# Patient Record
Sex: FEMALE | Race: WHITE | Employment: OTHER | RURAL
[De-identification: names, ages, dates, MRNs, and addresses within clinical notes are randomized per-mention and may not be internally consistent; named-entity substitution may affect disease eponyms.]

---

## 2017-03-31 ENCOUNTER — HOSPITAL ENCOUNTER (OUTPATIENT)
Dept: GENERAL RADIOLOGY | Age: 72
Discharge: HOME | End: 2017-03-31
Attending: NURSE PRACTITIONER | Admitting: NURSE PRACTITIONER

## 2017-03-31 DIAGNOSIS — Z12.31 ENCOUNTER FOR MAMMOGRAM TO ESTABLISH BASELINE MAMMOGRAM: ICD-10-CM

## 2018-04-02 ENCOUNTER — HOSPITAL ENCOUNTER (OUTPATIENT)
Dept: GENERAL RADIOLOGY | Age: 73
Discharge: HOME | End: 2018-04-02
Admitting: NURSE PRACTITIONER

## 2018-04-02 DIAGNOSIS — M81.0 AGE-RELATED OSTEOPOROSIS WITHOUT CURRENT PATHOLOGICAL FRACTURE: ICD-10-CM

## 2018-04-02 DIAGNOSIS — Z12.39 BREAST CANCER SCREENING: ICD-10-CM

## 2018-04-25 RX ORDER — SIMVASTATIN 20 MG
20 TABLET ORAL NIGHTLY
COMMUNITY

## 2018-04-25 RX ORDER — LEVOTHYROXINE SODIUM 0.07 MG/1
75 TABLET ORAL DAILY
COMMUNITY

## 2018-04-25 RX ORDER — ALENDRONATE SODIUM 70 MG/1
70 TABLET ORAL
COMMUNITY

## 2018-04-25 RX ORDER — BUDESONIDE AND FORMOTEROL FUMARATE DIHYDRATE 80; 4.5 UG/1; UG/1
2 AEROSOL RESPIRATORY (INHALATION)
Status: ON HOLD | COMMUNITY
End: 2018-11-01 | Stop reason: ALTCHOICE

## 2018-04-26 ENCOUNTER — OFFICE VISIT (OUTPATIENT)
Dept: SURGERY | Facility: CLINIC | Age: 73
End: 2018-04-26

## 2018-04-26 VITALS
SYSTOLIC BLOOD PRESSURE: 154 MMHG | HEART RATE: 91 BPM | WEIGHT: 210.54 LBS | DIASTOLIC BLOOD PRESSURE: 63 MMHG | TEMPERATURE: 98.4 F | OXYGEN SATURATION: 93 %

## 2018-04-26 DIAGNOSIS — K21.9 GASTROESOPHAGEAL REFLUX DISEASE, ESOPHAGITIS PRESENCE NOT SPECIFIED: ICD-10-CM

## 2018-04-26 DIAGNOSIS — K62.5 RECTAL BLEED: Primary | ICD-10-CM

## 2018-04-26 PROCEDURE — 99204 OFFICE O/P NEW MOD 45 MIN: CPT | Performed by: SURGERY

## 2018-04-26 RX ORDER — POLYETHYLENE GLYCOL 3350 17 G/17G
238 POWDER, FOR SOLUTION ORAL ONCE
Qty: 14 PACKET | Refills: 0 | Status: SHIPPED | OUTPATIENT
Start: 2018-04-26 | End: 2018-04-26

## 2018-04-26 RX ORDER — PHENOL 1.4 %
600 AEROSOL, SPRAY (ML) MUCOUS MEMBRANE DAILY
COMMUNITY

## 2018-04-26 RX ORDER — BISACODYL 5 MG/1
5 TABLET, DELAYED RELEASE ORAL DAILY PRN
Qty: 4 TABLET | Refills: 0 | Status: SHIPPED | OUTPATIENT
Start: 2018-04-26 | End: 2018-04-27

## 2018-04-26 NOTE — PROGRESS NOTES
Patient: Maura Jiménez    YOB: 1945    Date: 04/26/2018    Primary Care Provider: BRITTNY De La Cruz    Chief Complaint   Patient presents with   • Rectal Bleeding       Subjective .     History of present illness:  I saw the patient in the office today as a consultation for evaluation and treatment of rectal bleeding.  She has noticed blood on the toilet paper when she has hemorrhoids troubles.  She has also had a positive hemoccult test.  She denies family history of colon cancer and has never had a colonoscopy before.  She denies diarrhea, constipation, or abdominal pain. Patient also had epigastric pain and reflux symptoms. No previous EGD.    The following portions of the patient's history were reviewed and updated as appropriate: allergies, current medications, past family history, past medical history, past social history, past surgical history and problem list.      Review of Systems   Constitutional: Negative for chills, fever and unexpected weight change.   HENT: Negative for hearing loss, trouble swallowing and voice change.    Eyes: Negative for visual disturbance.   Respiratory: Negative for apnea, cough, chest tightness, shortness of breath and wheezing.    Cardiovascular: Negative for chest pain, palpitations and leg swelling.   Gastrointestinal: Positive for blood in stool. Negative for abdominal distention, abdominal pain, anal bleeding, constipation, diarrhea, nausea, rectal pain and vomiting.   Endocrine: Negative for cold intolerance and heat intolerance.   Genitourinary: Negative for difficulty urinating, dysuria and flank pain.   Musculoskeletal: Negative for back pain and gait problem.   Skin: Negative for color change, rash and wound.   Neurological: Negative for dizziness, syncope, speech difficulty, weakness, light-headedness, numbness and headaches.   Hematological: Negative for adenopathy. Does not bruise/bleed easily.   Psychiatric/Behavioral: Negative for  confusion. The patient is not nervous/anxious.        History:  Past Medical History:   Diagnosis Date   • Diabetes mellitus    • Disease of thyroid gland    • Hyperlipidemia    • Osteoarthritis        Past Surgical History:   Procedure Laterality Date   • APPENDECTOMY     • HYSTERECTOMY         Family History   Problem Relation Age of Onset   • Heart disease Father    • Diabetes Brother        Social History   Substance Use Topics   • Smoking status: Former Smoker   • Smokeless tobacco: Never Used   • Alcohol use No       Allergies:  Allergies   Allergen Reactions   • Codeine Unknown (See Comments)     unknown   • Shellfish-Derived Products Nausea And Vomiting       Medications:    Current Outpatient Prescriptions:   •  calcium carbonate (OS-VASU) 600 MG tablet, Take 600 mg by mouth Daily., Disp: , Rfl:   •  alendronate (FOSAMAX) 70 MG tablet, Take 70 mg by mouth Every 7 (Seven) Days., Disp: , Rfl:   •  bisacodyl (DULCOLAX) 5 MG EC tablet, Take 1 tablet by mouth Daily As Needed for Constipation for up to 1 day. Take as directed for colonoscopy prep, Disp: 4 tablet, Rfl: 0  •  budesonide-formoterol (SYMBICORT) 80-4.5 MCG/ACT inhaler, Inhale 2 puffs 2 (Two) Times a Day., Disp: , Rfl:   •  levothyroxine (SYNTHROID, LEVOTHROID) 75 MCG tablet, Take 75 mcg by mouth Daily., Disp: , Rfl:   •  mometasone-formoterol (DULERA 200) 200-5 MCG/ACT inhaler, Inhale 2 puffs 2 (Two) Times a Day., Disp: , Rfl:   •  polyethylene glycol (MIRALAX) packet, Take 238 g by mouth 1 (One) Time for 1 dose., Disp: 14 packet, Rfl: 0  •  simvastatin (ZOCOR) 20 MG tablet, Take 20 mg by mouth Every Night., Disp: , Rfl:   •  tiotropium (SPIRIVA) 18 MCG per inhalation capsule, Place 1 capsule into inhaler and inhale Daily., Disp: , Rfl:     Objective     Vital Signs:   Temp:  [98.4 °F (36.9 °C)] 98.4 °F (36.9 °C)  Heart Rate:  [91] 91  BP: (154)/(63) 154/63    Physical Exam:   General Appearance:    Alert, cooperative, in no acute distress   Head:     Normocephalic, without obvious abnormality, atraumatic   Eyes:            Lids and lashes normal, conjunctivae and sclerae normal, no   icterus, no pallor, corneas clear, PERRL   Ears:    Ears appear intact with no abnormalities noted   Throat:   No oral lesions, no thrush, oral mucosa moist   Neck:   No adenopathy, supple, trachea midline, no thyromegaly,  no JVD   Lungs:     Clear to auscultation,respirations regular, even and                  unlabored    Heart:    Regular rhythm and normal rate, normal S1 and S2, no            murmur   Abdomen:     no masses, no organomegaly, soft non-tender, non-distended, no guarding, there is no evidence of tenderness   Extremities:   Moves all extremities well, no edema, no cyanosis, no             redness   Pulses:   Pulses palpable and equal bilaterally   Skin:   No bleeding, bruising or rash   Lymph nodes:   No palpable adenopathy   Neurologic:   Cranial nerves 2 - 12 grossly intact, sensation intact      Results Review:   I reviewed the patient's new clinical results.    Review of Systems was reviewed and confirmed as accurate today.    Assessment/Plan :    1. Rectal bleed    2. Gastroesophageal reflux disease, esophagitis presence not specified        I recommend a colonoscopy And EGD for further evaluation. The procedure was explained as well as the risks which include but are not limited to bleeding, infection, perforation, abdominal pain etc. The patient understands these risks and the procedure and wishes to proceed.     Electronically signed by Arnulfo Sosa MD  04/26/18 9:39 AM  .crystal

## 2018-05-03 ENCOUNTER — OUTSIDE FACILITY SERVICE (OUTPATIENT)
Dept: SURGERY | Facility: CLINIC | Age: 73
End: 2018-05-03

## 2018-05-03 PROCEDURE — 43239 EGD BIOPSY SINGLE/MULTIPLE: CPT | Performed by: SURGERY

## 2018-05-03 PROCEDURE — 45378 DIAGNOSTIC COLONOSCOPY: CPT | Performed by: SURGERY

## 2018-05-10 ENCOUNTER — OFFICE VISIT (OUTPATIENT)
Dept: SURGERY | Facility: CLINIC | Age: 73
End: 2018-05-10

## 2018-05-10 VITALS
WEIGHT: 210.54 LBS | HEART RATE: 75 BPM | DIASTOLIC BLOOD PRESSURE: 61 MMHG | OXYGEN SATURATION: 97 % | TEMPERATURE: 97.5 F | SYSTOLIC BLOOD PRESSURE: 131 MMHG

## 2018-05-10 DIAGNOSIS — K21.00 GASTROESOPHAGEAL REFLUX DISEASE WITH ESOPHAGITIS: Primary | ICD-10-CM

## 2018-05-10 DIAGNOSIS — R10.13 EPIGASTRIC PAIN: ICD-10-CM

## 2018-05-10 PROCEDURE — 99212 OFFICE O/P EST SF 10 MIN: CPT | Performed by: SURGERY

## 2018-05-10 RX ORDER — OMEPRAZOLE 40 MG/1
40 CAPSULE, DELAYED RELEASE ORAL DAILY
Qty: 30 CAPSULE | Refills: 11 | Status: SHIPPED | OUTPATIENT
Start: 2018-05-10 | End: 2019-05-10

## 2018-05-10 NOTE — PROGRESS NOTES
Patient: Maura Jiménez    YOB: 1945    Date: 05/10/2018    Primary Care Provider: BRITTNY De La Cruz    Reason for Consultation: Follow-up colonoscopy    Chief Complaint   Patient presents with   • Follow-up     EGD and colonoscopy       History of present illness:  I saw the patient in the office today as a followup from their recent colonoscopy and EGD with polypectomy, the pathology report did show minimal chronic gastritis and acute esophagitis.  They state that they have done well and are having no complaints.Patient is not on a PPI medication. No findings on colonoscopy.    The following portions of the patient's history were reviewed and updated as appropriate: allergies, current medications, past family history, past medical history, past social history, past surgical history and problem list.      Review of Systems   Constitutional: Negative for chills, fatigue and fever.   Respiratory: Negative for cough.    Cardiovascular: Negative for chest pain.   Gastrointestinal: Negative for abdominal pain, diarrhea, nausea and vomiting.       Allergies:  Allergies   Allergen Reactions   • Codeine Unknown (See Comments)     unknown   • Shellfish-Derived Products Nausea And Vomiting       Medications:    Current Outpatient Prescriptions:   •  alendronate (FOSAMAX) 70 MG tablet, Take 70 mg by mouth Every 7 (Seven) Days., Disp: , Rfl:   •  budesonide-formoterol (SYMBICORT) 80-4.5 MCG/ACT inhaler, Inhale 2 puffs 2 (Two) Times a Day., Disp: , Rfl:   •  calcium carbonate (OS-VASU) 600 MG tablet, Take 600 mg by mouth Daily., Disp: , Rfl:   •  levothyroxine (SYNTHROID, LEVOTHROID) 75 MCG tablet, Take 75 mcg by mouth Daily., Disp: , Rfl:   •  mometasone-formoterol (DULERA 200) 200-5 MCG/ACT inhaler, Inhale 2 puffs 2 (Two) Times a Day., Disp: , Rfl:   •  simvastatin (ZOCOR) 20 MG tablet, Take 20 mg by mouth Every Night., Disp: , Rfl:   •  tiotropium (SPIRIVA) 18 MCG per inhalation capsule, Place 1  capsule into inhaler and inhale Daily., Disp: , Rfl:     Vital Signs:  Temp:  [97.5 °F (36.4 °C)] 97.5 °F (36.4 °C)  Heart Rate:  [75] 75  BP: (131)/(61) 131/61    Physical Exam:   General Appearance:    Alert, cooperative, in no acute distress   Abdomen:     no masses, no organomegaly, soft non-tender, non-distended, no guarding, wounds are well healed, no evidence of recurrent hernia   Chest:      Clear to ausculation            Cor:     Regular rate and rhythm    Results Review:   I reviewed the patient's new clinical results.    Assessment / Plan:    1. Gastroesophageal reflux disease with esophagitis    2. Epigastric pain        I did discuss the situation with the patient today in the office and they have done well from their recent colonoscopy with polypectomy.  I have released the patient back to normal activity.  I need to see the patient back in the office in 5 years and they will need to have repeat colonoscopy at that time.Patient will also be started on Prilosec daily and needs repeat EGD in 1 year.     Electronically signed by Arnulfo Sosa MD  05/10/18    Scribed for Arnulfo Sosa MD by Michelle Milligan. 5/10/2018  9:54 AM

## 2018-10-31 ENCOUNTER — APPOINTMENT (OUTPATIENT)
Dept: GENERAL RADIOLOGY | Facility: HOSPITAL | Age: 73
End: 2018-10-31

## 2018-10-31 ENCOUNTER — HOSPITAL ENCOUNTER (INPATIENT)
Facility: HOSPITAL | Age: 73
LOS: 4 days | Discharge: HOME | End: 2018-11-05
Attending: EMERGENCY MEDICINE | Admitting: INTERNAL MEDICINE

## 2018-10-31 DIAGNOSIS — J96.11 CHRONIC RESPIRATORY FAILURE WITH HYPOXIA (HCC): ICD-10-CM

## 2018-10-31 DIAGNOSIS — J96.91 RESPIRATORY FAILURE WITH HYPOXIA, UNSPECIFIED CHRONICITY (HCC): Primary | ICD-10-CM

## 2018-10-31 LAB
ALBUMIN SERPL-MCNC: 4.2 G/DL (ref 3.5–5)
ALBUMIN/GLOB SERPL: 1.3 G/DL (ref 1–2)
ALP SERPL-CCNC: 98 U/L (ref 38–126)
ALT SERPL W P-5'-P-CCNC: 28 U/L (ref 13–69)
ANION GAP SERPL CALCULATED.3IONS-SCNC: 13.6 MMOL/L (ref 10–20)
ARTERIAL PATENCY WRIST A: ABNORMAL
AST SERPL-CCNC: 40 U/L (ref 15–46)
ATMOSPHERIC PRESS: 732 MMHG
BASE EXCESS BLDA CALC-SCNC: 2.5 MMOL/L (ref 0–2)
BASOPHILS # BLD AUTO: 0.03 10*3/MM3 (ref 0–0.2)
BASOPHILS NFR BLD AUTO: 0.3 % (ref 0–2.5)
BDY SITE: ABNORMAL
BILIRUB SERPL-MCNC: 0.5 MG/DL (ref 0.2–1.3)
BUN BLD-MCNC: 9 MG/DL (ref 7–20)
BUN/CREAT SERPL: 15 (ref 7.1–23.5)
CALCIUM SPEC-SCNC: 9.2 MG/DL (ref 8.4–10.2)
CHLORIDE SERPL-SCNC: 99 MMOL/L (ref 98–107)
CO2 SERPL-SCNC: 27 MMOL/L (ref 26–30)
COHGB MFR BLD: 0.8 % (ref 0–2)
CREAT BLD-MCNC: 0.6 MG/DL (ref 0.6–1.3)
D-LACTATE SERPL-SCNC: 1.7 MMOL/L (ref 0.5–2)
DEPRECATED RDW RBC AUTO: 45.3 FL (ref 37–54)
EOSINOPHIL # BLD AUTO: 0.02 10*3/MM3 (ref 0–0.7)
EOSINOPHIL NFR BLD AUTO: 0.2 % (ref 0–7)
ERYTHROCYTE [DISTWIDTH] IN BLOOD BY AUTOMATED COUNT: 12.8 % (ref 11.5–14.5)
FLUAV AG NPH QL: NEGATIVE
FLUBV AG NPH QL IA: NEGATIVE
GFR SERPL CREATININE-BSD FRML MDRD: 98 ML/MIN/1.73
GLOBULIN UR ELPH-MCNC: 3.3 GM/DL
GLUCOSE BLD-MCNC: 251 MG/DL (ref 74–98)
GLUCOSE BLDC GLUCOMTR-MCNC: 272 MG/DL (ref 70–130)
HCO3 BLDA-SCNC: 26.4 MMOL/L (ref 22–28)
HCT VFR BLD AUTO: 39.4 % (ref 37–47)
HCT VFR BLD CALC: 36.2 %
HGB BLD-MCNC: 12.3 G/DL (ref 12–16)
HGB BLDA-MCNC: 11.8 G/DL (ref 12–18)
HOLD SPECIMEN: NORMAL
IMM GRANULOCYTES # BLD: 0.04 10*3/MM3 (ref 0–0.06)
IMM GRANULOCYTES NFR BLD: 0.3 % (ref 0–0.6)
LYMPHOCYTES # BLD AUTO: 0.67 10*3/MM3 (ref 0.6–3.4)
LYMPHOCYTES NFR BLD AUTO: 5.8 % (ref 10–50)
MCH RBC QN AUTO: 30 PG (ref 27–31)
MCHC RBC AUTO-ENTMCNC: 31.2 G/DL (ref 30–37)
MCV RBC AUTO: 96.1 FL (ref 81–99)
METHGB BLD QL: 1 % (ref 0–1.5)
MODALITY: ABNORMAL
MONOCYTES # BLD AUTO: 0.71 10*3/MM3 (ref 0–0.9)
MONOCYTES NFR BLD AUTO: 6.1 % (ref 0–12)
NEUTROPHILS # BLD AUTO: 10.15 10*3/MM3 (ref 2–6.9)
NEUTROPHILS NFR BLD AUTO: 87.3 % (ref 37–80)
NOTE: ABNORMAL
NRBC BLD MANUAL-RTO: 0 /100 WBC (ref 0–0)
NT-PROBNP SERPL-MCNC: 568 PG/ML (ref 0–125)
OXYHGB MFR BLDV: 95.3 % (ref 94–99)
PCO2 BLDA: 37.3 MM HG (ref 35–45)
PCO2 TEMP ADJ BLD: ABNORMAL MM HG (ref 35–45)
PH BLDA: 7.46 PH UNITS (ref 7.3–7.5)
PH, TEMP CORRECTED: ABNORMAL PH UNITS
PLATELET # BLD AUTO: 250 10*3/MM3 (ref 130–400)
PMV BLD AUTO: 10.8 FL (ref 6–12)
PO2 BLDA: 82 MM HG (ref 75–100)
PO2 TEMP ADJ BLD: ABNORMAL MM HG (ref 83–108)
POTASSIUM BLD-SCNC: 3.6 MMOL/L (ref 3.5–5.1)
PROT SERPL-MCNC: 7.5 G/DL (ref 6.3–8.2)
RBC # BLD AUTO: 4.1 10*6/MM3 (ref 4.2–5.4)
SAO2 % BLDCOA: 97 % (ref 94–100)
SODIUM BLD-SCNC: 136 MMOL/L (ref 137–145)
TROPONIN I SERPL-MCNC: <0.012 NG/ML (ref 0–0.03)
VENTILATOR MODE: ABNORMAL
WBC NRBC COR # BLD: 11.62 10*3/MM3 (ref 4.8–10.8)
WHOLE BLOOD HOLD SPECIMEN: NORMAL
WHOLE BLOOD HOLD SPECIMEN: NORMAL

## 2018-10-31 PROCEDURE — 25010000002 CEFTRIAXONE SODIUM-DEXTROSE 1-3.74 GM-%(50ML) RECONSTITUTED SOLUTION: Performed by: EMERGENCY MEDICINE

## 2018-10-31 PROCEDURE — 87804 INFLUENZA ASSAY W/OPTIC: CPT | Performed by: EMERGENCY MEDICINE

## 2018-10-31 PROCEDURE — 82962 GLUCOSE BLOOD TEST: CPT

## 2018-10-31 PROCEDURE — G0378 HOSPITAL OBSERVATION PER HR: HCPCS

## 2018-10-31 PROCEDURE — 93005 ELECTROCARDIOGRAM TRACING: CPT

## 2018-10-31 PROCEDURE — 80053 COMPREHEN METABOLIC PANEL: CPT

## 2018-10-31 PROCEDURE — 25010000002 ENOXAPARIN PER 10 MG: Performed by: INTERNAL MEDICINE

## 2018-10-31 PROCEDURE — 94799 UNLISTED PULMONARY SVC/PX: CPT

## 2018-10-31 PROCEDURE — 83605 ASSAY OF LACTIC ACID: CPT | Performed by: EMERGENCY MEDICINE

## 2018-10-31 PROCEDURE — 94640 AIRWAY INHALATION TREATMENT: CPT

## 2018-10-31 PROCEDURE — 25010000002 METHYLPREDNISOLONE PER 125 MG: Performed by: EMERGENCY MEDICINE

## 2018-10-31 PROCEDURE — 25010000002 AZITHROMYCIN: Performed by: EMERGENCY MEDICINE

## 2018-10-31 PROCEDURE — 63710000001 INSULIN ASPART PER 5 UNITS: Performed by: INTERNAL MEDICINE

## 2018-10-31 PROCEDURE — 83880 ASSAY OF NATRIURETIC PEPTIDE: CPT

## 2018-10-31 PROCEDURE — 83036 HEMOGLOBIN GLYCOSYLATED A1C: CPT | Performed by: INTERNAL MEDICINE

## 2018-10-31 PROCEDURE — 87040 BLOOD CULTURE FOR BACTERIA: CPT | Performed by: EMERGENCY MEDICINE

## 2018-10-31 PROCEDURE — 82375 ASSAY CARBOXYHB QUANT: CPT

## 2018-10-31 PROCEDURE — 71046 X-RAY EXAM CHEST 2 VIEWS: CPT

## 2018-10-31 PROCEDURE — 83050 HGB METHEMOGLOBIN QUAN: CPT

## 2018-10-31 PROCEDURE — 85025 COMPLETE CBC W/AUTO DIFF WBC: CPT

## 2018-10-31 PROCEDURE — 25010000002 METHYLPREDNISOLONE PER 40 MG: Performed by: INTERNAL MEDICINE

## 2018-10-31 PROCEDURE — 82805 BLOOD GASES W/O2 SATURATION: CPT

## 2018-10-31 PROCEDURE — 25810000003 SODIUM CHLORIDE 0.9 % WITH KCL 20 MEQ 20-0.9 MEQ/L-% SOLUTION: Performed by: INTERNAL MEDICINE

## 2018-10-31 PROCEDURE — 99220 PR INITIAL OBSERVATION CARE/DAY 70 MINUTES: CPT | Performed by: INTERNAL MEDICINE

## 2018-10-31 PROCEDURE — 36600 WITHDRAWAL OF ARTERIAL BLOOD: CPT

## 2018-10-31 PROCEDURE — 99284 EMERGENCY DEPT VISIT MOD MDM: CPT

## 2018-10-31 PROCEDURE — 84484 ASSAY OF TROPONIN QUANT: CPT

## 2018-10-31 RX ORDER — ATORVASTATIN CALCIUM 10 MG/1
10 TABLET, FILM COATED ORAL NIGHTLY
Status: DISCONTINUED | OUTPATIENT
Start: 2018-10-31 | End: 2018-11-05 | Stop reason: HOSPADM

## 2018-10-31 RX ORDER — IPRATROPIUM BROMIDE AND ALBUTEROL SULFATE 2.5; .5 MG/3ML; MG/3ML
3 SOLUTION RESPIRATORY (INHALATION)
Status: DISCONTINUED | OUTPATIENT
Start: 2018-10-31 | End: 2018-11-05 | Stop reason: HOSPADM

## 2018-10-31 RX ORDER — PROMETHAZINE HYDROCHLORIDE 25 MG/ML
12.5 INJECTION, SOLUTION INTRAMUSCULAR; INTRAVENOUS EVERY 6 HOURS PRN
Status: DISCONTINUED | OUTPATIENT
Start: 2018-10-31 | End: 2018-11-05 | Stop reason: HOSPADM

## 2018-10-31 RX ORDER — DEXTROSE MONOHYDRATE 25 G/50ML
25 INJECTION, SOLUTION INTRAVENOUS
Status: DISCONTINUED | OUTPATIENT
Start: 2018-10-31 | End: 2018-11-05 | Stop reason: HOSPADM

## 2018-10-31 RX ORDER — PANTOPRAZOLE SODIUM 40 MG/1
40 TABLET, DELAYED RELEASE ORAL EVERY MORNING
Status: DISCONTINUED | OUTPATIENT
Start: 2018-11-01 | End: 2018-11-05 | Stop reason: HOSPADM

## 2018-10-31 RX ORDER — IPRATROPIUM BROMIDE AND ALBUTEROL SULFATE 2.5; .5 MG/3ML; MG/3ML
3 SOLUTION RESPIRATORY (INHALATION) ONCE
Status: COMPLETED | OUTPATIENT
Start: 2018-10-31 | End: 2018-10-31

## 2018-10-31 RX ORDER — CEFTRIAXONE 1 G/50ML
1 INJECTION, SOLUTION INTRAVENOUS ONCE
Status: COMPLETED | OUTPATIENT
Start: 2018-10-31 | End: 2018-10-31

## 2018-10-31 RX ORDER — METHYLPREDNISOLONE SODIUM SUCCINATE 125 MG/2ML
125 INJECTION, POWDER, LYOPHILIZED, FOR SOLUTION INTRAMUSCULAR; INTRAVENOUS ONCE
Status: COMPLETED | OUTPATIENT
Start: 2018-10-31 | End: 2018-10-31

## 2018-10-31 RX ORDER — SODIUM CHLORIDE AND POTASSIUM CHLORIDE 150; 900 MG/100ML; MG/100ML
100 INJECTION, SOLUTION INTRAVENOUS CONTINUOUS
Status: DISPENSED | OUTPATIENT
Start: 2018-10-31 | End: 2018-11-01

## 2018-10-31 RX ORDER — ALBUTEROL SULFATE 90 UG/1
2 AEROSOL, METERED RESPIRATORY (INHALATION) EVERY 4 HOURS PRN
Status: ON HOLD | COMMUNITY
End: 2018-11-01 | Stop reason: ALTCHOICE

## 2018-10-31 RX ORDER — FOLIC ACID 1 MG/1
1 TABLET ORAL DAILY
Status: DISCONTINUED | OUTPATIENT
Start: 2018-11-01 | End: 2018-11-05 | Stop reason: HOSPADM

## 2018-10-31 RX ORDER — FOLIC ACID 1 MG/1
1 TABLET ORAL DAILY
COMMUNITY

## 2018-10-31 RX ORDER — CHOLECALCIFEROL (VITAMIN D3) 125 MCG
500 CAPSULE ORAL DAILY
Status: DISCONTINUED | OUTPATIENT
Start: 2018-11-01 | End: 2018-11-05 | Stop reason: HOSPADM

## 2018-10-31 RX ORDER — ACETAMINOPHEN 325 MG/1
650 TABLET ORAL EVERY 4 HOURS PRN
Status: DISCONTINUED | OUTPATIENT
Start: 2018-10-31 | End: 2018-11-05 | Stop reason: HOSPADM

## 2018-10-31 RX ORDER — SODIUM CHLORIDE 0.9 % (FLUSH) 0.9 %
3-10 SYRINGE (ML) INJECTION AS NEEDED
Status: DISCONTINUED | OUTPATIENT
Start: 2018-10-31 | End: 2018-11-05 | Stop reason: HOSPADM

## 2018-10-31 RX ORDER — PROMETHAZINE HYDROCHLORIDE 25 MG/1
12.5 SUPPOSITORY RECTAL EVERY 6 HOURS PRN
Status: DISCONTINUED | OUTPATIENT
Start: 2018-10-31 | End: 2018-11-05 | Stop reason: HOSPADM

## 2018-10-31 RX ORDER — BISACODYL 10 MG
10 SUPPOSITORY, RECTAL RECTAL DAILY PRN
Status: DISCONTINUED | OUTPATIENT
Start: 2018-10-31 | End: 2018-11-05 | Stop reason: HOSPADM

## 2018-10-31 RX ORDER — NICOTINE POLACRILEX 4 MG
1 LOZENGE BUCCAL
Status: DISCONTINUED | OUTPATIENT
Start: 2018-10-31 | End: 2018-11-05 | Stop reason: HOSPADM

## 2018-10-31 RX ORDER — SODIUM CHLORIDE 0.9 % (FLUSH) 0.9 %
3 SYRINGE (ML) INJECTION EVERY 12 HOURS SCHEDULED
Status: DISCONTINUED | OUTPATIENT
Start: 2018-10-31 | End: 2018-11-05 | Stop reason: HOSPADM

## 2018-10-31 RX ORDER — SODIUM CHLORIDE 0.9 % (FLUSH) 0.9 %
10 SYRINGE (ML) INJECTION AS NEEDED
Status: DISCONTINUED | OUTPATIENT
Start: 2018-10-31 | End: 2018-11-05 | Stop reason: HOSPADM

## 2018-10-31 RX ORDER — LEVOTHYROXINE SODIUM 0.07 MG/1
75 TABLET ORAL
Status: DISCONTINUED | OUTPATIENT
Start: 2018-11-01 | End: 2018-11-02

## 2018-10-31 RX ORDER — METHYLPREDNISOLONE SODIUM SUCCINATE 40 MG/ML
40 INJECTION, POWDER, LYOPHILIZED, FOR SOLUTION INTRAMUSCULAR; INTRAVENOUS EVERY 8 HOURS
Status: DISCONTINUED | OUTPATIENT
Start: 2018-10-31 | End: 2018-11-02

## 2018-10-31 RX ORDER — PROMETHAZINE HYDROCHLORIDE 12.5 MG/1
12.5 TABLET ORAL EVERY 6 HOURS PRN
Status: DISCONTINUED | OUTPATIENT
Start: 2018-10-31 | End: 2018-11-05 | Stop reason: HOSPADM

## 2018-10-31 RX ORDER — GUAIFENESIN 600 MG/1
600 TABLET, EXTENDED RELEASE ORAL 2 TIMES DAILY
Status: DISCONTINUED | OUTPATIENT
Start: 2018-10-31 | End: 2018-11-05 | Stop reason: HOSPADM

## 2018-10-31 RX ADMIN — METHYLPREDNISOLONE SODIUM SUCCINATE 125 MG: 125 INJECTION, POWDER, FOR SOLUTION INTRAMUSCULAR; INTRAVENOUS at 17:12

## 2018-10-31 RX ADMIN — SODIUM CHLORIDE 1000 ML: 9 INJECTION, SOLUTION INTRAVENOUS at 17:10

## 2018-10-31 RX ADMIN — POTASSIUM CHLORIDE AND SODIUM CHLORIDE 100 ML/HR: 900; 150 INJECTION, SOLUTION INTRAVENOUS at 20:33

## 2018-10-31 RX ADMIN — IPRATROPIUM BROMIDE AND ALBUTEROL SULFATE 3 ML: .5; 3 SOLUTION RESPIRATORY (INHALATION) at 16:40

## 2018-10-31 RX ADMIN — CEFTRIAXONE 1 G: 1 INJECTION, SOLUTION INTRAVENOUS at 18:06

## 2018-10-31 RX ADMIN — METHYLPREDNISOLONE SODIUM SUCCINATE 40 MG: 40 INJECTION, POWDER, FOR SOLUTION INTRAMUSCULAR; INTRAVENOUS at 20:32

## 2018-10-31 RX ADMIN — ATORVASTATIN CALCIUM 10 MG: 10 TABLET, FILM COATED ORAL at 20:33

## 2018-10-31 RX ADMIN — GUAIFENESIN 600 MG: 600 TABLET, EXTENDED RELEASE ORAL at 20:32

## 2018-10-31 RX ADMIN — INSULIN ASPART 4 UNITS: 100 INJECTION, SOLUTION INTRAVENOUS; SUBCUTANEOUS at 20:33

## 2018-10-31 RX ADMIN — ENOXAPARIN SODIUM 40 MG: 40 INJECTION SUBCUTANEOUS at 20:33

## 2018-10-31 RX ADMIN — AZITHROMYCIN 500 MG: 500 INJECTION, POWDER, LYOPHILIZED, FOR SOLUTION INTRAVENOUS at 18:54

## 2018-11-01 PROBLEM — E11.9 TYPE 2 DIABETES MELLITUS WITHOUT COMPLICATION, WITHOUT LONG-TERM CURRENT USE OF INSULIN: Status: ACTIVE | Noted: 2018-11-01

## 2018-11-01 PROBLEM — J44.1 CHRONIC OBSTRUCTIVE PULMONARY DISEASE WITH ACUTE EXACERBATION: Status: ACTIVE | Noted: 2018-11-01

## 2018-11-01 PROBLEM — E11.9 TYPE 2 DIABETES MELLITUS WITHOUT COMPLICATION, WITHOUT LONG-TERM CURRENT USE OF INSULIN (HCC): Status: ACTIVE | Noted: 2018-11-01

## 2018-11-01 LAB
ANION GAP SERPL CALCULATED.3IONS-SCNC: 13 MMOL/L (ref 10–20)
BACTERIA SPEC RESP CULT: NORMAL
BASOPHILS # BLD AUTO: 0.01 10*3/MM3 (ref 0–0.2)
BASOPHILS NFR BLD AUTO: 0.1 % (ref 0–2.5)
BUN BLD-MCNC: 9 MG/DL (ref 7–20)
BUN/CREAT SERPL: 18 (ref 7.1–23.5)
CALCIUM SPEC-SCNC: 7.9 MG/DL (ref 8.4–10.2)
CHLORIDE SERPL-SCNC: 107 MMOL/L (ref 98–107)
CO2 SERPL-SCNC: 19 MMOL/L (ref 26–30)
CREAT BLD-MCNC: 0.5 MG/DL (ref 0.6–1.3)
DEPRECATED RDW RBC AUTO: 45.4 FL (ref 37–54)
EOSINOPHIL # BLD AUTO: 0 10*3/MM3 (ref 0–0.7)
EOSINOPHIL NFR BLD AUTO: 0 % (ref 0–7)
ERYTHROCYTE [DISTWIDTH] IN BLOOD BY AUTOMATED COUNT: 12.9 % (ref 11.5–14.5)
GFR SERPL CREATININE-BSD FRML MDRD: 121 ML/MIN/1.73
GLUCOSE BLD-MCNC: 276 MG/DL (ref 74–98)
GLUCOSE BLDC GLUCOMTR-MCNC: 162 MG/DL (ref 70–130)
GLUCOSE BLDC GLUCOMTR-MCNC: 247 MG/DL (ref 70–130)
GLUCOSE BLDC GLUCOMTR-MCNC: 262 MG/DL (ref 70–130)
GLUCOSE BLDC GLUCOMTR-MCNC: 324 MG/DL (ref 70–130)
HBA1C MFR BLD: 6.8 % (ref 3–6)
HCT VFR BLD AUTO: 32.3 % (ref 37–47)
HGB BLD-MCNC: 10.3 G/DL (ref 12–16)
IMM GRANULOCYTES # BLD: 0.07 10*3/MM3 (ref 0–0.06)
IMM GRANULOCYTES NFR BLD: 0.7 % (ref 0–0.6)
LYMPHOCYTES # BLD AUTO: 0.48 10*3/MM3 (ref 0.6–3.4)
LYMPHOCYTES NFR BLD AUTO: 4.7 % (ref 10–50)
MAGNESIUM SERPL-MCNC: 1.9 MG/DL (ref 1.6–2.3)
MCH RBC QN AUTO: 30.6 PG (ref 27–31)
MCHC RBC AUTO-ENTMCNC: 31.9 G/DL (ref 30–37)
MCV RBC AUTO: 95.8 FL (ref 81–99)
MONOCYTES # BLD AUTO: 0.21 10*3/MM3 (ref 0–0.9)
MONOCYTES NFR BLD AUTO: 2.1 % (ref 0–12)
NEUTROPHILS # BLD AUTO: 9.37 10*3/MM3 (ref 2–6.9)
NEUTROPHILS NFR BLD AUTO: 92.4 % (ref 37–80)
NRBC BLD MANUAL-RTO: 0 /100 WBC (ref 0–0)
PLATELET # BLD AUTO: 218 10*3/MM3 (ref 130–400)
PMV BLD AUTO: 11 FL (ref 6–12)
POTASSIUM BLD-SCNC: 4 MMOL/L (ref 3.5–5.1)
RBC # BLD AUTO: 3.37 10*6/MM3 (ref 4.2–5.4)
SODIUM BLD-SCNC: 135 MMOL/L (ref 137–145)
WBC NRBC COR # BLD: 10.14 10*3/MM3 (ref 4.8–10.8)

## 2018-11-01 PROCEDURE — 83735 ASSAY OF MAGNESIUM: CPT | Performed by: INTERNAL MEDICINE

## 2018-11-01 PROCEDURE — 63710000001 INSULIN ASPART PER 5 UNITS: Performed by: INTERNAL MEDICINE

## 2018-11-01 PROCEDURE — 94640 AIRWAY INHALATION TREATMENT: CPT

## 2018-11-01 PROCEDURE — 85025 COMPLETE CBC W/AUTO DIFF WBC: CPT | Performed by: INTERNAL MEDICINE

## 2018-11-01 PROCEDURE — 25010000002 METHYLPREDNISOLONE PER 40 MG: Performed by: INTERNAL MEDICINE

## 2018-11-01 PROCEDURE — 25010000002 AZITHROMYCIN: Performed by: INTERNAL MEDICINE

## 2018-11-01 PROCEDURE — 94762 N-INVAS EAR/PLS OXIMTRY CONT: CPT

## 2018-11-01 PROCEDURE — 63710000001 INSULIN DETEMIR PER 5 UNITS: Performed by: NURSE PRACTITIONER

## 2018-11-01 PROCEDURE — 87070 CULTURE OTHR SPECIMN AEROBIC: CPT | Performed by: INTERNAL MEDICINE

## 2018-11-01 PROCEDURE — 25010000002 ENOXAPARIN PER 10 MG: Performed by: INTERNAL MEDICINE

## 2018-11-01 PROCEDURE — 94799 UNLISTED PULMONARY SVC/PX: CPT

## 2018-11-01 PROCEDURE — 82962 GLUCOSE BLOOD TEST: CPT

## 2018-11-01 PROCEDURE — 99232 SBSQ HOSP IP/OBS MODERATE 35: CPT | Performed by: INTERNAL MEDICINE

## 2018-11-01 PROCEDURE — 80048 BASIC METABOLIC PNL TOTAL CA: CPT | Performed by: INTERNAL MEDICINE

## 2018-11-01 PROCEDURE — 25010000002 CEFTRIAXONE SODIUM-DEXTROSE 1-3.74 GM-%(50ML) RECONSTITUTED SOLUTION: Performed by: INTERNAL MEDICINE

## 2018-11-01 RX ORDER — SODIUM CHLORIDE FOR INHALATION 3 %
4 VIAL, NEBULIZER (ML) INHALATION ONCE
Status: DISCONTINUED | OUTPATIENT
Start: 2018-11-01 | End: 2018-11-05 | Stop reason: HOSPADM

## 2018-11-01 RX ORDER — CEFTRIAXONE 1 G/50ML
1 INJECTION, SOLUTION INTRAVENOUS EVERY 24 HOURS
Status: DISCONTINUED | OUTPATIENT
Start: 2018-11-01 | End: 2018-11-05 | Stop reason: HOSPADM

## 2018-11-01 RX ORDER — BUDESONIDE 0.5 MG/2ML
0.5 INHALANT ORAL
Status: DISCONTINUED | OUTPATIENT
Start: 2018-11-01 | End: 2018-11-05 | Stop reason: HOSPADM

## 2018-11-01 RX ORDER — ALBUTEROL SULFATE 2.5 MG/3ML
2.5 SOLUTION RESPIRATORY (INHALATION) EVERY 4 HOURS PRN
COMMUNITY

## 2018-11-01 RX ADMIN — LEVOTHYROXINE SODIUM 75 MCG: 75 TABLET ORAL at 06:27

## 2018-11-01 RX ADMIN — METHYLPREDNISOLONE SODIUM SUCCINATE 40 MG: 40 INJECTION, POWDER, FOR SOLUTION INTRAMUSCULAR; INTRAVENOUS at 04:05

## 2018-11-01 RX ADMIN — IPRATROPIUM BROMIDE AND ALBUTEROL SULFATE 3 ML: .5; 3 SOLUTION RESPIRATORY (INHALATION) at 12:57

## 2018-11-01 RX ADMIN — PANTOPRAZOLE SODIUM 40 MG: 40 TABLET, DELAYED RELEASE ORAL at 06:27

## 2018-11-01 RX ADMIN — GUAIFENESIN 600 MG: 600 TABLET, EXTENDED RELEASE ORAL at 20:39

## 2018-11-01 RX ADMIN — CYANOCOBALAMIN TAB 500 MCG 500 MCG: 500 TAB at 09:07

## 2018-11-01 RX ADMIN — INSULIN ASPART 2 UNITS: 100 INJECTION, SOLUTION INTRAVENOUS; SUBCUTANEOUS at 20:39

## 2018-11-01 RX ADMIN — IPRATROPIUM BROMIDE AND ALBUTEROL SULFATE 3 ML: .5; 3 SOLUTION RESPIRATORY (INHALATION) at 00:50

## 2018-11-01 RX ADMIN — INSULIN ASPART 3 UNITS: 100 INJECTION, SOLUTION INTRAVENOUS; SUBCUTANEOUS at 17:44

## 2018-11-01 RX ADMIN — CALCIUM CARBONATE-VITAMIN D TAB 500 MG-200 UNIT 500 MG: 500-200 TAB at 09:07

## 2018-11-01 RX ADMIN — FOLIC ACID 1 MG: 1 TABLET ORAL at 09:07

## 2018-11-01 RX ADMIN — Medication 3 ML: at 09:06

## 2018-11-01 RX ADMIN — INSULIN ASPART 4 UNITS: 100 INJECTION, SOLUTION INTRAVENOUS; SUBCUTANEOUS at 06:59

## 2018-11-01 RX ADMIN — ENOXAPARIN SODIUM 40 MG: 40 INJECTION SUBCUTANEOUS at 20:39

## 2018-11-01 RX ADMIN — IPRATROPIUM BROMIDE AND ALBUTEROL SULFATE 3 ML: .5; 3 SOLUTION RESPIRATORY (INHALATION) at 06:53

## 2018-11-01 RX ADMIN — ATORVASTATIN CALCIUM 10 MG: 10 TABLET, FILM COATED ORAL at 20:39

## 2018-11-01 RX ADMIN — METHYLPREDNISOLONE SODIUM SUCCINATE 40 MG: 40 INJECTION, POWDER, FOR SOLUTION INTRAMUSCULAR; INTRAVENOUS at 20:39

## 2018-11-01 RX ADMIN — GUAIFENESIN 600 MG: 600 TABLET, EXTENDED RELEASE ORAL at 09:07

## 2018-11-01 RX ADMIN — IPRATROPIUM BROMIDE AND ALBUTEROL SULFATE 3 ML: .5; 3 SOLUTION RESPIRATORY (INHALATION) at 19:24

## 2018-11-01 RX ADMIN — Medication 3 ML: at 20:41

## 2018-11-01 RX ADMIN — AZITHROMYCIN 500 MG: 500 INJECTION, POWDER, LYOPHILIZED, FOR SOLUTION INTRAVENOUS at 18:57

## 2018-11-01 RX ADMIN — BUDESONIDE 0.5 MG: 0.5 INHALANT RESPIRATORY (INHALATION) at 19:24

## 2018-11-01 RX ADMIN — CEFTRIAXONE 1 G: 1 INJECTION, SOLUTION INTRAVENOUS at 17:43

## 2018-11-01 RX ADMIN — METHYLPREDNISOLONE SODIUM SUCCINATE 40 MG: 40 INJECTION, POWDER, FOR SOLUTION INTRAMUSCULAR; INTRAVENOUS at 12:18

## 2018-11-01 RX ADMIN — INSULIN DETEMIR 10 UNITS: 100 INJECTION, SOLUTION SUBCUTANEOUS at 15:44

## 2018-11-01 RX ADMIN — INSULIN ASPART 5 UNITS: 100 INJECTION, SOLUTION INTRAVENOUS; SUBCUTANEOUS at 12:17

## 2018-11-01 NOTE — PLAN OF CARE
Problem: Breathing Pattern Ineffective (Adult)  Goal: Identify Related Risk Factors and Signs and Symptoms  Outcome: Outcome(s) achieved Date Met: 10/31/18   10/31/18 0970   Breathing Pattern Ineffective (Adult)   Related Risk Factors (Breathing Pattern Ineffective) infection   Signs and Symptoms (Breathing Pattern Ineffective) breathing pattern altered;activity intolerance

## 2018-11-01 NOTE — PROGRESS NOTES
Adult Nutrition  Assessment/PES    Patient Name:  Maura Jiménez  YOB: 1945  MRN: [id number]  Admit Date:  10/31/2018    Assessment Date:  11/1/2018    Comments:  Pt was seen today and may qualify for mild malnutrition based on weight and NFPE (Nutrition Focused Physical Exam). Mild malnutrition related to inadequate protein and nutrient-dense foods evidenced by pt's poor PO intake over the past 3 days; no significant weight loss; mild muscle wasted noted on exam with slightly prominent clavicle, slightly squared shoulder, and mild hollowness to temples; mild fat loss indicated by loss of tricep and under eye fat pad, pt with prominent muscle definition in calf region. RD ordered nutritional supplements of NSA Mighty Shakes nightly and peanut butter cracker snack after lunch. Rec #1: Continue current diet order. Rec #2: Consider MVI with minerals. Rec #3: Replenish fluid PRN. RD to follow pt. Consult RD PRN.               Malnutrition Severity Assessment     Row Name 11/01/18 9682          Malnutrition Severity Assessment    Malnutrition Type Acute Illness/Injury Malnutrition        Physical Signs of Malnutrition (Acute)    Muscle Wasting Mild     Fat Loss Mild     Fluid Accumulation None     Secondary Physical Signs None        Weight Status (Acute)    BMI Mild (<19)     %IBW Mild (<90%)     %UBW --   Pt at UBW (96lbs)     Weight Loss --   No recent weight loss        Energy Intake Status (Acute)    Energy Intake Mild (<75% / 5d)        Criteria Met (Must meet criteria for severity in at least 2 of these categories: M Wasting, Fat Loss, Fluid, Secondary Signs, Wt. Status, Intake)    Patient meets criteria for  Mild malnutrition           Problem/Interventions:                      Electronically signed by:  Trinity Siegel RD  11/01/18 3:37 PM

## 2018-11-01 NOTE — PROGRESS NOTES
Malnutrition Severity Assessment    Patient Name:  Maura Jiménez  YOB: 1945  MRN: [id number]  Admit Date:  10/31/2018    Patient meets criteria for : Mild malnutrition    Comments:  Pt was seen today and may qualify for mild malnutrition based on weight and NFPE (Nutrition Focused Physical Exam). Mild malnutrition related to inadequate protein and nutrient-dense foods evidenced by pt's poor PO intake over the past 3 days; no significant weight loss; mild muscle wasted noted on exam with slightly prominent clavicle, slightly squared shoulder, and mild hollowness to temples; mild fat loss indicated by loss of tricep and under eye fat pad, pt with prominent muscle definition in calf region. RD ordered nutritional supplements of NSA Mighty Shakes nightly and peanut butter cracker snack after lunch. Rec #1: Continue current diet order. Rec #2: Consider MVI with minerals. Rec #3: Replenish fluid PRN. RD to follow pt. Consult RD PRN.       Malnutrition Type: Acute Illness/Injury Malnutrition     Malnutrition Type (last 8 hours)      Malnutrition Severity Assessment     Row Name 11/01/18 [facility]       Malnutrition Severity Assessment    Malnutrition Type Acute Illness/Injury Malnutrition    Row Name 11/01/18 1352       Physical Signs of Malnutrition (Acute)    Muscle Wasting Mild    Fat Loss Mild    Fluid Accumulation None    Secondary Physical Signs None    Row Name 11/01/18 1352       Weight Status (Acute)    BMI Mild (<19)    %IBW Mild (<90%)    %UBW --   Pt at UBW (96lbs)    Weight Loss --   No recent weight loss    Row Name 11/01/18 1352       Energy Intake Status (Acute)    Energy Intake Mild (<75% / 5d)    Row Name 11/01/18 [facility]       Criteria Met (Must meet criteria for severity in at least 2 of these categories: M Wasting, Fat Loss, Fluid, Secondary Signs, Wt. Status, Intake)    Patient meets criteria for  Mild malnutrition          Electronically signed by:  Trinity Siegel RD  11/01/18 3:36  PM

## 2018-11-01 NOTE — PROGRESS NOTES
Discharge Planning Assessment  [facility]     Patient Name: Maura Jiménez  MRN: [id number]  Today's Date: 11/1/2018    Admit Date: 10/31/2018          Discharge Needs Assessment     Row Name 11/01/18 1104       Living Environment    Lives With spouse    Name(s) of Who Lives With Patient --   , Yevgeniy       Resource/Environmental Concerns    Transportation Concerns car, none       Discharge Needs Assessment    Readmission Within the Last 30 Days no previous admission in last 30 days    Concerns to be Addressed no discharge needs identified    Anticipated Changes Related to Illness none    Equipment Needed After Discharge none            Discharge Plan     Row Name 11/01/18 1105       Plan    Plan Spoke to pt in room. Has no POA or Living Will.  Lives with  in 1 story house.  Has no steps.  .  Address and phone # is correct.  .   Independent with ADLS.  Has no medical equipment, but has had o2 before thinks my need it again. Ariana provided o2 last time and that is who she choses if needs it again.  Plans to go home at discharge denies discharge needs.   will transport.          Destination     No service coordination in this encounter.      Durable Medical Equipment     No service coordination in this encounter.      Dialysis/Infusion     No service coordination in this encounter.      Home Medical Care     No service coordination in this encounter.      Social Care     No service coordination in this encounter.                Demographic Summary     Row Name 11/01/18 1101       General Information    Admission Type inpatient    Referral Source admission list    Reason for Consult discharge planning            Functional Status     Row Name 11/01/18 1109       Functional Status    Usual Activity Tolerance good    Current Activity Tolerance good       Functional Status, IADL    Medications independent    Meal Preparation independent    Housekeeping independent    Laundry independent     Shopping independent       Mental Status Summary    Recent Changes in Mental Status/Cognitive Functioning no changes            Psychosocial    No documentation.           Abuse/Neglect    No documentation.           Legal    No documentation.           Substance Abuse    No documentation.           Patient Forms    No documentation.         Martha Haney

## 2018-11-01 NOTE — PROGRESS NOTES
PROGRESS NOTE        Date of Admission: 10/31/2018  Length of Stay: 0  Primary Care Physician: Sofia Martinez APRN    Subjective   Chief Complaint: Dyspnea  HPI: This is a 73-year-old female with history of COPD not on home oxygen who came into the emergency room with fever and shortness of breath and a been going on for 3-4 days.  She went to her primary care provider and was noted to have an oxygen saturation around 80%.  She was sent to the emergency room for further evaluation.  Chest x-ray was done which did not show any evidence of pneumonia.  Rambling to screen was negative.  She was admitted to the hospitalist service for acute hypoxic respiratory failure and COPD with exacerbation.  She is getting IV antibiotics in the form Rocephin and azithromycin as well as IV fluids.  She was also placed on steroids.  Her blood sugars have been running high secondary to the steroids for which we will start her on basal insulin.  Her hemoglobin A1c was 6.8.  She denies any chest pain shortness of breath is somewhat better today.  She still gets winded with any exertion.  She was certainly need a walking oximetry before discharge home.           Review Of Systems:   Review of Systems   General ROS: Patient denies any fevers, chills or loss of consciousness.    Psychological ROS: Denies any hallucinations and delusions.  Ophthalmic ROS: Denies any diplopia or transient loss of vision.  ENT ROS: Denies sore throat, nasal congestion or ear pain.   Hematological and Lymphatic ROS: Denies neck swelling or easy bleeding.  Endocrine ROS: Denies any recent unintentional weight gain or loss.  Respiratory ROS: Denies cough or shortness of breath.   Cardiovascular ROS: Denies chest pain or palpitations. No history of exertional chest pain.   Gastrointestinal ROS: Denies nausea and vomiting. Denies any abdominal pain. No diarrhea.  Genito-Urinary ROS: Denies dysuria or hematuria.  Musculoskeletal ROS: Denies back pain. No  muscle pain. No calf pain.   Neurological ROS: Denies any focal weakness. No loss of consciousness. Denies any numbness. Denies neck pain.   Dermatological ROS: Denies any redness or pruritis.    Objective      Temp:  [97.9 °F (36.6 °C)-101.8 °F (38.8 °C)] 98 °F (36.7 °C)  Heart Rate:  [] 94  Resp:  [16-28] 19  BP: (107-162)/(40-74) 129/50  Physical Exam    General Appearance:  Alert and cooperative, not in any acute distress.  Frail appearing, chronically ill appearing.   Head:  Atraumatic and normocephalic, without obvious abnormality.   Eyes:          PERRLA, conjunctivae and sclerae normal, no Icterus. No pallor. Extraocular movements are within normal limits.   Ears:  Ears appear intact with no abnormalities noted.   Throat: No oral lesions, no thrush, oral mucosa moist.   Neck: Supple, trachea midline, no thyromegaly, no carotid bruit.       Lungs:   Chest shape is normal. Breath sounds heard bilaterally equally.  No crackles Few wheezing with rhonchi. No Pleural rub or bronchial breathing.   Heart:  Normal S1 and S2, no murmur, no gallop, no rub. No JVD   Abdomen:   Normal bowel sounds, no masses, no organomegaly. Soft    non-tender, non-distended, no guarding, no rebound             tenderness   Extremities: Moves all extremities well, no edema, no cyanosis, no clubbing.   Pulses: Pulses palpable and equal bilaterally   Skin: No bleeding, bruising or rash       Neurologic:    Psychiatric/Behavior:     Cranial nerves 2 - 12 grossly intact, sensation intact, Motor power is normal and equal bilaterally.  Mood normal, behavior normal       Results Review:    I have reviewed the labs, radiology results and diagnostic studies.      Results from last 7 days  Lab Units 11/01/18  0456   WBC 10*3/mm3 10.14   HEMOGLOBIN g/dL 10.3*   PLATELETS 10*3/mm3 218       Results from last 7 days  Lab Units 11/01/18  0456   SODIUM mmol/L 135*   POTASSIUM mmol/L 4.0   CO2 mmol/L 19.0*   CREATININE mg/dL 0.50*   GLUCOSE mg/dL  276*       Culture Data:   Blood Culture   Date Value Ref Range Status   10/31/2018 No growth at less than 24 hours  Preliminary   10/31/2018 No growth at less than 24 hours  Preliminary     Respiratory Culture   Date Value Ref Range Status   11/01/2018 Rejected  Final     Radiology Data:   Cardiology Data:    I have reviewed the medications.    Assessment/Plan     Assessment and Plan:  1.  Acute hypoxic respiratory failure-patient is on oxygen.  She will need a walking oximetry prior to discharge.    2.  COPD with exacerbation patient is on breathing treatments, IV steroids, IV antibiotics in the form Rocephin and azithromycin.  Will continue to treat accordingly.    3.  Acute bronchitis-present upon admission-influenza was negative.  Same treatment is #2.    4.  Diabetes mellitus-blood sugars are elevated at this time likely secondary to the steroids.  I will go ahead and start her on Levemir and continue sliding scale insulin coverage.    5.  Acquired hypothyroidism-continue home levothyroxine.  Will check TSH in am      DVT prophylaxis:  Lovenox  Discharge Planning:   Kim Banuelos, BRITTNY 11/01/18 2:21 PM

## 2018-11-01 NOTE — PLAN OF CARE
Problem: Infection, Risk/Actual (Adult)  Goal: Identify Related Risk Factors and Signs and Symptoms   11/01/18 1602   Infection, Risk/Actual (Adult)   Related Risk Factors (Infection, Risk/Actual) age extremes;chronic illness/condition;sleep disturbance   Signs and Symptoms (Infection, Risk/Actual) chills;blood glucose changes

## 2018-11-02 ENCOUNTER — APPOINTMENT (OUTPATIENT)
Dept: CARDIOLOGY | Facility: HOSPITAL | Age: 73
End: 2018-11-02

## 2018-11-02 ENCOUNTER — APPOINTMENT (OUTPATIENT)
Dept: NUCLEAR MEDICINE | Facility: HOSPITAL | Age: 73
End: 2018-11-02

## 2018-11-02 LAB
ANION GAP SERPL CALCULATED.3IONS-SCNC: 9.9 MMOL/L (ref 10–20)
BASOPHILS # BLD AUTO: 0.01 10*3/MM3 (ref 0–0.2)
BASOPHILS NFR BLD AUTO: 0.1 % (ref 0–2.5)
BUN BLD-MCNC: 12 MG/DL (ref 7–20)
BUN/CREAT SERPL: 24 (ref 7.1–23.5)
CALCIUM SPEC-SCNC: 8.1 MG/DL (ref 8.4–10.2)
CHLORIDE SERPL-SCNC: 108 MMOL/L (ref 98–107)
CO2 SERPL-SCNC: 23 MMOL/L (ref 26–30)
CREAT BLD-MCNC: 0.5 MG/DL (ref 0.6–1.3)
D-DIMER, QUANTITATIVE (MAD,POW, STR): 739 NG/ML (FEU) (ref 0–500)
DEPRECATED RDW RBC AUTO: 46.4 FL (ref 37–54)
EOSINOPHIL # BLD AUTO: 0 10*3/MM3 (ref 0–0.7)
EOSINOPHIL NFR BLD AUTO: 0 % (ref 0–7)
ERYTHROCYTE [DISTWIDTH] IN BLOOD BY AUTOMATED COUNT: 13 % (ref 11.5–14.5)
GFR SERPL CREATININE-BSD FRML MDRD: 121 ML/MIN/1.73
GLUCOSE BLD-MCNC: 124 MG/DL (ref 74–98)
GLUCOSE BLDC GLUCOMTR-MCNC: 109 MG/DL (ref 70–130)
GLUCOSE BLDC GLUCOMTR-MCNC: 137 MG/DL (ref 70–130)
GLUCOSE BLDC GLUCOMTR-MCNC: 143 MG/DL (ref 70–130)
GLUCOSE BLDC GLUCOMTR-MCNC: 208 MG/DL (ref 70–130)
HCT VFR BLD AUTO: 29.8 % (ref 37–47)
HGB BLD-MCNC: 9.2 G/DL (ref 12–16)
IMM GRANULOCYTES # BLD: 0.12 10*3/MM3 (ref 0–0.06)
IMM GRANULOCYTES NFR BLD: 0.9 % (ref 0–0.6)
LYMPHOCYTES # BLD AUTO: 0.61 10*3/MM3 (ref 0.6–3.4)
LYMPHOCYTES NFR BLD AUTO: 4.4 % (ref 10–50)
MCH RBC QN AUTO: 29.9 PG (ref 27–31)
MCHC RBC AUTO-ENTMCNC: 30.9 G/DL (ref 30–37)
MCV RBC AUTO: 96.8 FL (ref 81–99)
MONOCYTES # BLD AUTO: 0.49 10*3/MM3 (ref 0–0.9)
MONOCYTES NFR BLD AUTO: 3.6 % (ref 0–12)
NEUTROPHILS # BLD AUTO: 12.51 10*3/MM3 (ref 2–6.9)
NEUTROPHILS NFR BLD AUTO: 91 % (ref 37–80)
NRBC BLD MANUAL-RTO: 0 /100 WBC (ref 0–0)
PLATELET # BLD AUTO: 238 10*3/MM3 (ref 130–400)
PMV BLD AUTO: 11.4 FL (ref 6–12)
POTASSIUM BLD-SCNC: 3.9 MMOL/L (ref 3.5–5.1)
RBC # BLD AUTO: 3.08 10*6/MM3 (ref 4.2–5.4)
RETICS #: 0.06 10*6/MM3 (ref 0.02–0.13)
RETICS/RBC NFR AUTO: 1.91 % (ref 0.5–1.5)
SODIUM BLD-SCNC: 137 MMOL/L (ref 137–145)
TSH SERPL DL<=0.05 MIU/L-ACNC: 0.05 MIU/ML (ref 0.47–4.68)
WBC NRBC COR # BLD: 13.74 10*3/MM3 (ref 4.8–10.8)

## 2018-11-02 PROCEDURE — 85025 COMPLETE CBC W/AUTO DIFF WBC: CPT | Performed by: NURSE PRACTITIONER

## 2018-11-02 PROCEDURE — 94799 UNLISTED PULMONARY SVC/PX: CPT

## 2018-11-02 PROCEDURE — 93306 TTE W/DOPPLER COMPLETE: CPT | Performed by: INTERNAL MEDICINE

## 2018-11-02 PROCEDURE — 25010000002 CEFTRIAXONE SODIUM-DEXTROSE 1-3.74 GM-%(50ML) RECONSTITUTED SOLUTION: Performed by: INTERNAL MEDICINE

## 2018-11-02 PROCEDURE — 25010000002 ENOXAPARIN PER 10 MG: Performed by: INTERNAL MEDICINE

## 2018-11-02 PROCEDURE — 78582 LUNG VENTILAT&PERFUS IMAGING: CPT

## 2018-11-02 PROCEDURE — 93306 TTE W/DOPPLER COMPLETE: CPT

## 2018-11-02 PROCEDURE — 63710000001 INSULIN DETEMIR PER 5 UNITS: Performed by: NURSE PRACTITIONER

## 2018-11-02 PROCEDURE — 0 TECHNETIUM ALBUMIN AGGREGATED: Performed by: INTERNAL MEDICINE

## 2018-11-02 PROCEDURE — 85379 FIBRIN DEGRADATION QUANT: CPT | Performed by: NURSE PRACTITIONER

## 2018-11-02 PROCEDURE — 82962 GLUCOSE BLOOD TEST: CPT

## 2018-11-02 PROCEDURE — 84443 ASSAY THYROID STIM HORMONE: CPT | Performed by: NURSE PRACTITIONER

## 2018-11-02 PROCEDURE — 99232 SBSQ HOSP IP/OBS MODERATE 35: CPT | Performed by: INTERNAL MEDICINE

## 2018-11-02 PROCEDURE — 25010000002 AZITHROMYCIN: Performed by: INTERNAL MEDICINE

## 2018-11-02 PROCEDURE — 85045 AUTOMATED RETICULOCYTE COUNT: CPT | Performed by: NURSE PRACTITIONER

## 2018-11-02 PROCEDURE — 0 TECHNETIUM TC 99M PENTETATE KIT: Performed by: INTERNAL MEDICINE

## 2018-11-02 PROCEDURE — 25010000002 METHYLPREDNISOLONE PER 40 MG: Performed by: NURSE PRACTITIONER

## 2018-11-02 PROCEDURE — 63710000001 INSULIN ASPART PER 5 UNITS: Performed by: INTERNAL MEDICINE

## 2018-11-02 PROCEDURE — A9539 TC99M PENTETATE: HCPCS | Performed by: INTERNAL MEDICINE

## 2018-11-02 PROCEDURE — 25010000002 METHYLPREDNISOLONE PER 40 MG: Performed by: INTERNAL MEDICINE

## 2018-11-02 PROCEDURE — 80048 BASIC METABOLIC PNL TOTAL CA: CPT | Performed by: NURSE PRACTITIONER

## 2018-11-02 PROCEDURE — A9540 TC99M MAA: HCPCS | Performed by: INTERNAL MEDICINE

## 2018-11-02 RX ORDER — PREDNISONE 20 MG/1
40 TABLET ORAL
Status: DISCONTINUED | OUTPATIENT
Start: 2018-11-03 | End: 2018-11-02

## 2018-11-02 RX ORDER — LEVOTHYROXINE SODIUM 0.05 MG/1
50 TABLET ORAL
Status: DISCONTINUED | OUTPATIENT
Start: 2018-11-03 | End: 2018-11-05 | Stop reason: HOSPADM

## 2018-11-02 RX ORDER — METHYLPREDNISOLONE SODIUM SUCCINATE 40 MG/ML
40 INJECTION, POWDER, LYOPHILIZED, FOR SOLUTION INTRAMUSCULAR; INTRAVENOUS EVERY 12 HOURS
Status: DISCONTINUED | OUTPATIENT
Start: 2018-11-02 | End: 2018-11-04

## 2018-11-02 RX ADMIN — IPRATROPIUM BROMIDE AND ALBUTEROL SULFATE 3 ML: .5; 3 SOLUTION RESPIRATORY (INHALATION) at 00:42

## 2018-11-02 RX ADMIN — CALCIUM CARBONATE-VITAMIN D TAB 500 MG-200 UNIT 500 MG: 500-200 TAB at 08:44

## 2018-11-02 RX ADMIN — ENOXAPARIN SODIUM 40 MG: 40 INJECTION SUBCUTANEOUS at 20:39

## 2018-11-02 RX ADMIN — Medication 1 DOSE: at 18:25

## 2018-11-02 RX ADMIN — IPRATROPIUM BROMIDE AND ALBUTEROL SULFATE 3 ML: .5; 3 SOLUTION RESPIRATORY (INHALATION) at 19:22

## 2018-11-02 RX ADMIN — BUDESONIDE 0.5 MG: 0.5 INHALANT RESPIRATORY (INHALATION) at 19:22

## 2018-11-02 RX ADMIN — ATORVASTATIN CALCIUM 10 MG: 10 TABLET, FILM COATED ORAL at 20:39

## 2018-11-02 RX ADMIN — INSULIN DETEMIR 10 UNITS: 100 INJECTION, SOLUTION SUBCUTANEOUS at 09:00

## 2018-11-02 RX ADMIN — METHYLPREDNISOLONE SODIUM SUCCINATE 40 MG: 40 INJECTION, POWDER, FOR SOLUTION INTRAMUSCULAR; INTRAVENOUS at 14:55

## 2018-11-02 RX ADMIN — CEFTRIAXONE 1 G: 1 INJECTION, SOLUTION INTRAVENOUS at 16:30

## 2018-11-02 RX ADMIN — BUDESONIDE 0.5 MG: 0.5 INHALANT RESPIRATORY (INHALATION) at 06:51

## 2018-11-02 RX ADMIN — IPRATROPIUM BROMIDE AND ALBUTEROL SULFATE 3 ML: .5; 3 SOLUTION RESPIRATORY (INHALATION) at 12:16

## 2018-11-02 RX ADMIN — GUAIFENESIN 600 MG: 600 TABLET, EXTENDED RELEASE ORAL at 08:44

## 2018-11-02 RX ADMIN — Medication 3 ML: at 23:26

## 2018-11-02 RX ADMIN — KIT FOR THE PREPARATION OF TECHNETIUM TC 99M PENTETATE 1 DOSE: 20 INJECTION, POWDER, LYOPHILIZED, FOR SOLUTION INTRAVENOUS; RESPIRATORY (INHALATION) at 18:10

## 2018-11-02 RX ADMIN — CYANOCOBALAMIN TAB 500 MCG 500 MCG: 500 TAB at 08:44

## 2018-11-02 RX ADMIN — AZITHROMYCIN 500 MG: 500 INJECTION, POWDER, LYOPHILIZED, FOR SOLUTION INTRAVENOUS at 17:22

## 2018-11-02 RX ADMIN — PANTOPRAZOLE SODIUM 40 MG: 40 TABLET, DELAYED RELEASE ORAL at 06:01

## 2018-11-02 RX ADMIN — FOLIC ACID 1 MG: 1 TABLET ORAL at 08:44

## 2018-11-02 RX ADMIN — LEVOTHYROXINE SODIUM 75 MCG: 75 TABLET ORAL at 06:01

## 2018-11-02 RX ADMIN — IPRATROPIUM BROMIDE AND ALBUTEROL SULFATE 3 ML: .5; 3 SOLUTION RESPIRATORY (INHALATION) at 06:51

## 2018-11-02 RX ADMIN — INSULIN ASPART 3 UNITS: 100 INJECTION, SOLUTION INTRAVENOUS; SUBCUTANEOUS at 12:15

## 2018-11-02 RX ADMIN — GUAIFENESIN 600 MG: 600 TABLET, EXTENDED RELEASE ORAL at 20:39

## 2018-11-02 RX ADMIN — Medication 3 ML: at 10:00

## 2018-11-02 RX ADMIN — METHYLPREDNISOLONE SODIUM SUCCINATE 40 MG: 40 INJECTION, POWDER, FOR SOLUTION INTRAMUSCULAR; INTRAVENOUS at 06:00

## 2018-11-02 NOTE — PROGRESS NOTES
Exercise Oximetry    Patient Name:Maura Jiménez   MRN: [id number]   Date: 11/02/18             ROOM AIR BASELINE   SpO2% 79   Heart Rate    Blood Pressure      EXERCISE ON ROOM AIR SpO2% EXERCISE ON O2 @  LPM SpO2%   1 MINUTE  1 MINUTE    2 MINUTES  2 MINUTES    3 MINUTES  3 MINUTES    4 MINUTES  4 MINUTES    5 MINUTES  5 MINUTES    6 MINUTES  6 MINUTES               Distance Walked   Distance Walked   Dyspnea (Jp Scale)  7 Dyspnea (Jp Scale)   Fatigue (Jp Scale)  7 Fatigue (Jp Scale)   SpO2% Post Exercise 94 SpO2% Post Exercise   HR Post Exercise   HR Post Exercise   Time to Recovery  3mins Time to Recovery     Comments:pt got up on her own and walked to restroom  from bed on nc @ 2lpm. Pt sat dropped and she became very short of breath.  After pt returned back to bed respiratory placed her on nc @ 3lpm and o2 sats  > 90%.   Pt states that her dyspnea jp scale was at least 7.

## 2018-11-02 NOTE — PLAN OF CARE
Problem: Breathing Pattern Ineffective (Adult)  Goal: Effective Oxygenation/Ventilation  Outcome: Ongoing (interventions implemented as appropriate)   11/02/18 0433   Breathing Pattern Ineffective (Adult)   Effective Oxygenation/Ventilation making progress toward outcome

## 2018-11-02 NOTE — PROGRESS NOTES
PROGRESS NOTE        Date of Admission: 10/31/2018  Length of Stay: 1  Primary Care Physician: Sofia Martinez APRN    Subjective   Chief Complaint: Dyspnea  HPI: This is a 73-year-old female with history of COPD not on home oxygen who came into the emergency room with fever and shortness of breath and a been going on for 3-4 days.  She went to her primary care provider and was noted to have an oxygen saturation around 80%.  She was sent to the emergency room for further evaluation.  Chest x-ray was done which did not show any evidence of pneumonia.  Rambling to screen was negative.  She was admitted to the hospitalist service for acute hypoxic respiratory failure and COPD with exacerbation.  She is getting IV antibiotics in the form Rocephin and azithromycin as well as IV fluids.  She was also placed on steroids.  Her blood sugars have been running high secondary to the steroids for which we will start her on basal insulin.  Her hemoglobin A1c was 6.8.  She denies any chest pain shortness of breath is somewhat better today.  She still gets winded with any exertion.  She is feeling much better today.  I had planned to discharge the patient home today however when she got up with respiratory she became very SOA and oxygen dropped to less than 80 %. I will check a d-dimer and if elevated get a VQ scan (shellfish allergy) just to ensure no evidence of PE and a 2D echo to look at her valvular function.  She denies chest pain, nausea or vomiting.           Review Of Systems:   Review of Systems   General ROS: Patient denies any fevers, chills or loss of consciousness.    Psychological ROS: Denies any hallucinations and delusions.  Ophthalmic ROS: Denies any diplopia or transient loss of vision.  ENT ROS: Denies sore throat, nasal congestion or ear pain.   Hematological and Lymphatic ROS: Denies neck swelling or easy bleeding.  Endocrine ROS: Denies any recent unintentional weight gain or loss.  Respiratory ROS:  Denies cough  She does have shortness of breath.   Cardiovascular ROS: Denies chest pain or palpitations. No history of exertional chest pain.   Gastrointestinal ROS: Denies nausea and vomiting. Denies any abdominal pain. No diarrhea.  Genito-Urinary ROS: Denies dysuria or hematuria.  Musculoskeletal ROS: Denies back pain. No muscle pain. No calf pain.   Neurological ROS: Denies any focal weakness. No loss of consciousness. Denies any numbness. Denies neck pain.   Dermatological ROS: Denies any redness or pruritis.    Objective      Temp:  [97.6 °F (36.4 °C)-98.8 °F (37.1 °C)] 98.2 °F (36.8 °C)  Heart Rate:  [] 90  Resp:  [16-19] 16  BP: (112-132)/(42-60) 117/46  Physical Exam    General Appearance:  Alert and cooperative, not in any acute distress.  Frail appearing, chronically ill appearing.   Head:  Atraumatic and normocephalic, without obvious abnormality.   Eyes:          PERRLA, conjunctivae and sclerae normal, no Icterus. No pallor. Extraocular movements are within normal limits.   Ears:  Ears appear intact with no abnormalities noted.   Throat: No oral lesions, no thrush, oral mucosa moist.   Neck: Supple, trachea midline, no thyromegaly, no carotid bruit.       Lungs:   Chest shape is normal. Breath sounds heard bilaterally equally.  No crackles Few wheezing with rhonchi but moving air well. No Pleural rub or bronchial breathing.   Heart:  Normal S1 and S2, no murmur, no gallop, no rub. No JVD   Abdomen:   Normal bowel sounds, no masses, no organomegaly. Soft    non-tender, non-distended, no guarding, no rebound             tenderness   Extremities: Moves all extremities well, no edema, no cyanosis, no clubbing.   Pulses: Pulses palpable and equal bilaterally   Skin: No bleeding, bruising or rash       Neurologic:    Psychiatric/Behavior:     Cranial nerves 2 - 12 grossly intact, sensation intact, Motor power is normal and equal bilaterally.  Mood normal, behavior normal       Results Review:    I  have reviewed the labs, radiology results and diagnostic studies.      Results from last 7 days  Lab Units 11/02/18  0454   WBC 10*3/mm3 13.74*   HEMOGLOBIN g/dL 9.2*   PLATELETS 10*3/mm3 238       Results from last 7 days  Lab Units 11/02/18  0454   SODIUM mmol/L 137   POTASSIUM mmol/L 3.9   CO2 mmol/L 23.0*   CREATININE mg/dL 0.50*   GLUCOSE mg/dL 124*       Culture Data:   Blood Culture   Date Value Ref Range Status   10/31/2018 No growth at less than 24 hours  Preliminary   10/31/2018 No growth at less than 24 hours  Preliminary     Respiratory Culture   Date Value Ref Range Status   11/01/2018 Rejected  Final     Radiology Data:   Cardiology Data:    I have reviewed the medications.    Assessment/Plan     Assessment and Plan:  1.  Acute hypoxic respiratory failure-patient is on oxygen.  She will need a walking oximetry prior to discharge.  She did become quite dyspneic with a walking oximetry today.  VQ scan neg for PE.      2.  COPD with exacerbation patient is on breathing treatments, IV steroids, IV antibiotics in the form Rocephin and azithromycin.  Will continue to treat accordingly.      3.  Acute bronchitis-present upon admission-influenza was negative.  Same treatment is #2.    4.  Diabetes mellitus-blood sugars are elevated at this time likely secondary to the steroids.  I will go ahead and start her on Levemir and continue sliding scale insulin coverage.    5.  Acquired hypothyroidism-  Overtreated.  Decrease levothyroxine to to 50mcg    6.  Anemia-  Iron panel, B12 ferritin, folate, retic count    Hopefully plan dc home in next 24 to 48 hours.    DVT prophylaxis:  Lovenox  Discharge Planning:   Kim Banuelos, BRITTNY 11/02/18 1:23 PM

## 2018-11-02 NOTE — PROGRESS NOTES
Continued Stay Note   [facility]     Patient Name: Maura Jiménez  MRN: [id number]  Today's Date: 11/2/2018    Admit Date: 10/31/2018          Discharge Plan     Row Name 11/02/18 1342       Plan    Plan Spoke again to pt in room, still thinks may need o2 at discharge.  Still wants [facility] but concerned they are in [place].  Please ck with pt to make certain of choice if needs.  O2.                Discharge Codes    No documentation.           Martha Haney

## 2018-11-02 NOTE — PLAN OF CARE
Problem: Patient Care Overview  Goal: Plan of Care Review  Outcome: Ongoing (interventions implemented as appropriate)   11/02/18 0433   Coping/Psychosocial   Plan of Care Reviewed With patient   Plan of Care Review   Progress improving   OTHER   Outcome Summary Patient states that she is feeling much better. VSS. Continue with IV steroids and IV antibiotics.     Goal: Individualization and Mutuality  Outcome: Ongoing (interventions implemented as appropriate)    Goal: Discharge Needs Assessment  Outcome: Ongoing (interventions implemented as appropriate)    Goal: Interprofessional Rounds/Family Conf  Outcome: Ongoing (interventions implemented as appropriate)      Problem: Infection, Risk/Actual (Adult)  Goal: Infection Prevention/Resolution  Outcome: Ongoing (interventions implemented as appropriate)      Problem: Breathing Pattern Ineffective (Adult)  Goal: Effective Oxygenation/Ventilation  Outcome: Ongoing (interventions implemented as appropriate)    Goal: Anxiety/Fear Reduction  Outcome: Ongoing (interventions implemented as appropriate)

## 2018-11-03 LAB
ANION GAP SERPL CALCULATED.3IONS-SCNC: 9.8 MMOL/L (ref 10–20)
BH CV ECHO MEAS - EF(MOD-BP): 60 %
BH CV ECHO MEAS - RAP SYSTOLE: 15 MMHG
BH CV ECHO MEAS - RVSP: 60 MMHG
BH CV ECHO MEAS - TAPSE (>1.6): 2.1 CM2
BUN BLD-MCNC: 14 MG/DL (ref 7–20)
BUN/CREAT SERPL: 23.3 (ref 7.1–23.5)
CALCIUM SPEC-SCNC: 8.8 MG/DL (ref 8.4–10.2)
CHLORIDE SERPL-SCNC: 107 MMOL/L (ref 98–107)
CO2 SERPL-SCNC: 28 MMOL/L (ref 26–30)
CREAT BLD-MCNC: 0.6 MG/DL (ref 0.6–1.3)
DEPRECATED RDW RBC AUTO: 46.4 FL (ref 37–54)
ERYTHROCYTE [DISTWIDTH] IN BLOOD BY AUTOMATED COUNT: 13.2 % (ref 11.5–14.5)
FERRITIN SERPL-MCNC: 145 NG/ML (ref 11.1–264)
GFR SERPL CREATININE-BSD FRML MDRD: 98 ML/MIN/1.73
GLUCOSE BLD-MCNC: 150 MG/DL (ref 74–98)
GLUCOSE BLDC GLUCOMTR-MCNC: 124 MG/DL (ref 70–130)
GLUCOSE BLDC GLUCOMTR-MCNC: 155 MG/DL (ref 70–130)
GLUCOSE BLDC GLUCOMTR-MCNC: 187 MG/DL (ref 70–130)
GLUCOSE BLDC GLUCOMTR-MCNC: 60 MG/DL (ref 70–130)
GLUCOSE BLDC GLUCOMTR-MCNC: 78 MG/DL (ref 70–130)
HCT VFR BLD AUTO: 32 % (ref 37–47)
HEMOCCULT STL QL: NEGATIVE
HGB BLD-MCNC: 10.1 G/DL (ref 12–16)
IRON 24H UR-MRATE: 38 MCG/DL (ref 37–181)
IRON SATN MFR SERPL: 17 % (ref 11–46)
LV EF 2D ECHO EST: 75 %
MAXIMAL PREDICTED HEART RATE: 146 BPM
MCH RBC QN AUTO: 30.1 PG (ref 27–31)
MCHC RBC AUTO-ENTMCNC: 31.6 G/DL (ref 30–37)
MCV RBC AUTO: 95.5 FL (ref 81–99)
PLATELET # BLD AUTO: 298 10*3/MM3 (ref 130–400)
PMV BLD AUTO: 11 FL (ref 6–12)
POTASSIUM BLD-SCNC: 3.8 MMOL/L (ref 3.5–5.1)
RBC # BLD AUTO: 3.35 10*6/MM3 (ref 4.2–5.4)
SODIUM BLD-SCNC: 141 MMOL/L (ref 137–145)
STRESS TARGET HR: 124 BPM
TIBC SERPL-MCNC: 222 MCG/DL (ref 261–497)
WBC NRBC COR # BLD: 11.83 10*3/MM3 (ref 4.8–10.8)

## 2018-11-03 PROCEDURE — 94799 UNLISTED PULMONARY SVC/PX: CPT

## 2018-11-03 PROCEDURE — 25010000002 AZITHROMYCIN: Performed by: INTERNAL MEDICINE

## 2018-11-03 PROCEDURE — 25010000002 CEFTRIAXONE SODIUM-DEXTROSE 1-3.74 GM-%(50ML) RECONSTITUTED SOLUTION: Performed by: INTERNAL MEDICINE

## 2018-11-03 PROCEDURE — 25010000002 ENOXAPARIN PER 10 MG: Performed by: INTERNAL MEDICINE

## 2018-11-03 PROCEDURE — 82607 VITAMIN B-12: CPT | Performed by: NURSE PRACTITIONER

## 2018-11-03 PROCEDURE — 25010000002 METHYLPREDNISOLONE PER 40 MG: Performed by: NURSE PRACTITIONER

## 2018-11-03 PROCEDURE — 83540 ASSAY OF IRON: CPT | Performed by: NURSE PRACTITIONER

## 2018-11-03 PROCEDURE — 80048 BASIC METABOLIC PNL TOTAL CA: CPT | Performed by: NURSE PRACTITIONER

## 2018-11-03 PROCEDURE — 82746 ASSAY OF FOLIC ACID SERUM: CPT | Performed by: NURSE PRACTITIONER

## 2018-11-03 PROCEDURE — 83550 IRON BINDING TEST: CPT | Performed by: NURSE PRACTITIONER

## 2018-11-03 PROCEDURE — 82962 GLUCOSE BLOOD TEST: CPT

## 2018-11-03 PROCEDURE — 85027 COMPLETE CBC AUTOMATED: CPT | Performed by: NURSE PRACTITIONER

## 2018-11-03 PROCEDURE — 63710000001 INSULIN ASPART PER 5 UNITS: Performed by: INTERNAL MEDICINE

## 2018-11-03 PROCEDURE — 82728 ASSAY OF FERRITIN: CPT | Performed by: NURSE PRACTITIONER

## 2018-11-03 PROCEDURE — 99232 SBSQ HOSP IP/OBS MODERATE 35: CPT | Performed by: INTERNAL MEDICINE

## 2018-11-03 PROCEDURE — 63710000001 INSULIN DETEMIR PER 5 UNITS: Performed by: NURSE PRACTITIONER

## 2018-11-03 PROCEDURE — 82272 OCCULT BLD FECES 1-3 TESTS: CPT | Performed by: NURSE PRACTITIONER

## 2018-11-03 RX ADMIN — GUAIFENESIN 600 MG: 600 TABLET, EXTENDED RELEASE ORAL at 21:08

## 2018-11-03 RX ADMIN — BUDESONIDE 0.5 MG: 0.5 INHALANT RESPIRATORY (INHALATION) at 06:39

## 2018-11-03 RX ADMIN — CALCIUM CARBONATE-VITAMIN D TAB 500 MG-200 UNIT 500 MG: 500-200 TAB at 08:25

## 2018-11-03 RX ADMIN — AZITHROMYCIN 500 MG: 500 INJECTION, POWDER, LYOPHILIZED, FOR SOLUTION INTRAVENOUS at 18:16

## 2018-11-03 RX ADMIN — METHYLPREDNISOLONE SODIUM SUCCINATE 40 MG: 40 INJECTION, POWDER, FOR SOLUTION INTRAMUSCULAR; INTRAVENOUS at 02:04

## 2018-11-03 RX ADMIN — METHYLPREDNISOLONE SODIUM SUCCINATE 40 MG: 40 INJECTION, POWDER, FOR SOLUTION INTRAMUSCULAR; INTRAVENOUS at 17:08

## 2018-11-03 RX ADMIN — ENOXAPARIN SODIUM 40 MG: 40 INJECTION SUBCUTANEOUS at 21:08

## 2018-11-03 RX ADMIN — BUDESONIDE 0.5 MG: 0.5 INHALANT RESPIRATORY (INHALATION) at 18:31

## 2018-11-03 RX ADMIN — INSULIN ASPART 2 UNITS: 100 INJECTION, SOLUTION INTRAVENOUS; SUBCUTANEOUS at 06:51

## 2018-11-03 RX ADMIN — GUAIFENESIN 600 MG: 600 TABLET, EXTENDED RELEASE ORAL at 08:25

## 2018-11-03 RX ADMIN — PANTOPRAZOLE SODIUM 40 MG: 40 TABLET, DELAYED RELEASE ORAL at 06:51

## 2018-11-03 RX ADMIN — CYANOCOBALAMIN TAB 500 MCG 500 MCG: 500 TAB at 08:25

## 2018-11-03 RX ADMIN — IPRATROPIUM BROMIDE AND ALBUTEROL SULFATE 3 ML: .5; 3 SOLUTION RESPIRATORY (INHALATION) at 00:24

## 2018-11-03 RX ADMIN — Medication 3 ML: at 21:08

## 2018-11-03 RX ADMIN — INSULIN DETEMIR 10 UNITS: 100 INJECTION, SOLUTION SUBCUTANEOUS at 08:26

## 2018-11-03 RX ADMIN — IPRATROPIUM BROMIDE AND ALBUTEROL SULFATE 3 ML: .5; 3 SOLUTION RESPIRATORY (INHALATION) at 18:31

## 2018-11-03 RX ADMIN — INSULIN ASPART 2 UNITS: 100 INJECTION, SOLUTION INTRAVENOUS; SUBCUTANEOUS at 12:34

## 2018-11-03 RX ADMIN — ATORVASTATIN CALCIUM 10 MG: 10 TABLET, FILM COATED ORAL at 21:08

## 2018-11-03 RX ADMIN — LEVOTHYROXINE SODIUM 50 MCG: 50 TABLET ORAL at 06:51

## 2018-11-03 RX ADMIN — FOLIC ACID 1 MG: 1 TABLET ORAL at 08:25

## 2018-11-03 RX ADMIN — Medication 3 ML: at 08:26

## 2018-11-03 RX ADMIN — IPRATROPIUM BROMIDE AND ALBUTEROL SULFATE 3 ML: .5; 3 SOLUTION RESPIRATORY (INHALATION) at 13:00

## 2018-11-03 RX ADMIN — CEFTRIAXONE 1 G: 1 INJECTION, SOLUTION INTRAVENOUS at 17:09

## 2018-11-03 RX ADMIN — IPRATROPIUM BROMIDE AND ALBUTEROL SULFATE 3 ML: .5; 3 SOLUTION RESPIRATORY (INHALATION) at 06:39

## 2018-11-03 NOTE — PROGRESS NOTES
[facility]    Name:  Maura Jiménez   Age:  73 y.o.  Sex:  female  :  1945  MRN:  [id number]   Visit Number:  [id number]  Admission Date:  10/31/2018  Date Of Service:  18  Primary Care Physician:  Sofia Martinez APRN     LOS: 2 days :  Patient Care Team:  Sofia Martinez APRN as PCP - Arnulfo Zeng MD as Consulting Physician (General Surgery):      Subjective / Interval History:     Patient's chart reviewed and intubated min and events noted since admission.  The she has been admitted because of COPD exacerbation with hypoxic respiratory.  Patient the says she feels very short of breath if she tries to more little bit and the given go going up to the bathroom.  The she does a not have oxygen at home but things would needed upon discharge.  She is coughing it is dry Cough and nonproductive.  There is no fever besides that other review of systems unremarkable      Vital Signs:    Temp:  [97.8 °F (36.6 °C)-98.3 °F (36.8 °C)] 98.3 °F (36.8 °C)  Heart Rate:  [84-92] 92  Resp:  [16-18] 18  BP: (117-142)/(46-61) 142/55    Intake and output:    I/O last 3 completed shifts:  In: 360 [P.O.:360]  Out: 1800 [Urine:1800]  No intake/output data recorded.    Physical Examination:    General Appearance:    Alert and cooperative, not in any acute distress.   Head:    Atraumatic and normocephalic, without obvious abnormality.   Eyes:            PERRLA,  No pallor. Extraocular movements are within normal limits.   Neck:   Supple,  No lymphglands, no bruit   Lungs:     Chest shape is normal. Breath sounds heard bilaterally equally.  Mild bilateral end expiratory wheezing.     Heart:    Normal S1 and S2, no murmur,  No JVD   Abdomen:     Normal bowel sounds, no masses, no organomegaly. Soft        non-tender, no guarding, no rebound                tenderness   Extremities:   Moves all extremities well, no edema, no cyanosis,    Skin:   No   bruising or rash.   Neurologic:   Grossly non focal and moves all extrimities equally.    Laboratory results:    Results from last 7 days  Lab Units 11/03/18  0506 11/02/18  0454 11/01/18  0456 10/31/18  1557   SODIUM mmol/L 141 137 135* 136*   POTASSIUM mmol/L 3.8 3.9 4.0 3.6   CHLORIDE mmol/L 107 108* 107 99   CO2 mmol/L 28.0 23.0* 19.0* 27.0   BUN mg/dL 14 12 9 9   CREATININE mg/dL 0.60 0.50* 0.50* 0.60   CALCIUM mg/dL 8.8 8.1* 7.9* 9.2   BILIRUBIN mg/dL  --   --   --  0.5   ALK PHOS U/L  --   --   --  98   ALT (SGPT) U/L  --   --   --  28   AST (SGOT) U/L  --   --   --  40   GLUCOSE mg/dL 150* 124* 276* 251*       Results from last 7 days  Lab Units 11/03/18  0506 11/02/18 0454 11/01/18  0456   WBC 10*3/mm3 11.83* 13.74* 10.14   HEMOGLOBIN g/dL 10.1* 9.2* 10.3*   HEMATOCRIT % 32.0* 29.8* 32.3*   PLATELETS 10*3/mm3 298 238 218           Results from last 7 days  Lab Units 10/31/18  1557   TROPONIN I ng/mL <0.012       Results from last 7 days  Lab Units 10/31/18  1635 10/31/18  1630   BLOODCX  No growth at 2 days No growth at 2 days       Radiology results:    Imaging Results (last 24 hours)     Procedure Component Value Units Date/Time    NM Lung Ventilation Perfusion [[id number]] Collected:  11/02/18 1852     Updated:  11/02/18 1853    Narrative:       FINAL REPORT    TECHNIQUE:  The patient was injected with 5 mCi of technetium 99m MAA.  30.0  mCi of aerosolized DTPA was utilized for ventilation.  Images of  the lungs were obtained in multiple projections.    CLINICAL HISTORY:  Other abnormalities of breathing    FINDINGS:  Overall, perfusion is superior to ventilation.  There are no  mismatched defects to suggest PE.  Impression: Low probability  for PE.      Impression:       Authenticated by Doris Carlin MD on 11/02/2018 06:52:35 PM          I have reviewed the patient's radiology reports.    Medication Review:     I have reviewed the patients active and prn medications.     Assessment:       Respiratory failure with hypoxia (CMS/HCC)    Chronic obstructive pulmonary disease with acute exacerbation (CMS/HCC)    Type 2 diabetes mellitus without complication, without long-term current use of insulin (CMS/HCC)      Plan:    Patient with hypoxic respiratory failure due to COPD exacerbation.  At present would continue with the bronchodilators oxygen nebulizers and the mucolytic agent.  Will try to keep a close watch on blood sugars due to steroids and as she is diabetic though hemoglobin A1c is controlled.  Will try to ambulate and see if her hypoxemia is better and close follow-up as needed.    Cody Allan MD  11/03/18  9:06 AM      Please note that portions of this note may have been completed with a voice recognition program. Efforts were made to edit the dictations, but occasionally words are mistranscribed.

## 2018-11-03 NOTE — PLAN OF CARE
Problem: Patient Care Overview  Goal: Plan of Care Review  Outcome: Ongoing (interventions implemented as appropriate)   11/03/18 0315 11/03/18 1200 11/03/18 1612   Coping/Psychosocial   Plan of Care Reviewed With --  patient --    Plan of Care Review   Progress improving --  --    OTHER   Outcome Summary --  --  Improvement with pt breathing, willl continue to monitor       Problem: Infection, Risk/Actual (Adult)  Goal: Identify Related Risk Factors and Signs and Symptoms  Outcome: Outcome(s) achieved Date Met: 11/03/18    Goal: Infection Prevention/Resolution  Outcome: Ongoing (interventions implemented as appropriate)      Problem: Breathing Pattern Ineffective (Adult)  Goal: Effective Oxygenation/Ventilation  Outcome: Ongoing (interventions implemented as appropriate)    Goal: Anxiety/Fear Reduction  Outcome: Ongoing (interventions implemented as appropriate)

## 2018-11-03 NOTE — PLAN OF CARE
Problem: Patient Care Overview  Goal: Plan of Care Review  Outcome: Ongoing (interventions implemented as appropriate)   11/03/18 0315   Coping/Psychosocial   Plan of Care Reviewed With patient   Plan of Care Review   Progress improving   OTHER   Outcome Summary VSS. Patient rested comfortably. VQ scan negative.     Goal: Individualization and Mutuality  Outcome: Ongoing (interventions implemented as appropriate)    Goal: Discharge Needs Assessment  Outcome: Ongoing (interventions implemented as appropriate)    Goal: Interprofessional Rounds/Family Conf  Outcome: Ongoing (interventions implemented as appropriate)      Problem: Infection, Risk/Actual (Adult)  Goal: Infection Prevention/Resolution  Outcome: Ongoing (interventions implemented as appropriate)      Problem: Breathing Pattern Ineffective (Adult)  Goal: Anxiety/Fear Reduction  Outcome: Ongoing (interventions implemented as appropriate)

## 2018-11-04 LAB
GLUCOSE BLDC GLUCOMTR-MCNC: 130 MG/DL (ref 70–130)
GLUCOSE BLDC GLUCOMTR-MCNC: 226 MG/DL (ref 70–130)
GLUCOSE BLDC GLUCOMTR-MCNC: 271 MG/DL (ref 70–130)
GLUCOSE BLDC GLUCOMTR-MCNC: 54 MG/DL (ref 70–130)

## 2018-11-04 PROCEDURE — 99231 SBSQ HOSP IP/OBS SF/LOW 25: CPT | Performed by: INTERNAL MEDICINE

## 2018-11-04 PROCEDURE — 63710000001 INSULIN ASPART PER 5 UNITS: Performed by: INTERNAL MEDICINE

## 2018-11-04 PROCEDURE — 82962 GLUCOSE BLOOD TEST: CPT

## 2018-11-04 PROCEDURE — 94799 UNLISTED PULMONARY SVC/PX: CPT

## 2018-11-04 PROCEDURE — 25010000002 ENOXAPARIN PER 10 MG: Performed by: INTERNAL MEDICINE

## 2018-11-04 PROCEDURE — 25010000002 METHYLPREDNISOLONE PER 40 MG: Performed by: NURSE PRACTITIONER

## 2018-11-04 PROCEDURE — 25010000002 METHYLPREDNISOLONE PER 40 MG: Performed by: INTERNAL MEDICINE

## 2018-11-04 PROCEDURE — 25010000002 CEFTRIAXONE SODIUM-DEXTROSE 1-3.74 GM-%(50ML) RECONSTITUTED SOLUTION: Performed by: INTERNAL MEDICINE

## 2018-11-04 PROCEDURE — 25010000002 AZITHROMYCIN: Performed by: INTERNAL MEDICINE

## 2018-11-04 PROCEDURE — 63710000001 INSULIN DETEMIR PER 5 UNITS: Performed by: NURSE PRACTITIONER

## 2018-11-04 RX ORDER — L.ACID,PARA/B.BIFIDUM/S.THERM 8B CELL
1 CAPSULE ORAL DAILY
Status: DISCONTINUED | OUTPATIENT
Start: 2018-11-04 | End: 2018-11-05 | Stop reason: HOSPADM

## 2018-11-04 RX ORDER — METHYLPREDNISOLONE SODIUM SUCCINATE 40 MG/ML
20 INJECTION, POWDER, LYOPHILIZED, FOR SOLUTION INTRAMUSCULAR; INTRAVENOUS EVERY 12 HOURS
Status: DISCONTINUED | OUTPATIENT
Start: 2018-11-04 | End: 2018-11-05 | Stop reason: HOSPADM

## 2018-11-04 RX ADMIN — LEVOTHYROXINE SODIUM 50 MCG: 50 TABLET ORAL at 06:54

## 2018-11-04 RX ADMIN — GUAIFENESIN 600 MG: 600 TABLET, EXTENDED RELEASE ORAL at 09:03

## 2018-11-04 RX ADMIN — IPRATROPIUM BROMIDE AND ALBUTEROL SULFATE 3 ML: .5; 3 SOLUTION RESPIRATORY (INHALATION) at 06:59

## 2018-11-04 RX ADMIN — INSULIN DETEMIR 10 UNITS: 100 INJECTION, SOLUTION SUBCUTANEOUS at 09:04

## 2018-11-04 RX ADMIN — INSULIN ASPART 3 UNITS: 100 INJECTION, SOLUTION INTRAVENOUS; SUBCUTANEOUS at 06:54

## 2018-11-04 RX ADMIN — IPRATROPIUM BROMIDE AND ALBUTEROL SULFATE 3 ML: .5; 3 SOLUTION RESPIRATORY (INHALATION) at 00:58

## 2018-11-04 RX ADMIN — ATORVASTATIN CALCIUM 10 MG: 10 TABLET, FILM COATED ORAL at 20:52

## 2018-11-04 RX ADMIN — GUAIFENESIN 600 MG: 600 TABLET, EXTENDED RELEASE ORAL at 20:52

## 2018-11-04 RX ADMIN — ENOXAPARIN SODIUM 40 MG: 40 INJECTION SUBCUTANEOUS at 20:53

## 2018-11-04 RX ADMIN — INSULIN ASPART 4 UNITS: 100 INJECTION, SOLUTION INTRAVENOUS; SUBCUTANEOUS at 12:02

## 2018-11-04 RX ADMIN — METHYLPREDNISOLONE SODIUM SUCCINATE 20 MG: 40 INJECTION, POWDER, FOR SOLUTION INTRAMUSCULAR; INTRAVENOUS at 15:43

## 2018-11-04 RX ADMIN — PANTOPRAZOLE SODIUM 40 MG: 40 TABLET, DELAYED RELEASE ORAL at 06:54

## 2018-11-04 RX ADMIN — CYANOCOBALAMIN TAB 500 MCG 500 MCG: 500 TAB at 09:03

## 2018-11-04 RX ADMIN — Medication 3 ML: at 20:53

## 2018-11-04 RX ADMIN — Medication 3 ML: at 09:00

## 2018-11-04 RX ADMIN — BUDESONIDE 0.5 MG: 0.5 INHALANT RESPIRATORY (INHALATION) at 06:59

## 2018-11-04 RX ADMIN — CALCIUM CARBONATE-VITAMIN D TAB 500 MG-200 UNIT 500 MG: 500-200 TAB at 09:03

## 2018-11-04 RX ADMIN — FOLIC ACID 1 MG: 1 TABLET ORAL at 09:03

## 2018-11-04 RX ADMIN — Medication 1 CAPSULE: at 15:55

## 2018-11-04 RX ADMIN — METHYLPREDNISOLONE SODIUM SUCCINATE 40 MG: 40 INJECTION, POWDER, FOR SOLUTION INTRAMUSCULAR; INTRAVENOUS at 01:32

## 2018-11-04 RX ADMIN — IPRATROPIUM BROMIDE AND ALBUTEROL SULFATE 3 ML: .5; 3 SOLUTION RESPIRATORY (INHALATION) at 19:24

## 2018-11-04 RX ADMIN — IPRATROPIUM BROMIDE AND ALBUTEROL SULFATE 3 ML: .5; 3 SOLUTION RESPIRATORY (INHALATION) at 12:54

## 2018-11-04 RX ADMIN — BUDESONIDE 0.5 MG: 0.5 INHALANT RESPIRATORY (INHALATION) at 19:24

## 2018-11-04 RX ADMIN — AZITHROMYCIN 500 MG: 500 INJECTION, POWDER, LYOPHILIZED, FOR SOLUTION INTRAVENOUS at 18:01

## 2018-11-04 RX ADMIN — CEFTRIAXONE 1 G: 1 INJECTION, SOLUTION INTRAVENOUS at 17:14

## 2018-11-04 NOTE — PLAN OF CARE
Problem: Patient Care Overview  Goal: Plan of Care Review  Outcome: Outcome(s) achieved Date Met: 11/04/18 11/04/18 0434 11/04/18 1200 11/04/18 1457   Coping/Psychosocial   Plan of Care Reviewed With --  patient --    Plan of Care Review   Progress improving --  --    OTHER   Outcome Summary --  --  pt improvement with breathing, plan for potential D/C tomorrow with walking pulse ox. titrate oxygen down and steroids. Will continue to monitor.       Problem: Infection, Risk/Actual (Adult)  Goal: Infection Prevention/Resolution  Outcome: Ongoing (interventions implemented as appropriate)      Problem: Breathing Pattern Ineffective (Adult)  Goal: Effective Oxygenation/Ventilation  Outcome: Ongoing (interventions implemented as appropriate)    Goal: Anxiety/Fear Reduction  Outcome: Ongoing (interventions implemented as appropriate)      Problem: Fall Risk (Adult)  Goal: Absence of Fall  Outcome: Ongoing (interventions implemented as appropriate)

## 2018-11-04 NOTE — PROGRESS NOTES
[facility]    Name:  Maura Jiménez   Age:  73 y.o.  Sex:  female  :  1945  MRN:  [id number]   Visit Number:  [id number]  Admission Date:  10/31/2018  Date Of Service:  18  Primary Care Physician:  Sofia Martinez APRN     LOS: 3 days :  Patient Care Team:  Sofia Martinez APRN as PCP - Arnulfo Zeng MD as Consulting Physician (General Surgery):      Subjective / Interval History:     Patient's chart reviewed  and events noted since admission.  she has been admitted because of COPD exacerbation with hypoxic respiratory.  Patient the says she was very short of breath until yesterday and it has improved.  On oxygen she needed about 3 L to keep her saturation and they are improved and in the mid 90s and will start tapering it down.  Patient has been coughing but is dry but it is also improving.  Also her appetite is not good but the she is able to eat and drink and tolerate oral food well      Vital Signs:    Temp:  [97.9 °F (36.6 °C)-98.5 °F (36.9 °C)] 98.2 °F (36.8 °C)  Heart Rate:  [77-95] 82  Resp:  [17-20] 19  BP: (114-139)/(53-64) 134/64    Intake and output:    I/O last 3 completed shifts:  In: -   Out: 1150 [Urine:1150]  I/O this shift:  In: -   Out: 350 [Urine:350]    Physical Examination:    General Appearance:    Alert and cooperative, not in any acute distress.   Head:    Atraumatic and normocephalic, without obvious abnormality.   Eyes:            PERRLA,  No pallor. Extraocular movements are within normal limits.   Neck:   Supple,  No lymphglands, no bruit   Lungs:     Chest shape is normal. Breath sounds heard bilaterally equally.  Mild bilateral end expiratory wheezing and she had yesterday has resolved     Heart:    Normal S1 and S2, no murmur,  No JVD   Abdomen:     Normal bowel sounds, no masses, no organomegaly. Soft        non-tender, no guarding, no rebound                tenderness   Extremities:   Moves  all extremities well, no edema, no cyanosis,    Skin:   No  bruising or rash.   Neurologic:   Grossly non focal and moves all extrimities equally.    Laboratory results:    Results from last 7 days  Lab Units 11/03/18  0506 11/02/18  0454 11/01/18  0456 10/31/18  1557   SODIUM mmol/L 141 137 135* 136*   POTASSIUM mmol/L 3.8 3.9 4.0 3.6   CHLORIDE mmol/L 107 108* 107 99   CO2 mmol/L 28.0 23.0* 19.0* 27.0   BUN mg/dL 14 12 9 9   CREATININE mg/dL 0.60 0.50* 0.50* 0.60   CALCIUM mg/dL 8.8 8.1* 7.9* 9.2   BILIRUBIN mg/dL  --   --   --  0.5   ALK PHOS U/L  --   --   --  98   ALT (SGPT) U/L  --   --   --  28   AST (SGOT) U/L  --   --   --  40   GLUCOSE mg/dL 150* 124* 276* 251*       Results from last 7 days  Lab Units 11/03/18  0506 11/02/18  0454 11/01/18  0456   WBC 10*3/mm3 11.83* 13.74* 10.14   HEMOGLOBIN g/dL 10.1* 9.2* 10.3*   HEMATOCRIT % 32.0* 29.8* 32.3*   PLATELETS 10*3/mm3 298 238 218           Results from last 7 days  Lab Units 10/31/18  1557   TROPONIN I ng/mL <0.012       Results from last 7 days  Lab Units 10/31/18  1635 10/31/18  1630   BLOODCX  No growth at 3 days No growth at 3 days       Radiology results:    Imaging Results (last 24 hours)     ** No results found for the last 24 hours. **          I have reviewed the patient's radiology reports.    Medication Review:     I have reviewed the patients active and prn medications.     Assessment:      Respiratory failure with hypoxia (CMS/HCC)    Chronic obstructive pulmonary disease with acute exacerbation (CMS/HCC)    Type 2 diabetes mellitus without complication, without long-term current use of insulin (CMS/HCC)      Plan:    Patient with hypoxic respiratory failure due to COPD exacerbation.  At present would continue with the bronchodilators oxygen nebulizers and the mucolytic agent.  Will try to keep a close watch on blood sugars due to steroids and as she is diabetic though hemoglobin A1c is controlled.      We will start to taper down the IV  steroids and possibly change to oral.  Also decrease oxygen to 2 L and try to ambulate and that tomorrow 6 minute walk on room air to be done to see if she needs home oxygen if stable she may be able to be discharged in the next 24 hours.    Cody Allan MD  11/04/18  12:54 PM      Please note that portions of this note may have been completed with a voice recognition program. Efforts were made to edit the dictations, but occasionally words are mistranscribed.

## 2018-11-04 NOTE — PLAN OF CARE
Problem: Patient Care Overview  Goal: Plan of Care Review  Outcome: Ongoing (interventions implemented as appropriate)   11/04/18 0434   Coping/Psychosocial   Plan of Care Reviewed With patient   Plan of Care Review   Progress improving     Goal: Individualization and Mutuality  Outcome: Ongoing (interventions implemented as appropriate)   11/04/18 0434   Individualization   Patient Specific Preferences Trash can by bed.     Goal: Discharge Needs Assessment  Outcome: Ongoing (interventions implemented as appropriate)      Problem: Infection, Risk/Actual (Adult)  Goal: Infection Prevention/Resolution  Outcome: Ongoing (interventions implemented as appropriate)   11/04/18 0434   Infection, Risk/Actual (Adult)   Infection Prevention/Resolution making progress toward outcome       Problem: Breathing Pattern Ineffective (Adult)  Goal: Effective Oxygenation/Ventilation  Outcome: Ongoing (interventions implemented as appropriate)   11/04/18 0434   Breathing Pattern Ineffective (Adult)   Effective Oxygenation/Ventilation making progress toward outcome     Goal: Anxiety/Fear Reduction  Outcome: Ongoing (interventions implemented as appropriate)   11/04/18 0434   Breathing Pattern Ineffective (Adult)   Anxiety/Fear Reduction making progress toward outcome

## 2018-11-04 NOTE — PLAN OF CARE
Problem: Fall Risk (Adult)  Goal: Identify Related Risk Factors and Signs and Symptoms  Outcome: Outcome(s) achieved Date Met: 11/04/18 11/04/18 0434   Fall Risk (Adult)   Related Risk Factors (Fall Risk) environment unfamiliar   Signs and Symptoms (Fall Risk) presence of risk factors     Goal: Absence of Fall  Outcome: Ongoing (interventions implemented as appropriate)   11/04/18 0434   Fall Risk (Adult)   Absence of Fall making progress toward outcome

## 2018-11-05 VITALS
SYSTOLIC BLOOD PRESSURE: 130 MMHG | HEART RATE: 89 BPM | WEIGHT: 99.65 LBS | OXYGEN SATURATION: 90 % | BODY MASS INDEX: 18.81 KG/M2 | TEMPERATURE: 98.2 F | RESPIRATION RATE: 18 BRPM | HEIGHT: 61 IN | DIASTOLIC BLOOD PRESSURE: 48 MMHG

## 2018-11-05 LAB
ALBUMIN SERPL-MCNC: 3 G/DL (ref 3.5–5)
ALBUMIN/GLOB SERPL: 1.3 G/DL (ref 1–2)
ALP SERPL-CCNC: 65 U/L (ref 38–126)
ALT SERPL W P-5'-P-CCNC: 57 U/L (ref 13–69)
ANION GAP SERPL CALCULATED.3IONS-SCNC: 7.9 MMOL/L (ref 10–20)
AST SERPL-CCNC: 26 U/L (ref 15–46)
BACTERIA SPEC AEROBE CULT: NORMAL
BACTERIA SPEC AEROBE CULT: NORMAL
BILIRUB SERPL-MCNC: 0.3 MG/DL (ref 0.2–1.3)
BUN BLD-MCNC: 11 MG/DL (ref 7–20)
BUN/CREAT SERPL: 22 (ref 7.1–23.5)
CALCIUM SPEC-SCNC: 8.6 MG/DL (ref 8.4–10.2)
CHLORIDE SERPL-SCNC: 103 MMOL/L (ref 98–107)
CO2 SERPL-SCNC: 32 MMOL/L (ref 26–30)
CREAT BLD-MCNC: 0.5 MG/DL (ref 0.6–1.3)
DEPRECATED RDW RBC AUTO: 46.8 FL (ref 37–54)
ERYTHROCYTE [DISTWIDTH] IN BLOOD BY AUTOMATED COUNT: 13 % (ref 11.5–14.5)
FOLATE SERPL-MCNC: 11.9 NG/ML
GFR SERPL CREATININE-BSD FRML MDRD: 121 ML/MIN/1.73
GLOBULIN UR ELPH-MCNC: 2.3 GM/DL
GLUCOSE BLD-MCNC: 117 MG/DL (ref 74–98)
GLUCOSE BLDC GLUCOMTR-MCNC: 131 MG/DL (ref 70–130)
GLUCOSE BLDC GLUCOMTR-MCNC: 175 MG/DL (ref 70–130)
HCT VFR BLD AUTO: 33.4 % (ref 37–47)
HGB BLD-MCNC: 10.3 G/DL (ref 12–16)
MCH RBC QN AUTO: 29.9 PG (ref 27–31)
MCHC RBC AUTO-ENTMCNC: 30.8 G/DL (ref 30–37)
MCV RBC AUTO: 97.1 FL (ref 81–99)
PLATELET # BLD AUTO: 318 10*3/MM3 (ref 130–400)
PMV BLD AUTO: 10.4 FL (ref 6–12)
POTASSIUM BLD-SCNC: 3.9 MMOL/L (ref 3.5–5.1)
PROT SERPL-MCNC: 5.3 G/DL (ref 6.3–8.2)
RBC # BLD AUTO: 3.44 10*6/MM3 (ref 4.2–5.4)
SODIUM BLD-SCNC: 139 MMOL/L (ref 137–145)
VIT B12 BLD-MCNC: 697 PG/ML (ref 239–931)
WBC NRBC COR # BLD: 8.05 10*3/MM3 (ref 4.8–10.8)

## 2018-11-05 PROCEDURE — 94799 UNLISTED PULMONARY SVC/PX: CPT

## 2018-11-05 PROCEDURE — 80053 COMPREHEN METABOLIC PANEL: CPT | Performed by: INTERNAL MEDICINE

## 2018-11-05 PROCEDURE — 99239 HOSP IP/OBS DSCHRG MGMT >30: CPT | Performed by: FAMILY MEDICINE

## 2018-11-05 PROCEDURE — 82962 GLUCOSE BLOOD TEST: CPT

## 2018-11-05 PROCEDURE — 63710000001 INSULIN ASPART PER 5 UNITS: Performed by: INTERNAL MEDICINE

## 2018-11-05 PROCEDURE — 94618 PULMONARY STRESS TESTING: CPT

## 2018-11-05 PROCEDURE — 25010000002 METHYLPREDNISOLONE PER 40 MG: Performed by: INTERNAL MEDICINE

## 2018-11-05 PROCEDURE — 85027 COMPLETE CBC AUTOMATED: CPT | Performed by: INTERNAL MEDICINE

## 2018-11-05 PROCEDURE — 63710000001 INSULIN DETEMIR PER 5 UNITS: Performed by: NURSE PRACTITIONER

## 2018-11-05 RX ORDER — CEFUROXIME AXETIL 500 MG/1
500 TABLET ORAL 2 TIMES DAILY
Qty: 4 TABLET | Refills: 0 | Status: SHIPPED | OUTPATIENT
Start: 2018-11-05 | End: 2018-11-07

## 2018-11-05 RX ORDER — PREDNISONE 10 MG/1
TABLET ORAL
Qty: 14 TABLET | Refills: 0 | OUTPATIENT
Start: 2018-11-05 | End: 2022-09-12

## 2018-11-05 RX ADMIN — INSULIN DETEMIR 10 UNITS: 100 INJECTION, SOLUTION SUBCUTANEOUS at 09:13

## 2018-11-05 RX ADMIN — IPRATROPIUM BROMIDE AND ALBUTEROL SULFATE 3 ML: .5; 3 SOLUTION RESPIRATORY (INHALATION) at 00:28

## 2018-11-05 RX ADMIN — CALCIUM CARBONATE-VITAMIN D TAB 500 MG-200 UNIT 500 MG: 500-200 TAB at 09:11

## 2018-11-05 RX ADMIN — Medication 1 CAPSULE: at 09:11

## 2018-11-05 RX ADMIN — CYANOCOBALAMIN TAB 500 MCG 500 MCG: 500 TAB at 09:12

## 2018-11-05 RX ADMIN — INSULIN ASPART 2 UNITS: 100 INJECTION, SOLUTION INTRAVENOUS; SUBCUTANEOUS at 12:43

## 2018-11-05 RX ADMIN — IPRATROPIUM BROMIDE AND ALBUTEROL SULFATE 3 ML: .5; 3 SOLUTION RESPIRATORY (INHALATION) at 06:52

## 2018-11-05 RX ADMIN — FOLIC ACID 1 MG: 1 TABLET ORAL at 09:12

## 2018-11-05 RX ADMIN — GUAIFENESIN 600 MG: 600 TABLET, EXTENDED RELEASE ORAL at 09:11

## 2018-11-05 RX ADMIN — LEVOTHYROXINE SODIUM 50 MCG: 50 TABLET ORAL at 06:50

## 2018-11-05 RX ADMIN — METHYLPREDNISOLONE SODIUM SUCCINATE 20 MG: 40 INJECTION, POWDER, FOR SOLUTION INTRAMUSCULAR; INTRAVENOUS at 02:00

## 2018-11-05 RX ADMIN — BUDESONIDE 0.5 MG: 0.5 INHALANT RESPIRATORY (INHALATION) at 06:52

## 2018-11-05 RX ADMIN — PANTOPRAZOLE SODIUM 40 MG: 40 TABLET, DELAYED RELEASE ORAL at 06:50

## 2018-11-05 NOTE — PROGRESS NOTES
Continued Stay Note   [facility]     Patient Name: Maura Jiménez  MRN: [id number]  Today's Date: 11/5/2018    Admit Date: 10/31/2018          Discharge Plan     Row Name 11/05/18 1146       Plan    Plan Spoke to pt in room.  Will need home o2 at discharge.  Still want [facility].  Called to Stacey at [facility] and referral faxed to [phone].                Discharge Codes    No documentation.       Expected Discharge Date and Time     Expected Discharge Date Expected Discharge Time    Nov 3, 2018             Martha Haney

## 2018-11-05 NOTE — PLAN OF CARE
Problem: Patient Care Overview  Goal: Plan of Care Review  Outcome: Ongoing (interventions implemented as appropriate)      Problem: Breathing Pattern Ineffective (Adult)  Goal: Effective Oxygenation/Ventilation  Outcome: Ongoing (interventions implemented as appropriate)    Goal: Anxiety/Fear Reduction  Outcome: Outcome(s) achieved Date Met: 11/05/18      Problem: Fall Risk (Adult)  Goal: Absence of Fall  Outcome: Ongoing (interventions implemented as appropriate)

## 2018-11-05 NOTE — PROGRESS NOTES
Exercise Oximetry    Patient Name:Maura Jiménez   MRN: [id number]   Date: 11/05/18             ROOM AIR BASELINE   SpO2% 86% at RA    Heart Rate 107   Blood Pressure      EXERCISE ON ROOM AIR SpO2% EXERCISE ON O2 @ 2 LPM SpO2%   1 MINUTE  1 MINUTE           2 lpm NC 92%   2 MINUTES  2 MINUTES         2 lpm NC 90%   3 MINUTES  3 MINUTES         2 lpm NC 90%   4 MINUTES  4 MINUTES         2 lpm NC 90%   5 MINUTES  5 MINUTES         2 lpm NC 90%   6 MINUTES  6 MINUTES         2 lpm NC 90%              Distance Walked  125 FT Distance Walked   Dyspnea (Jp Scale)  0 Dyspnea (Jp Scale)   Fatigue (Jp Scale)   Fatigue (Jp Scale)   SpO2% Post Exercise  91% on 2 lpm NC SpO2% Post Exercise   HR Post Exercise  101 HR Post Exercise   Time to Recovery  5 Minutes Time to Recovery     Comments: Pt walked hallway at own pace with no assistance needed.

## 2018-11-05 NOTE — DISCHARGE SUMMARY
[facility]      Name:  Maura Jiménez   Age:  73 y.o.  Sex:  female  :  1945  MRN:  [id number]   Visit Number:  [id number]  Primary Care Physician:  Sofia Martinez APRN  Date of Discharge:  2018  Admission Date:  10/31/2018    Discharge Diagnosis:   Acute hypoxic respiratory failure, requiring 2L NC for discharge  COPD exacerbation improved  DM2          History of Present Illness/Hospital Course:  The patient is a 73 yr old with COPD not on home oxygen, DM2, admitted for COPD exacerbation. She had increased cough, yellow sputum, shortness of breath and fever for 3-4 days. Her oxygen was low at PCP and she was admitted for further treatment of hypoxia. Chest xray negative for acute infiltrates.     She was continued on oxygen, duonebs, solumedrol, ceftriaxone and azithromycin. She is improved now, with no further chest pain, shortness of breath, abdominal pain. She is eating and drinking more on her own. Amulation to restroom has been normal. She is feeling much better now and requesting to be discharged home.      Six minute walk confirmed she will need 2L Oxygen for home. Counseling provided about no smoking on oxygen.    Consults:     Consults     No orders found from 10/2/2018 to 2018.          Procedures Performed:none             Vital Signs:    Temp:  [97.8 °F (36.6 °C)-98.2 °F (36.8 °C)] 98.2 °F (36.8 °C)  Heart Rate:  [66-92] 89  Resp:  [16-18] 18  BP: (100-132)/(48-69) 130/48    Physical Exam:      General Appearance:    Alert, cooperative, in no acute distress, Eldelry lady sitting up in bed smiling.   Head:    Normocephalic, without obvious abnormality, atraumatic   Eyes:            Lids and lashes normal, conjunctivae and sclerae normal, no   icterus, no pallor, corneas clear, PERRLA   Ears:    Ears appear intact with no abnormalities noted   Throat:   No oral lesions, no thrush, oral mucosa moist   Neck:   No adenopathy,  supple, trachea midline, no thyromegaly, no     carotid bruit, no JVD   Back:     No kyphosis present, no scoliosis present, no skin lesions,       erythema or scars, no tenderness to percussion or                   palpation,   range of motion normal   Lungs:     Clear to auscultation,respirations regular, even and                   unlabored    Heart:    Regular rhythm and normal rate, normal S1 and S2, no            murmur, no gallop, no rub, no click   Breast Exam:    Deferred   Abdomen:     Normal bowel sounds, no masses, no organomegaly, soft        non-tender, non-distended, no guarding, no rebound                 tenderness   Genitalia:    Deferred   Extremities:   Moves all extremities well, no edema, no cyanosis, no              redness   Pulses:   Pulses palpable and equal bilaterally   Skin:   No bleeding, bruising or rash   Lymph nodes:   No palpable adenopathy   Neurologic:   Cranial nerves 2 - 12 grossly intact, sensation intact, DTR        present and equal bilaterally         Pertinent Lab Results:     Lab Results (all)     Procedure Component Value Units Date/Time    Vitamin B12 [[id number]]  (Normal) Collected:  11/03/18 0506    Specimen:  Blood Updated:  11/05/18 0720     Vitamin B-12 697 pg/mL     Folate [[id number]]  (Normal) Collected:  11/03/18 0506    Specimen:  Blood Updated:  11/05/18 0720     Folate 11.90 ng/mL     POC Glucose Once [[id number]]  (Abnormal) Collected:  11/05/18 0630    Specimen:  Blood Updated:  11/05/18 0640     Glucose 131 (H) mg/dL      Comment: Serial Number: [id number]:  [id number]       Comprehensive Metabolic Panel [[id number]]  (Abnormal) Collected:  11/05/18 0453    Specimen:  Blood Updated:  11/05/18 0603     Glucose 117 (H) mg/dL      BUN 11 mg/dL      Creatinine 0.50 (L) mg/dL      Sodium 139 mmol/L      Potassium 3.9 mmol/L      Chloride 103 mmol/L      CO2 32.0 (H) mmol/L      Calcium 8.6 mg/dL      Total Protein 5.3 (L) g/dL      Albumin 3.00 (L) g/dL       ALT (SGPT) 57 U/L      AST (SGOT) 26 U/L      Alkaline Phosphatase 65 U/L      Total Bilirubin 0.3 mg/dL      eGFR Non African Amer 121 mL/min/1.73      Globulin 2.3 gm/dL      A/G Ratio 1.3 g/dL      BUN/Creatinine Ratio 22.0     Anion Gap 7.9 (L) mmol/L     Narrative:       The MDRD GFR formula is only valid for adults with stable renal function between ages 18 and 70.    CBC (No Diff) [[id number]]  (Abnormal) Collected:  11/05/18 0453    Specimen:  Blood Updated:  11/05/18 0547     WBC 8.05 10*3/mm3      RBC 3.44 (L) 10*6/mm3      Hemoglobin 10.3 (L) g/dL      Hematocrit 33.4 (L) %      MCV 97.1 fL      MCH 29.9 pg      MCHC 30.8 g/dL      RDW 13.0 %      RDW-SD 46.8 fl      MPV 10.4 fL      Platelets 318 10*3/mm3     POC Glucose Once [[id number]]  (Normal) Collected:  11/04/18 2003    Specimen:  Blood Updated:  11/04/18 2015     Glucose 130 mg/dL      Comment: Serial Number: [id number]:  [id number]       POC Glucose Once [[id number]]  (Abnormal) Collected:  11/04/18 1555    Specimen:  Blood Updated:  11/04/18 1605     Glucose 54 (L) mg/dL      Comment: Serial Number: [id number]:  [id number]       Blood Culture - Blood, [[id number]]  (Normal) Collected:  10/31/18 1630    Specimen:  Blood from Arm, Left Updated:  11/04/18 1545     Blood Culture No growth at 4 days    Blood Culture - Blood, [[id number]]  (Normal) Collected:  10/31/18 1635    Specimen:  Blood from Arm, Right Updated:  11/04/18 1545     Blood Culture No growth at 4 days    POC Glucose Once [[id number]]  (Abnormal) Collected:  11/04/18 1055    Specimen:  Blood Updated:  11/04/18 1131     Glucose 271 (H) mg/dL      Comment: Serial Number: [id number]:  [id number]       POC Glucose Once [[id number]]  (Abnormal) Collected:  11/04/18 0633    Specimen:  Blood Updated:  11/04/18 0645     Glucose 226 (H) mg/dL      Comment: Serial Number: [id number]:  [id number]       POC Glucose Once [[id number]]  (Normal) Collected:  11/03/18 2029    Specimen:   Blood Updated:  11/03/18 2045     Glucose 78 mg/dL      Comment: Serial Number: [id number]:  [id number]       POC Glucose Once [[id number]]  (Normal) Collected:  11/03/18 1824    Specimen:  Blood Updated:  11/03/18 1830     Glucose 124 mg/dL      Comment: Serial Number: [id number]:  [id number]       POC Glucose Once [[id number]]  (Abnormal) Collected:  11/03/18 1636    Specimen:  Blood Updated:  11/03/18 1711     Glucose 60 (L) mg/dL      Comment: Serial Number: [id number]:  [id number]       POC Glucose Once [[id number]]  (Abnormal) Collected:  11/03/18 1107    Specimen:  Blood Updated:  11/03/18 1710     Glucose 187 (H) mg/dL      Comment: Serial Number: [id number]:  [id number]       Occult Blood X 1, Stool - Stool, Per Rectum [[id number]]  (Normal) Collected:  11/03/18 1050    Specimen:  Stool from Per Rectum Updated:  11/03/18 1105     Fecal Occult Blood Negative    Iron Profile [[id number]]  (Abnormal) Collected:  11/03/18 0506    Specimen:  Blood Updated:  11/03/18 0706     Iron 38 mcg/dL      TIBC 222 (L) mcg/dL      Iron Saturation 17 %     Ferritin [[id number]]  (Normal) Collected:  11/03/18 0506    Specimen:  Blood Updated:  11/03/18 0706     Ferritin 145.00 ng/mL     POC Glucose Once [[id number]]  (Abnormal) Collected:  11/03/18 0626    Specimen:  Blood Updated:  11/03/18 0645     Glucose 155 (H) mg/dL      Comment: Serial Number: [id number]:  [id number]       Basic Metabolic Panel [[id number]]  (Abnormal) Collected:  11/03/18 0506    Specimen:  Blood Updated:  11/03/18 0629     Glucose 150 (H) mg/dL      BUN 14 mg/dL      Creatinine 0.60 mg/dL      Sodium 141 mmol/L      Potassium 3.8 mmol/L      Chloride 107 mmol/L      CO2 28.0 mmol/L      Calcium 8.8 mg/dL      eGFR Non African Amer 98 mL/min/1.73      BUN/Creatinine Ratio 23.3     Anion Gap 9.8 (L) mmol/L     Narrative:       The MDRD GFR formula is only valid for adults with stable renal function between ages 18 and 70.    CBC (No Diff)  [[id number]]  (Abnormal) Collected:  11/03/18 0506    Specimen:  Blood Updated:  11/03/18 0618     WBC 11.83 (H) 10*3/mm3      RBC 3.35 (L) 10*6/mm3      Hemoglobin 10.1 (L) g/dL      Hematocrit 32.0 (L) %      MCV 95.5 fL      MCH 30.1 pg      MCHC 31.6 g/dL      RDW 13.2 %      RDW-SD 46.4 fl      MPV 11.0 fL      Platelets 298 10*3/mm3     POC Glucose Once [[id number]]  (Abnormal) Collected:  11/02/18 2033    Specimen:  Blood Updated:  11/02/18 2100     Glucose 143 (H) mg/dL      Comment: Serial Number: [id number]:  [id number]       POC Glucose Once [[id number]]  (Normal) Collected:  11/02/18 1622    Specimen:  Blood Updated:  11/02/18 1721     Glucose 109 mg/dL      Comment: Serial Number: [id number]:  [id number]       POC Glucose Once [[id number]]  (Abnormal) Collected:  11/02/18 1117    Specimen:  Blood Updated:  11/02/18 1720     Glucose 208 (H) mg/dL      Comment: Serial Number: [id number]:  [id number]       D-dimer, Quantitative [[id number]]  (Abnormal) Collected:  11/02/18 1507    Specimen:  Blood Updated:  11/02/18 1601     D-Dimer, Quantitative 739 (C) ng/mL (FEU)     Reticulocytes [[id number]]  (Abnormal) Collected:  11/02/18 1507    Specimen:  Blood Updated:  11/02/18 1520     Reticulocyte % 1.91 (H) %      Reticulocyte Absolute 0.0605 10*6/mm3     TSH [[id number]]  (Abnormal) Collected:  11/02/18 0454    Specimen:  Blood Updated:  11/02/18 0700     TSH 0.048 (L) mIU/mL     POC Glucose Once [[id number]]  (Abnormal) Collected:  11/02/18 0635    Specimen:  Blood Updated:  11/02/18 0650     Glucose 137 (H) mg/dL      Comment: Serial Number: [id number]:  [id number]       CBC & Differential [[id number]] Collected:  11/02/18 0454    Specimen:  Blood Updated:  11/02/18 0622    Narrative:       The following orders were created for panel order CBC & Differential.  Procedure                               Abnormality         Status                     ---------                               -----------          ------                     CBC Auto Differential[[id number]]        Abnormal            Final result                 Please view results for these tests on the individual orders.    CBC Auto Differential [[id number]]  (Abnormal) Collected:  11/02/18 0454    Specimen:  Blood Updated:  11/02/18 0622     WBC 13.74 (H) 10*3/mm3      RBC 3.08 (L) 10*6/mm3      Hemoglobin 9.2 (L) g/dL      Hematocrit 29.8 (L) %      MCV 96.8 fL      MCH 29.9 pg      MCHC 30.9 g/dL      RDW 13.0 %      RDW-SD 46.4 fl      MPV 11.4 fL      Platelets 238 10*3/mm3      Neutrophil % 91.0 (H) %      Lymphocyte % 4.4 (L) %      Monocyte % 3.6 %      Eosinophil % 0.0 %      Basophil % 0.1 %      Immature Grans % 0.9 (H) %      Neutrophils, Absolute 12.51 (H) 10*3/mm3      Lymphocytes, Absolute 0.61 10*3/mm3      Monocytes, Absolute 0.49 10*3/mm3      Eosinophils, Absolute 0.00 10*3/mm3      Basophils, Absolute 0.01 10*3/mm3      Immature Grans, Absolute 0.12 (H) 10*3/mm3      nRBC 0.0 /100 WBC     Basic Metabolic Panel [[id number]]  (Abnormal) Collected:  11/02/18 0454    Specimen:  Blood Updated:  11/02/18 0609     Glucose 124 (H) mg/dL      BUN 12 mg/dL      Creatinine 0.50 (L) mg/dL      Sodium 137 mmol/L      Potassium 3.9 mmol/L      Chloride 108 (H) mmol/L      CO2 23.0 (L) mmol/L      Calcium 8.1 (L) mg/dL      eGFR Non African Amer 121 mL/min/1.73      BUN/Creatinine Ratio 24.0 (H)     Anion Gap 9.9 (L) mmol/L     Narrative:       The MDRD GFR formula is only valid for adults with stable renal function between ages 18 and 70.    POC Glucose Once [[id number]]  (Abnormal) Collected:  11/01/18 2016    Specimen:  Blood Updated:  11/01/18 2100     Glucose 162 (H) mg/dL      Comment: Serial Number: [id number]:  [id number]       POC Glucose Once [[id number]]  (Abnormal) Collected:  11/01/18 1639    Specimen:  Blood Updated:  11/01/18 1750     Glucose 247 (H) mg/dL      Comment: Serial Number: [id number]:  [id number]       POC Glucose  Once [[id number]]  (Abnormal) Collected:  11/01/18 1114    Specimen:  Blood Updated:  11/01/18 1121     Glucose 324 (H) mg/dL      Comment: Serial Number: [id number]:  [id number]       POC Glucose Once [[id number]]  (Abnormal) Collected:  11/01/18 0645    Specimen:  Blood Updated:  11/01/18 0705     Glucose 262 (H) mg/dL      Comment: Serial Number: [id number]:  [id number]       Respiratory Culture - Sputum, Cough [[id number]] Collected:  11/01/18 0442    Specimen:  Sputum from Cough Updated:  11/01/18 0654     Respiratory Culture Rejected    Narrative:         Specimen rejected due to microscopic exam of gram stain.    Basic Metabolic Panel [[id number]]  (Abnormal) Collected:  11/01/18 0456    Specimen:  Blood Updated:  11/01/18 0628     Glucose 276 (H) mg/dL      BUN 9 mg/dL      Creatinine 0.50 (L) mg/dL      Sodium 135 (L) mmol/L      Potassium 4.0 mmol/L      Chloride 107 mmol/L      CO2 19.0 (L) mmol/L      Calcium 7.9 (L) mg/dL      eGFR Non African Amer 121 mL/min/1.73      BUN/Creatinine Ratio 18.0     Anion Gap 13.0 mmol/L     Narrative:       The MDRD GFR formula is only valid for adults with stable renal function between ages 18 and 70.    Magnesium [[id number]]  (Normal) Collected:  11/01/18 0456    Specimen:  Blood Updated:  11/01/18 0628     Magnesium 1.9 mg/dL     CBC Auto Differential [[id number]]  (Abnormal) Collected:  11/01/18 0456    Specimen:  Blood Updated:  11/01/18 0618     WBC 10.14 10*3/mm3      RBC 3.37 (L) 10*6/mm3      Hemoglobin 10.3 (L) g/dL      Hematocrit 32.3 (L) %      MCV 95.8 fL      MCH 30.6 pg      MCHC 31.9 g/dL      RDW 12.9 %      RDW-SD 45.4 fl      MPV 11.0 fL      Platelets 218 10*3/mm3      Neutrophil % 92.4 (H) %      Lymphocyte % 4.7 (L) %      Monocyte % 2.1 %      Eosinophil % 0.0 %      Basophil % 0.1 %      Immature Grans % 0.7 (H) %      Neutrophils, Absolute 9.37 (H) 10*3/mm3      Lymphocytes, Absolute 0.48 (L) 10*3/mm3      Monocytes, Absolute 0.21 10*3/mm3       Eosinophils, Absolute 0.00 10*3/mm3      Basophils, Absolute 0.01 10*3/mm3      Immature Grans, Absolute 0.07 (H) 10*3/mm3      nRBC 0.0 /100 WBC     Hemoglobin A1c [[id number]]  (Abnormal) Collected:  10/31/18 1557    Specimen:  Blood Updated:  11/01/18 0041     Hemoglobin A1C 6.8 (H) %     Narrative:       Expected HgbA1C results:     3% to 6% HgbA1C      HgbA1C                 Estimated Average Glucose     5%                              97 mg/dl   6%                             126 mg/dl   7%                             154 mg/dl   8%                             183 mg/dl   9%                             212 mg/dl  >10%                           >240 mg/dl      POC Glucose Once [[id number]]  (Abnormal) Collected:  10/31/18 2021    Specimen:  Blood Updated:  10/31/18 2030     Glucose 272 (H) mg/dL      Comment: Serial Number: [id number]:  [id number]       Hyrum Draw [[id number]] Collected:  10/31/18 1557    Specimen:  Blood Updated:  10/31/18 1808    Narrative:       The following orders were created for panel order Hyrum Draw.  Procedure                               Abnormality         Status                     ---------                               -----------         ------                     Light Blue Top[[id number]]                                    Final result               Lavender Top[[id number]]                                     Final result               Gold Top - SST[[id number]]                                   Final result               Green Top (No Gel)[[id number]]                                                            Please view results for these tests on the individual orders.    Influenza Antigen, Rapid - Swab, Nasopharynx [[id number]]  (Normal) Collected:  10/31/18 1645    Specimen:  Swab from Nasopharynx Updated:  10/31/18 1704     Influenza A Ag, EIA Negative     Influenza B Ag, EIA Negative    Lactic Acid, Plasma [[id number]]  (Normal) Collected:  10/31/18 1630    Specimen:   Blood from Arm, Left Updated:  10/31/18 1704     Lactate 1.7 mmol/L     Light Blue Top [[id number]] Collected:  10/31/18 1557    Specimen:  Blood Updated:  10/31/18 1700     Extra Tube hold for add-on     Comment: Auto resulted       Lavender Top [[id number]] Collected:  10/31/18 1557    Specimen:  Blood Updated:  10/31/18 1700     Extra Tube hold for add-on     Comment: Auto resulted       Gold Top - SST [[id number]] Collected:  10/31/18 1557    Specimen:  Blood Updated:  10/31/18 1700     Extra Tube Hold for add-ons.     Comment: Auto resulted.       Blood Gas, Arterial With Co-Ox [[id number]]  (Abnormal) Collected:  10/31/18 1649    Specimen:  Arterial Blood Updated:  10/31/18 1649     Site Arterial Line     Luciano's Test N/A     pH, Arterial 7.458 pH units      pCO2, Arterial 37.3 mm Hg      pO2, Arterial 82.0 mm Hg      HCO3, Arterial 26.4 mmol/L      Base Excess, Arterial 2.5 (H) mmol/L      O2 Saturation, Arterial 97.0 %      Hemoglobin, Blood Gas 11.8 (L) g/dL      Hematocrit, Blood Gas 36.2 %      Oxyhemoglobin 95.3 %      Methemoglobin 1.00 %      Carboxyhemoglobin 0.8 %      Barometric Pressure for Blood Gas 732 mmHg      Modality N/A     Ventilator Mode NA     Note --     pH, Temp Corrected -- pH Units      pCO2, Temperature Corrected -- mm Hg      pO2, Temperature Corrected -- mm Hg     BNP [[id number]]  (Abnormal) Collected:  10/31/18 1557    Specimen:  Blood Updated:  10/31/18 1645     proBNP 568.0 (C) pg/mL     Comprehensive Metabolic Panel [[id number]]  (Abnormal) Collected:  10/31/18 1557    Specimen:  Blood Updated:  10/31/18 1640     Glucose 251 (H) mg/dL      Comment: Glucose >180, Hemoglobin A1C recommended.        BUN 9 mg/dL      Creatinine 0.60 mg/dL      Sodium 136 (L) mmol/L      Potassium 3.6 mmol/L      Chloride 99 mmol/L      CO2 27.0 mmol/L      Calcium 9.2 mg/dL      Total Protein 7.5 g/dL      Albumin 4.20 g/dL      ALT (SGPT) 28 U/L      AST (SGOT) 40 U/L      Alkaline Phosphatase 98 U/L       Total Bilirubin 0.5 mg/dL      eGFR Non African Amer 98 mL/min/1.73      Globulin 3.3 gm/dL      A/G Ratio 1.3 g/dL      BUN/Creatinine Ratio 15.0     Anion Gap 13.6 mmol/L     Narrative:       The MDRD GFR formula is only valid for adults with stable renal function between ages 18 and 70.    Troponin [[id number]]  (Normal) Collected:  10/31/18 1557    Specimen:  Blood Updated:  10/31/18 1640     Troponin I <0.012 ng/mL     Narrative:       Normal Patient Upper Reference Limit (URL) (99th Percentile)=0.03 ng/mL   Non-AMI Illness Reference Limit=0.03-0.11 ng/mL   AMI Confirmation=0.12 ng/mL and above    CBC & Differential [[id number]] Collected:  10/31/18 1557    Specimen:  Blood Updated:  10/31/18 1614    Narrative:       The following orders were created for panel order CBC & Differential.  Procedure                               Abnormality         Status                     ---------                               -----------         ------                     CBC Auto Differential[[id number]]        Abnormal            Final result                 Please view results for these tests on the individual orders.    CBC Auto Differential [[id number]]  (Abnormal) Collected:  10/31/18 1557    Specimen:  Blood Updated:  10/31/18 1614     WBC 11.62 (H) 10*3/mm3      RBC 4.10 (L) 10*6/mm3      Hemoglobin 12.3 g/dL      Hematocrit 39.4 %      MCV 96.1 fL      MCH 30.0 pg      MCHC 31.2 g/dL      RDW 12.8 %      RDW-SD 45.3 fl      MPV 10.8 fL      Platelets 250 10*3/mm3      Neutrophil % 87.3 (H) %      Lymphocyte % 5.8 (L) %      Monocyte % 6.1 %      Eosinophil % 0.2 %      Basophil % 0.3 %      Immature Grans % 0.3 %      Neutrophils, Absolute 10.15 (H) 10*3/mm3      Lymphocytes, Absolute 0.67 10*3/mm3      Monocytes, Absolute 0.71 10*3/mm3      Eosinophils, Absolute 0.02 10*3/mm3      Basophils, Absolute 0.03 10*3/mm3      Immature Grans, Absolute 0.04 10*3/mm3      nRBC 0.0 /100 WBC           Pertinent Radiology  Results:    Imaging Results (all)     Procedure Component Value Units Date/Time    NM Lung Ventilation Perfusion [[id number]] Collected:  11/02/18 1852     Updated:  11/02/18 1853    Narrative:       FINAL REPORT    TECHNIQUE:  The patient was injected with 5 mCi of technetium 99m MAA.  30.0  mCi of aerosolized DTPA was utilized for ventilation.  Images of  the lungs were obtained in multiple projections.    CLINICAL HISTORY:  Other abnormalities of breathing    FINDINGS:  Overall, perfusion is superior to ventilation.  There are no  mismatched defects to suggest PE.  Impression: Low probability  for PE.      Impression:       Authenticated by Doris Carlin MD on 11/02/2018 06:52:35 PM    XR Chest 2 View [[id number]] Collected:  11/01/18 0817     Updated:  11/01/18 0821    Narrative:       PROCEDURE: XR CHEST 2 VW-        HISTORY: [facility]  triage protocol     COMPARISON: June 4, 2016.     FINDINGS: The heart is normal in size. The mediastinum is unremarkable.  There are emphysematous changes to the lungs. No focal opacities were  effusions are evident. There is no pneumothorax. There are no acute  osseous abnormalities.           Impression:       Emphysematous changes with no acute cardiopulmonary process.           This report was finalized on 11/1/2018 8:19 AM by Jerald Bueno M.D..          Condition on Discharge:  Stable        Code status during the hospital stay:        Discharge Disposition:    Home or Self Care    Discharge Medication:       Discharge Medications      New Medications      Instructions Start Date   cefuroxime 500 MG tablet  Commonly known as:  CEFTIN   500 mg, Oral, 2 Times Daily      predniSONE 10 MG tablet  Commonly known as:  DELTASONE   Take 4 tab daily x 2 day then 2 tab daily x 2 day then one tab daily x 2 day and stop         Continue These Medications      Instructions Start Date   albuterol (2.5 MG/3ML) 0.083% nebulizer solution  Commonly known as:  PROVENTIL   2.5 mg,  Nebulization, Every 4 Hours PRN      calcium carbonate 600 MG tablet  Commonly known as:  OS-VASU   600 mg, Oral, Daily      folic acid 1 MG tablet  Commonly known as:  FOLVITE   1 mg, Oral, Daily      FOSAMAX 70 MG tablet  Generic drug:  alendronate   70 mg, Oral, Every 7 Days      levothyroxine 75 MCG tablet  Commonly known as:  SYNTHROID, LEVOTHROID   75 mcg, Oral, Daily      omeprazole 40 MG capsule  Commonly known as:  PRILOSEC   40 mg, Oral, Daily      VITAMIN B 12 PO   1,000 Units, Oral, Daily      ZOCOR 20 MG tablet  Generic drug:  simvastatin   20 mg, Oral, Nightly             Discharge Diet:     Diet Instructions     Diet: Regular       Discharge Diet:  Regular          Activity at Discharge:     Activity Instructions     Activity as Tolerated             Follow-up Appointments:    No future appointments.  Additional Instructions for the Follow-ups that You Need to Schedule     Discharge Follow-up with PCP    As directed      Currently Documented PCP:  Sofia Martinez APRN  PCP Phone Number:  [phone]    Follow Up Details:  Less than one  week               Test Results Pending at Discharge:     Order Current Status    Blood Culture - Blood, Preliminary result    Blood Culture - Blood, Preliminary result             Shellie Amaral DO  11/05/18  11:53 AM      Medication risks and benefits were discussed in great detail. The patient reported being satisfied with the current treatment plan and the care delivered while hospitalized.     Time:  I spent 35 minutes preparing discharge counseling and teaching.

## 2018-11-06 ENCOUNTER — READMISSION MANAGEMENT (OUTPATIENT)
Dept: CALL CENTER | Facility: HOSPITAL | Age: 73
End: 2018-11-06

## 2018-11-06 NOTE — OUTREACH NOTE
Prep Survey      Responses   Facility patient discharged from?  [facility]   Is patient eligible?  Yes   Discharge diagnosis  Acute hypoxic resp. failure, COPD Exac. , Acute bronchitis, borderline diabetes II, hypothyroidism   Does the patient have one of the following disease processes/diagnoses(primary or secondary)?  COPD/Pneumonia   Does the patient have Home health ordered?  No   Is there a DME ordered?  Yes   What DME was ordered?  [facility] for home O2   Comments regarding appointments  See AVS   Prep survey completed?  Yes          Ml Miguel RN

## 2018-11-07 NOTE — PROGRESS NOTES
Case Management Discharge Note         Destination     No service has been selected for the patient.      Durable Medical Equipment - Selection Complete     Service Request Status Selected Specialties Address Phone Number Fax Number    [facility] Selected DME Services [address] [address] [phone]      Dialysis/Infusion     No service has been selected for the patient.      Home Medical Care     No service has been selected for the patient.      Social Care     No service has been selected for the patient.        Other:  (pvt car)    Final Discharge Disposition Code: 01 - home or self-care   Home O2 with [facility]

## 2018-11-08 ENCOUNTER — READMISSION MANAGEMENT (OUTPATIENT)
Dept: CALL CENTER | Facility: HOSPITAL | Age: 73
End: 2018-11-08

## 2018-11-08 NOTE — OUTREACH NOTE
COPD/PN Week 1 Survey      Responses   Facility patient discharged from?  [facility]   Does the patient have one of the following disease processes/diagnoses(primary or secondary)?  COPD/Pneumonia   Is there a successful TCM telephone encounter documented?  No   Was the primary reason for admission:  COPD exacerbation   Week 1 attempt successful?  Yes   Call start time  1516   Call end time  1523   Discharge diagnosis  Acute hypoxic resp. failure, COPD Exac. , Acute bronchitis, borderline diabetes II, hypothyroidism   Is patient permission given to speak with other caregiver?  Yes   List who call center can speak with  Yevgeniy    Meds reviewed with patient/caregiver?  Yes   Is the patient having any side effects they believe may be caused by any medication additions or changes?  No   Does the patient have all medications ordered at discharge?  Yes   Is the patient taking all medications as directed (includes completed medication regime)?  Yes   Comments regarding appointments  See AVS   Does the patient have a primary care provider?   Yes   Does the patient have an appointment with their PCP or pulmonologist within 7 days of discharge?  Yes   Has the patient kept scheduled appointments due by today?  N/A   Comments  Appt 11/12/18 with PCP   What DME was ordered?  [facility] for home O2   Has all DME been delivered?  Yes   Did the patient receive a copy of their discharge instructions?  Yes   Nursing interventions  Reviewed instructions with patient   What is the patient's perception of their health status since discharge?  Improving   Are the patient's immunizations up to date?   No   Nursing interventions  Advised patient to discuss with provider at next visit, Educated on importance of maintaining up to date immunizations as advised by provider   Is the patient/caregiver able to teach back the hierarchy of who to call/visit for symptoms/problems? PCP, Specialist, Home health nurse, Urgent Care, ED, 911  Yes    Additional teach back comments  Pneumonia vaccine in 2013, will speak with PCP about updating.  Quit smoking 2 months ago.    Is the patient able to teach back COPD zones?  Yes   Nursing interventions  Education provided on various zones   Patient reports what zone on this call?  Green Zone   Green Zone  Reports doing well, Breathing without shortness of breath, Usual activity and exercise level, Sleeping well, Appetite is good, Usual amount of phlegm/mucus without difficulty coughing up   Green Zone interventions:  Take daily medications, Continue regular exercise/diet plan, Do not smoke, Use oxygen as prescribed, Avoid indoor/outdoor triggers   Week 1 call completed?  Yes   Wrap up additional comments  Discussed some minor ankle edema. She will try elevating her feet, cutting out salt and ambulating more often. If not going down overnight, will mention to her doctor at appt.           Peggy Garcia RN

## 2018-11-15 ENCOUNTER — READMISSION MANAGEMENT (OUTPATIENT)
Dept: CALL CENTER | Facility: HOSPITAL | Age: 73
End: 2018-11-15

## 2018-11-15 NOTE — OUTREACH NOTE
COPD/PN Week 2 Survey      Responses   Facility patient discharged from?  [facility]   Does the patient have one of the following disease processes/diagnoses(primary or secondary)?  COPD/Pneumonia   Week 2 attempt successful?  Yes   Call start time  1652   Call end time  1654   Meds reviewed with patient/caregiver?  Yes   Is the patient taking all medications as directed (includes completed medication regime)?  Yes   Medication comments  antibiotics and steroids have been completed   Comments regarding appointments  Sofia Martinez NP   Has the patient kept scheduled appointments due by today?  Yes   Comments  2 liters oxygen    Did the patient receive a copy of their discharge instructions?  Yes   What is the patient's perception of their health status since discharge?  Improving   Is the patient able to teach back COPD zones?  Yes   Patient reports what zone on this call?  Green Zone   Green Zone  Reports doing well, Breathing without shortness of breath, Usual activity and exercise level, Usual amount of phlegm/mucus without difficulty coughing up, Sleeping well, Appetite is good   Green Zone interventions:  Take daily medications, Use oxygen as prescribed, Continue regular exercise/diet plan, Avoid indoor/outdoor triggers, Do not smoke   Week 2 call completed?  Yes   Wrap up additional comments  Swelling has gone away, feeling better.           Ana Yoon RN

## 2018-11-23 ENCOUNTER — READMISSION MANAGEMENT (OUTPATIENT)
Dept: CALL CENTER | Facility: HOSPITAL | Age: 73
End: 2018-11-23

## 2018-11-23 NOTE — OUTREACH NOTE
COPD/PN Week 3 Survey      Responses   Facility patient discharged from?  [facility]   Does the patient have one of the following disease processes/diagnoses(primary or secondary)?  COPD/Pneumonia   Was the primary reason for admission:  COPD exacerbation   Week 3 attempt successful?  Yes   Call start time  1620   Call end time  1623   Discharge diagnosis  Acute hypoxic resp. failure, COPD Exac. , Acute bronchitis, borderline diabetes II, hypothyroidism   Meds reviewed with patient/caregiver?  Yes   Is the patient having any side effects they believe may be caused by any medication additions or changes?  No   Does the patient have all medications ordered at discharge?  Yes   Is the patient taking all medications as directed (includes completed medication regime)?  Yes   Does the patient have a primary care provider?   Yes   Does the patient have an appointment with their PCP or pulmonologist within 7 days of discharge?  Yes   Has the patient kept scheduled appointments due by today?  Yes   Has home health visited the patient within 72 hours of discharge?  N/A   What DME was ordered?  [facility] for home O2   Has all DME been delivered?  Yes   Psychosocial issues?  No   Did the patient receive a copy of their discharge instructions?  Yes   Nursing interventions  Reviewed instructions with patient   What is the patient's perception of their health status since discharge?  Improving   Nursing Interventions  Nurse provided patient education   Are the patient's immunizations up to date?   Yes   Nursing interventions  Educated on importance of maintaining up to date immunizations as advised by provider   Is the patient/caregiver able to teach back the hierarchy of who to call/visit for symptoms/problems? PCP, Specialist, Home health nurse, Urgent Care, ED, 911  Yes   Is the patient able to teach back COPD zones?  Yes   Patient reports what zone on this call?  Green Zone   Green Zone  Reports doing well, Breathing without  shortness of breath, Usual amount of phlegm/mucus without difficulty coughing up, Usual activity and exercise level   Green Zone interventions:  Take daily medications   Week 3 call completed?  Yes          Johan Steiner RN

## 2018-11-30 ENCOUNTER — READMISSION MANAGEMENT (OUTPATIENT)
Dept: CALL CENTER | Facility: HOSPITAL | Age: 73
End: 2018-11-30

## 2018-11-30 NOTE — OUTREACH NOTE
COPD/PN Week 4 Survey      Responses   Facility patient discharged from?  [facility]   Does the patient have one of the following disease processes/diagnoses(primary or secondary)?  COPD/Pneumonia   Was the primary reason for admission:  COPD exacerbation   Week 4 attempt successful?  No          Evangelina Puente RN

## 2019-08-05 ENCOUNTER — TELEPHONE (OUTPATIENT)
Dept: SURGERY | Facility: CLINIC | Age: 74
End: 2019-08-05

## 2019-08-05 NOTE — TELEPHONE ENCOUNTER
Attempted to contact patient regarding follow up for EGD. Unable to contact at this time. Letter sent.

## 2019-09-18 ENCOUNTER — HOSPITAL ENCOUNTER (OUTPATIENT)
Dept: ULTRASOUND IMAGING | Facility: HOSPITAL | Age: 74
Discharge: HOME | End: 2019-09-18
Payer: MEDICARE

## 2019-09-18 ENCOUNTER — HOSPITAL ENCOUNTER (OUTPATIENT)
Facility: HOSPITAL | Age: 74
Discharge: HOME | End: 2019-09-18
Payer: MEDICARE

## 2019-09-18 ENCOUNTER — HOSPITAL ENCOUNTER (OUTPATIENT)
Dept: GENERAL RADIOLOGY | Facility: HOSPITAL | Age: 74
Discharge: HOME | End: 2019-09-18
Payer: MEDICARE

## 2019-09-18 DIAGNOSIS — R63.4 LOSS OF WEIGHT: ICD-10-CM

## 2019-09-18 DIAGNOSIS — Z72.0 TOBACCO ABUSE: ICD-10-CM

## 2019-09-18 PROCEDURE — 76705 ECHO EXAM OF ABDOMEN: CPT

## 2019-09-18 PROCEDURE — 71046 X-RAY EXAM CHEST 2 VIEWS: CPT

## 2022-04-12 ENCOUNTER — TELEPHONE (OUTPATIENT)
Dept: SURGERY | Facility: CLINIC | Age: 77
End: 2022-04-12

## 2022-04-12 NOTE — TELEPHONE ENCOUNTER
PT CALLED BACK AND SAID SHE DIDN'T WANT TO BE SEEN FOR A EVAL FOR EGD. SO IF YOU HAVE ANY OTHER QUESTION FOR PT PLEASE CALL BACK [phone]

## 2022-04-12 NOTE — TELEPHONE ENCOUNTER
LVM FOR PT TO CALL BACK FOR A EVAL EGD LAST EGD WAS DONE 05/03/2018. I HAVE S/C PT FOR 05/19/2022 AT [facility] FOR EVAL FOR EGD.

## 2022-05-12 ENCOUNTER — HOSPITAL ENCOUNTER (INPATIENT)
Facility: HOSPITAL | Age: 77
LOS: 2 days | Discharge: HOME | DRG: 190 | End: 2022-05-14
Attending: HOSPITALIST | Admitting: INTERNAL MEDICINE
Payer: MEDICARE

## 2022-05-12 ENCOUNTER — APPOINTMENT (OUTPATIENT)
Dept: GENERAL RADIOLOGY | Facility: HOSPITAL | Age: 77
DRG: 190 | End: 2022-05-12
Payer: MEDICARE

## 2022-05-12 DIAGNOSIS — J44.9 COPD (CHRONIC OBSTRUCTIVE PULMONARY DISEASE) (HCC): ICD-10-CM

## 2022-05-12 DIAGNOSIS — J96.01 ACUTE RESPIRATORY FAILURE WITH HYPOXIA (HCC): Primary | ICD-10-CM

## 2022-05-12 DIAGNOSIS — J44.1 ACUTE EXACERBATION OF CHRONIC OBSTRUCTIVE PULMONARY DISEASE (COPD) (HCC): ICD-10-CM

## 2022-05-12 LAB
A/G RATIO: 1.1 (ref 0.8–2)
ALBUMIN SERPL-MCNC: 3.4 G/DL (ref 3.4–4.8)
ALP BLD-CCNC: 71 U/L (ref 25–100)
ALT SERPL-CCNC: 13 U/L (ref 4–36)
ANION GAP SERPL CALCULATED.3IONS-SCNC: 10 MMOL/L (ref 3–16)
AST SERPL-CCNC: 19 U/L (ref 8–33)
BASE EXCESS ARTERIAL: 3.4 MMOL/L (ref -3–3)
BASOPHILS ABSOLUTE: 0 K/UL (ref 0–0.1)
BASOPHILS RELATIVE PERCENT: 0.3 %
BILIRUB SERPL-MCNC: 0.3 MG/DL (ref 0.3–1.2)
BUN BLDV-MCNC: 12 MG/DL (ref 6–20)
CALCIUM SERPL-MCNC: 9 MG/DL (ref 8.5–10.5)
CHLORIDE BLD-SCNC: 97 MMOL/L (ref 98–107)
CO2: 25 MMOL/L (ref 20–30)
CREAT SERPL-MCNC: 0.7 MG/DL (ref 0.4–1.2)
EOSINOPHILS ABSOLUTE: 0 K/UL (ref 0–0.4)
EOSINOPHILS RELATIVE PERCENT: 0.1 %
FIO2: 0.28 %
GFR AFRICAN AMERICAN: >59
GFR NON-AFRICAN AMERICAN: >60
GLOBULIN: 3 G/DL
GLUCOSE BLD-MCNC: 254 MG/DL (ref 74–106)
GLUCOSE BLD-MCNC: 394 MG/DL (ref 74–106)
HCO3 ARTERIAL: 27.9 MMOL/L (ref 22–26)
HCT VFR BLD CALC: 37.5 % (ref 37–47)
HEMOGLOBIN: 11.7 G/DL (ref 11.5–16.5)
IMMATURE GRANULOCYTES #: 0.1 K/UL
IMMATURE GRANULOCYTES %: 0.5 % (ref 0–5)
LYMPHOCYTES ABSOLUTE: 0.6 K/UL (ref 1.5–4)
LYMPHOCYTES RELATIVE PERCENT: 5.3 %
MCH RBC QN AUTO: 29.5 PG (ref 27–32)
MCHC RBC AUTO-ENTMCNC: 31.2 G/DL (ref 31–35)
MCV RBC AUTO: 94.5 FL (ref 80–100)
MONOCYTES ABSOLUTE: 0.8 K/UL (ref 0.2–0.8)
MONOCYTES RELATIVE PERCENT: 7.4 %
NEUTROPHILS ABSOLUTE: 9.2 K/UL (ref 2–7.5)
NEUTROPHILS RELATIVE PERCENT: 86.4 %
O2 SAT, ARTERIAL: 91.6 %
O2 THERAPY: ABNORMAL
PCO2 ARTERIAL: 42.2 MMHG (ref 35–45)
PDW BLD-RTO: 12.4 % (ref 11–16)
PERFORMED ON: ABNORMAL
PH ARTERIAL: 7.44 (ref 7.35–7.45)
PLATELET # BLD: 254 K/UL (ref 150–400)
PMV BLD AUTO: 10.4 FL (ref 6–10)
PO2 ARTERIAL: 62.4 MMHG (ref 80–100)
POTASSIUM REFLEX MAGNESIUM: 4.6 MMOL/L (ref 3.4–5.1)
PRO-BNP: 927 PG/ML (ref 0–1800)
RAPID INFLUENZA  B AGN: NEGATIVE
RAPID INFLUENZA A AGN: NEGATIVE
RBC # BLD: 3.97 M/UL (ref 3.8–5.8)
SODIUM BLD-SCNC: 132 MMOL/L (ref 136–145)
TCO2 ARTERIAL: 29.2 MMOL/L (ref 24–30)
TOTAL PROTEIN: 6.4 G/DL (ref 6.4–8.3)
TROPONIN: <0.3 NG/ML
WBC # BLD: 10.7 K/UL (ref 4–11)

## 2022-05-12 PROCEDURE — 80053 COMPREHEN METABOLIC PANEL: CPT

## 2022-05-12 PROCEDURE — 84484 ASSAY OF TROPONIN QUANT: CPT

## 2022-05-12 PROCEDURE — 96360 HYDRATION IV INFUSION INIT: CPT

## 2022-05-12 PROCEDURE — 2580000003 HC RX 258: Performed by: INTERNAL MEDICINE

## 2022-05-12 PROCEDURE — 93005 ELECTROCARDIOGRAM TRACING: CPT

## 2022-05-12 PROCEDURE — 6360000002 HC RX W HCPCS: Performed by: HOSPITALIST

## 2022-05-12 PROCEDURE — 94640 AIRWAY INHALATION TREATMENT: CPT

## 2022-05-12 PROCEDURE — 36415 COLL VENOUS BLD VENIPUNCTURE: CPT

## 2022-05-12 PROCEDURE — 85025 COMPLETE CBC W/AUTO DIFF WBC: CPT

## 2022-05-12 PROCEDURE — 1200000000 HC SEMI PRIVATE

## 2022-05-12 PROCEDURE — 71045 X-RAY EXAM CHEST 1 VIEW: CPT

## 2022-05-12 PROCEDURE — 83880 ASSAY OF NATRIURETIC PEPTIDE: CPT

## 2022-05-12 PROCEDURE — 87804 INFLUENZA ASSAY W/OPTIC: CPT

## 2022-05-12 PROCEDURE — 2580000003 HC RX 258: Performed by: HOSPITALIST

## 2022-05-12 PROCEDURE — 99285 EMERGENCY DEPT VISIT HI MDM: CPT

## 2022-05-12 PROCEDURE — 6370000000 HC RX 637 (ALT 250 FOR IP): Performed by: INTERNAL MEDICINE

## 2022-05-12 PROCEDURE — 6360000002 HC RX W HCPCS: Performed by: INTERNAL MEDICINE

## 2022-05-12 PROCEDURE — 82803 BLOOD GASES ANY COMBINATION: CPT

## 2022-05-12 PROCEDURE — 36600 WITHDRAWAL OF ARTERIAL BLOOD: CPT

## 2022-05-12 PROCEDURE — 96361 HYDRATE IV INFUSION ADD-ON: CPT

## 2022-05-12 RX ORDER — ONDANSETRON 2 MG/ML
4 INJECTION INTRAMUSCULAR; INTRAVENOUS EVERY 6 HOURS PRN
Status: DISCONTINUED | OUTPATIENT
Start: 2022-05-12 | End: 2022-05-14 | Stop reason: HOSPADM

## 2022-05-12 RX ORDER — ALBUTEROL SULFATE 90 UG/1
2 AEROSOL, METERED RESPIRATORY (INHALATION) EVERY 6 HOURS PRN
COMMUNITY

## 2022-05-12 RX ORDER — ERGOCALCIFEROL 1.25 MG/1
50000 CAPSULE ORAL WEEKLY
COMMUNITY

## 2022-05-12 RX ORDER — LEVOTHYROXINE SODIUM 0.07 MG/1
75 TABLET ORAL DAILY
Status: DISCONTINUED | OUTPATIENT
Start: 2022-05-13 | End: 2022-05-14 | Stop reason: HOSPADM

## 2022-05-12 RX ORDER — IPRATROPIUM BROMIDE AND ALBUTEROL SULFATE 2.5; .5 MG/3ML; MG/3ML
1 SOLUTION RESPIRATORY (INHALATION)
Status: DISCONTINUED | OUTPATIENT
Start: 2022-05-13 | End: 2022-05-14 | Stop reason: HOSPADM

## 2022-05-12 RX ORDER — LOSARTAN POTASSIUM 100 MG/1
100 TABLET ORAL DAILY
Status: ON HOLD | COMMUNITY
End: 2022-05-13

## 2022-05-12 RX ORDER — ATORVASTATIN CALCIUM 20 MG/1
20 TABLET, FILM COATED ORAL DAILY
Status: DISCONTINUED | OUTPATIENT
Start: 2022-05-12 | End: 2022-05-14 | Stop reason: HOSPADM

## 2022-05-12 RX ORDER — CHOLECALCIFEROL (VITAMIN D3) 125 MCG
1000 CAPSULE ORAL DAILY
Status: DISCONTINUED | OUTPATIENT
Start: 2022-05-13 | End: 2022-05-14 | Stop reason: HOSPADM

## 2022-05-12 RX ORDER — LOSARTAN POTASSIUM 50 MG/1
100 TABLET ORAL DAILY
Status: DISCONTINUED | OUTPATIENT
Start: 2022-05-13 | End: 2022-05-14 | Stop reason: HOSPADM

## 2022-05-12 RX ORDER — INSULIN LISPRO 100 [IU]/ML
0-6 INJECTION, SOLUTION INTRAVENOUS; SUBCUTANEOUS
Status: DISCONTINUED | OUTPATIENT
Start: 2022-05-13 | End: 2022-05-14 | Stop reason: HOSPADM

## 2022-05-12 RX ORDER — POLYETHYLENE GLYCOL 3350 17 G/17G
17 POWDER, FOR SOLUTION ORAL DAILY PRN
Status: DISCONTINUED | OUTPATIENT
Start: 2022-05-12 | End: 2022-05-14 | Stop reason: HOSPADM

## 2022-05-12 RX ORDER — ONDANSETRON 4 MG/1
4 TABLET, ORALLY DISINTEGRATING ORAL EVERY 8 HOURS PRN
Status: DISCONTINUED | OUTPATIENT
Start: 2022-05-12 | End: 2022-05-14 | Stop reason: HOSPADM

## 2022-05-12 RX ORDER — ACETAMINOPHEN 325 MG/1
650 TABLET ORAL EVERY 6 HOURS PRN
Status: DISCONTINUED | OUTPATIENT
Start: 2022-05-12 | End: 2022-05-14 | Stop reason: HOSPADM

## 2022-05-12 RX ORDER — LEVALBUTEROL INHALATION SOLUTION 1.25 MG/3ML
1.25 SOLUTION RESPIRATORY (INHALATION) EVERY 8 HOURS PRN
Status: DISCONTINUED | OUTPATIENT
Start: 2022-05-12 | End: 2022-05-12

## 2022-05-12 RX ORDER — INSULIN LISPRO 100 [IU]/ML
0-3 INJECTION, SOLUTION INTRAVENOUS; SUBCUTANEOUS NIGHTLY
Status: DISCONTINUED | OUTPATIENT
Start: 2022-05-12 | End: 2022-05-14 | Stop reason: HOSPADM

## 2022-05-12 RX ORDER — ENOXAPARIN SODIUM 100 MG/ML
30 INJECTION SUBCUTANEOUS DAILY
Status: DISCONTINUED | OUTPATIENT
Start: 2022-05-13 | End: 2022-05-13

## 2022-05-12 RX ORDER — FOLIC ACID 1 MG/1
1 TABLET ORAL DAILY
Status: DISCONTINUED | OUTPATIENT
Start: 2022-05-13 | End: 2022-05-14 | Stop reason: HOSPADM

## 2022-05-12 RX ORDER — ACETAMINOPHEN 650 MG/1
650 SUPPOSITORY RECTAL EVERY 6 HOURS PRN
Status: DISCONTINUED | OUTPATIENT
Start: 2022-05-12 | End: 2022-05-14 | Stop reason: HOSPADM

## 2022-05-12 RX ORDER — ALBUTEROL SULFATE 2.5 MG/3ML
2.5 SOLUTION RESPIRATORY (INHALATION) EVERY 6 HOURS PRN
COMMUNITY

## 2022-05-12 RX ORDER — 0.9 % SODIUM CHLORIDE 0.9 %
500 INTRAVENOUS SOLUTION INTRAVENOUS ONCE
Status: COMPLETED | OUTPATIENT
Start: 2022-05-12 | End: 2022-05-12

## 2022-05-12 RX ADMIN — INSULIN LISPRO 2 UNITS: 100 INJECTION, SOLUTION INTRAVENOUS; SUBCUTANEOUS at 23:07

## 2022-05-12 RX ADMIN — LEVALBUTEROL 1.25 MG: 1.25 SOLUTION RESPIRATORY (INHALATION) at 18:28

## 2022-05-12 RX ADMIN — IPRATROPIUM BROMIDE 0.5 MG: 0.5 SOLUTION RESPIRATORY (INHALATION) at 18:28

## 2022-05-12 RX ADMIN — CEFTRIAXONE SODIUM 1000 MG: 1 INJECTION, POWDER, FOR SOLUTION INTRAMUSCULAR; INTRAVENOUS at 23:05

## 2022-05-12 RX ADMIN — SODIUM CHLORIDE 500 ML: 9 INJECTION, SOLUTION INTRAVENOUS at 18:51

## 2022-05-12 ASSESSMENT — PAIN - FUNCTIONAL ASSESSMENT: PAIN_FUNCTIONAL_ASSESSMENT: NONE - DENIES PAIN

## 2022-05-12 NOTE — ED PROVIDER NOTES
7:24 PM EDT  Dusty Bunch was checked out to me by Dr. Satnam Bullard. Please see his/her initial documentation for details of the patient's ED presentation, physical exam and completed studies. In brief, Dusty Bunch is a 67 y.o. female that presents to ER with productive cough with white sputum. Pt has been sick for the past couple days with shortness of breath and cough. She went to the clinic today and had COVID testing that was negative. Flu testing negative. Pt with CXR negative. Noted by EMS that her oxygen was in the mid 80's. They placed her on 2 liters bumping her up to 6 liters. She was 100% and they dropped her to 2 L while in ED. ABG showing PO2 of 68 and Dr. Satnam Bullard moved her up to 3 L. Currently at 99%. Pt without any acute respiratory distress currently. Temp 99.7 F. Pt long term smoker 1 ppd for 30 years. No sick contacts.      I have reviewed and interpreted all of the currently available lab results from this visit (if applicable):  Results for orders placed or performed during the hospital encounter of 05/12/22   Rapid Influenza A/B Antigens    Specimen: Nasopharyngeal   Result Value Ref Range    Rapid Influenza A Ag Negative Negative    Rapid Influenza B Ag Negative Negative   CBC with Auto Differential   Result Value Ref Range    WBC 10.7 4.0 - 11.0 K/uL    RBC 3.97 3.80 - 5.80 M/uL    Hemoglobin 11.7 11.5 - 16.5 g/dL    Hematocrit 37.5 37.0 - 47.0 %    MCV 94.5 80.0 - 100.0 fL    MCH 29.5 27.0 - 32.0 pg    MCHC 31.2 31.0 - 35.0 g/dL    RDW 12.4 11.0 - 16.0 %    Platelets 604 211 - 896 K/uL    MPV 10.4 (H) 6.0 - 10.0 fL    Neutrophils % 86.4 %    Immature Granulocytes % 0.5 0.0 - 5.0 %    Lymphocytes % 5.3 %    Monocytes % 7.4 %    Eosinophils % 0.1 %    Basophils % 0.3 %    Neutrophils Absolute 9.2 (H) 2.0 - 7.5 K/uL    Immature Granulocytes # 0.1 K/uL    Lymphocytes Absolute 0.6 (L) 1.5 - 4.0 K/uL    Monocytes Absolute 0.8 0.2 - 0.8 K/uL    Eosinophils Absolute 0.0 0.0 - 0.4 K/uL Basophils Absolute 0.0 0.0 - 0.1 K/uL   Comprehensive Metabolic Panel w/ Reflex to MG   Result Value Ref Range    Sodium 132 (L) 136 - 145 mmol/L    Potassium reflex Magnesium 4.6 3.4 - 5.1 mmol/L    Chloride 97 (L) 98 - 107 mmol/L    CO2 25 20 - 30 mmol/L    Anion Gap 10 3 - 16    Glucose 394 (H) 74 - 106 mg/dL    BUN 12 6 - 20 mg/dL    CREATININE 0.7 0.4 - 1.2 mg/dL    GFR Non-African American >60 >59    GFR African American >59 >59    Calcium 9.0 8.5 - 10.5 mg/dL    Total Protein 6.4 6.4 - 8.3 g/dL    Albumin 3.4 3.4 - 4.8 g/dL    Albumin/Globulin Ratio 1.1 0.8 - 2.0    Total Bilirubin 0.3 0.3 - 1.2 mg/dL    Alkaline Phosphatase 71 25 - 100 U/L    ALT 13 4 - 36 U/L    AST 19 8 - 33 U/L    Globulin 3.0 Not Established g/dL   Troponin   Result Value Ref Range    Troponin <0.30 <0.30 ng/mL   Brain Natriuretic Peptide   Result Value Ref Range    Pro- 0 - 1,800 pg/mL   Blood Gas, Arterial   Result Value Ref Range    pH, Arterial 7.438 7.350 - 7.450    pCO2, Arterial 42.2 35.0 - 45.0 mmHg    pO2, Arterial 62.4 (L) 80.0 - 100.0 mmHg    HCO3, Arterial 27.9 (H) 22.0 - 26.0 mmol/L    Base Excess, Arterial 3.4 (H) -3.0 - 3.0 mmol/L    O2 Sat, Arterial 91.6 >92 %    TCO2, Arterial 29.2 24.0 - 30.0 mmol/L    O2 Therapy Unknown     FIO2 0.28 Not Established %       MDM:  Pt was pending CXR at shift change. No acute findings noted once the xray was done. Pt stable. Final read also negative. Called Dr. Neisha Kenyon and he has accepted the patient. Will give dose of Rocephin while here. Pt will be admitted to [facility] med/surg    Final Impression:  1. Acute respiratory failure with hypoxia ([facility] [address]    2.  Acute exacerbation of chronic obstructive pulmonary disease (COPD) ([facility] [address]      Disposition: Pt admitted to [facility] Med/Surg Floor      (Please note that portions of this note may have been completed with a voice recognition program. Efforts were made to edit the dictations but occasionally words are mis-transcribed.)    Jazmyn Palmer, DO Misha Blind, DO  05/12/22 2028

## 2022-05-12 NOTE — ED PROVIDER NOTES
[facility] ENCOUNTER      Pt Name: Akilah Hdz  MRN: [id number]  YOB: 1945  Date of evaluation: 5/12/2022  Provider: Cindy Ryan, [facility]       Chief Complaint   Patient presents with    Cough    Shortness of Breath         HISTORY OF PRESENT ILLNESS  (Location/Symptom, Timing/Onset, Context/Setting, Quality, Duration, Modifying Factors, Severity.)   Akilah Hdz is a 67 y.o. female who presents to the emergency department for shortness of breath and cough. Patient states her symptoms actually started yesterday. Advised that she was short of breath but she took some nebulizers at home and states that she started coughing up some yellowish sputum. Patient does have a history of chronic obstructive pulmonary disease. States that she was on oxygen at 1 time but she is no longer on it that she has not been on oxygen for about 3 years. Patient states that she quit smoking 1 week ago. Advised that she was half a pack a day smoker for 30 years if not longer. Patient denies any fevers or chills that she is aware of but states that her temperature at the clinic today was low-grade at 99. She advised that she had a negative COVID swab performed at the clinic. Patient pulse ox apparently was low and they had to place her on oxygen. Patient was on 3 L at the clinic and a pulse ox was around 87-89 with EMS. They turned her up to 6L nasal cannula and she came up to the upper 90 to 100% with this. Patient denies any sick contacts that she is aware of. She denies any headaches out of ordinary. Denies any body aches. Denies any chest pain with her shortness of breath. Denies any loss of taste or smell. She received Solu-Medrol 80mg IM and a nebulizer at the clinic prior to arrival here. Nursing notes were reviewed.     REVIEW OFSYSTEMS    (2-9 systems for level 4, 10 or more for level 5)   ROS:  General:  No fevers, no chills, no weakness  Cardiovascular:  No chest pain, no palpitations  Respiratory:  +shortness of breath, + cough, no wheezing  Gastrointestinal:  No pain, no nausea, no vomiting, no diarrhea  Musculoskeletal:  No muscle pain, no joint pain  Skin:  No rash, no easy bruising  Neurologic:  No speech problems, no headache, no extremity weakness  Psychiatric:  No anxiety  Genitourinary:  No dysuria, no hematuria    Except as noted above the remainder of the review of systems was reviewed and negative. PAST MEDICAL HISTORY     Past Medical History:   Diagnosis Date    COPD [facility]chronic obstructive [facility] disease) ([facility] [address]     Hyperlipidemia     Hypothyroidism     Osteoarthritis     Type II or unspecified type diabetes mellitus without mention of complication, not stated as uncontrolled          SURGICAL HISTORY       Past Surgical History:   Procedure Laterality Date    APPENDECTOMY      HYSTERECTOMY      PARTIAL         CURRENT MEDICATIONS       Previous Medications    ALBUTEROL (PROVENTIL) (2.5 MG/3ML) 0.083% NEBULIZER SOLUTION    Take 2.5 mg by nebulization every 6 hours as needed for Wheezing    ALBUTEROL SULFATE HFA (VENTOLIN HFA) 108 (90 BASE) MCG/ACT INHALER    Inhale 2 puffs into the lungs every 6 hours as needed for Wheezing    CALCIUM CARB-CHOLECALCIFEROL (CALCIUM CARBONATE-VITAMIN D3 PO)    Take by mouth 600 mg (1500 mg) -800 unit tablet    FOLIC ACID 20 MG CAPS    Take 1 mg by mouth daily     LEVOTHYROXINE (SYNTHROID) 75 MCG TABLET    TAKE ONE TABLET BY MOUTH EVERY DAY    LOSARTAN (COZAAR) 100 MG TABLET    Take 100 mg by mouth daily    SIMVASTATIN (ZOCOR) 20 MG TABLET    TAKE ONE TABLET BY MOUTH NIGHTLY    VITAMIN B-12 (CYANOCOBALAMIN) 100 MCG TABLET    Take 1,000 mcg by mouth daily.       VITAMIN D (ERGOCALCIFEROL) 1.25 MG (83283 UT) CAPS CAPSULE    Take 50,000 Units by mouth once a week       ALLERGIES     Codeine and Shellfish-derived products    FAMILY HISTORY       Family History Problem Relation Age of Onset    Heart Disease Father     Diabetes Brother           SOCIAL HISTORY       Social History     Socioeconomic History    Marital status:      Spouse name: None    Number of children: None    Years of education: None    Highest education level: None   Occupational History    None   Tobacco Use    Smoking status: Former Smoker     Packs/day: 0.50     Years: 36.00     Pack years: 18.00    Smokeless tobacco: Former User     Quit date: 12/10/2013    Tobacco comment: patient quit x 1 week ago   Substance and Sexual Activity    Alcohol use: No    Drug use: No    Sexual activity: Not Currently   Other Topics Concern    None   Social History Narrative    None     Social Determinants of Health     Financial Resource Strain:     Difficulty of Paying Living Expenses: Not on file   Food Insecurity:     Worried About Running Out of Food in the Last Year: Not on file    Coco of Food in the Last Year: Not on file   Transportation Needs:     Lack of Transportation (Medical): Not on file    Lack of Transportation (Non-Medical):  Not on file   Physical Activity:     Days of Exercise per Week: Not on file    Minutes of Exercise per Session: Not on file   Stress:     Feeling of Stress : Not on file   Social Connections:     Frequency of Communication with Friends and Family: Not on file    Frequency of Social Gatherings with Friends and Family: Not on file    Attends Temple Services: Not on file    Active Member of Clubs or Organizations: Not on file    Attends Club or Organization Meetings: Not on file    Marital Status: Not on file   Intimate Partner Violence:     Fear of Current or Ex-Partner: Not on file    Emotionally Abused: Not on file    Physically Abused: Not on file    Sexually Abused: Not on file   Housing Stability:     Unable to Pay for Housing in the Last Year: Not on file    Number of Places Lived in the Last Year: Not on file    Unstable Housing in the Last Year: Not on file         PHYSICAL EXAM    (up to 7 for level 4, 8 or more for level 5)     ED Triage Vitals   BP Temp Temp src Pulse Resp SpO2 Height Weight   -- -- -- -- -- -- -- --       Physical Exam  General :Patient is awake, alert, oriented, in no acute distress, nontoxic appearing  HEENT: Pupils are equally round and reactive to light, EOMI, conjunctivae clear. Oral mucosa is moist, no exudate. Uvula is midline  Cardiac: Heart regular rate, rhythm, no murmurs, rubs, or gallops  Lungs: Creased breath sounds bibasilarly with mild bilateral expiratory wheeze mid lung fields. No rhonchi noted. No obvious crackle. There is no use of accessory muscles. Abdomen: Abdomen is soft, nontender, nondistended. There is no firm or pulsatile masses, no rebound rigidity or guarding. Musculoskeletal: 5 out of 5 strength in all 4 extremities. No focal muscle deficits are appreciated  Neuro: Motor intact, sensory intact, level of consciousness is normal  Dermatology: Skin is warm and dry  Psych: Mentation is grossly normal, cognition is grossly normal. Affect is appropriate. DIAGNOSTIC RESULTS     EKG: All EKG's are interpreted by the Emergency Department Physician who either signs or Co-signs this chart in the [address] a cardiologist.    The EKG interpreted by me shows sinus tachycardia. Heart rates 105 bpm, AZ interval is 117 ms, QRS durations 76, QT is 312 QTC is 413 ms. No acute T wave inversions concerning for acute myocardial ischemia. No ST elevations concerning for acute myocardial infarction.     RADIOLOGY:   Non-plain film images such as CT, Ultrasound and MRI are read by the radiologist. Plain radiographic images are visualized and preliminarily interpreted by the emergency physician with the below findings:      ? Radiologist's Report Reviewed:  XR CHEST PORTABLE    (Results Pending)         ED BEDSIDE ULTRASOUND:   Performed by ED Physician - none    LABS:    I have reviewed and interpreted all of the currently available lab results from this visit (ifapplicable):  Results for orders placed or performed during the hospital encounter of 05/12/22   CBC with Auto Differential   Result Value Ref Range    WBC 10.7 4.0 - 11.0 K/uL    RBC 3.97 3.80 - 5.80 M/uL    Hemoglobin 11.7 11.5 - 16.5 g/dL    Hematocrit 37.5 37.0 - 47.0 %    MCV 94.5 80.0 - 100.0 fL    MCH 29.5 27.0 - 32.0 pg    MCHC 31.2 31.0 - 35.0 g/dL    RDW 12.4 11.0 - 16.0 %    Platelets 386 682 - 355 K/uL    MPV 10.4 (H) 6.0 - 10.0 fL    Neutrophils % 86.4 %    Immature Granulocytes % 0.5 0.0 - 5.0 %    Lymphocytes % 5.3 %    Monocytes % 7.4 %    Eosinophils % 0.1 %    Basophils % 0.3 %    Neutrophils Absolute 9.2 (H) 2.0 - 7.5 K/uL    Immature Granulocytes # 0.1 K/uL    Lymphocytes Absolute 0.6 (L) 1.5 - 4.0 K/uL    Monocytes Absolute 0.8 0.2 - 0.8 K/uL    Eosinophils Absolute 0.0 0.0 - 0.4 K/uL    Basophils Absolute 0.0 0.0 - 0.1 K/uL   Comprehensive Metabolic Panel w/ Reflex to MG   Result Value Ref Range    Sodium 132 (L) 136 - 145 mmol/L    Potassium reflex Magnesium 4.6 3.4 - 5.1 mmol/L    Chloride 97 (L) 98 - 107 mmol/L    CO2 25 20 - 30 mmol/L    Anion Gap 10 3 - 16    Glucose 394 (H) 74 - 106 mg/dL    BUN 12 6 - 20 mg/dL    CREATININE 0.7 0.4 - 1.2 mg/dL    GFR Non-African American >60 >59    GFR African American >59 >59    Calcium 9.0 8.5 - 10.5 mg/dL    Total Protein 6.4 6.4 - 8.3 g/dL    Albumin 3.4 3.4 - 4.8 g/dL    Albumin/Globulin Ratio 1.1 0.8 - 2.0    Total Bilirubin 0.3 0.3 - 1.2 mg/dL    Alkaline Phosphatase 71 25 - 100 U/L    ALT 13 4 - 36 U/L    AST 19 8 - 33 U/L    Globulin 3.0 Not Established g/dL   Troponin   Result Value Ref Range    Troponin <0.30 <0.30 ng/mL   Brain Natriuretic Peptide   Result Value Ref Range    Pro- 0 - 1,800 pg/mL   Blood Gas, Arterial   Result Value Ref Range    pH, Arterial 7.438 7.350 - 7.450    pCO2, Arterial 42.2 35.0 - 45.0 mmHg    pO2, Arterial 62.4 (L) 80.0 - 100.0 mmHg HCO3, Arterial 27.9 (H) 22.0 - 26.0 mmol/L    Base Excess, Arterial 3.4 (H) -3.0 - 3.0 mmol/L    O2 Sat, Arterial 91.6 >92 %    TCO2, Arterial 29.2 24.0 - 30.0 mmol/L    O2 Therapy Unknown     FIO2 0.28 Not Established %        All other labs were within normal range or not returned as of this dictation. EMERGENCY DEPARTMENT COURSE and DIFFERENTIAL DIAGNOSIS/MDM:   Vitals:    Vitals:    05/12/22 1814 05/12/22 1827 05/12/22 1839   BP: (!) 155/52     Pulse: 100     Resp: 22     Temp: 99.7 °F (37.6 °C)     TempSrc: Oral     SpO2: 100% 98% 94%   Weight: 83 lb (37.6 kg)     Height: 5' 1\" (1.549 m)         MEDICATIONS ADMINISTERED IN ED:  Medications   0.9 % sodium chloride bolus (500 mLs IntraVENous New Bag 5/12/22 1851)       After initial evaluation examination I did have a conversation with the patient about the upcoming plan, treatment possible disposition which they are agreeable to the times dictation. Advised we going give her Atrovent and a Xopenex treatment here since she already had albuterol just prior to arrival.  Patient had a couple of nebulizers at her home also prior to arrival to the clinic. Patient will also have a small fluid boluses given here. She also has a audible chest radiograph performed rule out any pneumonic type process however she has had cough which is productive but denies any fevers or chills symptoms seem more persistent with a acute COPD exacerbation. Patient will have CBC, CMP, troponin, proBNP, arterial blood gas and twelve-lead EKG. Patient's final disposition will determine once her radiological diagnostic studies been performed reviewed however she has had a acute hypoxic respiratory failure and was needing to be started on oxygen. Her pulse ox was as low as in the 80 range and even on 3 L at the clinic she was still under 89%. So she does meet criteria for acute hypoxic respiratory failure which would be worthy of admission by itself.   We have been able to wean the patient down from the 6 L nasal cannula that she was on down to 2 she was ranging anywhere between 92 and 94% on the 2 L nasal cannula. Patient had rapid COVID screen at clinic which was negative we will try to verify that with the clinic if possible so she does not have to have repeat test here. Rapid influenza screen was    Blood work showed white count 10,700, hemoglobin is 11.7, hematocrit 37.5, platelet counts 948. Comprehensive metabolic panel was benign save for sodium of 132 slightly low, chloride of 97 slightly low, glucose of 394 mildly elevated. Troponin was nondetectable less than 0.30. proBNP was negative at 927. ABG pH is 7.438, PCO2 of 42.2 PO2 was 62.4 on 2 L nasal cannula. Bicarb was 27.9. Base excess was 3.4. Portable chest radiograph pending at shift change. Final disposition will be determined by oncoming physician. 1900  I have signed out Claudia Betito Emergency Department care to Dr. Adryan Manriquez. We discussed the pertinent history, physical exam, completed/pending test results (if applicable) and current treatment plan. Please refer to his/her chart for the patients remaining Emergency Department course and final disposition. CONSULTS:  None    PROCEDURES:  Procedures    CRITICAL CARE TIME    Total Critical Care time was 0 minutes, excluding separately reportable procedures. There was a high probability of clinically significant/life threatening deterioration in the patient's condition which required my urgent intervention. FINAL IMPRESSION      1. Acute respiratory failure with hypoxia ([facility] [address]    2. Acute exacerbation of chronic obstructive pulmonary disease (COPD) ([facility])          DISPOSITION/PLAN   DISPOSITION        PATIENT REFERRED TO:  No follow-up provider specified.     DISCHARGE MEDICATIONS:  New Prescriptions    No medications on file       Comment: Please note this report has been produced using speech recognition software and may contain errorsrelated to that system including errors in grammar, punctuation, and spelling, as well as words and phrases that may be inappropriate. If there are any questions or concerns please feel free to contact the dictating providerfor clarification.     Ricardo Media, DO  Attending Emergency Physician              Ricardo Media, DO  05/12/22 5565

## 2022-05-12 NOTE — ED NOTES
Patient with c/o cough and shortness of breath x 2 days. Patient was sent in by [facility] for low oxygen sats and shortness of breath, and cough.  Patient was given an Albuterol neb and solumedrol 80mg IM by [facility] prior to ems arrival.     Wai Franks RN  05/12/22 2041 [facility] Yissel, RN  05/12/22 2041 [facility], RN  05/12/22 9740

## 2022-05-13 PROBLEM — Z87.891 PERSONAL HISTORY OF TOBACCO USE: Status: ACTIVE | Noted: 2022-05-13

## 2022-05-13 PROBLEM — J96.01 ACUTE RESPIRATORY FAILURE WITH HYPOXIA (HCC): Status: ACTIVE | Noted: 2022-05-13

## 2022-05-13 LAB
ANION GAP SERPL CALCULATED.3IONS-SCNC: 12 MMOL/L (ref 3–16)
BASOPHILS ABSOLUTE: 0 K/UL (ref 0–0.1)
BASOPHILS RELATIVE PERCENT: 0.1 %
BUN BLDV-MCNC: 13 MG/DL (ref 6–20)
CALCIUM SERPL-MCNC: 9.2 MG/DL (ref 8.5–10.5)
CHLORIDE BLD-SCNC: 100 MMOL/L (ref 98–107)
CO2: 25 MMOL/L (ref 20–30)
CREAT SERPL-MCNC: 0.6 MG/DL (ref 0.4–1.2)
EOSINOPHILS ABSOLUTE: 0 K/UL (ref 0–0.4)
EOSINOPHILS RELATIVE PERCENT: 0 %
GFR AFRICAN AMERICAN: >59
GFR NON-AFRICAN AMERICAN: >60
GLUCOSE BLD-MCNC: 247 MG/DL (ref 74–106)
GLUCOSE BLD-MCNC: 249 MG/DL (ref 74–106)
GLUCOSE BLD-MCNC: 284 MG/DL (ref 74–106)
GLUCOSE BLD-MCNC: 298 MG/DL (ref 74–106)
GLUCOSE BLD-MCNC: 363 MG/DL (ref 74–106)
HBA1C MFR BLD: 7 %
HCT VFR BLD CALC: 39.5 % (ref 37–47)
HEMOGLOBIN: 12.3 G/DL (ref 11.5–16.5)
IMMATURE GRANULOCYTES #: 0.1 K/UL
IMMATURE GRANULOCYTES %: 0.7 % (ref 0–5)
LYMPHOCYTES ABSOLUTE: 0.6 K/UL (ref 1.5–4)
LYMPHOCYTES RELATIVE PERCENT: 6.2 %
MCH RBC QN AUTO: 29.8 PG (ref 27–32)
MCHC RBC AUTO-ENTMCNC: 31.1 G/DL (ref 31–35)
MCV RBC AUTO: 95.6 FL (ref 80–100)
MONOCYTES ABSOLUTE: 0.3 K/UL (ref 0.2–0.8)
MONOCYTES RELATIVE PERCENT: 2.9 %
NEUTROPHILS ABSOLUTE: 8.3 K/UL (ref 2–7.5)
NEUTROPHILS RELATIVE PERCENT: 90.1 %
PDW BLD-RTO: 12.4 % (ref 11–16)
PERFORMED ON: ABNORMAL
PLATELET # BLD: 259 K/UL (ref 150–400)
PMV BLD AUTO: 10.5 FL (ref 6–10)
POTASSIUM REFLEX MAGNESIUM: 4.7 MMOL/L (ref 3.4–5.1)
RBC # BLD: 4.13 M/UL (ref 3.8–5.8)
SARS-COV-2, NAAT: NOT DETECTED
SODIUM BLD-SCNC: 137 MMOL/L (ref 136–145)
WBC # BLD: 9.2 K/UL (ref 4–11)

## 2022-05-13 PROCEDURE — 83036 HEMOGLOBIN GLYCOSYLATED A1C: CPT

## 2022-05-13 PROCEDURE — 6370000000 HC RX 637 (ALT 250 FOR IP): Performed by: INTERNAL MEDICINE

## 2022-05-13 PROCEDURE — 85025 COMPLETE CBC W/AUTO DIFF WBC: CPT

## 2022-05-13 PROCEDURE — 99222 1ST HOSP IP/OBS MODERATE 55: CPT | Performed by: INTERNAL MEDICINE

## 2022-05-13 PROCEDURE — 6360000002 HC RX W HCPCS: Performed by: PHYSICIAN ASSISTANT

## 2022-05-13 PROCEDURE — 97161 PT EVAL LOW COMPLEX 20 MIN: CPT

## 2022-05-13 PROCEDURE — 99406 BEHAV CHNG SMOKING 3-10 MIN: CPT | Performed by: INTERNAL MEDICINE

## 2022-05-13 PROCEDURE — 2700000000 HC OXYGEN THERAPY PER DAY

## 2022-05-13 PROCEDURE — 94640 AIRWAY INHALATION TREATMENT: CPT

## 2022-05-13 PROCEDURE — 94669 MECHANICAL CHEST WALL OSCILL: CPT

## 2022-05-13 PROCEDURE — 97165 OT EVAL LOW COMPLEX 30 MIN: CPT

## 2022-05-13 PROCEDURE — 6360000002 HC RX W HCPCS: Performed by: INTERNAL MEDICINE

## 2022-05-13 PROCEDURE — 36415 COLL VENOUS BLD VENIPUNCTURE: CPT

## 2022-05-13 PROCEDURE — 87635 SARS-COV-2 COVID-19 AMP PRB: CPT

## 2022-05-13 PROCEDURE — 97802 MEDICAL NUTRITION INDIV IN: CPT

## 2022-05-13 PROCEDURE — 2580000003 HC RX 258: Performed by: INTERNAL MEDICINE

## 2022-05-13 PROCEDURE — 80048 BASIC METABOLIC PNL TOTAL CA: CPT

## 2022-05-13 PROCEDURE — 97116 GAIT TRAINING THERAPY: CPT

## 2022-05-13 PROCEDURE — 6370000000 HC RX 637 (ALT 250 FOR IP): Performed by: PHYSICIAN ASSISTANT

## 2022-05-13 PROCEDURE — 1200000000 HC SEMI PRIVATE

## 2022-05-13 RX ORDER — DEXTROSE MONOHYDRATE 50 MG/ML
100 INJECTION, SOLUTION INTRAVENOUS PRN
Status: DISCONTINUED | OUTPATIENT
Start: 2022-05-13 | End: 2022-05-14 | Stop reason: HOSPADM

## 2022-05-13 RX ORDER — BUDESONIDE 0.5 MG/2ML
0.5 INHALANT ORAL 2 TIMES DAILY
Status: DISCONTINUED | OUTPATIENT
Start: 2022-05-13 | End: 2022-05-14 | Stop reason: HOSPADM

## 2022-05-13 RX ORDER — SIMVASTATIN 40 MG
TABLET ORAL
COMMUNITY
Start: 2022-04-26

## 2022-05-13 RX ORDER — AZITHROMYCIN 250 MG/1
250 TABLET, FILM COATED ORAL DAILY
Status: DISCONTINUED | OUTPATIENT
Start: 2022-05-14 | End: 2022-05-14 | Stop reason: HOSPADM

## 2022-05-13 RX ORDER — ENOXAPARIN SODIUM 100 MG/ML
40 INJECTION SUBCUTANEOUS DAILY
Status: DISCONTINUED | OUTPATIENT
Start: 2022-05-14 | End: 2022-05-14 | Stop reason: HOSPADM

## 2022-05-13 RX ORDER — LOSARTAN POTASSIUM 50 MG/1
TABLET ORAL
COMMUNITY
Start: 2022-04-25

## 2022-05-13 RX ORDER — GUAIFENESIN/DEXTROMETHORPHAN 100-10MG/5
5 SYRUP ORAL EVERY 4 HOURS PRN
Status: DISCONTINUED | OUTPATIENT
Start: 2022-05-13 | End: 2022-05-14 | Stop reason: HOSPADM

## 2022-05-13 RX ORDER — GUAIFENESIN 600 MG/1
600 TABLET, EXTENDED RELEASE ORAL 2 TIMES DAILY
Status: DISCONTINUED | OUTPATIENT
Start: 2022-05-13 | End: 2022-05-14 | Stop reason: HOSPADM

## 2022-05-13 RX ORDER — AZITHROMYCIN 250 MG/1
500 TABLET, FILM COATED ORAL DAILY
Status: COMPLETED | OUTPATIENT
Start: 2022-05-13 | End: 2022-05-13

## 2022-05-13 RX ORDER — METHYLPREDNISOLONE SODIUM SUCCINATE 125 MG/2ML
60 INJECTION, POWDER, LYOPHILIZED, FOR SOLUTION INTRAMUSCULAR; INTRAVENOUS EVERY 8 HOURS
Status: DISCONTINUED | OUTPATIENT
Start: 2022-05-13 | End: 2022-05-14 | Stop reason: HOSPADM

## 2022-05-13 RX ADMIN — ATORVASTATIN CALCIUM 20 MG: 20 TABLET, FILM COATED ORAL at 20:00

## 2022-05-13 RX ADMIN — INSULIN LISPRO 2 UNITS: 100 INJECTION, SOLUTION INTRAVENOUS; SUBCUTANEOUS at 08:44

## 2022-05-13 RX ADMIN — LEVOTHYROXINE SODIUM 75 MCG: 75 TABLET ORAL at 08:43

## 2022-05-13 RX ADMIN — INSULIN LISPRO 5 UNITS: 100 INJECTION, SOLUTION INTRAVENOUS; SUBCUTANEOUS at 17:30

## 2022-05-13 RX ADMIN — IPRATROPIUM BROMIDE AND ALBUTEROL SULFATE 1 AMPULE: 2.5; .5 SOLUTION RESPIRATORY (INHALATION) at 10:42

## 2022-05-13 RX ADMIN — CEFTRIAXONE SODIUM 1000 MG: 1 INJECTION, POWDER, FOR SOLUTION INTRAMUSCULAR; INTRAVENOUS at 21:17

## 2022-05-13 RX ADMIN — INSULIN LISPRO 3 UNITS: 100 INJECTION, SOLUTION INTRAVENOUS; SUBCUTANEOUS at 11:22

## 2022-05-13 RX ADMIN — GUAIFENESIN 600 MG: 600 TABLET, EXTENDED RELEASE ORAL at 10:04

## 2022-05-13 RX ADMIN — GUAIFENESIN 600 MG: 600 TABLET, EXTENDED RELEASE ORAL at 20:00

## 2022-05-13 RX ADMIN — FOLIC ACID 1 MG: 1 TABLET ORAL at 08:43

## 2022-05-13 RX ADMIN — LOSARTAN POTASSIUM 100 MG: 50 TABLET, FILM COATED ORAL at 08:43

## 2022-05-13 RX ADMIN — INSULIN LISPRO 2 UNITS: 100 INJECTION, SOLUTION INTRAVENOUS; SUBCUTANEOUS at 20:01

## 2022-05-13 RX ADMIN — BUDESONIDE 500 MCG: 0.5 INHALANT ORAL at 17:28

## 2022-05-13 RX ADMIN — AZITHROMYCIN MONOHYDRATE 500 MG: 250 TABLET ORAL at 10:04

## 2022-05-13 RX ADMIN — ATORVASTATIN CALCIUM 20 MG: 20 TABLET, FILM COATED ORAL at 00:18

## 2022-05-13 RX ADMIN — METHYLPREDNISOLONE SODIUM SUCCINATE 60 MG: 125 INJECTION, POWDER, FOR SOLUTION INTRAMUSCULAR; INTRAVENOUS at 17:59

## 2022-05-13 RX ADMIN — METFORMIN HYDROCHLORIDE 500 MG: 500 TABLET, FILM COATED ORAL at 08:51

## 2022-05-13 RX ADMIN — IPRATROPIUM BROMIDE AND ALBUTEROL SULFATE 1 AMPULE: 2.5; .5 SOLUTION RESPIRATORY (INHALATION) at 05:12

## 2022-05-13 RX ADMIN — IPRATROPIUM BROMIDE AND ALBUTEROL SULFATE 1 AMPULE: 2.5; .5 SOLUTION RESPIRATORY (INHALATION) at 17:28

## 2022-05-13 RX ADMIN — METFORMIN HYDROCHLORIDE 500 MG: 500 TABLET, FILM COATED ORAL at 17:26

## 2022-05-13 RX ADMIN — ENOXAPARIN SODIUM 30 MG: 100 INJECTION SUBCUTANEOUS at 08:43

## 2022-05-13 RX ADMIN — METHYLPREDNISOLONE SODIUM SUCCINATE 60 MG: 125 INJECTION, POWDER, FOR SOLUTION INTRAMUSCULAR; INTRAVENOUS at 10:04

## 2022-05-13 NOTE — PROGRESS NOTES
Med rec performed utilizing fill history from [address] [place] [address] and [facility].  See notes below:    -Removed the following per fill history and patient:  Vitamin D3    -Changed Simvastatin 20 mg to Simvastatin 40 mg per fill history and patient.  -Changed Losartan 100 mg to Losartan 50 mg    Frankie Murphy CMA

## 2022-05-13 NOTE — ACP (ADVANCE CARE PLANNING)
Advance Care Planning     General Advance Care Planning (ACP) Conversation    Date of Conversation:5/13/22   Conducted with: Patient with Decision Making Capacity    Healthcare Decision Maker:  No healthcare decision makers have been documented. Click here to complete [address] including selection of the Healthcare Decision Maker Relationship (ie \"Primary\")   Today we documented Decision Maker(s) consistent with Legal Next of Kin hierarchy. Content/Action Overview:  Has NO ACP documents/care preferences - information provided, considering goals and options  Reviewed DNR/DNI and patient elects DNR order - referred to ACP Clinical Specialist & placed order  Nurses notified of request to arzola eto DNR status.      Length of Voluntary ACP Conversation in minutes:  <16 minutes (Non-Billable)    Natalya Mcqueene

## 2022-05-13 NOTE — PROGRESS NOTES
Occupational Therapy  Facility/Department: [facility] MED SURG  Occupational Therapy Initial Assessment    Name: Sandrine Barrow  :   MRN: [id number]  Date of Service: 2022    Discharge Recommendations:  Home with assist PRN  OT Equipment Recommendations  Equipment Needed: No       Patient Diagnosis(es): The primary encounter diagnosis was Acute respiratory failure with hypoxia ([facility] [address] A diagnosis of Acute exacerbation of chronic obstructive pulmonary disease (COPD) ([facility] [address] was also pertinent to this visit. Past Medical History:  has a past medical history of COPD (chronic obstructive pulmonary disease) ([facility] [address] Hyperlipidemia, Hypothyroidism, Osteoarthritis, and Type II or unspecified type diabetes mellitus without mention of complication, not stated as uncontrolled. Past Surgical History:  has a past surgical history that includes Appendectomy and Hysterectomy. Assessment   Performance deficits / Impairments: Decreased endurance;Decreased balance  Assessment: Pt agreeable to OT services. Pt completed bed mobility with MOD I. Pt transferred to standing with CGA<>SBA. Pt with mild sway upon coming to stand. Pt donned shoes with independence. Pt ambulated 380 feet with 2 standing rest breaks desat 86% and regained with standing rest break and PLB technique. Pt will benefit from skilled OT services while IP. Prognosis: Good  Decision Making: Low Complexity  Exam: 02 sats decreased to 86% during ambulation with standing rest break and PLB education improved >90%. REQUIRES OT FOLLOW-UP: Yes  Activity Tolerance  Activity Tolerance Comments: Limited by decreased 02 sats.         Plan   Plan  Times per Week: 3-5  Times per Day: Daily  Plan Weeks: 1  Current Treatment Recommendations: Strengthening,Balance training,Endurance training,Self-Care / ADL     Restrictions  Restrictions/Precautions  Restrictions/Precautions: General Precautions  Required Braces or Orthoses?: No    Subjective   General  Chart Reviewed: Yes  Patient assessed for rehabilitation services?: Yes  Family / Caregiver Present: No  Referring Practitioner: [facility] [place] [place]  Diagnosis: COPD exacerbation  Subjective  Subjective: Pt states she came in to ED with SOA. Pt agreeable to OT services.      Social/Functional History  Social/Functional History  Lives With: Spouse  Type of Home: House  Home Layout: One level  Home Access: Stairs to enter without rails  Entrance Stairs - Number of Steps: 1  Bathroom Shower/Tub: Tub/Shower unit  Bathroom Toilet: Handicap height  Bathroom Equipment: 3-in-1 commode  Bathroom Accessibility: Accessible  Home Equipment: [place] [employer], standard,Cane  Has the patient had two or more falls in the past year or any fall with injury in the past year?: No  ADL Assistance: Independent  Homemaking Assistance: Independent  Homemaking Responsibilities: Yes  Ambulation Assistance: Independent  Transfer Assistance: Independent  Active : Yes       Objective   Pulse: 73  Heart Rate Source: Monitor  BP: (!) 113/50  MAP (Calculated): 71  Resp: 23  SpO2: 94 %  O2 Device: Nasal cannula  Vision Exceptions: Wears glasses at all times  Hearing: Within functional limits       Observation/Palpation  Observation: 3LPM, lying in bed, NAD, pleasant and cooperative     Bed Mobility Training  Bed Mobility Training: No  Balance  Sitting: Intact  Standing: Without support  Gait  Overall Level of Assistance: Contact-guard assistance;Stand-by assistance  Distance (ft): 380 Feet        ADL  LE Dressing: Independent        Bed mobility  Rolling to Left: Modified independent  Supine to Sit: Modified independent  Sit to Supine: Modified independent  Scooting: Modified independent  Transfers  Stand Pivot Transfers: Stand by assistance  Sit to stand: Stand by assistance     Cognition  Overall Cognitive Status: WFL     LUE AROM (degrees)  LUE AROM : WFL  RUE AROM (degrees)  RUE AROM : WFL       Goals  Short Term Goals  Time Frame for Short term goals: 1 week  Short Term Goal 1: Pt to tolerate x15 minutes of activity to increase functional activity tolerance maintaining 02 sats >90% PRE <4/10  Short Term Goal 2: Pt to complete functional reaching with no LOB with independence. Short Term Goal 3: Pt to tolerate standing x8 minutes with no LOB to complete ADL tasks. Therapy Time   Individual Concurrent Group Co-treatment   Time In           Time Out           Minutes                This note serves as a DC summary in the event of pt discharge.      Ramya Barba, OTR/L

## 2022-05-13 NOTE — PLAN OF CARE
Problem: ABCDS Injury Assessment  Goal: Absence of physical injury  5/13/2022 1413 by Mariella Shepherd RN  Outcome: Progressing  5/13/2022 0052 by Porsha Pratt RN  Outcome: Progressing

## 2022-05-13 NOTE — FLOWSHEET NOTE
05/13/22 0900   Assessment   Charting Type Shift assessment   Psychosocial   Psychosocial (WDL) WDL   Neurological   Neuro (WDL) WDL   Level of Consciousness Alert (0)   Elizabeth Coma Scale   Eye Opening 4   Best Verbal Response 5   Best Motor Response 6   Gracie Coma Scale Score 15   HEENT (Head, Ears, Eyes, Nose, & Throat)   HEENT (WDL) X   Teeth Dentures upper;Dentures lower;Missing teeth   Respiratory   Respiratory (WDL) WDL   Cardiac   Cardiac (WDL) WDL   Gastrointestinal   Abdominal (WDL) WDL   Genitourinary   Genitourinary (WDL) WDL   Peripheral Vascular   Peripheral Vascular (WDL) WDL   Edema None   Skin Integumentary    Skin Integumentary (WDL) WDL   Musculoskeletal   Musculoskeletal (WDL) WDL

## 2022-05-13 NOTE — H&P
History and Physical    Patient:  Dusty Bunch    CHIEF COMPLAINT:    Cough  SOA    HISTORY OF PRESENT ILLNESS:   The patient is a 67 y.o. female with past medical history of tobacco abuse, COPD, HLD, hypothyroidism, OA and diabetes mellitus type 2 who presented to the emergency department complaining of cough and shortness of breath. Patient states symptoms started approximately 1 week prior and have continued to worsen. She has now developed a productive cough with yellowish sputum. She also has difficulty walking across the floor without feeling short of breath. At baseline, she does not require oxygen. Patient states she stopped smoking with onset of symptoms. She denied any fever, chills, chest pain, nausea, vomiting or diarrhea. She has been using her home inhaler and nebulizer without improvement of symptoms. Yesterday, patient became acutely short of breath and called her PCP. Upon presentation to clinic, O2 sat found to be 84%. EMS was called for transport to the ED. Patient was placed on 3 L nasal cannula with improvement to 87-89%. She was turned up to 6 L nasal cannula with improvement to %. She denies any sick contacts. She has been vaccinated for COVID and flu. Vitals upon arrival: Blood pressure 155/52, pulse 100, respirations 22, temperature 99.7, O2 sat 100% on 4 L nasal cannula. Labs: WBC 10.7, hemoglobin 11.7, hematocrit 37.5, platelets 484, sodium 132, potassium 4.6, chloride 97, CO2 25, BUN 12, creatinine 0.7, anion gap 10, glucose 394, proBNP 927, troponin less than 0.3, alkaline phos 71, ALT 13, AST 19, bilirubin 0.3. Flu negative. ABG: pH 7.438, PCO2 42.2, PO2 62.4, O2 sat 91.6. Chest x-ray with no acute pulmonary disease. Patient was given IV fluids, azithromycin, ceftriaxone, bronchodilators and Solu-Medrol. She was then admitted for further care.     Past Medical History:      Diagnosis Date    COPD (chronic obstructive pulmonary disease) ([facility]     Hyperlipidemia     Hypothyroidism     Osteoarthritis     Type II or unspecified type diabetes mellitus without mention of complication, not stated as uncontrolled        Past Surgical History:      Procedure Laterality Date    APPENDECTOMY      HYSTERECTOMY      PARTIAL       Medications Prior to Admission:    Prior to Admission medications    Medication Sig Start Date End Date Taking? Authorizing Provider   albuterol sulfate HFA (VENTOLIN HFA) 108 (90 Base) MCG/ACT inhaler Inhale 2 puffs into the lungs every 6 hours as needed for Wheezing   Yes Historical Provider, MD   albuterol (PROVENTIL) (2.5 MG/3ML) 0.083% nebulizer solution Take 2.5 mg by nebulization every 6 hours as needed for Wheezing   Yes Historical Provider, MD   vitamin D (ERGOCALCIFEROL) 1.25 MG (75564 UT) CAPS capsule Take 50,000 Units by mouth once a week   Yes Historical Provider, MD   simvastatin (ZOCOR) 40 MG tablet TAKE 1 TABLET BY MOUTH ONCE DAILY IN THE EVENING 4/26/22   Historical Provider, MD   losartan (COZAAR) 50 MG tablet  4/25/22   Historical Provider, MD   levothyroxine (SYNTHROID) 75 MCG tablet TAKE ONE TABLET BY MOUTH EVERY DAY 5/9/14   AMANDA Crawford   Folic Acid 20 MG CAPS Take 1 mg by mouth daily     Historical Provider, MD   vitamin B-12 (CYANOCOBALAMIN) 100 MCG tablet Take 1,000 mcg by mouth daily. Historical Provider, MD       Allergies:  Codeine and Shellfish-derived products    Social History:   TOBACCO:   reports that she has quit smoking. She has a 18.00 pack-year smoking history. She quit smokeless tobacco use about 8 years ago. ETOH:   reports no history of alcohol use. OCCUPATION:  None     Family History:       Problem Relation Age of Onset    Heart Disease Father     Diabetes Brother        Review of system  Constitutional:  Denies fever or chills. Positive for generalized weakness and fatigue.   Eyes:  Denies eye pain or redness  HENT:  Denies nasal congestion or sore throat   Respiratory: Positive for productive cough, shortness of air and dyspnea on exertion. Cardiovascular:  Denies chest pain or edema   GI:  Denies abdominal pain, nausea, vomiting, bloody stools or diarrhea   :  Denies dysuria or frequency  Musculoskeletal:  Denies acute neck pain or body aches  Integument:  Denies rash or itching  Neurologic:  Denies headache, dizziness, numbness, tingling or unilateral weakness  Psychiatric:  Denies acute depression or acute anxiety      Vital Signs  Temp: 97.3 °F (36.3 °C)  Pulse: 73  Resp: 23  BP: (!) 113/50  SpO2: 90 %  O2 Device: Nasal cannula  O2 Flow Rate (L/min): 3 L/min    vital signs reviewed in electronic chart. Physical exam  Constitutional:  Well developed, well nourished, thin, elderly female sitting upright in bed in no acute distress  Eyes:  no scleral icterus, conjunctiva normal   HENT:  Atraumatic, external ears normal, nose normal, oropharynx moist, no pharyngeal exudates. Neck- supple, no JVD, no lymphadenopathy  Respiratory:  No respiratory distress, no rales. Scant wheezing and scattered rhonchi detected. Decreased air movement throughout. Cardiovascular:  Normal rate, normal rhythm, no murmurs, no gallops, no rubs, no edema   GI:  Soft, nondistended, normal bowel sounds, nontender, no voluntary guarding  Musculoskeletal:  No cyanosis or obvious acute deformity. Moving all extremities   Integument:  Warm and dry.   Lymphatic:  No cervical or axillary lymphadenopathy noted   Neurologic:  Alert & oriented x 3, no apparent focal deficits noted   Psychiatric:  Speech and behavior appropriate         Lab Results   Component Value Date     05/13/2022    K 4.7 05/13/2022     05/13/2022    CO2 25 05/13/2022    BUN 13 05/13/2022    CREATININE 0.6 05/13/2022    GLUCOSE 247 (H) 05/13/2022    CALCIUM 9.2 05/13/2022    PROT 6.4 05/12/2022    LABALBU 3.4 05/12/2022    BILITOT 0.3 05/12/2022    ALKPHOS 71 05/12/2022    AST 19 05/12/2022    ALT 13 05/12/2022    LABGLOM >60 05/13/2022    GFRAA >59 05/13/2022    AGRATIO 1.1 05/12/2022    GLOB 3.0 05/12/2022           Lab Results   Component Value Date    WBC 9.2 05/13/2022    HGB 12.3 05/13/2022    HCT 39.5 05/13/2022    MCV 95.6 05/13/2022     05/13/2022       PA/lat CXR:   XR CHEST PORTABLE   Final Result      No acute pulmonary disease. EKG: Sinus tachycardia, heart rate 105 bpm, MI interval 117 ms, QRS duration 76 ms, QT/QTc 312/413 ms. Assessment and Plan     Active Hospital Problems    Diagnosis Date Noted    Acute respiratory failure with hypoxia ([facility] [J96.01]  -suspect due to COPD exacerbation  -continue treatment as outlined  -at baseline, pt does not use home O2; O2 sats currently maintained on 2L NC   05/13/2022    Personal history of tobacco use [Z87.891]  -NRT  -counseled on cessation   05/13/2022    COPD exacerbation ([facility] [J44.1]  -CXR 5/12: no acute pulmonary disease  -treating with IV rocephin, azithromycin, duonebs, budesonide, mucinex, solumedrol  -encourage IS and ambulation  -continue supplemental O2 to maintain sats >90%; At baseline, pt does not use O2; currently requiring 2L NC   05/12/2022    DM type 2 (diabetes mellitus, type 2) ([facility] [E11.9]  -A1c 7.0  -Continue home metformin  -Add Lantus 10 units nightly and sliding scale insulin while patient is receiving steroids   07/21/2011    Hyperlipidemia [E78.5]  -continue lipitor   07/21/2011    Hypothyroidism [E03.9]  -continue synthroid   07/21/2011     Patient was seen and examined by Dr. Enedina Gee and plan of care reviewed. Treatment plan was formulated collaboratively.       JOCELIN Almodovar certifies per CMS regulation for 42 .15(a), that the patient may reasonably be expected to be discharged or transferred to a hospital within 96 hours after admission to [address] [place] [address]    Electronically signed by JOCELIN Almodovar on 5/13/2022 at 1:49 PM

## 2022-05-13 NOTE — PROGRESS NOTES
Physical Therapy  Facility/Department: [facility]  Physical Therapy Initial Assessment    Name: Anuel Sanchez  :   MRN: [id number]  Date of Service: 2022    Discharge Recommendations:  Continue to assess pending progress          Patient Diagnosis(es): The primary encounter diagnosis was Acute respiratory failure with hypoxia ([facility] A diagnosis of Acute exacerbation of chronic obstructive pulmonary disease (COPD) ([facility] was also pertinent to this visit. Past Medical History:  has a past medical history of COPD (chronic obstructive pulmonary disease) ([facility] Hyperlipidemia, Hypothyroidism, Osteoarthritis, and Type II or unspecified type diabetes mellitus without mention of complication, not stated as uncontrolled. Past Surgical History:  has a past surgical history that includes Appendectomy and Hysterectomy. Assessment   Body Structures, Functions, Activity Limitations Requiring Skilled Therapeutic Intervention: Decreased endurance  Assessment: PT eval completed. Patient requires mod I or bed mobility. Sit to stand and transfers with SBA/CGA but was noted to have LOB posteriorly with initial stand. Ambulates 380' with SBA/CGA but requires 2 standing rest breaks d/t reported SOA and O2 sats dropping to 86% on 3 LPM O2. Sats recovered quickly with v/c for PLB/pacing. Patient presents with functional mobility deficits but is expected to benefit from continued skilled PT intervention to improve her mobility status prior to D/C.   Therapy Prognosis: Good  Decision Making: Low Complexity  Requires PT Follow-Up: Yes  Activity Tolerance  Activity Tolerance: Patient limited by endurance     Plan   Plan  Plan: 3-5 times per week  Current Treatment Recommendations: Balance training,Functional mobility training,Transfer training,Gait training,Endurance training,Home exercise program,Safety education & training,Patient/Caregiver education & training  Safety Devices  Type of Devices: Call light within reach,Left in bed     Restrictions  Restrictions/Precautions  Restrictions/Precautions: General Precautions  Required Braces or Orthoses?: No     Subjective   General  Chart Reviewed: Yes  Patient assessed for rehabilitation services?: Yes  Family / Caregiver Present: No  Referring Practitioner: Amelia Umanzor MD  Follows Commands: Within Functional Limits         Social/Functional History  Social/Functional History  Lives With: Spouse  Type of Home: House  Home Layout: One level  Home Access: Stairs to enter without rails  Entrance Stairs - Number of Steps: 1  Bathroom Shower/Tub: Tub/Shower unit  Bathroom Toilet: Handicap height  Bathroom Equipment: 3-in-1 commode  Bathroom Accessibility: Accessible  Home Equipment: Wheelchair-manual,Walker, standard,Cane  Has the patient had two or more falls in the past year or any fall with injury in the past year?: No  ADL Assistance: [address] [place] [address] Avenue: Independent  Homemaking Responsibilities: Yes  Ambulation Assistance: Independent  Transfer Assistance: Independent  Active : Yes  Vision/Hearing  Vision Exceptions: Wears glasses at all times  Hearing: Within functional limits    Cognition   Orientation  Overall Orientation Status: Within Functional Limits  Cognition  Overall Cognitive Status: WFL     Objective   Pulse: 73  BP: (!) 113/50  MAP (Calculated): 71  SpO2: 90 %  O2 Device: Nasal cannula     Observation/Palpation  Observation: 3LPM, lying in bed, NAD, pleasant and cooperative  Gross Assessment  AROM: Within functional limits  PROM: Within functional limits  Strength:  Within functional limits  Coordination: Within functional limits                 Bed Mobility Training  Bed Mobility Training: No  Balance  Sitting: Intact  Standing: Without support  Gait  Overall Level of Assistance: Contact-guard assistance;Stand-by assistance  Distance (ft): 380 Feet  Bed mobility  Rolling to Left: Modified independent  Supine to Sit: Modified independent  Sit to Supine: Modified independent  Scooting: Modified independent  Transfers  Sit to Stand: Contact guard assistance (Posterior list/LOB backward with sit to stand.)  Stand to sit: Stand by assistance  Bed to Chair: Contact guard assistance  Ambulation  Surface: level tile  Device: No Device  Other Apparatus: O2  Assistance: Contact guard assistance;Stand by assistance  Distance: 380'- 2 standing rest breaks d/t SOA/O2 desat to 86%. Sats increased quickly with v/c for PLB/pacing. Balance  Posture: Good  Sitting - Static: Good  Sitting - Dynamic: Good  Standing - Static: Good;-  Standing - Dynamic: Good;-             Goals  Long Term Goals  Time Frame for Long term goals : 10 days  Long term goal 1: Patient will perform all bed mobility with independence. Long term goal 2: Patient will perform sit to stand and transfers with independence. Long term goal 3: Patient will ambulate 400'x2 with no SOA, appropriate PLB/pacing, and SBA. Long term goal 4: Patient will demonstrate independence with HEP. Patient Goals   Patient goals : Return home.        Education         Therapy Time   Individual Concurrent Group Co-treatment   Time In 1655         Time Out 1004         Minutes [address], [place]

## 2022-05-13 NOTE — CONSULTS
Comprehensive Nutrition Assessment    Type and Reason for Visit:  Initial,Consult,Positive Nutrition Screen    Nutrition Recommendations/Plan:   1. Modified diet to include 4 carb choice diet. 2. Will start ONS TID. 3. Encourage intakes of meals and ONS as appropriate. Malnutrition Assessment:  Malnutrition Status: At risk for malnutrition (Comment) (related to stated poor intakes and significant weight loss. No muscle wasting.) (05/13/22 1041)    Context:  Acute Illness     Findings of the 6 clinical characteristics of malnutrition:  Energy Intake:  Unable to assess  Weight Loss:  Greater than 5% over 1 month     Body Fat Loss:  Unable to assess     Muscle Mass Loss:  No significant muscle mass loss    Fluid Accumulation:  No significant fluid accumulation     Strength:       Nutrition Assessment:    Pt admitted with COPD excerbation and is at moderate-high risk for ongoing nutritional compromise r/t significant weight loss, and poor appetite. Nutrition Related Findings:    Pt presents with being  severely underweight and 13% decrease in weight since April 26. Pt states has always been small. Has no appetite. No lilia reported and no skin issues. No muscle wasting. PMH: COPD, GERD, hyperlipidemia, T2D. Wound Type: None       Current Nutrition Intake & Therapies:    Average Meal Intake: 51-75%  Average Supplements Intake: None Ordered  ADULT ORAL NUTRITION SUPPLEMENT; Breakfast, Lunch, Dinner; Standard High Calorie/High Protein Oral Supplement  ADULT DIET; Regular; 4 carb choices (60 gm/meal)    Anthropometric Measures:  Height: 5' 1\" (154.9 cm)  Ideal Body Weight (IBW): 105 lbs (48 kg)       Current Body Weight: 83 lb (37.6 kg), 79 % IBW.  Weight Source: Stated  Current BMI (kg/m2): 15.7  Usual Body Weight: 95 lb 8 oz (43.3 kg)  % Weight Change (Calculated): -13.1                    BMI Categories: Underweight (BMI less than 22) age over 72    Estimated Daily Nutrient Needs:  Energy Requirements

## 2022-05-14 VITALS
WEIGHT: 86.1 LBS | TEMPERATURE: 98.3 F | RESPIRATION RATE: 18 BRPM | HEART RATE: 93 BPM | OXYGEN SATURATION: 96 % | DIASTOLIC BLOOD PRESSURE: 44 MMHG | HEIGHT: 61 IN | SYSTOLIC BLOOD PRESSURE: 110 MMHG | BODY MASS INDEX: 16.25 KG/M2

## 2022-05-14 LAB
A/G RATIO: 1.1 (ref 0.8–2)
ALBUMIN SERPL-MCNC: 3.1 G/DL (ref 3.4–4.8)
ALP BLD-CCNC: 69 U/L (ref 25–100)
ALT SERPL-CCNC: 16 U/L (ref 4–36)
ANION GAP SERPL CALCULATED.3IONS-SCNC: 9 MMOL/L (ref 3–16)
AST SERPL-CCNC: 19 U/L (ref 8–33)
BILIRUB SERPL-MCNC: <0.2 MG/DL (ref 0.3–1.2)
BUN BLDV-MCNC: 16 MG/DL (ref 6–20)
CALCIUM SERPL-MCNC: 9 MG/DL (ref 8.5–10.5)
CHLORIDE BLD-SCNC: 103 MMOL/L (ref 98–107)
CO2: 27 MMOL/L (ref 20–30)
CREAT SERPL-MCNC: 0.6 MG/DL (ref 0.4–1.2)
GFR AFRICAN AMERICAN: >59
GFR NON-AFRICAN AMERICAN: >60
GLOBULIN: 2.7 G/DL
GLUCOSE BLD-MCNC: 290 MG/DL (ref 74–106)
GLUCOSE BLD-MCNC: 302 MG/DL (ref 74–106)
GLUCOSE BLD-MCNC: 327 MG/DL (ref 74–106)
HCT VFR BLD CALC: 36 % (ref 37–47)
HEMOGLOBIN: 10.9 G/DL (ref 11.5–16.5)
MCH RBC QN AUTO: 29.1 PG (ref 27–32)
MCHC RBC AUTO-ENTMCNC: 30.3 G/DL (ref 31–35)
MCV RBC AUTO: 96 FL (ref 80–100)
PDW BLD-RTO: 12.6 % (ref 11–16)
PERFORMED ON: ABNORMAL
PERFORMED ON: ABNORMAL
PLATELET # BLD: 268 K/UL (ref 150–400)
PMV BLD AUTO: 11.3 FL (ref 6–10)
POTASSIUM REFLEX MAGNESIUM: 5.6 MMOL/L (ref 3.4–5.1)
RBC # BLD: 3.75 M/UL (ref 3.8–5.8)
SODIUM BLD-SCNC: 139 MMOL/L (ref 136–145)
TOTAL PROTEIN: 5.8 G/DL (ref 6.4–8.3)
WBC # BLD: 15.1 K/UL (ref 4–11)

## 2022-05-14 PROCEDURE — 99239 HOSP IP/OBS DSCHRG MGMT >30: CPT | Performed by: INTERNAL MEDICINE

## 2022-05-14 PROCEDURE — 94669 MECHANICAL CHEST WALL OSCILL: CPT

## 2022-05-14 PROCEDURE — 94761 N-INVAS EAR/PLS OXIMETRY MLT: CPT

## 2022-05-14 PROCEDURE — 94618 PULMONARY STRESS TESTING: CPT

## 2022-05-14 PROCEDURE — 6360000002 HC RX W HCPCS: Performed by: PHYSICIAN ASSISTANT

## 2022-05-14 PROCEDURE — 6370000000 HC RX 637 (ALT 250 FOR IP): Performed by: INTERNAL MEDICINE

## 2022-05-14 PROCEDURE — 80053 COMPREHEN METABOLIC PANEL: CPT

## 2022-05-14 PROCEDURE — 36415 COLL VENOUS BLD VENIPUNCTURE: CPT

## 2022-05-14 PROCEDURE — 94640 AIRWAY INHALATION TREATMENT: CPT

## 2022-05-14 PROCEDURE — 6370000000 HC RX 637 (ALT 250 FOR IP): Performed by: PHYSICIAN ASSISTANT

## 2022-05-14 PROCEDURE — 2700000000 HC OXYGEN THERAPY PER DAY

## 2022-05-14 PROCEDURE — 85027 COMPLETE CBC AUTOMATED: CPT

## 2022-05-14 RX ORDER — PREDNISONE 20 MG/1
40 TABLET ORAL DAILY
Qty: 10 TABLET | Refills: 0 | Status: SHIPPED | OUTPATIENT
Start: 2022-05-14 | End: 2022-05-19

## 2022-05-14 RX ORDER — SODIUM POLYSTYRENE SULFONATE 15 G/60ML
15 SUSPENSION ORAL; RECTAL ONCE
Status: COMPLETED | OUTPATIENT
Start: 2022-05-14 | End: 2022-05-14

## 2022-05-14 RX ORDER — AMLODIPINE BESYLATE 5 MG/1
5 TABLET ORAL DAILY
Qty: 30 TABLET | Refills: 3 | Status: SHIPPED | OUTPATIENT
Start: 2022-05-14

## 2022-05-14 RX ORDER — DOXYCYCLINE HYCLATE 100 MG
100 TABLET ORAL 2 TIMES DAILY
Qty: 10 TABLET | Refills: 0 | Status: SHIPPED | OUTPATIENT
Start: 2022-05-14 | End: 2022-05-19

## 2022-05-14 RX ORDER — GUAIFENESIN 600 MG/1
600 TABLET, EXTENDED RELEASE ORAL 2 TIMES DAILY
Qty: 10 TABLET | Refills: 0 | Status: SHIPPED | OUTPATIENT
Start: 2022-05-14

## 2022-05-14 RX ORDER — GUAIFENESIN/DEXTROMETHORPHAN 100-10MG/5
5 SYRUP ORAL EVERY 4 HOURS PRN
Qty: 120 ML | Refills: 0 | Status: SHIPPED | OUTPATIENT
Start: 2022-05-14 | End: 2022-05-24

## 2022-05-14 RX ORDER — IPRATROPIUM BROMIDE AND ALBUTEROL SULFATE 2.5; .5 MG/3ML; MG/3ML
3 SOLUTION RESPIRATORY (INHALATION)
Qty: 360 ML | Refills: 0 | Status: SHIPPED | OUTPATIENT
Start: 2022-05-14

## 2022-05-14 RX ORDER — BUDESONIDE AND FORMOTEROL FUMARATE DIHYDRATE 80; 4.5 UG/1; UG/1
2 AEROSOL RESPIRATORY (INHALATION) 2 TIMES DAILY
Qty: 1 EACH | Refills: 0 | Status: SHIPPED | OUTPATIENT
Start: 2022-05-14

## 2022-05-14 RX ADMIN — LOSARTAN POTASSIUM 100 MG: 50 TABLET, FILM COATED ORAL at 08:20

## 2022-05-14 RX ADMIN — METHYLPREDNISOLONE SODIUM SUCCINATE 60 MG: 125 INJECTION, POWDER, FOR SOLUTION INTRAMUSCULAR; INTRAVENOUS at 10:17

## 2022-05-14 RX ADMIN — IPRATROPIUM BROMIDE AND ALBUTEROL SULFATE 1 AMPULE: 2.5; .5 SOLUTION RESPIRATORY (INHALATION) at 08:52

## 2022-05-14 RX ADMIN — BUDESONIDE 500 MCG: 0.5 INHALANT ORAL at 05:13

## 2022-05-14 RX ADMIN — CYANOCOBALAMIN TAB 500 MCG 1000 MCG: 500 TAB at 08:21

## 2022-05-14 RX ADMIN — AZITHROMYCIN MONOHYDRATE 250 MG: 250 TABLET ORAL at 08:21

## 2022-05-14 RX ADMIN — METFORMIN HYDROCHLORIDE 500 MG: 500 TABLET, FILM COATED ORAL at 08:20

## 2022-05-14 RX ADMIN — ENOXAPARIN SODIUM 40 MG: 100 INJECTION SUBCUTANEOUS at 08:20

## 2022-05-14 RX ADMIN — INSULIN LISPRO 4 UNITS: 100 INJECTION, SOLUTION INTRAVENOUS; SUBCUTANEOUS at 08:22

## 2022-05-14 RX ADMIN — SODIUM POLYSTYRENE SULFONATE 15 G: 15 SUSPENSION ORAL; RECTAL at 10:13

## 2022-05-14 RX ADMIN — LEVOTHYROXINE SODIUM 75 MCG: 75 TABLET ORAL at 08:22

## 2022-05-14 RX ADMIN — FOLIC ACID 1 MG: 1 TABLET ORAL at 08:22

## 2022-05-14 RX ADMIN — IPRATROPIUM BROMIDE AND ALBUTEROL SULFATE 1 AMPULE: 2.5; .5 SOLUTION RESPIRATORY (INHALATION) at 05:12

## 2022-05-14 RX ADMIN — GUAIFENESIN 600 MG: 600 TABLET, EXTENDED RELEASE ORAL at 08:22

## 2022-05-14 RX ADMIN — METHYLPREDNISOLONE SODIUM SUCCINATE 60 MG: 125 INJECTION, POWDER, FOR SOLUTION INTRAMUSCULAR; INTRAVENOUS at 00:15

## 2022-05-14 RX ADMIN — INSULIN LISPRO 4 UNITS: 100 INJECTION, SOLUTION INTRAVENOUS; SUBCUTANEOUS at 11:29

## 2022-05-14 NOTE — PROGRESS NOTES
Received home oxygen supply from [facility]. PIV removed. Discharged home. Pt verbalized understanding of discharge instructions and is aware to  new prescriptions at pharmacy. Accompanied to exit.

## 2022-05-14 NOTE — FLOWSHEET NOTE
05/14/22 0839   Assessment   Charting Type Shift assessment   Psychosocial   Psychosocial (WDL) WDL   Neurological   Neuro (WDL) WDL   Level of Consciousness Alert (0)   [place] Coma Scale   Eye Opening 4   Best Verbal Response 5   Best Motor Response 6   Gracie Coma Scale Score 15   HEENT (Head, Ears, Eyes, Nose, & Throat)   HEENT (WDL) X   Teeth Dentures upper;Dentures lower;Missing teeth   Respiratory   Respiratory (WDL) WDL   Cardiac   Cardiac (WDL) WDL   Gastrointestinal   Abdominal (WDL) WDL   Genitourinary   Genitourinary (WDL) WDL   Peripheral Vascular   Peripheral Vascular (WDL) WDL   Edema None   Skin Integumentary    Skin Integumentary (WDL) WDL   Musculoskeletal   Musculoskeletal (WDL) WDL Script to be filled here in [facility] pharmacy.  Taken down to fill.  Mother aware.  Closing encounter.

## 2022-05-14 NOTE — PLAN OF CARE
Problem: Discharge Planning  Goal: Discharge to home or other facility with appropriate resources  Outcome: Completed     Problem: ABCDS Injury Assessment  Goal: Absence of physical injury  Outcome: Completed     Problem: Chronic Conditions and Co-morbidities  Goal: Patient's chronic conditions and co-morbidity symptoms are monitored and maintained or improved  Outcome: Completed

## 2022-05-14 NOTE — PROGRESS NOTES
6 MINUTE WALK TEST    Exercise type: In patient's room without assistance. O2 sat RA/O2LPM RR BERNARDA HR BP   Rest 87 RA 20 0 92    1 min 84 1  1 98    2 min 88 2 24 2 102    3 min 91 3  2 102    4 min 93 3 24 3 104    5 min 94 3  3 107    6 min 94 3 24 3 108    Recovery 95 2 20 0 96      Distance walked: 240 Feet    Breath sounds/patterns: Diminished    Comments: Patient will require continuous oxygen @ 2 lpm and will need to increase oxygen liter flow to 3 lpm with activity.

## 2022-05-14 NOTE — DISCHARGE SUMMARY
Discharge Summary      Patient ID: Hollis Joshua      Patient's PCP: Alexander Barker Date: 5/12/2022     Discharge Date:  5/14/2022    Admitting Provider: Zuhair Carranza MD    Discharging Provider: JOCELIN Celeste     Reason for this admission:   Acute hypoxic respiratory failure secondary to COPD exacerbation    Discharge Diagnoses: Active Hospital Problems    Diagnosis Date Noted    Acute respiratory failure with hypoxia ([facility] [J96.01] 05/13/2022     Priority: Medium    Personal history of tobacco use [Z87.891] 05/13/2022     Priority: Medium    COPD exacerbation ([facility] [J44.1] 05/12/2022     Priority: Medium    DM type 2 (diabetes mellitus, type 2) ([facility] [E11.9] 07/21/2011    Hyperlipidemia [E78.5] 07/21/2011    Hypothyroidism [E03.9] 07/21/2011       Procedures:  XR CHEST PORTABLE   Final Result      No acute pulmonary disease. Consults:   IP CONSULT TO DIETITIAN  PT OT  RT  Case management      Briefly:   67 y.o. female with past medical history of tobacco abuse, COPD, HLD, hypothyroidism, OA and diabetes mellitus type 2 admitted for acute hypoxic respiratory failure secondary to COPD exacerbation. Patient's O2 sats 84% upon arrival to PCPs clinic. She was subsequently placed on someone oxygen with improvement. Patient treated with IV Rocephin, azithromycin, DuoNebs, budesonide nebs, Mucinex and Solu-Medrol with significant improvement. She is requesting to be discharged home today. She underwent desaturation screening this morning with desaturation to 84%. She was placed on 3 L nasal cannula with activity with recovery. She will also need 2 L nasal cannula at rest.  Oxygen will be arranged and patient will be discharged home. She will be discharged with prednisone, Symbicort, spiriva, Mucinex, doxycycline and given as needed duoneb prescription. She already has a nebulizer. Hospital Course:       Active Hospital Problems     Diagnosis Date Noted    Acute respiratory failure with hypoxia ([facility] [J96.01]  -suspect due to COPD exacerbation  -continue treatment as outlined  -at baseline, pt does not use home O2; O2 sats currently maintained on 2L NC  -Desaturation screening 5/14. Patient will need supplemental oxygen for discharge home    05/13/2022    Personal history of tobacco use [Z87.891]  -NRT  -counseled on cessation    05/13/2022    COPD exacerbation ([facility] [place] [facility] [J44.1]  -CXR 5/12: no acute pulmonary disease  -treating with IV rocephin, azithromycin, duonebs, budesonide, mucinex, solumedrol  -encourage IS and ambulation  -continue supplemental O2 to maintain sats >90%; At baseline, pt does not use O2; currently requiring 2L NC  -Plan to discharge with course of prednisone, doxycycline, Mucinex, Symbicort inhaler, spirivia and as needed DuoNeb prescription. Patient already has nebulizer and PRN albuterol MDI and nebs at home.    05/12/2022    DM type 2 (diabetes mellitus, type 2) ([facility] [place] [facility] [E11.9]  -A1c 7.0  -Continue home metformin  -covered with Lantus 10 units nightly and sliding scale insulin while IP  -add metformin on dc    07/21/2011    Hyperlipidemia [E78.5]  -continue lipitor    07/21/2011    Hypothyroidism [E03.9]  -continue synthroid    Hypertension   -Changed home Cozaar to Norvasc 5/14 secondary to hyperkalemia    Hyperkalemia  -Potassium 5.6 on 5/14  -Changed home Cozaar to Norvasc as above  -Gave Kayexalate 30 g x 1 dose 5/14          Disposition: home    Discharged Condition: Stable    Vital Signs  Temp: 98.3 °F (36.8 °C)  Pulse: 93  Resp: 18  BP: (!) 110/44  SpO2: 96 %  O2 Device: Nasal cannula  O2 Flow Rate (L/min): 2 L/min    Vital signs reviewed in electronic chart. Physical exam  Constitutional:  Well developed, well nourished, thin, elderly female sitting upright in bed in no acute distress  Eyes:  no scleral icterus, conjunctiva normal   HENT:  Atraumatic, external ears normal, nose normal, oropharynx moist, no pharyngeal exudates.  Neck- supple, no JVD, no lymphadenopathy  Respiratory:  No respiratory distress, no rales. Scant wheezing and scattered rhonchi detected. Decreased air movement throughout. Cardiovascular:  Normal rate, normal rhythm, no murmurs, no gallops, no rubs, no edema   GI:  Soft, nondistended, normal bowel sounds, nontender, no voluntary guarding  Musculoskeletal:  No cyanosis or obvious acute deformity. Moving all extremities   Integument:  Warm and dry. Lymphatic:  No cervical or axillary lymphadenopathy noted   Neurologic:  Alert & oriented x 3, no apparent focal deficits noted   Psychiatric:  Speech and behavior appropriate       Activity: activity as tolerated  Diet: diabetic diet  Follow Up: Primary Care Physician in 2 weeks    Labs:  For convenience and continuity at follow-up the following most recent labs are provided:    CBC:   Lab Results   Component Value Date    WBC 15.1 05/14/2022    HGB 10.9 05/14/2022    HCT 36.0 05/14/2022     05/14/2022       RENAL:   Lab Results   Component Value Date     05/14/2022    K 5.6 05/14/2022     05/14/2022    CO2 27 05/14/2022    BUN 16 05/14/2022    CREATININE 0.6 05/14/2022         Discharge Medications:     Current Discharge Medication List           Details   ipratropium-albuterol (DUONEB) 0.5-2.5 (3) MG/3ML SOLN nebulizer solution Inhale 3 mLs into the lungs every 4 hours (while awake)  Qty: 360 mL, Refills: 0      metFORMIN (GLUCOPHAGE) 500 MG tablet Take 1 tablet by mouth 2 times daily (with meals)  Qty: 60 tablet, Refills: 3      doxycycline hyclate (VIBRA-TABS) 100 MG tablet Take 1 tablet by mouth 2 times daily for 5 days  Qty: 10 tablet, Refills: 0      predniSONE (DELTASONE) 20 MG tablet Take 2 tablets by mouth daily for 5 days  Qty: 10 tablet, Refills: 0      guaiFENesin (MUCINEX) 600 MG extended release tablet Take 1 tablet by mouth 2 times daily  Qty: 10 tablet, Refills: 0      guaiFENesin-dextromethorphan (ROBITUSSIN DM) 100-10 MG/5ML syrup Take 5 mLs by mouth every 4 hours as needed for Cough  Qty: 120 mL, Refills: 0      amLODIPine (NORVASC) 5 MG tablet Take 1 tablet by mouth daily  Qty: 30 tablet, Refills: 3              Details   budesonide-formoterol (SYMBICORT) 80-4.5 MCG/ACT AERO Inhale 2 puffs into the lungs 2 times daily  Qty: 1 each, Refills: 0    Associated Diagnoses: COPD (chronic obstructive pulmonary disease) ([facility])      tiotropium (SPIRIVA) 18 MCG inhalation capsule Inhale 1 capsule into the lungs daily  Qty: 30 capsule, Refills: 3    Associated Diagnoses: COPD (chronic obstructive pulmonary disease) ([facility])              Details   albuterol sulfate HFA (VENTOLIN HFA) 108 (90 Base) MCG/ACT inhaler Inhale 2 puffs into the lungs every 6 hours as needed for Wheezing      albuterol (PROVENTIL) (2.5 MG/3ML) 0.083% nebulizer solution Take 2.5 mg by nebulization every 6 hours as needed for Wheezing      vitamin D (ERGOCALCIFEROL) 1.25 MG (12092 UT) CAPS capsule Take 50,000 Units by mouth once a week      simvastatin (ZOCOR) 40 MG tablet TAKE 1 TABLET BY MOUTH ONCE DAILY IN THE EVENING      losartan (COZAAR) 50 MG tablet       levothyroxine (SYNTHROID) 75 MCG tablet TAKE ONE TABLET BY MOUTH EVERY DAY  Qty: 30 tablet, Refills: 5    Associated Diagnoses: Hypothyroid      Folic Acid 20 MG CAPS Take 1 mg by mouth daily       vitamin B-12 (CYANOCOBALAMIN) 100 MCG tablet Take 1,000 mcg by mouth daily. Patient was seen and examined by Dr. Radha Gr and plan of care reviewed. Treatment plan was formulated collaboratively. Time spent in review of pt's case, medical record, coordination with case management and preparation of discharge summary was in excess of 30 minutes. Signed:  Electronically signed by JOCELIN Greene on 5/14/2022 at 12:25 PM       Thank you DARLINE Miller for the opportunity to be involved in this patient's care. If you have any questions or concerns please feel free to contact me at [phone].

## 2022-09-12 ENCOUNTER — HOSPITAL ENCOUNTER (EMERGENCY)
Facility: HOSPITAL | Age: 77
Discharge: HOME | End: 2022-09-12
Attending: EMERGENCY MEDICINE | Admitting: EMERGENCY MEDICINE

## 2022-09-12 ENCOUNTER — APPOINTMENT (OUTPATIENT)
Dept: GENERAL RADIOLOGY | Facility: HOSPITAL | Age: 77
End: 2022-09-12

## 2022-09-12 VITALS
OXYGEN SATURATION: 98 % | SYSTOLIC BLOOD PRESSURE: 101 MMHG | HEIGHT: 61 IN | HEART RATE: 83 BPM | RESPIRATION RATE: 19 BRPM | TEMPERATURE: 98.6 F | WEIGHT: 78.2 LBS | BODY MASS INDEX: 14.76 KG/M2 | DIASTOLIC BLOOD PRESSURE: 46 MMHG

## 2022-09-12 DIAGNOSIS — J44.1 COPD EXACERBATION: Primary | ICD-10-CM

## 2022-09-12 DIAGNOSIS — U07.1 COVID-19: ICD-10-CM

## 2022-09-12 LAB
ALBUMIN SERPL-MCNC: 3.7 G/DL (ref 3.5–5.2)
ALBUMIN/GLOB SERPL: 1.4 G/DL
ALP SERPL-CCNC: 61 U/L (ref 39–117)
ALT SERPL W P-5'-P-CCNC: 19 U/L (ref 1–33)
ANION GAP SERPL CALCULATED.3IONS-SCNC: 10.8 MMOL/L (ref 5–15)
AST SERPL-CCNC: 31 U/L (ref 1–32)
B PARAPERT DNA SPEC QL NAA+PROBE: NOT DETECTED
B PERT DNA SPEC QL NAA+PROBE: NOT DETECTED
BASOPHILS # BLD AUTO: 0.01 10*3/MM3 (ref 0–0.2)
BASOPHILS NFR BLD AUTO: 0.1 % (ref 0–1.5)
BILIRUB SERPL-MCNC: 0.3 MG/DL (ref 0–1.2)
BUN SERPL-MCNC: 11 MG/DL (ref 8–23)
BUN/CREAT SERPL: 18 (ref 7–25)
C PNEUM DNA NPH QL NAA+NON-PROBE: NOT DETECTED
CALCIUM SPEC-SCNC: 9.2 MG/DL (ref 8.6–10.5)
CHLORIDE SERPL-SCNC: 98 MMOL/L (ref 98–107)
CO2 SERPL-SCNC: 26.2 MMOL/L (ref 22–29)
CREAT SERPL-MCNC: 0.61 MG/DL (ref 0.57–1)
DEPRECATED RDW RBC AUTO: 43.6 FL (ref 37–54)
EGFRCR SERPLBLD CKD-EPI 2021: 91.6 ML/MIN/1.73
EOSINOPHIL # BLD AUTO: 0 10*3/MM3 (ref 0–0.4)
EOSINOPHIL NFR BLD AUTO: 0 % (ref 0.3–6.2)
ERYTHROCYTE [DISTWIDTH] IN BLOOD BY AUTOMATED COUNT: 13.1 % (ref 12.3–15.4)
FLUAV SUBTYP SPEC NAA+PROBE: NOT DETECTED
FLUBV RNA ISLT QL NAA+PROBE: NOT DETECTED
GLOBULIN UR ELPH-MCNC: 2.7 GM/DL
GLUCOSE SERPL-MCNC: 221 MG/DL (ref 65–99)
HADV DNA SPEC NAA+PROBE: NOT DETECTED
HCOV 229E RNA SPEC QL NAA+PROBE: NOT DETECTED
HCOV HKU1 RNA SPEC QL NAA+PROBE: NOT DETECTED
HCOV NL63 RNA SPEC QL NAA+PROBE: NOT DETECTED
HCOV OC43 RNA SPEC QL NAA+PROBE: NOT DETECTED
HCT VFR BLD AUTO: 36.5 % (ref 34–46.6)
HGB BLD-MCNC: 12 G/DL (ref 12–15.9)
HMPV RNA NPH QL NAA+NON-PROBE: NOT DETECTED
HOLD SPECIMEN: NORMAL
HOLD SPECIMEN: NORMAL
HPIV1 RNA ISLT QL NAA+PROBE: NOT DETECTED
HPIV2 RNA SPEC QL NAA+PROBE: NOT DETECTED
HPIV3 RNA NPH QL NAA+PROBE: NOT DETECTED
HPIV4 P GENE NPH QL NAA+PROBE: NOT DETECTED
IMM GRANULOCYTES # BLD AUTO: 0.04 10*3/MM3 (ref 0–0.05)
IMM GRANULOCYTES NFR BLD AUTO: 0.5 % (ref 0–0.5)
LYMPHOCYTES # BLD AUTO: 0.6 10*3/MM3 (ref 0.7–3.1)
LYMPHOCYTES NFR BLD AUTO: 8.2 % (ref 19.6–45.3)
M PNEUMO IGG SER IA-ACNC: NOT DETECTED
MCH RBC QN AUTO: 30.2 PG (ref 26.6–33)
MCHC RBC AUTO-ENTMCNC: 32.9 G/DL (ref 31.5–35.7)
MCV RBC AUTO: 91.7 FL (ref 79–97)
MONOCYTES # BLD AUTO: 0.43 10*3/MM3 (ref 0.1–0.9)
MONOCYTES NFR BLD AUTO: 5.9 % (ref 5–12)
NEUTROPHILS NFR BLD AUTO: 6.27 10*3/MM3 (ref 1.7–7)
NEUTROPHILS NFR BLD AUTO: 85.3 % (ref 42.7–76)
NRBC BLD AUTO-RTO: 0 /100 WBC (ref 0–0.2)
NT-PROBNP SERPL-MCNC: 984.7 PG/ML (ref 0–1800)
PLATELET # BLD AUTO: 145 10*3/MM3 (ref 140–450)
PMV BLD AUTO: 10.9 FL (ref 6–12)
POTASSIUM SERPL-SCNC: 4.1 MMOL/L (ref 3.5–5.2)
PROT SERPL-MCNC: 6.4 G/DL (ref 6–8.5)
RBC # BLD AUTO: 3.98 10*6/MM3 (ref 3.77–5.28)
RHINOVIRUS RNA SPEC NAA+PROBE: NOT DETECTED
RSV RNA NPH QL NAA+NON-PROBE: NOT DETECTED
SARS-COV-2 RNA NPH QL NAA+NON-PROBE: DETECTED
SODIUM SERPL-SCNC: 135 MMOL/L (ref 136–145)
TROPONIN T SERPL-MCNC: <0.01 NG/ML (ref 0–0.03)
WBC NRBC COR # BLD: 7.35 10*3/MM3 (ref 3.4–10.8)
WHOLE BLOOD HOLD COAG: NORMAL
WHOLE BLOOD HOLD SPECIMEN: NORMAL

## 2022-09-12 PROCEDURE — 85025 COMPLETE CBC W/AUTO DIFF WBC: CPT

## 2022-09-12 PROCEDURE — 96365 THER/PROPH/DIAG IV INF INIT: CPT

## 2022-09-12 PROCEDURE — 99283 EMERGENCY DEPT VISIT LOW MDM: CPT

## 2022-09-12 PROCEDURE — 36415 COLL VENOUS BLD VENIPUNCTURE: CPT

## 2022-09-12 PROCEDURE — 80053 COMPREHEN METABOLIC PANEL: CPT | Performed by: EMERGENCY MEDICINE

## 2022-09-12 PROCEDURE — 0202U NFCT DS 22 TRGT SARS-COV-2: CPT | Performed by: EMERGENCY MEDICINE

## 2022-09-12 PROCEDURE — 84484 ASSAY OF TROPONIN QUANT: CPT | Performed by: EMERGENCY MEDICINE

## 2022-09-12 PROCEDURE — 71045 X-RAY EXAM CHEST 1 VIEW: CPT

## 2022-09-12 PROCEDURE — 25010000002 MAGNESIUM SULFATE 2 GM/50ML SOLUTION: Performed by: EMERGENCY MEDICINE

## 2022-09-12 PROCEDURE — 83880 ASSAY OF NATRIURETIC PEPTIDE: CPT | Performed by: EMERGENCY MEDICINE

## 2022-09-12 PROCEDURE — 93005 ELECTROCARDIOGRAM TRACING: CPT | Performed by: EMERGENCY MEDICINE

## 2022-09-12 PROCEDURE — 96366 THER/PROPH/DIAG IV INF ADDON: CPT

## 2022-09-12 RX ORDER — MAGNESIUM SULFATE HEPTAHYDRATE 40 MG/ML
2 INJECTION, SOLUTION INTRAVENOUS ONCE
Status: COMPLETED | OUTPATIENT
Start: 2022-09-12 | End: 2022-09-12

## 2022-09-12 RX ORDER — PREDNISONE 20 MG/1
TABLET ORAL
Qty: 10 TABLET | Refills: 0 | Status: SHIPPED | OUTPATIENT
Start: 2022-09-12 | End: 2023-02-21 | Stop reason: HOSPADM

## 2022-09-12 RX ORDER — SODIUM CHLORIDE 0.9 % (FLUSH) 0.9 %
10 SYRINGE (ML) INJECTION AS NEEDED
Status: DISCONTINUED | OUTPATIENT
Start: 2022-09-12 | End: 2022-09-12 | Stop reason: HOSPADM

## 2022-09-12 RX ADMIN — MAGNESIUM SULFATE HEPTAHYDRATE 2 G: 40 INJECTION, SOLUTION INTRAVENOUS at 11:09

## 2022-09-12 NOTE — ED PROVIDER NOTES
"Subjective   PIT    History of Present Illness    Chief Complaint: Shortness of breath  History of Present Illness: 77-year-old female history of COPD, symptoms x3 days.  Wears baseline 2 L nasal cannula.  No sick contacts no travel no fever.  Patient arrived by ambulance, received nebs Solu-Medrol prior to arrival.  Onset: 3 days  Duration: Persistent  Exacerbating / Alleviating factors: Home medications persistent symptoms  Associated symptoms: None      Nurses Notes reviewed and agree, including vitals, allergies, social history and prior medical history.     REVIEW OF SYSTEMS: All systems reviewed and not pertinent unless noted.    Positive for: Shortness of breath, wheezing    Negative for: Fever hemoptysis syncope chest pain palpitations  Review of Systems    Past Medical History:   Diagnosis Date   • COPD (chronic obstructive pulmonary disease) ([facility])    • Disease of thyroid gland    • Hyperlipidemia    • Osteoarthritis    • Type 2 diabetes mellitus without complication, without long-term current use of insulin ([facility]) 11/1/2018       Allergies   Allergen Reactions   • Shellfish-Derived Products Nausea And Vomiting   • Codeine Confusion     unknown       Past Surgical History:   Procedure Laterality Date   • APPENDECTOMY     • CATARACT EXTRACTION, BILATERAL     • HYSTERECTOMY         Family History   Problem Relation Age of Onset   • Heart disease Father    • Diabetes Brother        Social History     Socioeconomic History   • Marital status:    Tobacco Use   • Smoking status: Former Smoker     Quit date: 9/30/2018     Years since quitting: 3.9   • Smokeless tobacco: Never Used   Substance and Sexual Activity   • Alcohol use: No   • Drug use: No   • Sexual activity: Never           Objective   Physical Exam  BP 98/47   Pulse 75   Temp 98.6 °F (37 °C) (Oral)   Resp 20   Ht 154.9 cm (61\")   Wt 35.5 kg (78 lb 3.2 oz)   SpO2 95%   BMI 14.78 kg/m²     CONSTITUTIONAL: Well developed, nontoxic thin frame " 77-year-old  female,  in no acute distress.  VITAL SIGNS: per nursing, reviewed and noted  SKIN: exposed skin with no rashes, ulcerations or petechiae  EYES: Grossly EOMI, no icterus  ENT: Normal voice.  No posterior pharyngeal edema or exudate RESPIRATORY:  No increased work of breathing. No retractions.  Mild expiratory wheezes  CARDIOVASCULAR:  regular rate and rhythm, no murmurs.  Good Peripheral pulses. Good cap refill to extremities.   GI: Abdomen soft, nontender, normal bowel sounds. No hernia. No ascites.  MUSCULOSKELETAL:  No tenderness. Full ROM. Strength and tone grossly normal.  no spasms. no neck or back tenderness or spasm.   NEUROLOGIC: Alert, oriented x 3. No gross deficits. GCS 15.   PSYCH: appropriate affect.  : no bladder tenderness or distention, no CVA tenderness      Procedures       No attending physician procedures were performed on this patient.    ED Course  ED Course as of 09/12/22 1349   Mon Sep 12, 2022   1027 EKG interpreted by me reveals sinus rhythm rate 90.  No ectopy no ischemic changes normal EKG. [PF]   1247 COVID19(!!): Detected [PF]      ED Course User Index  [PF] Grayson Hoffmann,       Lab Results (last 24 hours)     Procedure Component Value Units Date/Time    CBC & Differential [[id number]]  (Abnormal) Collected: 09/12/22 1018    Specimen: Blood Updated: 09/12/22 1024    Narrative:      The following orders were created for panel order CBC & Differential.  Procedure                               Abnormality         Status                     ---------                               -----------         ------                     CBC Auto Differential[[id number]]        Abnormal            Final result                 Please view results for these tests on the individual orders.    Comprehensive Metabolic Panel [[id number]]  (Abnormal) Collected: 09/12/22 1018    Specimen: Blood Updated: 09/12/22 1043     Glucose 221 mg/dL      Comment: Glucose >180, Hemoglobin A1C  recommended.        BUN 11 mg/dL      Creatinine 0.61 mg/dL      Sodium 135 mmol/L      Potassium 4.1 mmol/L      Comment: Slight hemolysis detected by analyzer. Results may be affected.        Chloride 98 mmol/L      CO2 26.2 mmol/L      Calcium 9.2 mg/dL      Total Protein 6.4 g/dL      Albumin 3.70 g/dL      ALT (SGPT) 19 U/L      AST (SGOT) 31 U/L      Alkaline Phosphatase 61 U/L      Total Bilirubin 0.3 mg/dL      Globulin 2.7 gm/dL      A/G Ratio 1.4 g/dL      BUN/Creatinine Ratio 18.0     Anion Gap 10.8 mmol/L      eGFR 91.6 mL/min/1.73      Comment: National Kidney Foundation and American Society of Nephrology (ASN) Task Force recommended calculation based on the Chronic Kidney Disease Epidemiology Collaboration (CKD-EPI) equation refit without adjustment for race.       Narrative:      GFR Normal >60  Chronic Kidney Disease <60  Kidney Failure <15      BNP [[id number]]  (Normal) Collected: 09/12/22 1018    Specimen: Blood Updated: 09/12/22 1046     proBNP 984.7 pg/mL     Narrative:      Among patients with dyspnea, NT-proBNP is highly sensitive for the detection of acute congestive heart failure. In addition NT-proBNP of <300 pg/ml effectively rules out acute congestive heart failure with 99% negative predictive value.    Results may be falsely decreased if patient taking Biotin.      Troponin [[id number]]  (Normal) Collected: 09/12/22 1018    Specimen: Blood Updated: 09/12/22 1046     Troponin T <0.010 ng/mL     Narrative:      Troponin T Reference Range:  <= 0.03 ng/mL-   Negative for AMI  >0.03 ng/mL-     Abnormal for myocardial necrosis.  Clinicians would have to utilize clinical acumen, EKG, Troponin and serial changes to determine if it is an Acute Myocardial Infarction or myocardial injury due to an underlying chronic condition.       Results may be falsely decreased if patient taking Biotin.      CBC Auto Differential [[id number]]  (Abnormal) Collected: 09/12/22 1018    Specimen: Blood Updated:  09/12/22 1024     WBC 7.35 10*3/mm3      RBC 3.98 10*6/mm3      Hemoglobin 12.0 g/dL      Hematocrit 36.5 %      MCV 91.7 fL      MCH 30.2 pg      MCHC 32.9 g/dL      RDW 13.1 %      RDW-SD 43.6 fl      MPV 10.9 fL      Platelets 145 10*3/mm3      Neutrophil % 85.3 %      Lymphocyte % 8.2 %      Monocyte % 5.9 %      Eosinophil % 0.0 %      Basophil % 0.1 %      Immature Grans % 0.5 %      Neutrophils, Absolute 6.27 10*3/mm3      Lymphocytes, Absolute 0.60 10*3/mm3      Monocytes, Absolute 0.43 10*3/mm3      Eosinophils, Absolute 0.00 10*3/mm3      Basophils, Absolute 0.01 10*3/mm3      Immature Grans, Absolute 0.04 10*3/mm3      nRBC 0.0 /100 WBC     Respiratory Panel PCR w/COVID-19(SARS-CoV-2) MICHAEL/SHASHANK/NAVJOT/PAD/COR/MAD/HAYLEY In-House, NP Swab in [facility], 3-4 HR TAT - Swab, Nasopharynx [[id number]]  (Abnormal) Collected: 09/12/22 1111    Specimen: Swab from Nasopharynx Updated: 09/12/22 1243     ADENOVIRUS, PCR Not Detected     Coronavirus 229E Not Detected     Coronavirus HKU1 Not Detected     Coronavirus NL63 Not Detected     Coronavirus OC43 Not Detected     COVID19 Detected     Human Metapneumovirus Not Detected     Human Rhinovirus/Enterovirus Not Detected     Influenza A PCR Not Detected     Influenza B PCR Not Detected     Parainfluenza Virus 1 Not Detected     Parainfluenza Virus 2 Not Detected     Parainfluenza Virus 3 Not Detected     Parainfluenza Virus 4 Not Detected     RSV, PCR Not Detected     Bordetella pertussis pcr Not Detected     Bordetella parapertussis PCR Not Detected     Chlamydophila pneumoniae PCR Not Detected     Mycoplasma pneumo by PCR Not Detected    Narrative:      In the setting of a positive respiratory panel with a viral infection PLUS a negative procalcitonin without other underlying concern for bacterial infection, consider observing off antibiotics or discontinuation of antibiotics and continue supportive care. If the respiratory panel is positive for atypical bacterial infection  (Bordetella pertussis, Chlamydophila pneumoniae, or Mycoplasma pneumoniae), consider antibiotic de-escalation to target atypical bacterial infection.        XR Chest 1 View    Result Date: 9/12/2022  CLINICAL INDICATION:  [facility] Triage Protocol  EXAMINATION TECHNIQUE: XR CHEST 1 VW-  COMPARISON: Radiograph 10/31/2018.  FINDINGS: Severe emphysema. Inflated lungs with flattening of the hemidiaphragms. No suspicious pulmonary masses or airspace consolidation. No pneumothorax or pleural effusion. Heart is normal in size. Aortic atherosclerosis. No acute osseous or soft tissue abnormality. No free air under hemidiaphragms. Demineralization of the bones.      Impression: Severe emphysema. No focal airspace consolidation.  Images personally reviewed, interpreted and dictated by AARON Alarcon.       This report was signed and finalized on 9/12/2022 10:49 AM by AARON Alarcon.                                         MDM  Wheezing resolved after  interventions monitored 3+ hours.  Covid positive.  Tolerating baseline oxygen 2 L nasal cannula at 98%.  Patient does not meet criteria for Paxlovid due to weight less than 88 pounds.  It appears she meets criteria for molnopiravir. Fact sheet provided, will send to pharmacy, with rx. Prednisone.   Final diagnoses:   COPD exacerbation (HCC)   COVID-19       ED Disposition  ED Disposition     ED Disposition   Discharge    Condition   Stable    Comment   --             No follow-up provider specified.       Medication List      New Prescriptions    Molnupiravir 200 MG capsule  Commonly known as: LAGEVRIO  Take 4 capsules by mouth Every 12 (Twelve) Hours for 5 days.        Changed    predniSONE 20 MG tablet  Commonly known as: DELTASONE  Take 2 tablets daily.  What changed:   · medication strength  · additional instructions           Where to Get Your Medications      Information about where to get these medications is not yet available    Ask your nurse or doctor about  these medications  · Molnupiravir 200 MG capsule  · predniSONE 20 MG tablet          Grayson Hoffmann,   09/12/22 5039

## 2022-09-15 ENCOUNTER — APPOINTMENT (OUTPATIENT)
Dept: GENERAL RADIOLOGY | Facility: HOSPITAL | Age: 77
End: 2022-09-15

## 2022-09-15 ENCOUNTER — APPOINTMENT (OUTPATIENT)
Dept: CT IMAGING | Facility: HOSPITAL | Age: 77
End: 2022-09-15

## 2022-09-15 ENCOUNTER — HOSPITAL ENCOUNTER (EMERGENCY)
Facility: HOSPITAL | Age: 77
Discharge: HOME | End: 2022-09-15
Attending: EMERGENCY MEDICINE | Admitting: EMERGENCY MEDICINE

## 2022-09-15 VITALS
WEIGHT: 78 LBS | OXYGEN SATURATION: 100 % | HEART RATE: 68 BPM | RESPIRATION RATE: 18 BRPM | HEIGHT: 61 IN | DIASTOLIC BLOOD PRESSURE: 56 MMHG | TEMPERATURE: 98.3 F | SYSTOLIC BLOOD PRESSURE: 118 MMHG | BODY MASS INDEX: 14.73 KG/M2

## 2022-09-15 DIAGNOSIS — J42 CHRONIC BRONCHITIS, UNSPECIFIED CHRONIC BRONCHITIS TYPE: ICD-10-CM

## 2022-09-15 DIAGNOSIS — U07.1 COVID-19: Primary | ICD-10-CM

## 2022-09-15 LAB
A-A DO2: 26.3 MMHG
ALBUMIN SERPL-MCNC: 4.4 G/DL (ref 3.5–5.2)
ALBUMIN/GLOB SERPL: 2.2 G/DL
ALP SERPL-CCNC: 61 U/L (ref 39–117)
ALT SERPL W P-5'-P-CCNC: 19 U/L (ref 1–33)
ANION GAP SERPL CALCULATED.3IONS-SCNC: 10.3 MMOL/L (ref 5–15)
ARTERIAL PATENCY WRIST A: POSITIVE
AST SERPL-CCNC: 28 U/L (ref 1–32)
ATMOSPHERIC PRESS: 740 MMHG
BASE EXCESS BLDA CALC-SCNC: 4.5 MMOL/L (ref 0–2)
BASOPHILS # BLD AUTO: 0.01 10*3/MM3 (ref 0–0.2)
BASOPHILS NFR BLD AUTO: 0.1 % (ref 0–1.5)
BDY SITE: ABNORMAL
BILIRUB SERPL-MCNC: 0.3 MG/DL (ref 0–1.2)
BUN SERPL-MCNC: 20 MG/DL (ref 8–23)
BUN/CREAT SERPL: 32.8 (ref 7–25)
CALCIUM SPEC-SCNC: 9.6 MG/DL (ref 8.6–10.5)
CHLORIDE SERPL-SCNC: 103 MMOL/L (ref 98–107)
CO2 SERPL-SCNC: 29.7 MMOL/L (ref 22–29)
COHGB MFR BLD: 0.5 % (ref 0–2)
CREAT SERPL-MCNC: 0.61 MG/DL (ref 0.57–1)
D-LACTATE SERPL-SCNC: 2 MMOL/L (ref 0.5–2)
DEPRECATED RDW RBC AUTO: 45.1 FL (ref 37–54)
EGFRCR SERPLBLD CKD-EPI 2021: 91.6 ML/MIN/1.73
EOSINOPHIL # BLD AUTO: 0.01 10*3/MM3 (ref 0–0.4)
EOSINOPHIL NFR BLD AUTO: 0.1 % (ref 0.3–6.2)
ERYTHROCYTE [DISTWIDTH] IN BLOOD BY AUTOMATED COUNT: 13.2 % (ref 12.3–15.4)
GAS FLOW AIRWAY: 4.5 LPM
GLOBULIN UR ELPH-MCNC: 2 GM/DL
GLUCOSE SERPL-MCNC: 138 MG/DL (ref 65–99)
HCO3 BLDA-SCNC: 30.1 MMOL/L (ref 22–28)
HCT VFR BLD AUTO: 38.8 % (ref 34–46.6)
HCT VFR BLD CALC: 34.7 %
HGB BLD-MCNC: 12.3 G/DL (ref 12–15.9)
HOLD SPECIMEN: NORMAL
HOLD SPECIMEN: NORMAL
IMM GRANULOCYTES # BLD AUTO: 0.05 10*3/MM3 (ref 0–0.05)
IMM GRANULOCYTES NFR BLD AUTO: 0.5 % (ref 0–0.5)
INHALED O2 CONCENTRATION: 38 %
LYMPHOCYTES # BLD AUTO: 1.35 10*3/MM3 (ref 0.7–3.1)
LYMPHOCYTES NFR BLD AUTO: 12.5 % (ref 19.6–45.3)
MCH RBC QN AUTO: 29.6 PG (ref 26.6–33)
MCHC RBC AUTO-ENTMCNC: 31.7 G/DL (ref 31.5–35.7)
MCV RBC AUTO: 93.3 FL (ref 79–97)
METHGB BLD QL: 0.4 % (ref 0–1.5)
MODALITY: ABNORMAL
MONOCYTES # BLD AUTO: 0.72 10*3/MM3 (ref 0.1–0.9)
MONOCYTES NFR BLD AUTO: 6.7 % (ref 5–12)
NEUTROPHILS NFR BLD AUTO: 8.64 10*3/MM3 (ref 1.7–7)
NEUTROPHILS NFR BLD AUTO: 80.1 % (ref 42.7–76)
NOTE: ABNORMAL
NRBC BLD AUTO-RTO: 0 /100 WBC (ref 0–0.2)
NT-PROBNP SERPL-MCNC: 2125 PG/ML (ref 0–1800)
OXYHGB MFR BLDV: 98.9 % (ref 94–99)
PCO2 BLDA: 48.5 MM HG (ref 35–45)
PCO2 TEMP ADJ BLD: ABNORMAL MM[HG]
PH BLDA: 7.4 PH UNITS (ref 7.35–7.45)
PH, TEMP CORRECTED: ABNORMAL
PLATELET # BLD AUTO: 306 10*3/MM3 (ref 140–450)
PMV BLD AUTO: 10.8 FL (ref 6–12)
PO2 BLDA: 184 MM HG (ref 75–100)
PO2 TEMP ADJ BLD: ABNORMAL MM[HG]
POTASSIUM SERPL-SCNC: 4.1 MMOL/L (ref 3.5–5.2)
PROCALCITONIN SERPL-MCNC: 0.2 NG/ML (ref 0–0.25)
PROT SERPL-MCNC: 6.4 G/DL (ref 6–8.5)
RBC # BLD AUTO: 4.16 10*6/MM3 (ref 3.77–5.28)
SAO2 % BLDCOA: 99.8 % (ref 94–100)
SODIUM SERPL-SCNC: 143 MMOL/L (ref 136–145)
TROPONIN T SERPL-MCNC: <0.01 NG/ML (ref 0–0.03)
VENTILATOR MODE: ABNORMAL
WBC NRBC COR # BLD: 10.78 10*3/MM3 (ref 3.4–10.8)
WHOLE BLOOD HOLD COAG: NORMAL
WHOLE BLOOD HOLD SPECIMEN: NORMAL

## 2022-09-15 PROCEDURE — 93005 ELECTROCARDIOGRAM TRACING: CPT

## 2022-09-15 PROCEDURE — 82375 ASSAY CARBOXYHB QUANT: CPT

## 2022-09-15 PROCEDURE — 36415 COLL VENOUS BLD VENIPUNCTURE: CPT

## 2022-09-15 PROCEDURE — 83605 ASSAY OF LACTIC ACID: CPT

## 2022-09-15 PROCEDURE — 83050 HGB METHEMOGLOBIN QUAN: CPT

## 2022-09-15 PROCEDURE — 85025 COMPLETE CBC W/AUTO DIFF WBC: CPT

## 2022-09-15 PROCEDURE — 71045 X-RAY EXAM CHEST 1 VIEW: CPT

## 2022-09-15 PROCEDURE — 87040 BLOOD CULTURE FOR BACTERIA: CPT | Performed by: EMERGENCY MEDICINE

## 2022-09-15 PROCEDURE — 84145 PROCALCITONIN (PCT): CPT | Performed by: EMERGENCY MEDICINE

## 2022-09-15 PROCEDURE — 96360 HYDRATION IV INFUSION INIT: CPT

## 2022-09-15 PROCEDURE — 83880 ASSAY OF NATRIURETIC PEPTIDE: CPT

## 2022-09-15 PROCEDURE — 80053 COMPREHEN METABOLIC PANEL: CPT

## 2022-09-15 PROCEDURE — 82805 BLOOD GASES W/O2 SATURATION: CPT

## 2022-09-15 PROCEDURE — 36600 WITHDRAWAL OF ARTERIAL BLOOD: CPT

## 2022-09-15 PROCEDURE — 99284 EMERGENCY DEPT VISIT MOD MDM: CPT

## 2022-09-15 PROCEDURE — 25010000002 IOPAMIDOL 61 % SOLUTION: Performed by: EMERGENCY MEDICINE

## 2022-09-15 PROCEDURE — 71275 CT ANGIOGRAPHY CHEST: CPT

## 2022-09-15 PROCEDURE — 84484 ASSAY OF TROPONIN QUANT: CPT

## 2022-09-15 RX ORDER — SODIUM CHLORIDE 0.9 % (FLUSH) 0.9 %
10 SYRINGE (ML) INJECTION AS NEEDED
Status: DISCONTINUED | OUTPATIENT
Start: 2022-09-15 | End: 2022-09-15 | Stop reason: HOSPADM

## 2022-09-15 RX ADMIN — IOPAMIDOL 100 ML: 612 INJECTION, SOLUTION INTRAVENOUS at 12:57

## 2022-09-15 RX ADMIN — SODIUM CHLORIDE 500 ML: 9 INJECTION, SOLUTION INTRAVENOUS at 10:12

## 2022-09-15 NOTE — ED PROVIDER NOTES
Subjective   History of Present Illness  27-year-old female with a past medical history significant for COPD who has home O2 requirements of 2.5 L nasal cannula at all times presents to the ED with a chief complaint of shortness of breath.  Patient was seen and evaluated here 3 days ago diagnosed with COPD exacerbation secondary to a positive COVID-19 infection.  Patient states that her cough has gotten slightly worse.  Cough is productive small amount of sputum.  She has had increased work of breathing.  Tachypneic on arrival to the ED.  No fever.  No nausea vomiting diarrhea or abdominal pain.  She has been taking Paxil elevated.  No chest pain.  No other complaints at this time.        Review of Systems   Constitutional: Positive for fatigue.   Respiratory: Positive for cough, shortness of breath and wheezing.    Neurological: Positive for weakness.   All other systems reviewed and are negative.      Past Medical History:   Diagnosis Date   • COPD (chronic obstructive pulmonary disease) ([facility])    • Disease of thyroid gland    • Hyperlipidemia    • Osteoarthritis    • Type 2 diabetes mellitus without complication, without long-term current use of insulin ([facility]) 11/1/2018       Allergies   Allergen Reactions   • Shellfish-Derived Products Nausea And Vomiting   • Codeine Confusion     unknown       Past Surgical History:   Procedure Laterality Date   • APPENDECTOMY     • CATARACT EXTRACTION, BILATERAL     • HYSTERECTOMY         Family History   Problem Relation Age of Onset   • Heart disease Father    • Diabetes Brother        Social History     Socioeconomic History   • Marital status:    Tobacco Use   • Smoking status: Former Smoker     Quit date: 9/30/2018     Years since quitting: 3.9   • Smokeless tobacco: Never Used   Substance and Sexual Activity   • Alcohol use: No   • Drug use: No   • Sexual activity: Never           Objective   Physical Exam  Vitals and nursing note reviewed.   Constitutional:        General: She is not in acute distress.     Appearance: She is ill-appearing. She is not diaphoretic.      Comments: Chronically ill-appearing.   HENT:      Head: Normocephalic and atraumatic.      Nose: Nose normal.   Eyes:      Conjunctiva/sclera: Conjunctivae normal.   Cardiovascular:      Rate and Rhythm: Normal rate and regular rhythm.   Pulmonary:      Effort: Pulmonary effort is normal. No respiratory distress.      Breath sounds: Normal breath sounds.   Abdominal:      General: There is no distension.      Palpations: Abdomen is soft.      Tenderness: There is no abdominal tenderness. There is no guarding.   Musculoskeletal:         General: No deformity.   Neurological:      Mental Status: She is alert and oriented to person, place, and time.      Cranial Nerves: No cranial nerve deficit.         Procedures           ED Course  ED Course as of 09/15/22 1734   Thu Sep 15, 2022   1115 EKG interpreted by me.  Sinus rhythm.  Rate of 84.  Short MN interval.  Shortened QT interval.  No obvious ST or T wave changes.  Abnormal EKG. [CG]      ED Course User Index  [CG] Kane Martinez, DO                                           MDM  77-year-old female presents to the ED with a chief complaint of shortness of breath.  The patient has a history of COPD wears 2.5 L nasal cannula at home at all times.  She recently had a positive COVID-19 infection and is taking medication and steroids.  That were prescribed to her 3 years ago.  She had some increasing shortness of breath today.  Her work-up is reassuring.  Imaging shows some chronic bronchitis.  Her pulse ox was appropriate and she was weaned back down to normal baseline requirements.  Patient is appropriate for discharge and follow-up outpatient needed.  She is agreeable to this plan.      Final diagnoses:   COVID-19   Chronic bronchitis, unspecified chronic bronchitis type (HCC)       ED Disposition  ED Disposition     ED Disposition   Discharge    Condition    Stable    Comment   --             Sofia Martinez, APRN  [address] [facility] [address] [phone]               Medication List      No changes were made to your prescriptions during this visit.          Kane Martinez, DO  09/15/22 1738

## 2022-09-20 LAB
BACTERIA SPEC AEROBE CULT: NORMAL
BACTERIA SPEC AEROBE CULT: NORMAL

## 2023-02-20 ENCOUNTER — HOSPITAL ENCOUNTER (OUTPATIENT)
Facility: HOSPITAL | Age: 78
Setting detail: OBSERVATION
Discharge: HOME | End: 2023-02-21
Attending: STUDENT IN AN ORGANIZED HEALTH CARE EDUCATION/TRAINING PROGRAM | Admitting: STUDENT IN AN ORGANIZED HEALTH CARE EDUCATION/TRAINING PROGRAM
Payer: MEDICARE

## 2023-02-20 ENCOUNTER — APPOINTMENT (OUTPATIENT)
Dept: GENERAL RADIOLOGY | Facility: HOSPITAL | Age: 78
End: 2023-02-20
Payer: MEDICARE

## 2023-02-20 DIAGNOSIS — J96.11 CHRONIC RESPIRATORY FAILURE WITH HYPOXIA: ICD-10-CM

## 2023-02-20 DIAGNOSIS — J44.9 CHRONIC OBSTRUCTIVE PULMONARY DISEASE, UNSPECIFIED COPD TYPE: Primary | ICD-10-CM

## 2023-02-20 PROBLEM — J44.1 COPD EXACERBATION: Status: ACTIVE | Noted: 2023-02-20

## 2023-02-20 PROBLEM — J44.1 COPD EXACERBATION (HCC): Status: ACTIVE | Noted: 2023-02-20

## 2023-02-20 PROBLEM — J96.21 ACUTE ON CHRONIC RESPIRATORY FAILURE WITH HYPOXIA: Status: ACTIVE | Noted: 2023-02-20

## 2023-02-20 LAB
ALBUMIN SERPL-MCNC: 4.2 G/DL (ref 3.5–5.2)
ALBUMIN/GLOB SERPL: 1.6 G/DL
ALP SERPL-CCNC: 79 U/L (ref 39–117)
ALT SERPL W P-5'-P-CCNC: 11 U/L (ref 1–33)
ANION GAP SERPL CALCULATED.3IONS-SCNC: 10.8 MMOL/L (ref 5–15)
AST SERPL-CCNC: 19 U/L (ref 1–32)
B PARAPERT DNA SPEC QL NAA+PROBE: NOT DETECTED
B PERT DNA SPEC QL NAA+PROBE: NOT DETECTED
BASOPHILS # BLD AUTO: 0.03 10*3/MM3 (ref 0–0.2)
BASOPHILS NFR BLD AUTO: 0.4 % (ref 0–1.5)
BILIRUB SERPL-MCNC: 0.4 MG/DL (ref 0–1.2)
BUN SERPL-MCNC: 11 MG/DL (ref 8–23)
BUN/CREAT SERPL: 16.9 (ref 7–25)
C PNEUM DNA NPH QL NAA+NON-PROBE: NOT DETECTED
CALCIUM SPEC-SCNC: 9.5 MG/DL (ref 8.6–10.5)
CHLORIDE SERPL-SCNC: 96 MMOL/L (ref 98–107)
CO2 SERPL-SCNC: 32.2 MMOL/L (ref 22–29)
CREAT SERPL-MCNC: 0.65 MG/DL (ref 0.57–1)
CRP SERPL-MCNC: 2.5 MG/DL (ref 0–0.5)
D DIMER PPP FEU-MCNC: <0.27 MCGFEU/ML (ref 0–0.78)
DEPRECATED RDW RBC AUTO: 42.5 FL (ref 37–54)
EGFRCR SERPLBLD CKD-EPI 2021: 90.2 ML/MIN/1.73
EOSINOPHIL # BLD AUTO: 0.07 10*3/MM3 (ref 0–0.4)
EOSINOPHIL NFR BLD AUTO: 0.9 % (ref 0.3–6.2)
ERYTHROCYTE [DISTWIDTH] IN BLOOD BY AUTOMATED COUNT: 12.7 % (ref 12.3–15.4)
FLUAV SUBTYP SPEC NAA+PROBE: NOT DETECTED
FLUBV RNA ISLT QL NAA+PROBE: NOT DETECTED
GEN 5 2HR TROPONIN T REFLEX: 11 NG/L
GLOBULIN UR ELPH-MCNC: 2.6 GM/DL
GLUCOSE BLDC GLUCOMTR-MCNC: 176 MG/DL (ref 70–130)
GLUCOSE SERPL-MCNC: 175 MG/DL (ref 65–99)
HADV DNA SPEC NAA+PROBE: NOT DETECTED
HBA1C MFR BLD: 6.5 % (ref 4.8–5.6)
HCOV 229E RNA SPEC QL NAA+PROBE: NOT DETECTED
HCOV HKU1 RNA SPEC QL NAA+PROBE: NOT DETECTED
HCOV NL63 RNA SPEC QL NAA+PROBE: NOT DETECTED
HCOV OC43 RNA SPEC QL NAA+PROBE: NOT DETECTED
HCT VFR BLD AUTO: 40.4 % (ref 34–46.6)
HGB BLD-MCNC: 12.4 G/DL (ref 12–15.9)
HMPV RNA NPH QL NAA+NON-PROBE: NOT DETECTED
HOLD SPECIMEN: NORMAL
HOLD SPECIMEN: NORMAL
HPIV1 RNA ISLT QL NAA+PROBE: NOT DETECTED
HPIV2 RNA SPEC QL NAA+PROBE: NOT DETECTED
HPIV3 RNA NPH QL NAA+PROBE: NOT DETECTED
HPIV4 P GENE NPH QL NAA+PROBE: NOT DETECTED
IMM GRANULOCYTES # BLD AUTO: 0.03 10*3/MM3 (ref 0–0.05)
IMM GRANULOCYTES NFR BLD AUTO: 0.4 % (ref 0–0.5)
LYMPHOCYTES # BLD AUTO: 0.88 10*3/MM3 (ref 0.7–3.1)
LYMPHOCYTES NFR BLD AUTO: 10.9 % (ref 19.6–45.3)
M PNEUMO IGG SER IA-ACNC: NOT DETECTED
MAGNESIUM SERPL-MCNC: 1.8 MG/DL (ref 1.6–2.4)
MCH RBC QN AUTO: 28 PG (ref 26.6–33)
MCHC RBC AUTO-ENTMCNC: 30.7 G/DL (ref 31.5–35.7)
MCV RBC AUTO: 91.2 FL (ref 79–97)
MONOCYTES # BLD AUTO: 0.53 10*3/MM3 (ref 0.1–0.9)
MONOCYTES NFR BLD AUTO: 6.6 % (ref 5–12)
NEUTROPHILS NFR BLD AUTO: 6.51 10*3/MM3 (ref 1.7–7)
NEUTROPHILS NFR BLD AUTO: 80.8 % (ref 42.7–76)
NRBC BLD AUTO-RTO: 0 /100 WBC (ref 0–0.2)
NT-PROBNP SERPL-MCNC: 287.4 PG/ML (ref 0–1800)
PHOSPHATE SERPL-MCNC: 3.7 MG/DL (ref 2.5–4.5)
PLATELET # BLD AUTO: 260 10*3/MM3 (ref 140–450)
PMV BLD AUTO: 10.4 FL (ref 6–12)
POTASSIUM SERPL-SCNC: 3.8 MMOL/L (ref 3.5–5.2)
PROCALCITONIN SERPL-MCNC: 0.04 NG/ML (ref 0–0.25)
PROT SERPL-MCNC: 6.8 G/DL (ref 6–8.5)
RBC # BLD AUTO: 4.43 10*6/MM3 (ref 3.77–5.28)
RHINOVIRUS RNA SPEC NAA+PROBE: DETECTED
RSV RNA NPH QL NAA+NON-PROBE: NOT DETECTED
SARS-COV-2 RNA NPH QL NAA+NON-PROBE: NOT DETECTED
SODIUM SERPL-SCNC: 139 MMOL/L (ref 136–145)
TROPONIN T DELTA: 0 NG/L
TROPONIN T SERPL HS-MCNC: 11 NG/L
WBC NRBC COR # BLD: 8.05 10*3/MM3 (ref 3.4–10.8)
WHOLE BLOOD HOLD COAG: NORMAL
WHOLE BLOOD HOLD SPECIMEN: NORMAL

## 2023-02-20 PROCEDURE — 96375 TX/PRO/DX INJ NEW DRUG ADDON: CPT

## 2023-02-20 PROCEDURE — 84100 ASSAY OF PHOSPHORUS: CPT | Performed by: STUDENT IN AN ORGANIZED HEALTH CARE EDUCATION/TRAINING PROGRAM

## 2023-02-20 PROCEDURE — 80053 COMPREHEN METABOLIC PANEL: CPT | Performed by: STUDENT IN AN ORGANIZED HEALTH CARE EDUCATION/TRAINING PROGRAM

## 2023-02-20 PROCEDURE — 99285 EMERGENCY DEPT VISIT HI MDM: CPT

## 2023-02-20 PROCEDURE — 96372 THER/PROPH/DIAG INJ SC/IM: CPT

## 2023-02-20 PROCEDURE — 82962 GLUCOSE BLOOD TEST: CPT

## 2023-02-20 PROCEDURE — 96361 HYDRATE IV INFUSION ADD-ON: CPT

## 2023-02-20 PROCEDURE — 36415 COLL VENOUS BLD VENIPUNCTURE: CPT

## 2023-02-20 PROCEDURE — 83735 ASSAY OF MAGNESIUM: CPT | Performed by: STUDENT IN AN ORGANIZED HEALTH CARE EDUCATION/TRAINING PROGRAM

## 2023-02-20 PROCEDURE — G0378 HOSPITAL OBSERVATION PER HR: HCPCS

## 2023-02-20 PROCEDURE — 94799 UNLISTED PULMONARY SVC/PX: CPT

## 2023-02-20 PROCEDURE — 83036 HEMOGLOBIN GLYCOSYLATED A1C: CPT | Performed by: FAMILY MEDICINE

## 2023-02-20 PROCEDURE — 94761 N-INVAS EAR/PLS OXIMETRY MLT: CPT

## 2023-02-20 PROCEDURE — 96365 THER/PROPH/DIAG IV INF INIT: CPT

## 2023-02-20 PROCEDURE — 25010000002 METHYLPREDNISOLONE PER 125 MG: Performed by: STUDENT IN AN ORGANIZED HEALTH CARE EDUCATION/TRAINING PROGRAM

## 2023-02-20 PROCEDURE — 85379 FIBRIN DEGRADATION QUANT: CPT | Performed by: STUDENT IN AN ORGANIZED HEALTH CARE EDUCATION/TRAINING PROGRAM

## 2023-02-20 PROCEDURE — 86140 C-REACTIVE PROTEIN: CPT | Performed by: STUDENT IN AN ORGANIZED HEALTH CARE EDUCATION/TRAINING PROGRAM

## 2023-02-20 PROCEDURE — 87070 CULTURE OTHR SPECIMN AEROBIC: CPT | Performed by: FAMILY MEDICINE

## 2023-02-20 PROCEDURE — 84484 ASSAY OF TROPONIN QUANT: CPT | Performed by: STUDENT IN AN ORGANIZED HEALTH CARE EDUCATION/TRAINING PROGRAM

## 2023-02-20 PROCEDURE — 96376 TX/PRO/DX INJ SAME DRUG ADON: CPT

## 2023-02-20 PROCEDURE — 25010000002 METHYLPREDNISOLONE PER 40 MG: Performed by: FAMILY MEDICINE

## 2023-02-20 PROCEDURE — 94640 AIRWAY INHALATION TREATMENT: CPT

## 2023-02-20 PROCEDURE — 84145 PROCALCITONIN (PCT): CPT | Performed by: STUDENT IN AN ORGANIZED HEALTH CARE EDUCATION/TRAINING PROGRAM

## 2023-02-20 PROCEDURE — 85025 COMPLETE CBC W/AUTO DIFF WBC: CPT | Performed by: STUDENT IN AN ORGANIZED HEALTH CARE EDUCATION/TRAINING PROGRAM

## 2023-02-20 PROCEDURE — 25010000002 ENOXAPARIN PER 10 MG: Performed by: FAMILY MEDICINE

## 2023-02-20 PROCEDURE — 0202U NFCT DS 22 TRGT SARS-COV-2: CPT | Performed by: STUDENT IN AN ORGANIZED HEALTH CARE EDUCATION/TRAINING PROGRAM

## 2023-02-20 PROCEDURE — 93005 ELECTROCARDIOGRAM TRACING: CPT | Performed by: STUDENT IN AN ORGANIZED HEALTH CARE EDUCATION/TRAINING PROGRAM

## 2023-02-20 PROCEDURE — 25010000002 MAGNESIUM SULFATE 2 GM/50ML SOLUTION: Performed by: STUDENT IN AN ORGANIZED HEALTH CARE EDUCATION/TRAINING PROGRAM

## 2023-02-20 PROCEDURE — 71045 X-RAY EXAM CHEST 1 VIEW: CPT

## 2023-02-20 PROCEDURE — 83880 ASSAY OF NATRIURETIC PEPTIDE: CPT | Performed by: STUDENT IN AN ORGANIZED HEALTH CARE EDUCATION/TRAINING PROGRAM

## 2023-02-20 PROCEDURE — 99223 1ST HOSP IP/OBS HIGH 75: CPT | Performed by: FAMILY MEDICINE

## 2023-02-20 RX ORDER — ATORVASTATIN CALCIUM 10 MG/1
10 TABLET, FILM COATED ORAL NIGHTLY
Status: DISCONTINUED | OUTPATIENT
Start: 2023-02-21 | End: 2023-02-21 | Stop reason: HOSPADM

## 2023-02-20 RX ORDER — IPRATROPIUM BROMIDE AND ALBUTEROL SULFATE 2.5; .5 MG/3ML; MG/3ML
3 SOLUTION RESPIRATORY (INHALATION)
Status: DISCONTINUED | OUTPATIENT
Start: 2023-02-20 | End: 2023-02-20

## 2023-02-20 RX ORDER — SODIUM CHLORIDE 0.9 % (FLUSH) 0.9 %
10 SYRINGE (ML) INJECTION EVERY 12 HOURS SCHEDULED
Status: DISCONTINUED | OUTPATIENT
Start: 2023-02-20 | End: 2023-02-21 | Stop reason: HOSPADM

## 2023-02-20 RX ORDER — SODIUM CHLORIDE 9 MG/ML
40 INJECTION, SOLUTION INTRAVENOUS AS NEEDED
Status: DISCONTINUED | OUTPATIENT
Start: 2023-02-20 | End: 2023-02-21 | Stop reason: HOSPADM

## 2023-02-20 RX ORDER — DEXTROSE MONOHYDRATE 25 G/50ML
25 INJECTION, SOLUTION INTRAVENOUS
Status: DISCONTINUED | OUTPATIENT
Start: 2023-02-20 | End: 2023-02-21 | Stop reason: HOSPADM

## 2023-02-20 RX ORDER — CHOLECALCIFEROL (VITAMIN D3) 125 MCG
5 CAPSULE ORAL NIGHTLY PRN
Status: DISCONTINUED | OUTPATIENT
Start: 2023-02-20 | End: 2023-02-21 | Stop reason: HOSPADM

## 2023-02-20 RX ORDER — ONDANSETRON 4 MG/1
4 TABLET, FILM COATED ORAL EVERY 6 HOURS PRN
Status: DISCONTINUED | OUTPATIENT
Start: 2023-02-20 | End: 2023-02-21 | Stop reason: HOSPADM

## 2023-02-20 RX ORDER — ONDANSETRON 2 MG/ML
4 INJECTION INTRAMUSCULAR; INTRAVENOUS EVERY 6 HOURS PRN
Status: DISCONTINUED | OUTPATIENT
Start: 2023-02-20 | End: 2023-02-21 | Stop reason: HOSPADM

## 2023-02-20 RX ORDER — MAGNESIUM SULFATE HEPTAHYDRATE 40 MG/ML
2 INJECTION, SOLUTION INTRAVENOUS ONCE
Status: COMPLETED | OUTPATIENT
Start: 2023-02-20 | End: 2023-02-20

## 2023-02-20 RX ORDER — METHYLPREDNISOLONE SODIUM SUCCINATE 40 MG/ML
20 INJECTION, POWDER, LYOPHILIZED, FOR SOLUTION INTRAMUSCULAR; INTRAVENOUS EVERY 8 HOURS
Status: DISCONTINUED | OUTPATIENT
Start: 2023-02-20 | End: 2023-02-21 | Stop reason: HOSPADM

## 2023-02-20 RX ORDER — ACETAMINOPHEN 325 MG/1
650 TABLET ORAL EVERY 4 HOURS PRN
Status: DISCONTINUED | OUTPATIENT
Start: 2023-02-20 | End: 2023-02-21 | Stop reason: HOSPADM

## 2023-02-20 RX ORDER — GUAIFENESIN/DEXTROMETHORPHAN 100-10MG/5
10 SYRUP ORAL EVERY 6 HOURS PRN
Status: DISCONTINUED | OUTPATIENT
Start: 2023-02-20 | End: 2023-02-21 | Stop reason: HOSPADM

## 2023-02-20 RX ORDER — FLUTICASONE PROPIONATE 50 MCG
2 SPRAY, SUSPENSION (ML) NASAL DAILY
Status: DISCONTINUED | OUTPATIENT
Start: 2023-02-20 | End: 2023-02-21 | Stop reason: HOSPADM

## 2023-02-20 RX ORDER — METHYLPREDNISOLONE SODIUM SUCCINATE 125 MG/2ML
125 INJECTION, POWDER, LYOPHILIZED, FOR SOLUTION INTRAMUSCULAR; INTRAVENOUS ONCE
Status: COMPLETED | OUTPATIENT
Start: 2023-02-20 | End: 2023-02-20

## 2023-02-20 RX ORDER — IPRATROPIUM BROMIDE AND ALBUTEROL SULFATE 2.5; .5 MG/3ML; MG/3ML
3 SOLUTION RESPIRATORY (INHALATION)
Status: DISCONTINUED | OUTPATIENT
Start: 2023-02-20 | End: 2023-02-21 | Stop reason: HOSPADM

## 2023-02-20 RX ORDER — INSULIN ASPART 100 [IU]/ML
0-9 INJECTION, SOLUTION INTRAVENOUS; SUBCUTANEOUS
Status: DISCONTINUED | OUTPATIENT
Start: 2023-02-20 | End: 2023-02-21 | Stop reason: HOSPADM

## 2023-02-20 RX ORDER — SODIUM CHLORIDE 0.9 % (FLUSH) 0.9 %
10 SYRINGE (ML) INJECTION AS NEEDED
Status: DISCONTINUED | OUTPATIENT
Start: 2023-02-20 | End: 2023-02-21 | Stop reason: HOSPADM

## 2023-02-20 RX ORDER — BUDESONIDE 0.5 MG/2ML
0.5 INHALANT ORAL
Status: DISCONTINUED | OUTPATIENT
Start: 2023-02-20 | End: 2023-02-21 | Stop reason: HOSPADM

## 2023-02-20 RX ORDER — SODIUM CHLORIDE 9 MG/ML
75 INJECTION, SOLUTION INTRAVENOUS CONTINUOUS
Status: ACTIVE | OUTPATIENT
Start: 2023-02-20 | End: 2023-02-21

## 2023-02-20 RX ORDER — LEVOTHYROXINE SODIUM 0.07 MG/1
75 TABLET ORAL DAILY
Status: DISCONTINUED | OUTPATIENT
Start: 2023-02-21 | End: 2023-02-21 | Stop reason: HOSPADM

## 2023-02-20 RX ORDER — IPRATROPIUM BROMIDE AND ALBUTEROL SULFATE 2.5; .5 MG/3ML; MG/3ML
3 SOLUTION RESPIRATORY (INHALATION) ONCE
Status: COMPLETED | OUTPATIENT
Start: 2023-02-20 | End: 2023-02-20

## 2023-02-20 RX ORDER — ACETAMINOPHEN 650 MG/1
650 SUPPOSITORY RECTAL EVERY 4 HOURS PRN
Status: DISCONTINUED | OUTPATIENT
Start: 2023-02-20 | End: 2023-02-21 | Stop reason: HOSPADM

## 2023-02-20 RX ORDER — ACETAMINOPHEN 160 MG/5ML
650 SOLUTION ORAL EVERY 4 HOURS PRN
Status: DISCONTINUED | OUTPATIENT
Start: 2023-02-20 | End: 2023-02-21 | Stop reason: HOSPADM

## 2023-02-20 RX ORDER — ENOXAPARIN SODIUM 100 MG/ML
30 INJECTION SUBCUTANEOUS EVERY 24 HOURS
Status: DISCONTINUED | OUTPATIENT
Start: 2023-02-20 | End: 2023-02-21 | Stop reason: HOSPADM

## 2023-02-20 RX ORDER — IPRATROPIUM BROMIDE AND ALBUTEROL SULFATE 2.5; .5 MG/3ML; MG/3ML
3 SOLUTION RESPIRATORY (INHALATION) EVERY 4 HOURS PRN
Status: DISCONTINUED | OUTPATIENT
Start: 2023-02-20 | End: 2023-02-21 | Stop reason: HOSPADM

## 2023-02-20 RX ORDER — NICOTINE POLACRILEX 4 MG
15 LOZENGE BUCCAL
Status: DISCONTINUED | OUTPATIENT
Start: 2023-02-20 | End: 2023-02-21 | Stop reason: HOSPADM

## 2023-02-20 RX ADMIN — IPRATROPIUM BROMIDE AND ALBUTEROL SULFATE 3 ML: .5; 3 SOLUTION RESPIRATORY (INHALATION) at 19:40

## 2023-02-20 RX ADMIN — Medication 10 ML: at 21:31

## 2023-02-20 RX ADMIN — BUDESONIDE 0.5 MG: 0.5 SUSPENSION RESPIRATORY (INHALATION) at 19:40

## 2023-02-20 RX ADMIN — SODIUM CHLORIDE 75 ML/HR: 9 INJECTION, SOLUTION INTRAVENOUS at 18:55

## 2023-02-20 RX ADMIN — MAGNESIUM SULFATE HEPTAHYDRATE 2 G: 40 INJECTION, SOLUTION INTRAVENOUS at 11:08

## 2023-02-20 RX ADMIN — IPRATROPIUM BROMIDE AND ALBUTEROL SULFATE 3 ML: .5; 3 SOLUTION RESPIRATORY (INHALATION) at 10:59

## 2023-02-20 RX ADMIN — ENOXAPARIN SODIUM 30 MG: 30 INJECTION SUBCUTANEOUS at 18:55

## 2023-02-20 RX ADMIN — METHYLPREDNISOLONE SODIUM SUCCINATE 20 MG: 40 INJECTION, POWDER, FOR SOLUTION INTRAMUSCULAR; INTRAVENOUS at 21:31

## 2023-02-20 RX ADMIN — METHYLPREDNISOLONE SODIUM SUCCINATE 125 MG: 125 INJECTION, POWDER, FOR SOLUTION INTRAMUSCULAR; INTRAVENOUS at 11:08

## 2023-02-20 RX ADMIN — IPRATROPIUM BROMIDE AND ALBUTEROL SULFATE 3 ML: .5; 3 SOLUTION RESPIRATORY (INHALATION) at 14:53

## 2023-02-21 VITALS
RESPIRATION RATE: 17 BRPM | WEIGHT: 72.09 LBS | OXYGEN SATURATION: 92 % | TEMPERATURE: 99.2 F | BODY MASS INDEX: 13.61 KG/M2 | HEIGHT: 61 IN | DIASTOLIC BLOOD PRESSURE: 47 MMHG | HEART RATE: 96 BPM | SYSTOLIC BLOOD PRESSURE: 128 MMHG

## 2023-02-21 PROBLEM — J96.11 CHRONIC RESPIRATORY FAILURE WITH HYPOXIA: Status: ACTIVE | Noted: 2023-02-21

## 2023-02-21 PROBLEM — B34.8 RHINOVIRUS INFECTION: Status: ACTIVE | Noted: 2023-02-21

## 2023-02-21 PROBLEM — J96.11 CHRONIC RESPIRATORY FAILURE WITH HYPOXIA (HCC): Status: ACTIVE | Noted: 2023-02-21

## 2023-02-21 LAB
GLUCOSE BLDC GLUCOMTR-MCNC: 169 MG/DL (ref 70–130)
GLUCOSE BLDC GLUCOMTR-MCNC: 285 MG/DL (ref 70–130)

## 2023-02-21 PROCEDURE — 94799 UNLISTED PULMONARY SVC/PX: CPT

## 2023-02-21 PROCEDURE — G0378 HOSPITAL OBSERVATION PER HR: HCPCS

## 2023-02-21 PROCEDURE — 82962 GLUCOSE BLOOD TEST: CPT

## 2023-02-21 PROCEDURE — 94761 N-INVAS EAR/PLS OXIMETRY MLT: CPT

## 2023-02-21 PROCEDURE — 99239 HOSP IP/OBS DSCHRG MGMT >30: CPT | Performed by: STUDENT IN AN ORGANIZED HEALTH CARE EDUCATION/TRAINING PROGRAM

## 2023-02-21 PROCEDURE — 96361 HYDRATE IV INFUSION ADD-ON: CPT

## 2023-02-21 PROCEDURE — 96376 TX/PRO/DX INJ SAME DRUG ADON: CPT

## 2023-02-21 PROCEDURE — 25010000002 METHYLPREDNISOLONE PER 40 MG: Performed by: FAMILY MEDICINE

## 2023-02-21 PROCEDURE — 94664 DEMO&/EVAL PT USE INHALER: CPT

## 2023-02-21 PROCEDURE — 94618 PULMONARY STRESS TESTING: CPT

## 2023-02-21 PROCEDURE — 63710000001 INSULIN ASPART PER 5 UNITS: Performed by: FAMILY MEDICINE

## 2023-02-21 RX ORDER — PREDNISONE 20 MG/1
20 TABLET ORAL DAILY
Qty: 5 TABLET | Refills: 0 | Status: SHIPPED | OUTPATIENT
Start: 2023-02-21

## 2023-02-21 RX ADMIN — INSULIN ASPART 6 UNITS: 100 INJECTION, SOLUTION INTRAVENOUS; SUBCUTANEOUS at 12:03

## 2023-02-21 RX ADMIN — IPRATROPIUM BROMIDE AND ALBUTEROL SULFATE 3 ML: .5; 3 SOLUTION RESPIRATORY (INHALATION) at 06:58

## 2023-02-21 RX ADMIN — METHYLPREDNISOLONE SODIUM SUCCINATE 20 MG: 40 INJECTION, POWDER, FOR SOLUTION INTRAMUSCULAR; INTRAVENOUS at 12:04

## 2023-02-21 RX ADMIN — Medication 10 ML: at 10:44

## 2023-02-21 RX ADMIN — LEVOTHYROXINE SODIUM 75 MCG: 0.07 TABLET ORAL at 10:41

## 2023-02-21 RX ADMIN — INSULIN ASPART 2 UNITS: 100 INJECTION, SOLUTION INTRAVENOUS; SUBCUTANEOUS at 06:30

## 2023-02-21 RX ADMIN — METHYLPREDNISOLONE SODIUM SUCCINATE 20 MG: 40 INJECTION, POWDER, FOR SOLUTION INTRAMUSCULAR; INTRAVENOUS at 04:45

## 2023-02-21 RX ADMIN — BUDESONIDE 0.5 MG: 0.5 SUSPENSION RESPIRATORY (INHALATION) at 06:58

## 2023-02-22 LAB — BACTERIA SPEC RESP CULT: NORMAL

## 2024-02-04 ENCOUNTER — APPOINTMENT (OUTPATIENT)
Dept: CT IMAGING | Facility: HOSPITAL | Age: 79
End: 2024-02-04
Payer: MEDICARE

## 2024-02-04 ENCOUNTER — HOSPITAL ENCOUNTER (EMERGENCY)
Facility: HOSPITAL | Age: 79
Discharge: HOME | End: 2024-02-04
Attending: STUDENT IN AN ORGANIZED HEALTH CARE EDUCATION/TRAINING PROGRAM | Admitting: STUDENT IN AN ORGANIZED HEALTH CARE EDUCATION/TRAINING PROGRAM
Payer: MEDICARE

## 2024-02-04 VITALS
OXYGEN SATURATION: 92 % | RESPIRATION RATE: 20 BRPM | SYSTOLIC BLOOD PRESSURE: 140 MMHG | TEMPERATURE: 97.8 F | HEIGHT: 60 IN | WEIGHT: 70 LBS | BODY MASS INDEX: 13.74 KG/M2 | DIASTOLIC BLOOD PRESSURE: 40 MMHG | HEART RATE: 100 BPM

## 2024-02-04 DIAGNOSIS — S20.211A RIB CONTUSION, RIGHT, INITIAL ENCOUNTER: Primary | ICD-10-CM

## 2024-02-04 PROCEDURE — 72125 CT NECK SPINE W/O DYE: CPT

## 2024-02-04 PROCEDURE — 99284 EMERGENCY DEPT VISIT MOD MDM: CPT

## 2024-02-04 PROCEDURE — 71250 CT THORAX DX C-: CPT

## 2024-02-04 PROCEDURE — 70450 CT HEAD/BRAIN W/O DYE: CPT

## 2024-02-04 NOTE — ED PROVIDER NOTES
Subjective:  History of Present Illness:    Patient is a 78-year-old female with history of COPD, on 3 L nasal cannula at all times, presents today with right rib pain after fall.  States that 48 hours prior to arrival she suffered mechanical fall and fell onto her right side.  Denies loss of consciousness.  Initially was able to pick her self up and walk, had no pain to any extremity.  However, patient states that since that time she has had persistent right rib pain.  Concern for rib fracture presents to our emergency department for evaluation.  States that her right side hurts with inspiration.  Denies any fevers.  No increased cough.  Denies any nausea vomiting or diarrhea.  No pain to the abdomen, no pain to any extremity.  Patient with slight abrasion to the right side of her head but otherwise denies any headaches, C-spine tenderness no preceding medical complaints.      Nurses Notes reviewed and agree, including vitals, allergies, social history and prior medical history.     REVIEW OF SYSTEMS: All systems reviewed and not pertinent unless noted.  Review of Systems   Constitutional:  Negative for activity change, appetite change, chills, fatigue and fever.   HENT:  Negative for rhinorrhea, sinus pressure and sinus pain.    Eyes:  Negative for discharge and itching.   Respiratory:  Negative for cough and shortness of breath.    Cardiovascular:  Positive for chest pain. Negative for leg swelling.   Gastrointestinal:  Negative for abdominal distention, abdominal pain, nausea and vomiting.   Endocrine: Negative for cold intolerance and heat intolerance.   Genitourinary:  Negative for decreased urine volume, difficulty urinating, flank pain, frequency, urgency, vaginal bleeding, vaginal discharge and vaginal pain.   Musculoskeletal:  Negative for gait problem, neck pain and neck stiffness.   Skin:  Negative for color change.   Allergic/Immunologic: Negative for environmental allergies.   Neurological:  Negative  "for seizures, syncope, facial asymmetry and speech difficulty.   Psychiatric/Behavioral:  Negative for self-injury and suicidal ideas.        History reviewed. No pertinent past medical history.    Allergies:    Codeine and Shellfish-derived products      History reviewed. No pertinent surgical history.      Social History     Socioeconomic History    Marital status: Unknown         History reviewed. No pertinent family history.    Objective  Physical Exam:  /40 (BP Location: Left arm, Patient Position: Sitting)   Pulse 100   Temp 97.8 °F (36.6 °C) (Oral)   Resp 20   Ht 152.4 cm (60\")   Wt 31.8 kg (70 lb)   SpO2 92%   BMI 13.67 kg/m²      Physical Exam  Constitutional:       General: She is not in acute distress.     Appearance: Normal appearance. She is not ill-appearing.   HENT:      Head: Normocephalic and atraumatic.      Nose: Nose normal. No congestion or rhinorrhea.      Mouth/Throat:      Mouth: Mucous membranes are dry.      Pharynx: Oropharynx is clear. No oropharyngeal exudate or posterior oropharyngeal erythema.   Eyes:      Extraocular Movements: Extraocular movements intact.      Conjunctiva/sclera: Conjunctivae normal.      Pupils: Pupils are equal, round, and reactive to light.   Cardiovascular:      Rate and Rhythm: Normal rate and regular rhythm.      Pulses: Normal pulses.      Heart sounds: Normal heart sounds.   Pulmonary:      Effort: Pulmonary effort is normal. No respiratory distress.      Breath sounds: Normal breath sounds.      Comments: Right-sided tenderness palpation on exam without any crepitus  Chest:      Chest wall: Tenderness present.   Abdominal:      General: Abdomen is flat. Bowel sounds are normal. There is no distension.      Palpations: Abdomen is soft.      Tenderness: There is no abdominal tenderness.   Musculoskeletal:         General: No swelling or tenderness. Normal range of motion.      Cervical back: Normal range of motion and neck supple.   Skin:     " General: Skin is warm and dry.      Capillary Refill: Capillary refill takes less than 2 seconds.   Neurological:      General: No focal deficit present.      Mental Status: She is alert and oriented to person, place, and time. Mental status is at baseline.      Cranial Nerves: No cranial nerve deficit.      Sensory: No sensory deficit.      Motor: No weakness.      Coordination: Coordination normal.   Psychiatric:         Mood and Affect: Mood normal.         Behavior: Behavior normal.         Thought Content: Thought content normal.         Judgment: Judgment normal.         Procedures    ED Course:    ED Course as of 02/04/24 1333   Sun Feb 04, 2024   1302 No acute process of my review of CT imaging prior to radiology overread [JE]      ED Course User Index  [JE] Kiran Serrano MD       Lab Results (last 24 hours)       ** No results found for the last 24 hours. **             CT Chest Without Contrast Diagnostic    Result Date: 2/4/2024  PROCEDURE: CT CHEST WO CONTRAST DIAGNOSTIC-  HISTORY: Chest trauma, blunt  COMPARISON: None.  PROCEDURE: Axial images were obtained from the lung apex to the mid abdomen by computed tomography. This study was performed with techniques to keep radiation doses as low as reasonably achievable, (ALARA). Individualized dose reduction techniques using automated exposure control or adjustment of mA and/or kV according to the patient size were employed.  FINDINGS:  CHEST: There is no suspicious axillary adenopathy. There is no suspicious hilar or mediastinal adenopathy. The heart is proper size. There is no pericardial or pleural effusion.No suspicious infiltrate or nodule identified. Emphysematous change noted without pneumothorax. There is pleural-parenchymal scarring in both apices, partially calcified. No sternal fracture identified. Minimal pleural parenchymal scarring noted posteriorly in the right lower lobe. Posterior left lower lobe there is a 3 mm noncalcified subpleural  nodule. No displaced rib fractures seen. Limited images of the upper abdomen are unremarkable. Vascular calcifications noted. There is evidence of old calcified granulomatous disease.      Impression: No acute cardiopulmonary process.  Emphysematous change.    CTDI: 1.08 mGy DLP:43.72 mGy.cm  This report was signed and finalized on 2/4/2024 1:19 PM by Valerie Sherman MD.      CT Cervical Spine Without Contrast    Result Date: 2/4/2024  PROCEDURE: CT CERVICAL SPINE WO CONTRAST-  HISTORY: Neck trauma (Age >= 65y)  COMPARISON: None.  PROCEDURE: Axial images were obtained from the skull base to the thoracic inlet by computed tomography. 3 D reconstruction images were performed. This study was performed with techniques to keep radiation doses as low as reasonably achievable, (ALARA). Individualized dose reduction techniques using automated exposure control or adjustment of mA and/or kV according to the patient size were employed.  FINDINGS: No acute fracture identified. There is minimal retrolisthesis of C3 on C4, likely on a degenerative basis. There is moderate disc base narrowing at this level with osteophyte formation. Minimal anterolisthesis noted C2 on C3. There is normal alignment of the facets. Mild to moderate disc base narrowing noted at additional levels. Osteophytes present at all levels. There are levels with spinal stenosis and/or neuroforaminal narrowing secondary to degenerative change but not secondary to acute bony abnormality. Vascular calcifications noted, particularly on the left, consider nonemergent carotid Doppler. The facets are normally aligned. The soft tissues are unremarkable. Limited images of the lung apices demonstrate moderate degenerative change as well as partially calcified pleural parenchymal scarring. Osteopenia noted of the bony structures..      Impression: No acute fracture.  Multilevel degenerative change.  Significant carotid calcifications on the left, recommend nonemergent  carotid Doppler.   CTDI: 26.68 mGy DLP:448.45 mGy.cm  This report was signed and finalized on 2/4/2024 12:59 PM by Valerie Sherman MD.      CT Head Without Contrast    Result Date: 2/4/2024  PROCEDURE: CT HEAD WO CONTRAST-  HISTORY: Head trauma, minor (Age >= 65y)  COMPARISON: None.  TECHNIQUE: Multiple axial CT images were performed from the foramen magnum to the vertex. Individualized dose reduction techniques using automated exposure control or adjustment of mA and/or kV according to the patient size were employed.  FINDINGS: There is mild to moderate, age-appropriate generalized cerebral atrophy. The ventricles are enlarged. Minimal small vessel ischemic disease noted. There is no evidence of edema or hemorrhage.  No masses are identified. No extra-axial fluid is seen. The paranasal sinuses are unremarkable.      Impression: Atrophy  without acute process.     CTDI: 26.68 mGy DLP:448.45 mGy.cm  This report was signed and finalized on 2/4/2024 12:56 PM by Valerie Sherman MD.          MDM      Initial impression of presenting illness: Right rib pain    DDX: includes but is not limited to: Rib fracture, bacterial pneumonia, COPD exacerbation    Patient arrives stable with vitals interpreted by myself.     Pertinent features from physical exam: Right-sided rib tenderness on exam without any crepitus.    Initial diagnostic plan: CT head, C-spine, CT chest    Results from initial plan were reviewed and interpreted by me revealing no concern for rib fracture on CT, CT head and C-spine negative    Diagnostic information from other sources: Reviewed past medical records and discussed with family at bedside    Interventions / Re-evaluation: Observed in emergency department for over an hour and a half with no change in vital signs    Results/clinical rationale were discussed with patient at bedside    Consultations/Discussion of results with other physicians: Shari negative workup in our emergency department, given symptoms  suspect rib contusion as etiology of her symptoms.  Provided incentive spirometer and encouraged use every hours at home and encouraged follow-up with primary care doctor to ensure resolution of symptoms and given strict turn precaution for severe shortness of breath, fevers    Disposition plan: Discharge  -----        Final diagnoses:   Rib contusion, right, initial encounter          Kiran Serrano MD  02/04/24 3923

## 2024-02-04 NOTE — Clinical Note
[facility] EMERGENCY DEPARTMENT  [address] [address]  Phone: [phone]    Maura Bryson was seen and treated in our emergency department on 2/4/2024.  She may return to work on 02/07/2024.         Thank you for choosing [facility].    Kiran Serrano MD

## 2024-02-04 NOTE — DISCHARGE INSTRUCTIONS
You were evaluated after a fall.  We got CT scans of your head neck and chest that showed no concerns for fracture or acute process.  We provided you an incentive spirometer at bedside to make sure that you are moving air appropriately.  Will continue to take Tylenol ibuprofen at home for chest discomfort and follow-up primary care doctor to ensure that you heal appropriately.  You are now stable for discharge.

## 2024-02-07 ENCOUNTER — TRANSCRIBE ORDERS (OUTPATIENT)
Dept: ADMINISTRATIVE | Facility: HOSPITAL | Age: 79
End: 2024-02-07
Payer: MEDICARE

## 2024-02-07 DIAGNOSIS — I65.22 ATHEROSCLEROSIS OF LEFT CAROTID ARTERY: Primary | ICD-10-CM

## 2024-02-13 ENCOUNTER — HOSPITAL ENCOUNTER (OUTPATIENT)
Dept: ULTRASOUND IMAGING | Facility: HOSPITAL | Age: 79
Discharge: HOME | End: 2024-02-13
Admitting: NURSE PRACTITIONER
Payer: MEDICARE

## 2024-02-13 DIAGNOSIS — I65.22 ATHEROSCLEROSIS OF LEFT CAROTID ARTERY: ICD-10-CM

## 2024-02-13 PROCEDURE — 93880 EXTRACRANIAL BILAT STUDY: CPT

## 2024-06-26 ENCOUNTER — OFFICE VISIT (OUTPATIENT)
Dept: PULMONOLOGY | Facility: CLINIC | Age: 79
End: 2024-06-26
Payer: MEDICARE

## 2024-06-26 ENCOUNTER — HOSPITAL ENCOUNTER (OUTPATIENT)
Dept: PULMONOLOGY | Facility: HOSPITAL | Age: 79
Discharge: HOME | End: 2024-06-26
Payer: MEDICARE

## 2024-06-26 ENCOUNTER — LAB (OUTPATIENT)
Dept: LAB | Facility: HOSPITAL | Age: 79
End: 2024-06-26
Payer: MEDICARE

## 2024-06-26 VITALS
RESPIRATION RATE: 18 BRPM | BODY MASS INDEX: 13.55 KG/M2 | OXYGEN SATURATION: 84 % | HEART RATE: 97 BPM | HEIGHT: 60 IN | SYSTOLIC BLOOD PRESSURE: 124 MMHG | DIASTOLIC BLOOD PRESSURE: 52 MMHG | WEIGHT: 69 LBS

## 2024-06-26 DIAGNOSIS — J44.9 CHRONIC OBSTRUCTIVE PULMONARY DISEASE, UNSPECIFIED COPD TYPE: ICD-10-CM

## 2024-06-26 DIAGNOSIS — J41.0 CHRONIC BRONCHITIS, SIMPLE: ICD-10-CM

## 2024-06-26 DIAGNOSIS — Z87.891 PERSONAL HISTORY OF NICOTINE DEPENDENCE: ICD-10-CM

## 2024-06-26 DIAGNOSIS — R06.02 SHORTNESS OF BREATH: ICD-10-CM

## 2024-06-26 DIAGNOSIS — R06.02 SHORTNESS OF BREATH: Primary | ICD-10-CM

## 2024-06-26 DIAGNOSIS — J43.9 PULMONARY EMPHYSEMA, UNSPECIFIED EMPHYSEMA TYPE: ICD-10-CM

## 2024-06-26 DIAGNOSIS — R09.02 HYPOXIA: ICD-10-CM

## 2024-06-26 LAB
A-A DO2: 94.6 MMHG
ARTERIAL PATENCY WRIST A: ABNORMAL
ATMOSPHERIC PRESS: 729 MMHG
BASE EXCESS BLDA CALC-SCNC: 6.9 MMOL/L (ref 0–2)
BDY SITE: ABNORMAL
COHGB MFR BLD: 1.6 % (ref 0–2)
GAS FLOW AIRWAY: 3 LPM
HCO3 BLDA-SCNC: 32.9 MMOL/L (ref 22–28)
HCT VFR BLD CALC: 33.9 %
INHALED O2 CONCENTRATION: 32 %
Lab: ABNORMAL
METHGB BLD QL: 0.4 % (ref 0–1.5)
MODALITY: ABNORMAL
OXYHGB MFR BLDV: 92.7 % (ref 94–99)
PCO2 BLDA: 52.8 MM HG (ref 35–45)
PCO2 TEMP ADJ BLD: ABNORMAL MM[HG]
PH BLDA: 7.4 PH UNITS (ref 7.3–7.5)
PH, TEMP CORRECTED: ABNORMAL
PO2 BLDA: 65 MM HG (ref 75–100)
PO2 TEMP ADJ BLD: ABNORMAL MM[HG]
SAO2 % BLDCOA: 94.5 % (ref 94–100)
VENTILATOR MODE: ABNORMAL

## 2024-06-26 PROCEDURE — 36600 WITHDRAWAL OF ARTERIAL BLOOD: CPT | Performed by: INTERNAL MEDICINE

## 2024-06-26 PROCEDURE — 82104 ALPHA-1-ANTITRYPSIN PHENO: CPT

## 2024-06-26 PROCEDURE — 94729 DIFFUSING CAPACITY: CPT | Performed by: INTERNAL MEDICINE

## 2024-06-26 PROCEDURE — 99204 OFFICE O/P NEW MOD 45 MIN: CPT | Performed by: INTERNAL MEDICINE

## 2024-06-26 PROCEDURE — 83050 HGB METHEMOGLOBIN QUAN: CPT | Performed by: INTERNAL MEDICINE

## 2024-06-26 PROCEDURE — 82805 BLOOD GASES W/O2 SATURATION: CPT | Performed by: INTERNAL MEDICINE

## 2024-06-26 PROCEDURE — 94060 EVALUATION OF WHEEZING: CPT | Performed by: INTERNAL MEDICINE

## 2024-06-26 PROCEDURE — 94726 PLETHYSMOGRAPHY LUNG VOLUMES: CPT | Performed by: INTERNAL MEDICINE

## 2024-06-26 PROCEDURE — 82375 ASSAY CARBOXYHB QUANT: CPT | Performed by: INTERNAL MEDICINE

## 2024-06-26 PROCEDURE — 82103 ALPHA-1-ANTITRYPSIN TOTAL: CPT

## 2024-06-26 RX ORDER — HYDROGEN PEROXIDE 2.65 ML/100ML
1 LIQUID ORAL; TOPICAL DAILY
COMMUNITY
Start: 2024-03-28

## 2024-06-26 RX ORDER — METFORMIN HYDROCHLORIDE 500 MG/1
1 TABLET, EXTENDED RELEASE ORAL EVERY 12 HOURS SCHEDULED
COMMUNITY
Start: 2024-05-30

## 2024-06-26 RX ORDER — AMLODIPINE BESYLATE 5 MG/1
1 TABLET ORAL DAILY
COMMUNITY
Start: 2024-03-28

## 2024-06-26 RX ORDER — NAPROXEN 500 MG/1
TABLET ORAL
COMMUNITY
Start: 2024-05-15

## 2024-06-26 RX ORDER — PRAVASTATIN SODIUM 40 MG
40 TABLET ORAL
COMMUNITY
Start: 2024-04-12

## 2024-06-26 RX ORDER — LOSARTAN POTASSIUM 100 MG/1
1 TABLET ORAL DAILY
COMMUNITY
Start: 2024-04-12

## 2024-06-26 RX ORDER — ERGOCALCIFEROL 1.25 MG/1
50000 CAPSULE ORAL WEEKLY
COMMUNITY

## 2024-06-26 RX ORDER — IPRATROPIUM BROMIDE AND ALBUTEROL SULFATE 2.5; .5 MG/3ML; MG/3ML
3 SOLUTION RESPIRATORY (INHALATION) 4 TIMES DAILY PRN
Qty: 360 ML | Refills: 5 | Status: SHIPPED | OUTPATIENT
Start: 2024-06-26

## 2024-06-26 NOTE — PROGRESS NOTES
"  CONSULT NOTE    Requested by:   Sofia Martinez, *   Sofia Martinez, BRITTNY      Chief Complaint   Patient presents with    Breathing Problem    Consult       Subjective:  Maura Jiménez is a 78 y.o. female.   Patient comes in today for evaluation of shortness of breath. Patient says that for the past \"many many\" years, she has been noticing that her exercise tolerance has been declining. The patient has had days when walking any significant distance is difficult to do without stopping in the middle, to catch her breath.     Patient also complains of some cough and phlegm production that occurs on most mornings out of the week, for most weeks out of the month, for the past few years.     she has been smoking 1 packs per day for the past 30 years.  she quit smoking 7 months ago.    she mentions a few recent prescriptions for antibiotics / steroids for upper respiratory tract infection.    she is currently on oxygen.     Family history of lung diseases POSITIVE. COPD in  sister      The following portions of the patient's history were reviewed and updated as appropriate: allergies, current medications, past family history, past medical history, past social history, and past surgical history.    Review of Systems   Constitutional:  Negative for chills, fatigue and fever.   HENT:  Negative for sinus pressure, sneezing and sore throat.    Respiratory:  Positive for cough, shortness of breath and wheezing. Negative for chest tightness.    Cardiovascular:  Positive for leg swelling. Negative for palpitations.   All other systems reviewed and are negative.      Past Medical History:   Diagnosis Date    COPD (chronic obstructive pulmonary disease)     Disease of thyroid gland     Hyperlipidemia     Osteoarthritis     Type 2 diabetes mellitus without complication, without long-term current use of insulin 2018       Social History     Tobacco Use    Smoking status: Former     Current packs/day: " "0.00     Types: Cigarettes     Quit date: 2018     Years since quittin.7    Smokeless tobacco: Never   Substance Use Topics    Alcohol use: No         Objective:  Visit Vitals  /52   Pulse 97   Resp 18   Ht 152.4 cm (60\") Comment: pt reported   Wt 31.3 kg (69 lb)   SpO2 (!) 84% Comment: on room air (REST)   BMI 13.48 kg/m²   O2: 96% on 3LPD at rest.     BMI Readings from Last 8 Encounters:   24 13.48 kg/m²   24 13.67 kg/m²   23 13.62 kg/m²   09/15/22 14.74 kg/m²   22 14.78 kg/m²   18 18.83 kg/m²       Physical Exam  Vitals reviewed.   Constitutional:       Appearance: She is well-developed.   HENT:      Nose:      Comments: Mild nasal erythema noted.     Mouth/Throat:      Mouth: Mucous membranes are moist.   Neck:      Vascular: No JVD.   Cardiovascular:      Rate and Rhythm: Normal rate and regular rhythm.   Pulmonary:      Effort: Pulmonary effort is normal.      Breath sounds: No wheezing or rales.      Comments: Somewhat hyperresonant to percussion.  Somewhat decreased air entry.  Mild scattered wheezing noted.   Musculoskeletal:      Cervical back: Neck supple.      Right lower leg: No edema.      Left lower leg: No edema.      Comments: Gait was slow.   Skin:     General: Skin is warm and dry.   Neurological:      Mental Status: She is alert and oriented to person, place, and time.         Assessment/Plan:  Diagnoses and all orders for this visit:    1. Shortness of breath (Primary)  -     Alpha - 1 - Antitrypsin Phenotype; Future  -     Blood Gas, Arterial -With Co-Ox Panel: Yes; Future  -     Complete PFT - Pre & Post Bronchodilator; Future    2. Chronic obstructive pulmonary disease, unspecified COPD type  -     Alpha - 1 - Antitrypsin Phenotype; Future  -     Blood Gas, Arterial -With Co-Ox Panel: Yes; Future  -     Complete PFT - Pre & Post Bronchodilator; Future    3. Chronic bronchitis, simple  -     Alpha - 1 - Antitrypsin Phenotype; Future  -     Blood " Gas, Arterial -With Co-Ox Panel: Yes; Future  -     Complete PFT - Pre & Post Bronchodilator; Future    4. Pulmonary emphysema, unspecified emphysema type  -     Alpha - 1 - Antitrypsin Phenotype; Future  -     Blood Gas, Arterial -With Co-Ox Panel: Yes; Future  -     Complete PFT - Pre & Post Bronchodilator; Future    5. Personal history of nicotine dependence    6. Hypoxia    Other orders  -     ipratropium-albuterol (DUO-NEB) 0.5-2.5 mg/3 ml nebulizer; Take 3 mL by nebulization 4 (Four) Times a Day As Needed for Wheezing or Shortness of Air.  Dispense: 360 mL; Refill: 5        Return in about 4 months (around 10/26/2024) for Recheck, Labs, Imaging, For [place] ([place]), ...Also 11 mths w/Abby In [place].    DISCUSSION(if any):  Last CT scan results was reviewed in great detail with the patient.  Results for orders placed during the hospital encounter of 02/04/24    CT Chest Without Contrast Diagnostic    Narrative  PROCEDURE: CT CHEST WO CONTRAST DIAGNOSTIC-    HISTORY: Chest trauma, blunt    COMPARISON: None.    PROCEDURE: Axial images were obtained from the lung apex to the mid  abdomen by computed tomography. This study was performed with techniques  to keep radiation doses as low as reasonably achievable, (ALARA).  Individualized dose reduction techniques using automated exposure  control or adjustment of mA and/or kV according to the patient size were  employed.    FINDINGS:    CHEST: There is no suspicious axillary adenopathy. There is no  suspicious hilar or mediastinal adenopathy. The heart is proper size.  There is no pericardial or pleural effusion.No suspicious infiltrate or  nodule identified. Emphysematous change noted without pneumothorax.  There is pleural-parenchymal scarring in both apices, partially  calcified. No sternal fracture identified. Minimal pleural parenchymal  scarring noted posteriorly in the right lower lobe. Posterior left lower  lobe there is a 3 mm noncalcified subpleural  nodule. No displaced rib  fractures seen. Limited images of the upper abdomen are unremarkable.  Vascular calcifications noted. There is evidence of old calcified  granulomatous disease.    Impression  No acute cardiopulmonary process.    Emphysematous change.        CTDI: 1.08 mGy  DLP:43.72 mGy.cm    This report was signed and finalized on 2/4/2024 1:19 PM by Valerie Sherman MD.      I also reviewed her last echocardiogram and shared the results with her.   Results for orders placed during the hospital encounter of 10/31/18    Adult Transthoracic Echo Complete W/ Cont if Necessary Per Protocol    Interpretation Summary  · Left ventricular systolic function is hyperdynamic (EF > 70).  · Mild to moderate tricuspid valve regurgitation is present.  · Calculated right ventricular systolic pressure from tricuspid regurgitation is 60 mmHg.      ===========================  ===========================    Laboratory workup also showed   Lab Results   Component Value Date    HGB 12.4 02/20/2023    HGB 12.3 09/15/2022    HGB 12.0 09/12/2022   ,   Lab Results   Component Value Date    HCT 40.4 02/20/2023    HCT 38.8 09/15/2022    HCT 36.5 09/12/2022       Lab Results   Component Value Date    EOSABS 0.07 02/20/2023    EOSABS 0.01 09/15/2022    EOSABS 0.00 09/12/2022    & Laboratory workup also showed   Lab Results   Component Value Date    CO2 32.2 (H) 02/20/2023   ,   Lab Results   Component Value Date    HGBA1C 6.50 (H) 02/20/2023    HGBA1C 7.0 (H) 05/13/2022    HGBA1C 6.8 (H) 10/31/2018   ,   Lab Results   Component Value Date    TSH 0.048 (L) 11/02/2018    TSH 0.27 (L) 06/05/2016     Laboratory workup also showed Last Arterial Blood Gas  Lab Results   Component Value Date    PHART 7.401 09/15/2022    YBC0SDU 48.5 (H) 09/15/2022    PO2ART 184.0 (H) 09/15/2022    ZYJ7EAS 30.1 (H) 09/15/2022    BASEEXCESS 4.5 (H) 09/15/2022    [id number] 99.8 09/15/2022     PFTs were reviewed.  Severe obstruction  noted    ===========================  ===========================    Orders as above.    I have told the patient, that in my view, shortness of breath is likely from underlying severe chronic obstructive lung disease.     Patient was given education and demonstration on how to use the medicine.     Side effects, of prescribed medicines, discussed.    Patient was also instructed on compliance and adherence with instructions.     I have discussed the need to stay quit from smoking.    Patient was given reading material, as appropriate.     The patient belongs to the risk group for which lung cancer screening has been recommended.  This will be indicated in 1 year from the last CT. I have made the patient aware of my strong recommendation to discuss this further with primary care provider.    The patient was advised to continue using oxygen 24/7.    Will order ABG and if her CO2 is above 50 or so, she may need to be considered for either BiPAP or NIV.     Patient was asked to call with any concerns.     Patient will be followed clinically to assess for response to treatment and further recommendations will be made, based on response.        Dictated utilizing Dragon dictation.    This document was electronically signed by Khushi Brink MD on 06/26/24 at 15:03 EDT

## 2024-07-03 LAB
A1AT PHENOTYP SERPL IFE: ABNORMAL
A1AT SERPL-MCNC: 198 MG/DL (ref 101–187)

## 2024-09-10 ENCOUNTER — APPOINTMENT (OUTPATIENT)
Dept: GENERAL RADIOLOGY | Facility: HOSPITAL | Age: 79
End: 2024-09-10
Payer: MEDICARE

## 2024-09-10 ENCOUNTER — HOSPITAL ENCOUNTER (EMERGENCY)
Facility: HOSPITAL | Age: 79
Discharge: HOME | End: 2024-09-10
Attending: EMERGENCY MEDICINE | Admitting: EMERGENCY MEDICINE
Payer: MEDICARE

## 2024-09-10 VITALS
HEART RATE: 93 BPM | WEIGHT: 68 LBS | SYSTOLIC BLOOD PRESSURE: 125 MMHG | DIASTOLIC BLOOD PRESSURE: 46 MMHG | BODY MASS INDEX: 13.35 KG/M2 | RESPIRATION RATE: 16 BRPM | OXYGEN SATURATION: 99 % | TEMPERATURE: 98.8 F | HEIGHT: 60 IN

## 2024-09-10 DIAGNOSIS — J44.1 COPD EXACERBATION: Primary | ICD-10-CM

## 2024-09-10 LAB
ALBUMIN SERPL-MCNC: 4 G/DL (ref 3.5–5.2)
ALBUMIN/GLOB SERPL: 1.7 G/DL
ALP SERPL-CCNC: 56 U/L (ref 39–117)
ALT SERPL W P-5'-P-CCNC: 11 U/L (ref 1–33)
ANION GAP SERPL CALCULATED.3IONS-SCNC: 7.4 MMOL/L (ref 5–15)
AST SERPL-CCNC: 20 U/L (ref 1–32)
BASOPHILS # BLD AUTO: 0.03 10*3/MM3 (ref 0–0.2)
BASOPHILS NFR BLD AUTO: 0.4 % (ref 0–1.5)
BILIRUB SERPL-MCNC: 0.2 MG/DL (ref 0–1.2)
BUN SERPL-MCNC: 9 MG/DL (ref 8–23)
BUN/CREAT SERPL: 16.7 (ref 7–25)
CALCIUM SPEC-SCNC: 9.2 MG/DL (ref 8.6–10.5)
CHLORIDE SERPL-SCNC: 97 MMOL/L (ref 98–107)
CO2 SERPL-SCNC: 32.6 MMOL/L (ref 22–29)
CREAT SERPL-MCNC: 0.54 MG/DL (ref 0.57–1)
DEPRECATED RDW RBC AUTO: 43.8 FL (ref 37–54)
EGFRCR SERPLBLD CKD-EPI 2021: 93.2 ML/MIN/1.73
EOSINOPHIL # BLD AUTO: 0.12 10*3/MM3 (ref 0–0.4)
EOSINOPHIL NFR BLD AUTO: 1.6 % (ref 0.3–6.2)
ERYTHROCYTE [DISTWIDTH] IN BLOOD BY AUTOMATED COUNT: 12.5 % (ref 12.3–15.4)
GLOBULIN UR ELPH-MCNC: 2.3 GM/DL
GLUCOSE SERPL-MCNC: 308 MG/DL (ref 65–99)
HCT VFR BLD AUTO: 36.6 % (ref 34–46.6)
HGB BLD-MCNC: 11.4 G/DL (ref 12–15.9)
HOLD SPECIMEN: NORMAL
HOLD SPECIMEN: NORMAL
IMM GRANULOCYTES # BLD AUTO: 0.03 10*3/MM3 (ref 0–0.05)
IMM GRANULOCYTES NFR BLD AUTO: 0.4 % (ref 0–0.5)
LYMPHOCYTES # BLD AUTO: 0.31 10*3/MM3 (ref 0.7–3.1)
LYMPHOCYTES NFR BLD AUTO: 4.1 % (ref 19.6–45.3)
MCH RBC QN AUTO: 29.8 PG (ref 26.6–33)
MCHC RBC AUTO-ENTMCNC: 31.1 G/DL (ref 31.5–35.7)
MCV RBC AUTO: 95.8 FL (ref 79–97)
MONOCYTES # BLD AUTO: 0.51 10*3/MM3 (ref 0.1–0.9)
MONOCYTES NFR BLD AUTO: 6.7 % (ref 5–12)
NEUTROPHILS NFR BLD AUTO: 6.64 10*3/MM3 (ref 1.7–7)
NEUTROPHILS NFR BLD AUTO: 86.8 % (ref 42.7–76)
NRBC BLD AUTO-RTO: 0 /100 WBC (ref 0–0.2)
NT-PROBNP SERPL-MCNC: 319 PG/ML (ref 0–1800)
PLATELET # BLD AUTO: 217 10*3/MM3 (ref 140–450)
PMV BLD AUTO: 11 FL (ref 6–12)
POTASSIUM SERPL-SCNC: 4.4 MMOL/L (ref 3.5–5.2)
PROT SERPL-MCNC: 6.3 G/DL (ref 6–8.5)
RBC # BLD AUTO: 3.82 10*6/MM3 (ref 3.77–5.28)
SODIUM SERPL-SCNC: 137 MMOL/L (ref 136–145)
TROPONIN T SERPL HS-MCNC: 21 NG/L
TROPONIN T SERPL HS-MCNC: 22 NG/L
WBC NRBC COR # BLD AUTO: 7.64 10*3/MM3 (ref 3.4–10.8)
WHOLE BLOOD HOLD COAG: NORMAL
WHOLE BLOOD HOLD SPECIMEN: NORMAL

## 2024-09-10 PROCEDURE — 85025 COMPLETE CBC W/AUTO DIFF WBC: CPT | Performed by: EMERGENCY MEDICINE

## 2024-09-10 PROCEDURE — 84484 ASSAY OF TROPONIN QUANT: CPT | Performed by: NURSE PRACTITIONER

## 2024-09-10 PROCEDURE — 94640 AIRWAY INHALATION TREATMENT: CPT

## 2024-09-10 PROCEDURE — 80053 COMPREHEN METABOLIC PANEL: CPT | Performed by: EMERGENCY MEDICINE

## 2024-09-10 PROCEDURE — 36415 COLL VENOUS BLD VENIPUNCTURE: CPT

## 2024-09-10 PROCEDURE — 99284 EMERGENCY DEPT VISIT MOD MDM: CPT

## 2024-09-10 PROCEDURE — 25010000002 METHYLPREDNISOLONE PER 125 MG: Performed by: NURSE PRACTITIONER

## 2024-09-10 PROCEDURE — 83880 ASSAY OF NATRIURETIC PEPTIDE: CPT | Performed by: EMERGENCY MEDICINE

## 2024-09-10 PROCEDURE — 71045 X-RAY EXAM CHEST 1 VIEW: CPT

## 2024-09-10 PROCEDURE — 94799 UNLISTED PULMONARY SVC/PX: CPT

## 2024-09-10 PROCEDURE — 93005 ELECTROCARDIOGRAM TRACING: CPT | Performed by: EMERGENCY MEDICINE

## 2024-09-10 PROCEDURE — 94761 N-INVAS EAR/PLS OXIMETRY MLT: CPT

## 2024-09-10 PROCEDURE — 84484 ASSAY OF TROPONIN QUANT: CPT | Performed by: EMERGENCY MEDICINE

## 2024-09-10 PROCEDURE — 96374 THER/PROPH/DIAG INJ IV PUSH: CPT

## 2024-09-10 RX ORDER — PREDNISONE 20 MG/1
20 TABLET ORAL 2 TIMES DAILY
Qty: 10 TABLET | Refills: 0 | Status: SHIPPED | OUTPATIENT
Start: 2024-09-10 | End: 2024-09-18

## 2024-09-10 RX ORDER — SODIUM CHLORIDE 0.9 % (FLUSH) 0.9 %
10 SYRINGE (ML) INJECTION AS NEEDED
Status: DISCONTINUED | OUTPATIENT
Start: 2024-09-10 | End: 2024-09-10 | Stop reason: HOSPADM

## 2024-09-10 RX ORDER — IPRATROPIUM BROMIDE AND ALBUTEROL SULFATE 2.5; .5 MG/3ML; MG/3ML
3 SOLUTION RESPIRATORY (INHALATION) ONCE
Status: COMPLETED | OUTPATIENT
Start: 2024-09-10 | End: 2024-09-10

## 2024-09-10 RX ORDER — METHYLPREDNISOLONE SODIUM SUCCINATE 125 MG/2ML
125 INJECTION, POWDER, LYOPHILIZED, FOR SOLUTION INTRAMUSCULAR; INTRAVENOUS ONCE
Status: COMPLETED | OUTPATIENT
Start: 2024-09-10 | End: 2024-09-10

## 2024-09-10 RX ADMIN — IPRATROPIUM BROMIDE AND ALBUTEROL SULFATE 3 ML: .5; 3 SOLUTION RESPIRATORY (INHALATION) at 09:53

## 2024-09-10 RX ADMIN — METHYLPREDNISOLONE SODIUM SUCCINATE 125 MG: 125 INJECTION, POWDER, FOR SOLUTION INTRAMUSCULAR; INTRAVENOUS at 10:09

## 2024-09-10 NOTE — ED PROVIDER NOTES
Pt Name: Maura Jiménez  MRN: [id number]  : 1945  Date of Encounter: 9/10/2024    PCP: Sofia Martinez APRN      Subjective    History of Present Illness:    Chief Complaint: Shortness of breath cough    History of Present Illness: Maura Jiménez is a 79 y.o. female who presents to the ER complaining of shortness of breath and cough that started 3 days ago and is progressively worsened over the interval.  Patient states she has a history of COPD and wears 3 L nasal cannula at all times..  Patient denies any chest pain, swelling congestion fever nausea or vomiting.    Triage Vitals:    ED Triage Vitals [09/10/24 0927]   Temp Heart Rate Resp BP SpO2   98.8 °F (37.1 °C) 99 14 153/55 100 %      Temp src Heart Rate Source Patient Position BP Location FiO2 (%)   Oral Monitor Sitting Left arm --       Nurses Notes reviewed and agree, including vitals, allergies, social history and prior medical history.     Shellfish-derived products, Codeine, Codeine, and Shellfish-derived products    Past Medical History:   Diagnosis Date    COPD (chronic obstructive pulmonary disease)     Disease of thyroid gland     Hyperlipidemia     Osteoarthritis     Type 2 diabetes mellitus without complication, without long-term current use of insulin 2018       Past Surgical History:   Procedure Laterality Date    APPENDECTOMY      CATARACT EXTRACTION, BILATERAL      HYSTERECTOMY         Social History     Socioeconomic History    Marital status:    Tobacco Use    Smoking status: Former     Current packs/day: 0.00     Types: Cigarettes     Quit date: 2018     Years since quittin.9    Smokeless tobacco: Never   Vaping Use    Vaping status: Never Used   Substance and Sexual Activity    Alcohol use: No    Drug use: No    Sexual activity: Never       Family History   Problem Relation Age of Onset    Heart disease Father     Diabetes Brother        REVIEW OF SYSTEMS:     All systems reviewed and not  pertinent unless noted.    Review of Systems   Respiratory:  Positive for cough, shortness of breath and wheezing.    All other systems reviewed and are negative.      Objective    Physical Exam  Vitals and nursing note reviewed.   Constitutional:       Appearance: Normal appearance.   HENT:      Head: Normocephalic and atraumatic.   Eyes:      Extraocular Movements: Extraocular movements intact.      Pupils: Pupils are equal, round, and reactive to light.   Cardiovascular:      Rate and Rhythm: Normal rate and regular rhythm.      Pulses: Normal pulses.      Heart sounds: Normal heart sounds.   Pulmonary:      Effort: Pulmonary effort is normal.      Breath sounds: Decreased breath sounds and wheezing present.   Abdominal:      General: Abdomen is flat. Bowel sounds are normal.      Palpations: Abdomen is soft.   Musculoskeletal:      Cervical back: Normal range of motion and neck supple.   Skin:     Capillary Refill: Capillary refill takes less than 2 seconds.   Neurological:      General: No focal deficit present.      Mental Status: She is alert and oriented to person, place, and time. Mental status is at baseline.      GCS: GCS eye subscore is 4. GCS verbal subscore is 5. GCS motor subscore is 6.      Sensory: Sensation is intact.      Motor: Motor function is intact.      Gait: Gait is intact.   Psychiatric:         Attention and Perception: Attention and perception normal.         Mood and Affect: Mood and affect normal.         Speech: Speech normal.         Behavior: Behavior normal. Behavior is cooperative.                           Procedures    ED Course:    ECG 12 Lead ED Triage Standing Order; SOA   Final Result          ED Course as of 09/10/24 1301   Tue Sep 10, 2024   0904 EKG: I reviewed and independently interpreted the EKG as:  Sinus rhythm at 90 bpm, normal axis, normal intervals, no ST elevation, no T wave inversions [CS]      ED Course User Index  [CS] Johnny Zepeda MD       Orders  placed during this visit:    Orders Placed This Encounter   Procedures    XR Chest 1 View    [place] Draw    Comprehensive Metabolic Panel    BNP    Single High Sensitivity Troponin T    CBC Auto Differential    High Sensitivity Troponin T    High Sensitivity Troponin T 2Hr    NPO Diet NPO Type: Strict NPO    Undress & Gown    Continuous Pulse Oximetry    Vital Signs    Oxygen Therapy- Nasal Cannula; Titrate 1-6 LPM Per SpO2; 90 - 95%    ECG 12 Lead ED Triage Standing Order; SOA    Insert Peripheral IV    CBC & Differential    Green Top (Gel)    Lavender Top    Gold Top - SST    Light Blue Top       LAB Results:    Lab Results (last 24 hours)       Procedure Component Value Units Date/Time    CBC & Differential [[id number]]  (Abnormal) Collected: 09/10/24 1008    Specimen: Blood Updated: 09/10/24 1012    Narrative:      The following orders were created for panel order CBC & Differential.  Procedure                               Abnormality         Status                     ---------                               -----------         ------                     CBC Auto Differential[[id number]]        Abnormal            Final result                 Please view results for these tests on the individual orders.    Comprehensive Metabolic Panel [[id number]]  (Abnormal) Collected: 09/10/24 1008    Specimen: Blood Updated: 09/10/24 1029     Glucose 308 mg/dL      Comment: Glucose >180, Hemoglobin A1C recommended.        BUN 9 mg/dL      Creatinine 0.54 mg/dL      Sodium 137 mmol/L      Potassium 4.4 mmol/L      Comment: Slight hemolysis detected by analyzer. Result may be falsely elevated.        Chloride 97 mmol/L      CO2 32.6 mmol/L      Calcium 9.2 mg/dL      Total Protein 6.3 g/dL      Albumin 4.0 g/dL      ALT (SGPT) 11 U/L      AST (SGOT) 20 U/L      Comment: Slight hemolysis detected by analyzer. Result may be falsely elevated.        Alkaline Phosphatase 56 U/L      Total Bilirubin 0.2 mg/dL      Globulin 2.3 gm/dL       A/G Ratio 1.7 g/dL      BUN/Creatinine Ratio 16.7     Anion Gap 7.4 mmol/L      eGFR 93.2 mL/min/1.73     Narrative:      GFR Normal >60  Chronic Kidney Disease <60  Kidney Failure <15    The GFR formula is only valid for adults with stable renal function between ages 18 and 70.    BNP [[id number]]  (Normal) Collected: 09/10/24 1008    Specimen: Blood Updated: 09/10/24 1030     proBNP 319.0 pg/mL     Narrative:      This assay is used as an aid in the diagnosis of individuals suspected of having heart failure. It can be used as an aid in the diagnosis of acute decompensated heart failure (ADHF) in patients presenting with signs and symptoms of ADHF to the emergency department (ED). In addition, NT-proBNP of <300 pg/mL indicates ADHF is not likely.    Age Range Result Interpretation  NT-proBNP Concentration (pg/mL:      <50             Positive            >450                   Gray                 300-450                    Negative             <300    50-75           Positive            >900                  Gray                300-900                  Negative            <300      >75             Positive            >1800                  Gray                300-1800                  Negative            <300    Single High Sensitivity Troponin T [[id number]]  (Abnormal) Collected: 09/10/24 1008    Specimen: Blood Updated: 09/10/24 1030     HS Troponin T 22 ng/L     Narrative:      High Sensitive Troponin T Reference Range:  <14.0 ng/L- Negative Female for AMI  <22.0 ng/L- Negative Male for AMI  >=14 - Abnormal Female indicating possible myocardial injury.  >=22 - Abnormal Male indicating possible myocardial injury.   Clinicians would have to utilize clinical acumen, EKG, Troponin, and serial changes to determine if it is an Acute Myocardial Infarction or myocardial injury due to an underlying chronic condition.         CBC Auto Differential [[id number]]  (Abnormal) Collected: 09/10/24 1008    Specimen: Blood  Updated: 09/10/24 1012     WBC 7.64 10*3/mm3      RBC 3.82 10*6/mm3      Hemoglobin 11.4 g/dL      Hematocrit 36.6 %      MCV 95.8 fL      MCH 29.8 pg      MCHC 31.1 g/dL      RDW 12.5 %      RDW-SD 43.8 fl      MPV 11.0 fL      Platelets 217 10*3/mm3      Neutrophil % 86.8 %      Lymphocyte % 4.1 %      Monocyte % 6.7 %      Eosinophil % 1.6 %      Basophil % 0.4 %      Immature Grans % 0.4 %      Neutrophils, Absolute 6.64 10*3/mm3      Lymphocytes, Absolute 0.31 10*3/mm3      Monocytes, Absolute 0.51 10*3/mm3      Eosinophils, Absolute 0.12 10*3/mm3      Basophils, Absolute 0.03 10*3/mm3      Immature Grans, Absolute 0.03 10*3/mm3      nRBC 0.0 /100 WBC     High Sensitivity Troponin T [[id number]]  (Abnormal) Collected: 09/10/24 1113    Specimen: Blood Updated: 09/10/24 1152     HS Troponin T 21 ng/L      Comment: Specimen hemolyzed.  Results may be falsely decreased.       Narrative:      High Sensitive Troponin T Reference Range:  <14.0 ng/L- Negative Female for AMI  <22.0 ng/L- Negative Male for AMI  >=14 - Abnormal Female indicating possible myocardial injury.  >=22 - Abnormal Male indicating possible myocardial injury.   Clinicians would have to utilize clinical acumen, EKG, Troponin, and serial changes to determine if it is an Acute Myocardial Infarction or myocardial injury due to an underlying chronic condition.                  If labs were ordered, I have independently reviewed the results and considered them in the diagnosis and treatment plan for the patient    RADIOLOGY    XR Chest 1 View    Result Date: 9/10/2024  PROCEDURE: XR CHEST 1 VW-  HISTORY: SOA Triage Protocol  COMPARISON: February 20, 2023..  FINDINGS: The heart is normal in size. Emphysematous change noted bilaterally without acute infiltrate. There is evidence of old calcified granulomatous disease. Aortic mural calcifications noted... The mediastinum is unremarkable. There is no pneumothorax.  There are no acute osseous abnormalities.  Apical lordotic positioning noted.      Impression: No acute cardiopulmonary process.     This report was signed and finalized on 9/10/2024 10:45 AM by Valerie Sherman MD.        If I have ordered, I have independently reviewed the above noted radiographic studies.  Please see the radiologist dictation for the official interpretation    Medications given to patient in the ER    Medications   sodium chloride 0.9 % flush 10 mL (has no administration in time range)   methylPREDNISolone sodium succinate (SOLU-Medrol) injection 125 mg (125 mg Intravenous Given 9/10/24 1009)   ipratropium-albuterol (DUO-NEB) nebulizer solution 3 mL (3 mL Nebulization Given 9/10/24 0953)       AS OF 13:01 EDT VITALS:    BP - 125/46  HR - 93  TEMP - 98.8 °F (37.1 °C) (Oral)  O2 SATS - 99%         Shared Decision Making: After my consideration of the clinical presentation and laboratory/radiology studies obtained, I have discussed the findings with the patient/patient representative who is in agreement with the treatment plan and final disposition. Risks and benefits of discharge and/or observation admission were discussed.  Final disposition of the patient will be discharged home.  Patient is requested to follow-up with primary care provider and specialist in 1 week following final discharge.    Discharge Medications Prescribed:   Prednisone 20 mg 1 p.o. twice daily for 5 days was prescribed during this ER visit.    Medical Decision Making  Maura Jiménez is a 79 y.o. female who presents to the ER complaining of shortness of breath and cough that started 3 days ago and is progressively worsened over the interval.  Patient states she has a history of COPD and wears 3 L nasal cannula at all times..  Patient denies any chest pain, swelling congestion fever nausea or vomiting.    DDX: includes but is not limited to: COVID-19, COPD exacerbation, pneumonia, other    Problems Addressed:  COPD exacerbation: complicated acute illness or  injury    Amount and/or Complexity of Data Reviewed  Labs: ordered. Decision-making details documented in ED Course.     Details: I have personally reviewed and documented all results  Radiology: ordered. Decision-making details documented in ED Course.     Details: I have personally reviewed and documented all results  ECG/medicine tests: ordered. Decision-making details documented in ED Course.     Details: I have personally reviewed and documented all results  Discussion of management or test interpretation with external provider(s): Discussed assessment, treatment and plan with ER attending    Risk  Prescription drug management.  Risk Details: I have discussed with patient the finding of the test preformed today. Patient has been diagnosed with COPD exacerbation and will be discharged home.  Patient requested to follow-up with primary care provider, pulmonology within the next 7 days for reevaluation. Strict return precautions have been given and patient verbalizes understanding        Final diagnoses:   COPD exacerbation       Please note that portions of this document were completed using voice recognition dictation software.       Jose Juan Lugo, APRN  09/10/24 [id number]

## 2024-09-14 ENCOUNTER — HOSPITAL ENCOUNTER (INPATIENT)
Facility: HOSPITAL | Age: 79
LOS: 4 days | Discharge: HOME | End: 2024-09-18
Attending: EMERGENCY MEDICINE | Admitting: FAMILY MEDICINE
Payer: MEDICARE

## 2024-09-14 ENCOUNTER — APPOINTMENT (OUTPATIENT)
Dept: GENERAL RADIOLOGY | Facility: HOSPITAL | Age: 79
End: 2024-09-14
Payer: MEDICARE

## 2024-09-14 ENCOUNTER — APPOINTMENT (OUTPATIENT)
Dept: CT IMAGING | Facility: HOSPITAL | Age: 79
End: 2024-09-14
Payer: MEDICARE

## 2024-09-14 DIAGNOSIS — J96.01 ACUTE HYPOXIC RESPIRATORY FAILURE: Primary | ICD-10-CM

## 2024-09-14 DIAGNOSIS — J44.1 COPD WITH ACUTE EXACERBATION: ICD-10-CM

## 2024-09-14 DIAGNOSIS — Z74.09 IMPAIRED MOBILITY: Chronic | ICD-10-CM

## 2024-09-14 LAB
ALBUMIN SERPL-MCNC: 4.1 G/DL (ref 3.5–5.2)
ALBUMIN/GLOB SERPL: 1.9 G/DL
ALP SERPL-CCNC: 59 U/L (ref 39–117)
ALT SERPL W P-5'-P-CCNC: 14 U/L (ref 1–33)
ANION GAP SERPL CALCULATED.3IONS-SCNC: 7.7 MMOL/L (ref 5–15)
AST SERPL-CCNC: 16 U/L (ref 1–32)
BASOPHILS # BLD AUTO: 0.01 10*3/MM3 (ref 0–0.2)
BASOPHILS NFR BLD AUTO: 0.1 % (ref 0–1.5)
BILIRUB SERPL-MCNC: 0.2 MG/DL (ref 0–1.2)
BUN SERPL-MCNC: 23 MG/DL (ref 8–23)
BUN/CREAT SERPL: 35.9 (ref 7–25)
CALCIUM SPEC-SCNC: 9.4 MG/DL (ref 8.6–10.5)
CHLORIDE SERPL-SCNC: 96 MMOL/L (ref 98–107)
CO2 SERPL-SCNC: 36.3 MMOL/L (ref 22–29)
CREAT SERPL-MCNC: 0.64 MG/DL (ref 0.57–1)
DEPRECATED RDW RBC AUTO: 42.9 FL (ref 37–54)
EGFRCR SERPLBLD CKD-EPI 2021: 89.5 ML/MIN/1.73
EOSINOPHIL # BLD AUTO: 0.01 10*3/MM3 (ref 0–0.4)
EOSINOPHIL NFR BLD AUTO: 0.1 % (ref 0.3–6.2)
ERYTHROCYTE [DISTWIDTH] IN BLOOD BY AUTOMATED COUNT: 12.1 % (ref 12.3–15.4)
GEN 5 2HR TROPONIN T REFLEX: 17 NG/L
GLOBULIN UR ELPH-MCNC: 2.2 GM/DL
GLUCOSE BLDC GLUCOMTR-MCNC: 162 MG/DL (ref 70–130)
GLUCOSE BLDC GLUCOMTR-MCNC: 374 MG/DL (ref 70–130)
GLUCOSE SERPL-MCNC: 504 MG/DL (ref 65–99)
HBA1C MFR BLD: 7.3 % (ref 4.8–5.6)
HCT VFR BLD AUTO: 35.7 % (ref 34–46.6)
HGB BLD-MCNC: 11 G/DL (ref 12–15.9)
HOLD SPECIMEN: NORMAL
HOLD SPECIMEN: NORMAL
IMM GRANULOCYTES # BLD AUTO: 0.07 10*3/MM3 (ref 0–0.05)
IMM GRANULOCYTES NFR BLD AUTO: 0.8 % (ref 0–0.5)
LYMPHOCYTES # BLD AUTO: 0.41 10*3/MM3 (ref 0.7–3.1)
LYMPHOCYTES NFR BLD AUTO: 4.5 % (ref 19.6–45.3)
MCH RBC QN AUTO: 29.5 PG (ref 26.6–33)
MCHC RBC AUTO-ENTMCNC: 30.8 G/DL (ref 31.5–35.7)
MCV RBC AUTO: 95.7 FL (ref 79–97)
MONOCYTES # BLD AUTO: 0.54 10*3/MM3 (ref 0.1–0.9)
MONOCYTES NFR BLD AUTO: 5.9 % (ref 5–12)
NEUTROPHILS NFR BLD AUTO: 8.11 10*3/MM3 (ref 1.7–7)
NEUTROPHILS NFR BLD AUTO: 88.6 % (ref 42.7–76)
NRBC BLD AUTO-RTO: 0 /100 WBC (ref 0–0.2)
NT-PROBNP SERPL-MCNC: 779.3 PG/ML (ref 0–1800)
PLATELET # BLD AUTO: 296 10*3/MM3 (ref 140–450)
PMV BLD AUTO: 11.1 FL (ref 6–12)
POTASSIUM SERPL-SCNC: 4.4 MMOL/L (ref 3.5–5.2)
PROT SERPL-MCNC: 6.3 G/DL (ref 6–8.5)
RBC # BLD AUTO: 3.73 10*6/MM3 (ref 3.77–5.28)
SODIUM SERPL-SCNC: 140 MMOL/L (ref 136–145)
TROPONIN T DELTA: -1 NG/L
TROPONIN T SERPL HS-MCNC: 17 NG/L
TROPONIN T SERPL HS-MCNC: 18 NG/L
WBC NRBC COR # BLD AUTO: 9.15 10*3/MM3 (ref 3.4–10.8)
WHOLE BLOOD HOLD COAG: NORMAL
WHOLE BLOOD HOLD SPECIMEN: NORMAL

## 2024-09-14 PROCEDURE — 93005 ELECTROCARDIOGRAM TRACING: CPT | Performed by: EMERGENCY MEDICINE

## 2024-09-14 PROCEDURE — 25010000002 METHYLPREDNISOLONE PER 125 MG: Performed by: NURSE PRACTITIONER

## 2024-09-14 PROCEDURE — 84484 ASSAY OF TROPONIN QUANT: CPT | Performed by: EMERGENCY MEDICINE

## 2024-09-14 PROCEDURE — 36415 COLL VENOUS BLD VENIPUNCTURE: CPT

## 2024-09-14 PROCEDURE — 25510000001 IOPAMIDOL 61 % SOLUTION: Performed by: EMERGENCY MEDICINE

## 2024-09-14 PROCEDURE — 83036 HEMOGLOBIN GLYCOSYLATED A1C: CPT | Performed by: FAMILY MEDICINE

## 2024-09-14 PROCEDURE — 25010000002 ENOXAPARIN PER 10 MG: Performed by: FAMILY MEDICINE

## 2024-09-14 PROCEDURE — 71045 X-RAY EXAM CHEST 1 VIEW: CPT

## 2024-09-14 PROCEDURE — 99223 1ST HOSP IP/OBS HIGH 75: CPT | Performed by: FAMILY MEDICINE

## 2024-09-14 PROCEDURE — 25010000002 METHYLPREDNISOLONE PER 40 MG: Performed by: FAMILY MEDICINE

## 2024-09-14 PROCEDURE — 85025 COMPLETE CBC W/AUTO DIFF WBC: CPT | Performed by: EMERGENCY MEDICINE

## 2024-09-14 PROCEDURE — 63710000001 INSULIN REGULAR HUMAN PER 5 UNITS: Performed by: NURSE PRACTITIONER

## 2024-09-14 PROCEDURE — 84484 ASSAY OF TROPONIN QUANT: CPT | Performed by: NURSE PRACTITIONER

## 2024-09-14 PROCEDURE — 94640 AIRWAY INHALATION TREATMENT: CPT

## 2024-09-14 PROCEDURE — 99285 EMERGENCY DEPT VISIT HI MDM: CPT

## 2024-09-14 PROCEDURE — 82948 REAGENT STRIP/BLOOD GLUCOSE: CPT

## 2024-09-14 PROCEDURE — 71275 CT ANGIOGRAPHY CHEST: CPT

## 2024-09-14 PROCEDURE — 83880 ASSAY OF NATRIURETIC PEPTIDE: CPT | Performed by: EMERGENCY MEDICINE

## 2024-09-14 PROCEDURE — 82948 REAGENT STRIP/BLOOD GLUCOSE: CPT | Performed by: NURSE PRACTITIONER

## 2024-09-14 PROCEDURE — 80053 COMPREHEN METABOLIC PANEL: CPT | Performed by: EMERGENCY MEDICINE

## 2024-09-14 RX ORDER — SODIUM CHLORIDE 0.9 % (FLUSH) 0.9 %
10 SYRINGE (ML) INJECTION AS NEEDED
Status: DISCONTINUED | OUTPATIENT
Start: 2024-09-14 | End: 2024-09-18 | Stop reason: HOSPADM

## 2024-09-14 RX ORDER — METHYLPREDNISOLONE SODIUM SUCCINATE 40 MG/ML
40 INJECTION, POWDER, LYOPHILIZED, FOR SOLUTION INTRAMUSCULAR; INTRAVENOUS EVERY 12 HOURS
Status: DISCONTINUED | OUTPATIENT
Start: 2024-09-15 | End: 2024-09-18 | Stop reason: HOSPADM

## 2024-09-14 RX ORDER — LEVOTHYROXINE SODIUM 75 UG/1
75 TABLET ORAL DAILY
Status: DISCONTINUED | OUTPATIENT
Start: 2024-09-15 | End: 2024-09-18 | Stop reason: HOSPADM

## 2024-09-14 RX ORDER — METHYLPREDNISOLONE SODIUM SUCCINATE 125 MG/2ML
125 INJECTION, POWDER, LYOPHILIZED, FOR SOLUTION INTRAMUSCULAR; INTRAVENOUS ONCE
Status: COMPLETED | OUTPATIENT
Start: 2024-09-14 | End: 2024-09-14

## 2024-09-14 RX ORDER — INSULIN LISPRO 100 [IU]/ML
4-24 INJECTION, SOLUTION INTRAVENOUS; SUBCUTANEOUS
Status: DISCONTINUED | OUTPATIENT
Start: 2024-09-14 | End: 2024-09-18 | Stop reason: HOSPADM

## 2024-09-14 RX ORDER — ENOXAPARIN SODIUM 100 MG/ML
40 INJECTION SUBCUTANEOUS EVERY 24 HOURS
Status: DISCONTINUED | OUTPATIENT
Start: 2024-09-14 | End: 2024-09-14

## 2024-09-14 RX ORDER — IPRATROPIUM BROMIDE AND ALBUTEROL SULFATE 2.5; .5 MG/3ML; MG/3ML
3 SOLUTION RESPIRATORY (INHALATION)
Status: DISCONTINUED | OUTPATIENT
Start: 2024-09-15 | End: 2024-09-18 | Stop reason: HOSPADM

## 2024-09-14 RX ORDER — IPRATROPIUM BROMIDE AND ALBUTEROL SULFATE 2.5; .5 MG/3ML; MG/3ML
3 SOLUTION RESPIRATORY (INHALATION) ONCE
Status: COMPLETED | OUTPATIENT
Start: 2024-09-14 | End: 2024-09-14

## 2024-09-14 RX ORDER — SODIUM CHLORIDE 0.9 % (FLUSH) 0.9 %
10 SYRINGE (ML) INJECTION EVERY 12 HOURS SCHEDULED
Status: DISCONTINUED | OUTPATIENT
Start: 2024-09-14 | End: 2024-09-18 | Stop reason: HOSPADM

## 2024-09-14 RX ORDER — POLYETHYLENE GLYCOL 3350 17 G/17G
17 POWDER, FOR SOLUTION ORAL DAILY PRN
Status: DISCONTINUED | OUTPATIENT
Start: 2024-09-14 | End: 2024-09-18 | Stop reason: HOSPADM

## 2024-09-14 RX ORDER — SODIUM CHLORIDE 9 MG/ML
40 INJECTION, SOLUTION INTRAVENOUS AS NEEDED
Status: DISCONTINUED | OUTPATIENT
Start: 2024-09-14 | End: 2024-09-18 | Stop reason: HOSPADM

## 2024-09-14 RX ORDER — FOLIC ACID 1 MG/1
1 TABLET ORAL DAILY
Status: DISCONTINUED | OUTPATIENT
Start: 2024-09-15 | End: 2024-09-18 | Stop reason: HOSPADM

## 2024-09-14 RX ORDER — IOPAMIDOL 612 MG/ML
100 INJECTION, SOLUTION INTRAVASCULAR
Status: COMPLETED | OUTPATIENT
Start: 2024-09-14 | End: 2024-09-14

## 2024-09-14 RX ORDER — GUAIFENESIN/DEXTROMETHORPHAN 100-10MG/5
10 SYRUP ORAL EVERY 4 HOURS PRN
Status: DISCONTINUED | OUTPATIENT
Start: 2024-09-14 | End: 2024-09-18 | Stop reason: HOSPADM

## 2024-09-14 RX ORDER — ONDANSETRON 2 MG/ML
4 INJECTION INTRAMUSCULAR; INTRAVENOUS EVERY 6 HOURS PRN
Status: DISCONTINUED | OUTPATIENT
Start: 2024-09-14 | End: 2024-09-18 | Stop reason: HOSPADM

## 2024-09-14 RX ORDER — LOSARTAN POTASSIUM 50 MG/1
100 TABLET ORAL DAILY
Status: DISCONTINUED | OUTPATIENT
Start: 2024-09-15 | End: 2024-09-18 | Stop reason: HOSPADM

## 2024-09-14 RX ORDER — PRAVASTATIN SODIUM 20 MG
40 TABLET ORAL NIGHTLY
Status: DISCONTINUED | OUTPATIENT
Start: 2024-09-14 | End: 2024-09-18 | Stop reason: HOSPADM

## 2024-09-14 RX ORDER — ACETAMINOPHEN 650 MG/1
650 SUPPOSITORY RECTAL EVERY 4 HOURS PRN
Status: DISCONTINUED | OUTPATIENT
Start: 2024-09-14 | End: 2024-09-18 | Stop reason: HOSPADM

## 2024-09-14 RX ORDER — DEXTROSE MONOHYDRATE 25 G/50ML
25 INJECTION, SOLUTION INTRAVENOUS
Status: DISCONTINUED | OUTPATIENT
Start: 2024-09-14 | End: 2024-09-18 | Stop reason: HOSPADM

## 2024-09-14 RX ORDER — AMLODIPINE BESYLATE 5 MG/1
5 TABLET ORAL DAILY
Status: DISCONTINUED | OUTPATIENT
Start: 2024-09-15 | End: 2024-09-15

## 2024-09-14 RX ORDER — GUAIFENESIN 600 MG/1
600 TABLET, EXTENDED RELEASE ORAL EVERY 12 HOURS SCHEDULED
Status: DISCONTINUED | OUTPATIENT
Start: 2024-09-14 | End: 2024-09-18 | Stop reason: HOSPADM

## 2024-09-14 RX ORDER — ACETAMINOPHEN 325 MG/1
650 TABLET ORAL EVERY 4 HOURS PRN
Status: DISCONTINUED | OUTPATIENT
Start: 2024-09-14 | End: 2024-09-18 | Stop reason: HOSPADM

## 2024-09-14 RX ORDER — BISACODYL 10 MG
10 SUPPOSITORY, RECTAL RECTAL DAILY PRN
Status: DISCONTINUED | OUTPATIENT
Start: 2024-09-14 | End: 2024-09-18 | Stop reason: HOSPADM

## 2024-09-14 RX ORDER — ALBUTEROL SULFATE 0.83 MG/ML
2.5 SOLUTION RESPIRATORY (INHALATION) EVERY 6 HOURS PRN
Status: DISCONTINUED | OUTPATIENT
Start: 2024-09-14 | End: 2024-09-18 | Stop reason: HOSPADM

## 2024-09-14 RX ORDER — NITROGLYCERIN 0.4 MG/1
0.4 TABLET SUBLINGUAL
Status: DISCONTINUED | OUTPATIENT
Start: 2024-09-14 | End: 2024-09-17

## 2024-09-14 RX ORDER — AMOXICILLIN 250 MG
2 CAPSULE ORAL 2 TIMES DAILY PRN
Status: DISCONTINUED | OUTPATIENT
Start: 2024-09-14 | End: 2024-09-18 | Stop reason: HOSPADM

## 2024-09-14 RX ORDER — ACETAMINOPHEN 160 MG/5ML
650 SOLUTION ORAL EVERY 4 HOURS PRN
Status: DISCONTINUED | OUTPATIENT
Start: 2024-09-14 | End: 2024-09-18 | Stop reason: HOSPADM

## 2024-09-14 RX ORDER — NICOTINE POLACRILEX 4 MG
15 LOZENGE BUCCAL
Status: DISCONTINUED | OUTPATIENT
Start: 2024-09-14 | End: 2024-09-18 | Stop reason: HOSPADM

## 2024-09-14 RX ORDER — BISACODYL 5 MG/1
5 TABLET, DELAYED RELEASE ORAL DAILY PRN
Status: DISCONTINUED | OUTPATIENT
Start: 2024-09-14 | End: 2024-09-18 | Stop reason: HOSPADM

## 2024-09-14 RX ORDER — ENOXAPARIN SODIUM 100 MG/ML
30 INJECTION SUBCUTANEOUS EVERY 24 HOURS
Status: DISCONTINUED | OUTPATIENT
Start: 2024-09-14 | End: 2024-09-18 | Stop reason: HOSPADM

## 2024-09-14 RX ADMIN — PRAVASTATIN SODIUM 40 MG: 20 TABLET ORAL at 22:07

## 2024-09-14 RX ADMIN — METHYLPREDNISOLONE SODIUM SUCCINATE 125 MG: 125 INJECTION, POWDER, FOR SOLUTION INTRAMUSCULAR; INTRAVENOUS at 18:36

## 2024-09-14 RX ADMIN — Medication 10 ML: at 22:07

## 2024-09-14 RX ADMIN — IOPAMIDOL 70 ML: 612 INJECTION, SOLUTION INTRAVENOUS at 19:20

## 2024-09-14 RX ADMIN — METHYLPREDNISOLONE SODIUM SUCCINATE 40 MG: 40 INJECTION, POWDER, FOR SOLUTION INTRAMUSCULAR; INTRAVENOUS at 23:35

## 2024-09-14 RX ADMIN — GUAIFENESIN 600 MG: 600 TABLET, EXTENDED RELEASE ORAL at 22:07

## 2024-09-14 RX ADMIN — ENOXAPARIN SODIUM 30 MG: 100 INJECTION SUBCUTANEOUS at 22:07

## 2024-09-14 RX ADMIN — INSULIN HUMAN 8 UNITS: 100 INJECTION, SOLUTION PARENTERAL at 19:07

## 2024-09-14 RX ADMIN — IPRATROPIUM BROMIDE AND ALBUTEROL SULFATE 3 ML: .5; 3 SOLUTION RESPIRATORY (INHALATION) at 18:23

## 2024-09-15 LAB
ANION GAP SERPL CALCULATED.3IONS-SCNC: 10.5 MMOL/L (ref 5–15)
BUN SERPL-MCNC: 28 MG/DL (ref 8–23)
BUN/CREAT SERPL: 54.9 (ref 7–25)
CALCIUM SPEC-SCNC: 9.6 MG/DL (ref 8.6–10.5)
CHLORIDE SERPL-SCNC: 100 MMOL/L (ref 98–107)
CO2 SERPL-SCNC: 32.5 MMOL/L (ref 22–29)
CREAT SERPL-MCNC: 0.51 MG/DL (ref 0.57–1)
DEPRECATED RDW RBC AUTO: 42 FL (ref 37–54)
EGFRCR SERPLBLD CKD-EPI 2021: 94.5 ML/MIN/1.73
ERYTHROCYTE [DISTWIDTH] IN BLOOD BY AUTOMATED COUNT: 12.1 % (ref 12.3–15.4)
GLUCOSE BLDC GLUCOMTR-MCNC: 148 MG/DL (ref 70–130)
GLUCOSE BLDC GLUCOMTR-MCNC: 220 MG/DL (ref 70–130)
GLUCOSE BLDC GLUCOMTR-MCNC: 221 MG/DL (ref 70–130)
GLUCOSE BLDC GLUCOMTR-MCNC: 236 MG/DL (ref 70–130)
GLUCOSE SERPL-MCNC: 255 MG/DL (ref 65–99)
HCT VFR BLD AUTO: 33.3 % (ref 34–46.6)
HGB BLD-MCNC: 10.3 G/DL (ref 12–15.9)
MCH RBC QN AUTO: 29.3 PG (ref 26.6–33)
MCHC RBC AUTO-ENTMCNC: 30.9 G/DL (ref 31.5–35.7)
MCV RBC AUTO: 94.6 FL (ref 79–97)
PLATELET # BLD AUTO: 262 10*3/MM3 (ref 140–450)
PMV BLD AUTO: 10.6 FL (ref 6–12)
POTASSIUM SERPL-SCNC: 4.1 MMOL/L (ref 3.5–5.2)
RBC # BLD AUTO: 3.52 10*6/MM3 (ref 3.77–5.28)
SODIUM SERPL-SCNC: 143 MMOL/L (ref 136–145)
WBC NRBC COR # BLD AUTO: 6.32 10*3/MM3 (ref 3.4–10.8)

## 2024-09-15 PROCEDURE — 99232 SBSQ HOSP IP/OBS MODERATE 35: CPT | Performed by: INTERNAL MEDICINE

## 2024-09-15 PROCEDURE — 94664 DEMO&/EVAL PT USE INHALER: CPT

## 2024-09-15 PROCEDURE — 80048 BASIC METABOLIC PNL TOTAL CA: CPT | Performed by: FAMILY MEDICINE

## 2024-09-15 PROCEDURE — 85027 COMPLETE CBC AUTOMATED: CPT | Performed by: FAMILY MEDICINE

## 2024-09-15 PROCEDURE — 82948 REAGENT STRIP/BLOOD GLUCOSE: CPT | Performed by: FAMILY MEDICINE

## 2024-09-15 PROCEDURE — 63710000001 INSULIN LISPRO (HUMAN) PER 5 UNITS: Performed by: FAMILY MEDICINE

## 2024-09-15 PROCEDURE — 25010000002 ENOXAPARIN PER 10 MG: Performed by: FAMILY MEDICINE

## 2024-09-15 PROCEDURE — 94799 UNLISTED PULMONARY SVC/PX: CPT

## 2024-09-15 PROCEDURE — 94761 N-INVAS EAR/PLS OXIMETRY MLT: CPT

## 2024-09-15 PROCEDURE — 25010000002 METHYLPREDNISOLONE PER 40 MG: Performed by: FAMILY MEDICINE

## 2024-09-15 PROCEDURE — 82948 REAGENT STRIP/BLOOD GLUCOSE: CPT

## 2024-09-15 RX ORDER — AMLODIPINE BESYLATE 5 MG/1
5 TABLET ORAL ONCE
Status: COMPLETED | OUTPATIENT
Start: 2024-09-15 | End: 2024-09-15

## 2024-09-15 RX ORDER — AMLODIPINE BESYLATE 5 MG/1
10 TABLET ORAL DAILY
Status: DISCONTINUED | OUTPATIENT
Start: 2024-09-16 | End: 2024-09-18 | Stop reason: HOSPADM

## 2024-09-15 RX ADMIN — IPRATROPIUM BROMIDE AND ALBUTEROL SULFATE 3 ML: .5; 3 SOLUTION RESPIRATORY (INHALATION) at 07:03

## 2024-09-15 RX ADMIN — METHYLPREDNISOLONE SODIUM SUCCINATE 40 MG: 40 INJECTION, POWDER, FOR SOLUTION INTRAMUSCULAR; INTRAVENOUS at 11:27

## 2024-09-15 RX ADMIN — INSULIN LISPRO 8 UNITS: 100 INJECTION, SOLUTION INTRAVENOUS; SUBCUTANEOUS at 11:27

## 2024-09-15 RX ADMIN — LEVOTHYROXINE SODIUM 75 MCG: 0.07 TABLET ORAL at 09:28

## 2024-09-15 RX ADMIN — INSULIN LISPRO 8 UNITS: 100 INJECTION, SOLUTION INTRAVENOUS; SUBCUTANEOUS at 16:33

## 2024-09-15 RX ADMIN — LOSARTAN POTASSIUM 100 MG: 50 TABLET, FILM COATED ORAL at 09:28

## 2024-09-15 RX ADMIN — PRAVASTATIN SODIUM 40 MG: 20 TABLET ORAL at 20:42

## 2024-09-15 RX ADMIN — INSULIN LISPRO 8 UNITS: 100 INJECTION, SOLUTION INTRAVENOUS; SUBCUTANEOUS at 07:46

## 2024-09-15 RX ADMIN — Medication 5 MG: at 20:42

## 2024-09-15 RX ADMIN — IPRATROPIUM BROMIDE AND ALBUTEROL SULFATE 3 ML: .5; 3 SOLUTION RESPIRATORY (INHALATION) at 12:15

## 2024-09-15 RX ADMIN — GUAIFENESIN 600 MG: 600 TABLET, EXTENDED RELEASE ORAL at 09:27

## 2024-09-15 RX ADMIN — ENOXAPARIN SODIUM 30 MG: 100 INJECTION SUBCUTANEOUS at 21:48

## 2024-09-15 RX ADMIN — GUAIFENESIN 600 MG: 600 TABLET, EXTENDED RELEASE ORAL at 20:42

## 2024-09-15 RX ADMIN — AMLODIPINE BESYLATE 5 MG: 5 TABLET ORAL at 11:37

## 2024-09-15 RX ADMIN — IPRATROPIUM BROMIDE AND ALBUTEROL SULFATE 3 ML: .5; 3 SOLUTION RESPIRATORY (INHALATION) at 20:46

## 2024-09-15 RX ADMIN — Medication 10 ML: at 20:42

## 2024-09-15 RX ADMIN — FOLIC ACID 1 MG: 1 TABLET ORAL at 09:27

## 2024-09-15 RX ADMIN — AMLODIPINE BESYLATE 5 MG: 5 TABLET ORAL at 09:28

## 2024-09-15 RX ADMIN — Medication 10 ML: at 09:28

## 2024-09-16 ENCOUNTER — APPOINTMENT (OUTPATIENT)
Dept: CARDIOLOGY | Facility: HOSPITAL | Age: 79
End: 2024-09-16
Payer: MEDICARE

## 2024-09-16 PROBLEM — E43 SEVERE MALNUTRITION: Status: ACTIVE | Noted: 2024-09-16

## 2024-09-16 LAB
BH CV ECHO MEAS - AO MAX PG: 8.3 MMHG
BH CV ECHO MEAS - AO MEAN PG: 4 MMHG
BH CV ECHO MEAS - AO ROOT DIAM: 2.6 CM
BH CV ECHO MEAS - AO V2 MAX: 144 CM/SEC
BH CV ECHO MEAS - AO V2 VTI: 26.8 CM
BH CV ECHO MEAS - AVA(I,D): 1.98 CM2
BH CV ECHO MEAS - EDV(CUBED): 35.9 ML
BH CV ECHO MEAS - EDV(MOD-SP2): 18.6 ML
BH CV ECHO MEAS - EDV(MOD-SP4): 33.5 ML
BH CV ECHO MEAS - EF(MOD-BP): 72.6 %
BH CV ECHO MEAS - EF(MOD-SP2): 62.9 %
BH CV ECHO MEAS - EF(MOD-SP4): 78.9 %
BH CV ECHO MEAS - ESV(CUBED): 5.9 ML
BH CV ECHO MEAS - ESV(MOD-SP2): 6.9 ML
BH CV ECHO MEAS - ESV(MOD-SP4): 7.1 ML
BH CV ECHO MEAS - FS: 45.2 %
BH CV ECHO MEAS - IVS/LVPW: 0.85 CM
BH CV ECHO MEAS - IVSD: 0.64 CM
BH CV ECHO MEAS - LA DIMENSION: 3 CM
BH CV ECHO MEAS - LAT PEAK E' VEL: 9.6 CM/SEC
BH CV ECHO MEAS - LV DIASTOLIC VOL/BSA (35-75): 29.3 CM2
BH CV ECHO MEAS - LV MASS(C)D: 56.5 GRAMS
BH CV ECHO MEAS - LV MAX PG: 5.1 MMHG
BH CV ECHO MEAS - LV MEAN PG: 2 MMHG
BH CV ECHO MEAS - LV SYSTOLIC VOL/BSA (12-30): 6.2 CM2
BH CV ECHO MEAS - LV V1 MAX: 113 CM/SEC
BH CV ECHO MEAS - LV V1 VTI: 20.2 CM
BH CV ECHO MEAS - LVIDD: 3.3 CM
BH CV ECHO MEAS - LVIDS: 1.81 CM
BH CV ECHO MEAS - LVOT AREA: 2.6 CM2
BH CV ECHO MEAS - LVOT DIAM: 1.83 CM
BH CV ECHO MEAS - LVPWD: 0.75 CM
BH CV ECHO MEAS - MED PEAK E' VEL: 9.4 CM/SEC
BH CV ECHO MEAS - MV A MAX VEL: 93.8 CM/SEC
BH CV ECHO MEAS - MV DEC SLOPE: 807 CM/SEC2
BH CV ECHO MEAS - MV DEC TIME: 0.12 SEC
BH CV ECHO MEAS - MV E MAX VEL: 92.7 CM/SEC
BH CV ECHO MEAS - MV E/A: 0.99
BH CV ECHO MEAS - MV MAX PG: 8 MMHG
BH CV ECHO MEAS - MV MEAN PG: 5 MMHG
BH CV ECHO MEAS - MV V2 VTI: 23.4 CM
BH CV ECHO MEAS - MVA(VTI): 2.27 CM2
BH CV ECHO MEAS - PA ACC TIME: 0.1 SEC
BH CV ECHO MEAS - PA V2 MAX: 124 CM/SEC
BH CV ECHO MEAS - RAP SYSTOLE: 8 MMHG
BH CV ECHO MEAS - RV MAX PG: 3.5 MMHG
BH CV ECHO MEAS - RV V1 MAX: 92.9 CM/SEC
BH CV ECHO MEAS - RV V1 VTI: 13.1 CM
BH CV ECHO MEAS - RVSP: 69.2 MMHG
BH CV ECHO MEAS - SV(LVOT): 53.1 ML
BH CV ECHO MEAS - SV(MOD-SP2): 11.7 ML
BH CV ECHO MEAS - SV(MOD-SP4): 26.4 ML
BH CV ECHO MEAS - SVI(LVOT): 46.5 ML/M2
BH CV ECHO MEAS - SVI(MOD-SP2): 10.2 ML/M2
BH CV ECHO MEAS - SVI(MOD-SP4): 23.1 ML/M2
BH CV ECHO MEAS - TAPSE (>1.6): 1.57 CM
BH CV ECHO MEAS - TR MAX PG: 61.2 MMHG
BH CV ECHO MEAS - TR MAX VEL: 391 CM/SEC
BH CV ECHO MEASUREMENTS AVERAGE E/E' RATIO: 9.76
BH CV XLRA - RV BASE: 2.49 CM
BH CV XLRA - RV LENGTH: 5.2 CM
BH CV XLRA - RV MID: 2.19 CM
BH CV XLRA - TDI S': 6.2 CM/SEC
GLUCOSE BLDC GLUCOMTR-MCNC: 110 MG/DL (ref 70–130)
GLUCOSE BLDC GLUCOMTR-MCNC: 187 MG/DL (ref 70–130)
GLUCOSE BLDC GLUCOMTR-MCNC: 208 MG/DL (ref 70–130)
GLUCOSE BLDC GLUCOMTR-MCNC: 329 MG/DL (ref 70–130)
GLUCOSE BLDC GLUCOMTR-MCNC: 460 MG/DL (ref 70–130)
GLUCOSE BLDC GLUCOMTR-MCNC: 465 MG/DL (ref 70–130)

## 2024-09-16 PROCEDURE — 63710000001 INSULIN LISPRO (HUMAN) PER 5 UNITS: Performed by: FAMILY MEDICINE

## 2024-09-16 PROCEDURE — 94761 N-INVAS EAR/PLS OXIMETRY MLT: CPT

## 2024-09-16 PROCEDURE — 93306 TTE W/DOPPLER COMPLETE: CPT | Performed by: STUDENT IN AN ORGANIZED HEALTH CARE EDUCATION/TRAINING PROGRAM

## 2024-09-16 PROCEDURE — 94799 UNLISTED PULMONARY SVC/PX: CPT

## 2024-09-16 PROCEDURE — 25010000002 ENOXAPARIN PER 10 MG: Performed by: FAMILY MEDICINE

## 2024-09-16 PROCEDURE — 97165 OT EVAL LOW COMPLEX 30 MIN: CPT

## 2024-09-16 PROCEDURE — 82948 REAGENT STRIP/BLOOD GLUCOSE: CPT

## 2024-09-16 PROCEDURE — 97166 OT EVAL MOD COMPLEX 45 MIN: CPT

## 2024-09-16 PROCEDURE — 94762 N-INVAS EAR/PLS OXIMTRY CONT: CPT

## 2024-09-16 PROCEDURE — 93306 TTE W/DOPPLER COMPLETE: CPT

## 2024-09-16 PROCEDURE — 94760 N-INVAS EAR/PLS OXIMETRY 1: CPT

## 2024-09-16 PROCEDURE — 25010000002 METHYLPREDNISOLONE PER 40 MG: Performed by: FAMILY MEDICINE

## 2024-09-16 PROCEDURE — 25010000002 MORPHINE PER 10 MG: Performed by: STUDENT IN AN ORGANIZED HEALTH CARE EDUCATION/TRAINING PROGRAM

## 2024-09-16 PROCEDURE — 97161 PT EVAL LOW COMPLEX 20 MIN: CPT

## 2024-09-16 PROCEDURE — 99232 SBSQ HOSP IP/OBS MODERATE 35: CPT | Performed by: STUDENT IN AN ORGANIZED HEALTH CARE EDUCATION/TRAINING PROGRAM

## 2024-09-16 RX ORDER — MORPHINE SULFATE 2 MG/ML
1 INJECTION, SOLUTION INTRAMUSCULAR; INTRAVENOUS
Status: DISCONTINUED | OUTPATIENT
Start: 2024-09-16 | End: 2024-09-18 | Stop reason: HOSPADM

## 2024-09-16 RX ADMIN — INSULIN LISPRO 8 UNITS: 100 INJECTION, SOLUTION INTRAVENOUS; SUBCUTANEOUS at 11:55

## 2024-09-16 RX ADMIN — METHYLPREDNISOLONE SODIUM SUCCINATE 40 MG: 40 INJECTION, POWDER, FOR SOLUTION INTRAMUSCULAR; INTRAVENOUS at 23:12

## 2024-09-16 RX ADMIN — Medication 10 ML: at 20:45

## 2024-09-16 RX ADMIN — IPRATROPIUM BROMIDE AND ALBUTEROL SULFATE 3 ML: .5; 3 SOLUTION RESPIRATORY (INHALATION) at 07:31

## 2024-09-16 RX ADMIN — MORPHINE SULFATE 1 MG: 2 INJECTION, SOLUTION INTRAMUSCULAR; INTRAVENOUS at 23:18

## 2024-09-16 RX ADMIN — INSULIN LISPRO 16 UNITS: 100 INJECTION, SOLUTION INTRAVENOUS; SUBCUTANEOUS at 08:22

## 2024-09-16 RX ADMIN — INSULIN LISPRO 24 UNITS: 100 INJECTION, SOLUTION INTRAVENOUS; SUBCUTANEOUS at 21:02

## 2024-09-16 RX ADMIN — Medication 5 MG: at 20:45

## 2024-09-16 RX ADMIN — MORPHINE SULFATE 1 MG: 2 INJECTION, SOLUTION INTRAMUSCULAR; INTRAVENOUS at 19:30

## 2024-09-16 RX ADMIN — MORPHINE SULFATE 1 MG: 2 INJECTION, SOLUTION INTRAMUSCULAR; INTRAVENOUS at 14:43

## 2024-09-16 RX ADMIN — IPRATROPIUM BROMIDE AND ALBUTEROL SULFATE 3 ML: .5; 3 SOLUTION RESPIRATORY (INHALATION) at 20:18

## 2024-09-16 RX ADMIN — GUAIFENESIN 600 MG: 600 TABLET, EXTENDED RELEASE ORAL at 20:45

## 2024-09-16 RX ADMIN — PRAVASTATIN SODIUM 40 MG: 20 TABLET ORAL at 20:44

## 2024-09-16 RX ADMIN — Medication 10 ML: at 08:24

## 2024-09-16 RX ADMIN — ENOXAPARIN SODIUM 30 MG: 100 INJECTION SUBCUTANEOUS at 20:45

## 2024-09-16 RX ADMIN — IPRATROPIUM BROMIDE AND ALBUTEROL SULFATE 3 ML: .5; 3 SOLUTION RESPIRATORY (INHALATION) at 13:41

## 2024-09-16 RX ADMIN — GUAIFENESIN 600 MG: 600 TABLET, EXTENDED RELEASE ORAL at 08:22

## 2024-09-16 RX ADMIN — FOLIC ACID 1 MG: 1 TABLET ORAL at 08:22

## 2024-09-16 RX ADMIN — METHYLPREDNISOLONE SODIUM SUCCINATE 40 MG: 40 INJECTION, POWDER, FOR SOLUTION INTRAMUSCULAR; INTRAVENOUS at 11:55

## 2024-09-16 RX ADMIN — LOSARTAN POTASSIUM 100 MG: 50 TABLET, FILM COATED ORAL at 08:22

## 2024-09-16 RX ADMIN — AMLODIPINE BESYLATE 10 MG: 5 TABLET ORAL at 08:22

## 2024-09-16 RX ADMIN — LEVOTHYROXINE SODIUM 75 MCG: 0.07 TABLET ORAL at 08:23

## 2024-09-16 RX ADMIN — METHYLPREDNISOLONE SODIUM SUCCINATE 40 MG: 40 INJECTION, POWDER, FOR SOLUTION INTRAMUSCULAR; INTRAVENOUS at 00:46

## 2024-09-17 LAB
ANION GAP SERPL CALCULATED.3IONS-SCNC: 9.5 MMOL/L (ref 5–15)
BASOPHILS # BLD AUTO: 0.03 10*3/MM3 (ref 0–0.2)
BASOPHILS NFR BLD AUTO: 0.1 % (ref 0–1.5)
BUN SERPL-MCNC: 43 MG/DL (ref 8–23)
BUN/CREAT SERPL: 87.8 (ref 7–25)
CALCIUM SPEC-SCNC: 9.7 MG/DL (ref 8.6–10.5)
CHLORIDE SERPL-SCNC: 98 MMOL/L (ref 98–107)
CO2 SERPL-SCNC: 34.5 MMOL/L (ref 22–29)
CREAT SERPL-MCNC: 0.49 MG/DL (ref 0.57–1)
DEPRECATED RDW RBC AUTO: 41.1 FL (ref 37–54)
EGFRCR SERPLBLD CKD-EPI 2021: 95.4 ML/MIN/1.73
EOSINOPHIL # BLD AUTO: 0 10*3/MM3 (ref 0–0.4)
EOSINOPHIL NFR BLD AUTO: 0 % (ref 0.3–6.2)
ERYTHROCYTE [DISTWIDTH] IN BLOOD BY AUTOMATED COUNT: 11.9 % (ref 12.3–15.4)
GLUCOSE BLDC GLUCOMTR-MCNC: 185 MG/DL (ref 70–130)
GLUCOSE BLDC GLUCOMTR-MCNC: 301 MG/DL (ref 70–130)
GLUCOSE BLDC GLUCOMTR-MCNC: 341 MG/DL (ref 70–130)
GLUCOSE BLDC GLUCOMTR-MCNC: 389 MG/DL (ref 70–130)
GLUCOSE SERPL-MCNC: 244 MG/DL (ref 65–99)
HCT VFR BLD AUTO: 32.8 % (ref 34–46.6)
HGB BLD-MCNC: 9.9 G/DL (ref 12–15.9)
IMM GRANULOCYTES # BLD AUTO: 0.15 10*3/MM3 (ref 0–0.05)
IMM GRANULOCYTES NFR BLD AUTO: 0.6 % (ref 0–0.5)
LYMPHOCYTES # BLD AUTO: 0.26 10*3/MM3 (ref 0.7–3.1)
LYMPHOCYTES NFR BLD AUTO: 1.1 % (ref 19.6–45.3)
MCH RBC QN AUTO: 28.7 PG (ref 26.6–33)
MCHC RBC AUTO-ENTMCNC: 30.2 G/DL (ref 31.5–35.7)
MCV RBC AUTO: 95.1 FL (ref 79–97)
MONOCYTES # BLD AUTO: 0.75 10*3/MM3 (ref 0.1–0.9)
MONOCYTES NFR BLD AUTO: 3.2 % (ref 5–12)
NEUTROPHILS NFR BLD AUTO: 22.57 10*3/MM3 (ref 1.7–7)
NEUTROPHILS NFR BLD AUTO: 95 % (ref 42.7–76)
NRBC BLD AUTO-RTO: 0 /100 WBC (ref 0–0.2)
PLATELET # BLD AUTO: 287 10*3/MM3 (ref 140–450)
PMV BLD AUTO: 10.4 FL (ref 6–12)
POTASSIUM SERPL-SCNC: 4.2 MMOL/L (ref 3.5–5.2)
RBC # BLD AUTO: 3.45 10*6/MM3 (ref 3.77–5.28)
SODIUM SERPL-SCNC: 142 MMOL/L (ref 136–145)
WBC NRBC COR # BLD AUTO: 23.76 10*3/MM3 (ref 3.4–10.8)

## 2024-09-17 PROCEDURE — 94761 N-INVAS EAR/PLS OXIMETRY MLT: CPT

## 2024-09-17 PROCEDURE — 82948 REAGENT STRIP/BLOOD GLUCOSE: CPT

## 2024-09-17 PROCEDURE — 99232 SBSQ HOSP IP/OBS MODERATE 35: CPT | Performed by: STUDENT IN AN ORGANIZED HEALTH CARE EDUCATION/TRAINING PROGRAM

## 2024-09-17 PROCEDURE — 97535 SELF CARE MNGMENT TRAINING: CPT

## 2024-09-17 PROCEDURE — 94799 UNLISTED PULMONARY SVC/PX: CPT

## 2024-09-17 PROCEDURE — 97530 THERAPEUTIC ACTIVITIES: CPT

## 2024-09-17 PROCEDURE — 82948 REAGENT STRIP/BLOOD GLUCOSE: CPT | Performed by: FAMILY MEDICINE

## 2024-09-17 PROCEDURE — 25010000002 METHYLPREDNISOLONE PER 40 MG: Performed by: FAMILY MEDICINE

## 2024-09-17 PROCEDURE — 94664 DEMO&/EVAL PT USE INHALER: CPT

## 2024-09-17 PROCEDURE — 80048 BASIC METABOLIC PNL TOTAL CA: CPT | Performed by: STUDENT IN AN ORGANIZED HEALTH CARE EDUCATION/TRAINING PROGRAM

## 2024-09-17 PROCEDURE — 85025 COMPLETE CBC W/AUTO DIFF WBC: CPT | Performed by: STUDENT IN AN ORGANIZED HEALTH CARE EDUCATION/TRAINING PROGRAM

## 2024-09-17 PROCEDURE — 63710000001 INSULIN LISPRO (HUMAN) PER 5 UNITS: Performed by: FAMILY MEDICINE

## 2024-09-17 PROCEDURE — 25010000002 ENOXAPARIN PER 10 MG: Performed by: FAMILY MEDICINE

## 2024-09-17 RX ADMIN — ENOXAPARIN SODIUM 30 MG: 100 INJECTION SUBCUTANEOUS at 21:08

## 2024-09-17 RX ADMIN — AMLODIPINE BESYLATE 10 MG: 5 TABLET ORAL at 08:25

## 2024-09-17 RX ADMIN — IPRATROPIUM BROMIDE AND ALBUTEROL SULFATE 3 ML: .5; 3 SOLUTION RESPIRATORY (INHALATION) at 19:09

## 2024-09-17 RX ADMIN — GUAIFENESIN 600 MG: 600 TABLET, EXTENDED RELEASE ORAL at 21:08

## 2024-09-17 RX ADMIN — GUAIFENESIN 600 MG: 600 TABLET, EXTENDED RELEASE ORAL at 08:24

## 2024-09-17 RX ADMIN — IPRATROPIUM BROMIDE AND ALBUTEROL SULFATE 3 ML: .5; 3 SOLUTION RESPIRATORY (INHALATION) at 07:10

## 2024-09-17 RX ADMIN — INSULIN LISPRO 4 UNITS: 100 INJECTION, SOLUTION INTRAVENOUS; SUBCUTANEOUS at 11:24

## 2024-09-17 RX ADMIN — Medication 10 ML: at 21:08

## 2024-09-17 RX ADMIN — FOLIC ACID 1 MG: 1 TABLET ORAL at 08:24

## 2024-09-17 RX ADMIN — INSULIN LISPRO 16 UNITS: 100 INJECTION, SOLUTION INTRAVENOUS; SUBCUTANEOUS at 21:08

## 2024-09-17 RX ADMIN — Medication 10 ML: at 08:25

## 2024-09-17 RX ADMIN — LEVOTHYROXINE SODIUM 75 MCG: 0.07 TABLET ORAL at 08:24

## 2024-09-17 RX ADMIN — PRAVASTATIN SODIUM 40 MG: 20 TABLET ORAL at 21:07

## 2024-09-17 RX ADMIN — METHYLPREDNISOLONE SODIUM SUCCINATE 40 MG: 40 INJECTION, POWDER, FOR SOLUTION INTRAMUSCULAR; INTRAVENOUS at 11:24

## 2024-09-17 RX ADMIN — INSULIN LISPRO 20 UNITS: 100 INJECTION, SOLUTION INTRAVENOUS; SUBCUTANEOUS at 16:58

## 2024-09-17 RX ADMIN — INSULIN LISPRO 16 UNITS: 100 INJECTION, SOLUTION INTRAVENOUS; SUBCUTANEOUS at 08:25

## 2024-09-17 RX ADMIN — IPRATROPIUM BROMIDE AND ALBUTEROL SULFATE 3 ML: .5; 3 SOLUTION RESPIRATORY (INHALATION) at 12:42

## 2024-09-17 RX ADMIN — LOSARTAN POTASSIUM 100 MG: 50 TABLET, FILM COATED ORAL at 08:24

## 2024-09-17 RX ADMIN — Medication 5 MG: at 21:07

## 2024-09-18 ENCOUNTER — READMISSION MANAGEMENT (OUTPATIENT)
Dept: CALL CENTER | Facility: HOSPITAL | Age: 79
End: 2024-09-18
Payer: MEDICARE

## 2024-09-18 VITALS
WEIGHT: 63.49 LBS | HEIGHT: 60 IN | RESPIRATION RATE: 16 BRPM | TEMPERATURE: 98.8 F | OXYGEN SATURATION: 92 % | HEART RATE: 111 BPM | BODY MASS INDEX: 12.47 KG/M2 | SYSTOLIC BLOOD PRESSURE: 139 MMHG | DIASTOLIC BLOOD PRESSURE: 60 MMHG

## 2024-09-18 PROBLEM — Z74.09 IMPAIRED MOBILITY: Chronic | Status: ACTIVE | Noted: 2024-09-18

## 2024-09-18 LAB
ANION GAP SERPL CALCULATED.3IONS-SCNC: 8.9 MMOL/L (ref 5–15)
BASOPHILS # BLD AUTO: 0.03 10*3/MM3 (ref 0–0.2)
BASOPHILS NFR BLD AUTO: 0.1 % (ref 0–1.5)
BUN SERPL-MCNC: 42 MG/DL (ref 8–23)
BUN/CREAT SERPL: 79.2 (ref 7–25)
CALCIUM SPEC-SCNC: 9.4 MG/DL (ref 8.6–10.5)
CHLORIDE SERPL-SCNC: 100 MMOL/L (ref 98–107)
CO2 SERPL-SCNC: 36.1 MMOL/L (ref 22–29)
CREAT SERPL-MCNC: 0.53 MG/DL (ref 0.57–1)
DEPRECATED RDW RBC AUTO: 42.7 FL (ref 37–54)
EGFRCR SERPLBLD CKD-EPI 2021: 93.6 ML/MIN/1.73
EOSINOPHIL # BLD AUTO: 0 10*3/MM3 (ref 0–0.4)
EOSINOPHIL NFR BLD AUTO: 0 % (ref 0.3–6.2)
ERYTHROCYTE [DISTWIDTH] IN BLOOD BY AUTOMATED COUNT: 12.3 % (ref 12.3–15.4)
GLUCOSE BLDC GLUCOMTR-MCNC: 269 MG/DL (ref 70–130)
GLUCOSE SERPL-MCNC: 247 MG/DL (ref 65–99)
HCT VFR BLD AUTO: 33.3 % (ref 34–46.6)
HGB BLD-MCNC: 10.2 G/DL (ref 12–15.9)
IMM GRANULOCYTES # BLD AUTO: 0.16 10*3/MM3 (ref 0–0.05)
IMM GRANULOCYTES NFR BLD AUTO: 0.8 % (ref 0–0.5)
LYMPHOCYTES # BLD AUTO: 0.21 10*3/MM3 (ref 0.7–3.1)
LYMPHOCYTES NFR BLD AUTO: 1 % (ref 19.6–45.3)
MCH RBC QN AUTO: 29.1 PG (ref 26.6–33)
MCHC RBC AUTO-ENTMCNC: 30.6 G/DL (ref 31.5–35.7)
MCV RBC AUTO: 94.9 FL (ref 79–97)
MONOCYTES # BLD AUTO: 0.31 10*3/MM3 (ref 0.1–0.9)
MONOCYTES NFR BLD AUTO: 1.5 % (ref 5–12)
NEUTROPHILS NFR BLD AUTO: 19.62 10*3/MM3 (ref 1.7–7)
NEUTROPHILS NFR BLD AUTO: 96.6 % (ref 42.7–76)
NRBC BLD AUTO-RTO: 0 /100 WBC (ref 0–0.2)
PLATELET # BLD AUTO: 274 10*3/MM3 (ref 140–450)
PMV BLD AUTO: 11.2 FL (ref 6–12)
POTASSIUM SERPL-SCNC: 4.3 MMOL/L (ref 3.5–5.2)
RBC # BLD AUTO: 3.51 10*6/MM3 (ref 3.77–5.28)
SODIUM SERPL-SCNC: 145 MMOL/L (ref 136–145)
WBC NRBC COR # BLD AUTO: 20.33 10*3/MM3 (ref 3.4–10.8)

## 2024-09-18 PROCEDURE — 94664 DEMO&/EVAL PT USE INHALER: CPT

## 2024-09-18 PROCEDURE — 94799 UNLISTED PULMONARY SVC/PX: CPT

## 2024-09-18 PROCEDURE — 80048 BASIC METABOLIC PNL TOTAL CA: CPT | Performed by: STUDENT IN AN ORGANIZED HEALTH CARE EDUCATION/TRAINING PROGRAM

## 2024-09-18 PROCEDURE — 25010000002 METHYLPREDNISOLONE PER 40 MG: Performed by: FAMILY MEDICINE

## 2024-09-18 PROCEDURE — 85025 COMPLETE CBC W/AUTO DIFF WBC: CPT | Performed by: STUDENT IN AN ORGANIZED HEALTH CARE EDUCATION/TRAINING PROGRAM

## 2024-09-18 PROCEDURE — 94761 N-INVAS EAR/PLS OXIMETRY MLT: CPT

## 2024-09-18 PROCEDURE — 63710000001 INSULIN LISPRO (HUMAN) PER 5 UNITS: Performed by: FAMILY MEDICINE

## 2024-09-18 PROCEDURE — 99239 HOSP IP/OBS DSCHRG MGMT >30: CPT | Performed by: STUDENT IN AN ORGANIZED HEALTH CARE EDUCATION/TRAINING PROGRAM

## 2024-09-18 PROCEDURE — 82948 REAGENT STRIP/BLOOD GLUCOSE: CPT

## 2024-09-18 RX ORDER — PREDNISONE 10 MG/1
TABLET ORAL
Qty: 39 TABLET | Refills: 0 | Status: ON HOLD | OUTPATIENT
Start: 2024-09-18 | End: 2024-09-26 | Stop reason: SDUPTHER

## 2024-09-18 RX ADMIN — LOSARTAN POTASSIUM 100 MG: 50 TABLET, FILM COATED ORAL at 08:13

## 2024-09-18 RX ADMIN — GUAIFENESIN 600 MG: 600 TABLET, EXTENDED RELEASE ORAL at 08:13

## 2024-09-18 RX ADMIN — LEVOTHYROXINE SODIUM 75 MCG: 0.07 TABLET ORAL at 08:13

## 2024-09-18 RX ADMIN — AMLODIPINE BESYLATE 10 MG: 5 TABLET ORAL at 08:13

## 2024-09-18 RX ADMIN — INSULIN LISPRO 12 UNITS: 100 INJECTION, SOLUTION INTRAVENOUS; SUBCUTANEOUS at 08:13

## 2024-09-18 RX ADMIN — METHYLPREDNISOLONE SODIUM SUCCINATE 40 MG: 40 INJECTION, POWDER, FOR SOLUTION INTRAMUSCULAR; INTRAVENOUS at 00:07

## 2024-09-18 RX ADMIN — FOLIC ACID 1 MG: 1 TABLET ORAL at 08:13

## 2024-09-18 RX ADMIN — Medication 10 ML: at 08:14

## 2024-09-18 RX ADMIN — IPRATROPIUM BROMIDE AND ALBUTEROL SULFATE 3 ML: .5; 3 SOLUTION RESPIRATORY (INHALATION) at 07:31

## 2024-09-20 ENCOUNTER — APPOINTMENT (OUTPATIENT)
Dept: GENERAL RADIOLOGY | Facility: HOSPITAL | Age: 79
End: 2024-09-20
Payer: MEDICARE

## 2024-09-20 ENCOUNTER — HOSPITAL ENCOUNTER (INPATIENT)
Facility: HOSPITAL | Age: 79
LOS: 6 days | Discharge: HOME HEALTH | End: 2024-09-26
Attending: STUDENT IN AN ORGANIZED HEALTH CARE EDUCATION/TRAINING PROGRAM | Admitting: INTERNAL MEDICINE
Payer: MEDICARE

## 2024-09-20 ENCOUNTER — APPOINTMENT (OUTPATIENT)
Dept: CT IMAGING | Facility: HOSPITAL | Age: 79
End: 2024-09-20
Payer: MEDICARE

## 2024-09-20 ENCOUNTER — READMISSION MANAGEMENT (OUTPATIENT)
Dept: CALL CENTER | Facility: HOSPITAL | Age: 79
End: 2024-09-20
Payer: MEDICARE

## 2024-09-20 DIAGNOSIS — J96.02 ACUTE RESPIRATORY FAILURE WITH HYPOXIA AND HYPERCAPNIA: ICD-10-CM

## 2024-09-20 DIAGNOSIS — J44.1 COPD EXACERBATION: ICD-10-CM

## 2024-09-20 DIAGNOSIS — A41.9 SEPSIS, DUE TO UNSPECIFIED ORGANISM, UNSPECIFIED WHETHER ACUTE ORGAN DYSFUNCTION PRESENT: ICD-10-CM

## 2024-09-20 DIAGNOSIS — I27.20 PULMONARY HYPERTENSION: ICD-10-CM

## 2024-09-20 DIAGNOSIS — I48.92 ATRIAL FLUTTER, UNSPECIFIED TYPE: Primary | ICD-10-CM

## 2024-09-20 DIAGNOSIS — J96.01 ACUTE RESPIRATORY FAILURE WITH HYPOXIA AND HYPERCAPNIA: ICD-10-CM

## 2024-09-20 DIAGNOSIS — J96.01 ACUTE RESPIRATORY FAILURE WITH HYPOXIA: ICD-10-CM

## 2024-09-20 DIAGNOSIS — Z74.09 IMPAIRED MOBILITY: Chronic | ICD-10-CM

## 2024-09-20 LAB
A-A DO2: 163.1 MMHG
A-A DO2: ABNORMAL
ALBUMIN SERPL-MCNC: 3.3 G/DL (ref 3.5–5.2)
ALBUMIN/GLOB SERPL: 1.1 G/DL
ALP SERPL-CCNC: 107 U/L (ref 39–117)
ALT SERPL W P-5'-P-CCNC: 21 U/L (ref 1–33)
ANION GAP SERPL CALCULATED.3IONS-SCNC: 11.4 MMOL/L (ref 5–15)
ARTERIAL PATENCY WRIST A: POSITIVE
ARTERIAL PATENCY WRIST A: POSITIVE
AST SERPL-CCNC: 20 U/L (ref 1–32)
ATMOSPHERIC PRESS: 730 MMHG
ATMOSPHERIC PRESS: 731 MMHG
B PARAPERT DNA SPEC QL NAA+PROBE: NOT DETECTED
B PERT DNA SPEC QL NAA+PROBE: NOT DETECTED
BACTERIA UR QL AUTO: ABNORMAL /HPF
BASE EXCESS BLDA CALC-SCNC: 3.8 MMOL/L (ref 0–2)
BASE EXCESS BLDA CALC-SCNC: 6.4 MMOL/L (ref 0–2)
BASOPHILS # BLD AUTO: 0.04 10*3/MM3 (ref 0–0.2)
BASOPHILS NFR BLD AUTO: 0.2 % (ref 0–1.5)
BDY SITE: ABNORMAL
BDY SITE: ABNORMAL
BILIRUB SERPL-MCNC: 0.2 MG/DL (ref 0–1.2)
BILIRUB UR QL STRIP: NEGATIVE
BUN SERPL-MCNC: 25 MG/DL (ref 8–23)
BUN/CREAT SERPL: 48.1 (ref 7–25)
C PNEUM DNA NPH QL NAA+NON-PROBE: NOT DETECTED
CALCIUM SPEC-SCNC: 8.9 MG/DL (ref 8.6–10.5)
CHLORIDE SERPL-SCNC: 102 MMOL/L (ref 98–107)
CK SERPL-CCNC: 49 U/L (ref 20–180)
CLARITY UR: CLEAR
CO2 SERPL-SCNC: 31.6 MMOL/L (ref 22–29)
COHGB MFR BLD: 0.7 % (ref 0–2)
COHGB MFR BLD: 1.1 % (ref 0–2)
COLOR UR: YELLOW
CREAT SERPL-MCNC: 0.52 MG/DL (ref 0.57–1)
D DIMER PPP FEU-MCNC: 0.99 MCGFEU/ML (ref 0–0.8)
D-LACTATE SERPL-SCNC: 1.5 MMOL/L (ref 0.5–2)
DEPRECATED RDW RBC AUTO: 43.4 FL (ref 37–54)
EGFRCR SERPLBLD CKD-EPI 2021: 94.1 ML/MIN/1.73
EOSINOPHIL # BLD AUTO: 2.94 10*3/MM3 (ref 0–0.4)
EOSINOPHIL NFR BLD AUTO: 12.1 % (ref 0.3–6.2)
ERYTHROCYTE [DISTWIDTH] IN BLOOD BY AUTOMATED COUNT: 12.6 % (ref 12.3–15.4)
ETHANOL BLD-MCNC: <10 MG/DL (ref 0–10)
ETHANOL UR QL: <0.01 %
FLUAV SUBTYP SPEC NAA+PROBE: NOT DETECTED
FLUBV RNA ISLT QL NAA+PROBE: NOT DETECTED
GAS FLOW AIRWAY: 7 LPM
GEN 5 2HR TROPONIN T REFLEX: 55 NG/L
GLOBULIN UR ELPH-MCNC: 3 GM/DL
GLUCOSE BLDC GLUCOMTR-MCNC: 315 MG/DL (ref 70–130)
GLUCOSE SERPL-MCNC: 392 MG/DL (ref 65–99)
GLUCOSE UR STRIP-MCNC: ABNORMAL MG/DL
HADV DNA SPEC NAA+PROBE: NOT DETECTED
HCO3 BLDA-SCNC: 32.5 MMOL/L (ref 22–28)
HCO3 BLDA-SCNC: 33.8 MMOL/L (ref 22–28)
HCOV 229E RNA SPEC QL NAA+PROBE: NOT DETECTED
HCOV HKU1 RNA SPEC QL NAA+PROBE: NOT DETECTED
HCOV NL63 RNA SPEC QL NAA+PROBE: NOT DETECTED
HCOV OC43 RNA SPEC QL NAA+PROBE: NOT DETECTED
HCT VFR BLD AUTO: 36.7 % (ref 34–46.6)
HCT VFR BLD CALC: 30.2 %
HCT VFR BLD CALC: 31.3 %
HGB BLD-MCNC: 11.3 G/DL (ref 12–15.9)
HGB UR QL STRIP.AUTO: NEGATIVE
HMPV RNA NPH QL NAA+NON-PROBE: NOT DETECTED
HPIV1 RNA ISLT QL NAA+PROBE: NOT DETECTED
HPIV2 RNA SPEC QL NAA+PROBE: NOT DETECTED
HPIV3 RNA NPH QL NAA+PROBE: NOT DETECTED
HPIV4 P GENE NPH QL NAA+PROBE: NOT DETECTED
HYALINE CASTS UR QL AUTO: ABNORMAL /LPF
IMM GRANULOCYTES # BLD AUTO: 0.19 10*3/MM3 (ref 0–0.05)
IMM GRANULOCYTES NFR BLD AUTO: 0.8 % (ref 0–0.5)
INHALED O2 CONCENTRATION: 60 %
KETONES UR QL STRIP: ABNORMAL
LEUKOCYTE ESTERASE UR QL STRIP.AUTO: NEGATIVE
LYMPHOCYTES # BLD AUTO: 0.25 10*3/MM3 (ref 0.7–3.1)
LYMPHOCYTES # BLD MANUAL: 0.97 10*3/MM3 (ref 0.7–3.1)
LYMPHOCYTES NFR BLD AUTO: 1 % (ref 19.6–45.3)
LYMPHOCYTES NFR BLD MANUAL: 2 % (ref 5–12)
Lab: ABNORMAL
M PNEUMO IGG SER IA-ACNC: NOT DETECTED
MAGNESIUM SERPL-MCNC: 2 MG/DL (ref 1.6–2.4)
MCH RBC QN AUTO: 29.4 PG (ref 26.6–33)
MCHC RBC AUTO-ENTMCNC: 30.8 G/DL (ref 31.5–35.7)
MCV RBC AUTO: 95.3 FL (ref 79–97)
METHGB BLD QL: 0.4 % (ref 0–1.5)
METHGB BLD QL: 0.5 % (ref 0–1.5)
MODALITY: ABNORMAL
MODALITY: ABNORMAL
MONOCYTES # BLD AUTO: 1.11 10*3/MM3 (ref 0.1–0.9)
MONOCYTES # BLD: 0.49 10*3/MM3 (ref 0.1–0.9)
MONOCYTES NFR BLD AUTO: 4.6 % (ref 5–12)
NEUTROPHILS # BLD AUTO: 22.8 10*3/MM3 (ref 1.7–7)
NEUTROPHILS NFR BLD AUTO: 19.72 10*3/MM3 (ref 1.7–7)
NEUTROPHILS NFR BLD AUTO: 81.3 % (ref 42.7–76)
NEUTROPHILS NFR BLD MANUAL: 81 % (ref 42.7–76)
NEUTS BAND NFR BLD MANUAL: 13 % (ref 0–5)
NITRITE UR QL STRIP: NEGATIVE
NOTIFIED BY: ABNORMAL
NOTIFIED WHO: ABNORMAL
NRBC BLD AUTO-RTO: 0 /100 WBC (ref 0–0.2)
NT-PROBNP SERPL-MCNC: 1124 PG/ML (ref 0–1800)
OXYHGB MFR BLDV: 93.2 % (ref 94–99)
OXYHGB MFR BLDV: 98.9 % (ref 94–99)
PCO2 BLDA: 64.7 MM HG (ref 35–45)
PCO2 BLDA: 74.1 MM HG (ref 35–45)
PCO2 TEMP ADJ BLD: ABNORMAL MM[HG]
PCO2 TEMP ADJ BLD: ABNORMAL MM[HG]
PH BLDA: 7.25 PH UNITS (ref 7.3–7.5)
PH BLDA: 7.33 PH UNITS (ref 7.3–7.5)
PH UR STRIP.AUTO: 5.5 [PH] (ref 5–8)
PH, TEMP CORRECTED: ABNORMAL
PH, TEMP CORRECTED: ABNORMAL
PHOSPHATE SERPL-MCNC: 3.2 MG/DL (ref 2.5–4.5)
PLAT MORPH BLD: NORMAL
PLATELET # BLD AUTO: 355 10*3/MM3 (ref 140–450)
PMV BLD AUTO: 11.3 FL (ref 6–12)
PO2 BLDA: 178 MM HG (ref 75–100)
PO2 BLDA: 77.3 MM HG (ref 75–100)
PO2 TEMP ADJ BLD: ABNORMAL MM[HG]
PO2 TEMP ADJ BLD: ABNORMAL MM[HG]
POTASSIUM SERPL-SCNC: 4 MMOL/L (ref 3.5–5.2)
PROCALCITONIN SERPL-MCNC: 0.24 NG/ML (ref 0–0.25)
PROT SERPL-MCNC: 6.3 G/DL (ref 6–8.5)
PROT UR QL STRIP: ABNORMAL
RBC # BLD AUTO: 3.85 10*6/MM3 (ref 3.77–5.28)
RBC # UR STRIP: ABNORMAL /HPF
RBC MORPH BLD: NORMAL
REF LAB TEST METHOD: ABNORMAL
RHINOVIRUS RNA SPEC NAA+PROBE: NOT DETECTED
RSV RNA NPH QL NAA+NON-PROBE: NOT DETECTED
SAO2 % BLDCOA: 100.1 % (ref 94–100)
SAO2 % BLDCOA: 94.6 % (ref 94–100)
SARS-COV-2 RNA NPH QL NAA+NON-PROBE: DETECTED
SODIUM SERPL-SCNC: 145 MMOL/L (ref 136–145)
SP GR UR STRIP: >1.03 (ref 1–1.03)
SQUAMOUS #/AREA URNS HPF: ABNORMAL /HPF
TROPONIN T DELTA: 25 NG/L
TROPONIN T SERPL HS-MCNC: 30 NG/L
TSH SERPL DL<=0.05 MIU/L-ACNC: 0.29 UIU/ML (ref 0.27–4.2)
UROBILINOGEN UR QL STRIP: ABNORMAL
VARIANT LYMPHS NFR BLD MANUAL: 4 % (ref 19.6–45.3)
VENTILATOR MODE: ABNORMAL
VENTILATOR MODE: ABNORMAL
WBC # UR STRIP: ABNORMAL /HPF
WBC MORPH BLD: NORMAL
WBC NRBC COR # BLD AUTO: 24.25 10*3/MM3 (ref 3.4–10.8)

## 2024-09-20 PROCEDURE — 94799 UNLISTED PULMONARY SVC/PX: CPT

## 2024-09-20 PROCEDURE — 84443 ASSAY THYROID STIM HORMONE: CPT | Performed by: STUDENT IN AN ORGANIZED HEALTH CARE EDUCATION/TRAINING PROGRAM

## 2024-09-20 PROCEDURE — 83050 HGB METHEMOGLOBIN QUAN: CPT

## 2024-09-20 PROCEDURE — 99222 1ST HOSP IP/OBS MODERATE 55: CPT | Performed by: INTERNAL MEDICINE

## 2024-09-20 PROCEDURE — 84484 ASSAY OF TROPONIN QUANT: CPT | Performed by: STUDENT IN AN ORGANIZED HEALTH CARE EDUCATION/TRAINING PROGRAM

## 2024-09-20 PROCEDURE — 81001 URINALYSIS AUTO W/SCOPE: CPT | Performed by: STUDENT IN AN ORGANIZED HEALTH CARE EDUCATION/TRAINING PROGRAM

## 2024-09-20 PROCEDURE — 82375 ASSAY CARBOXYHB QUANT: CPT

## 2024-09-20 PROCEDURE — 87040 BLOOD CULTURE FOR BACTERIA: CPT | Performed by: STUDENT IN AN ORGANIZED HEALTH CARE EDUCATION/TRAINING PROGRAM

## 2024-09-20 PROCEDURE — 36600 WITHDRAWAL OF ARTERIAL BLOOD: CPT

## 2024-09-20 PROCEDURE — 71275 CT ANGIOGRAPHY CHEST: CPT

## 2024-09-20 PROCEDURE — 82948 REAGENT STRIP/BLOOD GLUCOSE: CPT | Performed by: INTERNAL MEDICINE

## 2024-09-20 PROCEDURE — 25010000002 ENOXAPARIN PER 10 MG: Performed by: INTERNAL MEDICINE

## 2024-09-20 PROCEDURE — 83880 ASSAY OF NATRIURETIC PEPTIDE: CPT | Performed by: STUDENT IN AN ORGANIZED HEALTH CARE EDUCATION/TRAINING PROGRAM

## 2024-09-20 PROCEDURE — 84145 PROCALCITONIN (PCT): CPT | Performed by: INTERNAL MEDICINE

## 2024-09-20 PROCEDURE — 93005 ELECTROCARDIOGRAM TRACING: CPT | Performed by: STUDENT IN AN ORGANIZED HEALTH CARE EDUCATION/TRAINING PROGRAM

## 2024-09-20 PROCEDURE — 85007 BL SMEAR W/DIFF WBC COUNT: CPT | Performed by: STUDENT IN AN ORGANIZED HEALTH CARE EDUCATION/TRAINING PROGRAM

## 2024-09-20 PROCEDURE — 63710000001 INSULIN LISPRO (HUMAN) PER 5 UNITS: Performed by: INTERNAL MEDICINE

## 2024-09-20 PROCEDURE — 25510000001 IOPAMIDOL 61 % SOLUTION: Performed by: STUDENT IN AN ORGANIZED HEALTH CARE EDUCATION/TRAINING PROGRAM

## 2024-09-20 PROCEDURE — 99291 CRITICAL CARE FIRST HOUR: CPT

## 2024-09-20 PROCEDURE — 71045 X-RAY EXAM CHEST 1 VIEW: CPT

## 2024-09-20 PROCEDURE — 83605 ASSAY OF LACTIC ACID: CPT | Performed by: STUDENT IN AN ORGANIZED HEALTH CARE EDUCATION/TRAINING PROGRAM

## 2024-09-20 PROCEDURE — 0202U NFCT DS 22 TRGT SARS-COV-2: CPT | Performed by: INTERNAL MEDICINE

## 2024-09-20 PROCEDURE — 85379 FIBRIN DEGRADATION QUANT: CPT | Performed by: STUDENT IN AN ORGANIZED HEALTH CARE EDUCATION/TRAINING PROGRAM

## 2024-09-20 PROCEDURE — 83735 ASSAY OF MAGNESIUM: CPT | Performed by: STUDENT IN AN ORGANIZED HEALTH CARE EDUCATION/TRAINING PROGRAM

## 2024-09-20 PROCEDURE — 84100 ASSAY OF PHOSPHORUS: CPT | Performed by: STUDENT IN AN ORGANIZED HEALTH CARE EDUCATION/TRAINING PROGRAM

## 2024-09-20 PROCEDURE — 82077 ASSAY SPEC XCP UR&BREATH IA: CPT | Performed by: STUDENT IN AN ORGANIZED HEALTH CARE EDUCATION/TRAINING PROGRAM

## 2024-09-20 PROCEDURE — 82805 BLOOD GASES W/O2 SATURATION: CPT

## 2024-09-20 PROCEDURE — 94660 CPAP INITIATION&MGMT: CPT

## 2024-09-20 PROCEDURE — 82550 ASSAY OF CK (CPK): CPT | Performed by: STUDENT IN AN ORGANIZED HEALTH CARE EDUCATION/TRAINING PROGRAM

## 2024-09-20 PROCEDURE — 80053 COMPREHEN METABOLIC PANEL: CPT | Performed by: STUDENT IN AN ORGANIZED HEALTH CARE EDUCATION/TRAINING PROGRAM

## 2024-09-20 PROCEDURE — 85025 COMPLETE CBC W/AUTO DIFF WBC: CPT | Performed by: STUDENT IN AN ORGANIZED HEALTH CARE EDUCATION/TRAINING PROGRAM

## 2024-09-20 PROCEDURE — 36415 COLL VENOUS BLD VENIPUNCTURE: CPT

## 2024-09-20 PROCEDURE — 25010000002 CEFTRIAXONE PER 250 MG: Performed by: STUDENT IN AN ORGANIZED HEALTH CARE EDUCATION/TRAINING PROGRAM

## 2024-09-20 PROCEDURE — 63710000001 INSULIN GLARGINE PER 5 UNITS: Performed by: INTERNAL MEDICINE

## 2024-09-20 RX ORDER — METHYLPREDNISOLONE SODIUM SUCCINATE 125 MG/2ML
60 INJECTION, POWDER, LYOPHILIZED, FOR SOLUTION INTRAMUSCULAR; INTRAVENOUS EVERY 12 HOURS
Status: DISCONTINUED | OUTPATIENT
Start: 2024-09-21 | End: 2024-09-22

## 2024-09-20 RX ORDER — POLYETHYLENE GLYCOL 3350 17 G/17G
17 POWDER, FOR SOLUTION ORAL DAILY PRN
Status: DISCONTINUED | OUTPATIENT
Start: 2024-09-20 | End: 2024-09-26 | Stop reason: HOSPADM

## 2024-09-20 RX ORDER — BISACODYL 10 MG
10 SUPPOSITORY, RECTAL RECTAL DAILY PRN
Status: DISCONTINUED | OUTPATIENT
Start: 2024-09-20 | End: 2024-09-26 | Stop reason: HOSPADM

## 2024-09-20 RX ORDER — IOPAMIDOL 612 MG/ML
60 INJECTION, SOLUTION INTRAVASCULAR
Status: COMPLETED | OUTPATIENT
Start: 2024-09-20 | End: 2024-09-20

## 2024-09-20 RX ORDER — IPRATROPIUM BROMIDE AND ALBUTEROL SULFATE 2.5; .5 MG/3ML; MG/3ML
3 SOLUTION RESPIRATORY (INHALATION) 4 TIMES DAILY PRN
Status: DISCONTINUED | OUTPATIENT
Start: 2024-09-20 | End: 2024-09-26 | Stop reason: HOSPADM

## 2024-09-20 RX ORDER — SODIUM CHLORIDE 0.9 % (FLUSH) 0.9 %
10 SYRINGE (ML) INJECTION AS NEEDED
Status: DISCONTINUED | OUTPATIENT
Start: 2024-09-20 | End: 2024-09-26 | Stop reason: HOSPADM

## 2024-09-20 RX ORDER — ONDANSETRON 2 MG/ML
4 INJECTION INTRAMUSCULAR; INTRAVENOUS EVERY 6 HOURS PRN
Status: DISCONTINUED | OUTPATIENT
Start: 2024-09-20 | End: 2024-09-26 | Stop reason: HOSPADM

## 2024-09-20 RX ORDER — ENOXAPARIN SODIUM 100 MG/ML
1 INJECTION SUBCUTANEOUS EVERY 12 HOURS SCHEDULED
Status: DISCONTINUED | OUTPATIENT
Start: 2024-09-21 | End: 2024-09-22

## 2024-09-20 RX ORDER — NITROGLYCERIN 0.4 MG/1
0.4 TABLET SUBLINGUAL
Status: DISCONTINUED | OUTPATIENT
Start: 2024-09-20 | End: 2024-09-26 | Stop reason: HOSPADM

## 2024-09-20 RX ORDER — INSULIN LISPRO 100 [IU]/ML
1-200 INJECTION, SOLUTION INTRAVENOUS; SUBCUTANEOUS AS NEEDED
Status: DISCONTINUED | OUTPATIENT
Start: 2024-09-20 | End: 2024-09-26 | Stop reason: HOSPADM

## 2024-09-20 RX ORDER — PRAVASTATIN SODIUM 20 MG
40 TABLET ORAL NIGHTLY
Status: DISCONTINUED | OUTPATIENT
Start: 2024-09-20 | End: 2024-09-26 | Stop reason: HOSPADM

## 2024-09-20 RX ORDER — ENOXAPARIN SODIUM 100 MG/ML
1 INJECTION SUBCUTANEOUS EVERY 12 HOURS
Status: DISCONTINUED | OUTPATIENT
Start: 2024-09-21 | End: 2024-09-20

## 2024-09-20 RX ORDER — NICOTINE POLACRILEX 4 MG
15 LOZENGE BUCCAL
Status: DISCONTINUED | OUTPATIENT
Start: 2024-09-20 | End: 2024-09-26 | Stop reason: HOSPADM

## 2024-09-20 RX ORDER — FOLIC ACID 1 MG/1
1 TABLET ORAL DAILY
Status: DISCONTINUED | OUTPATIENT
Start: 2024-09-21 | End: 2024-09-26 | Stop reason: HOSPADM

## 2024-09-20 RX ORDER — ACETAMINOPHEN 160 MG/5ML
650 SOLUTION ORAL EVERY 4 HOURS PRN
Status: DISCONTINUED | OUTPATIENT
Start: 2024-09-20 | End: 2024-09-26 | Stop reason: HOSPADM

## 2024-09-20 RX ORDER — ASPIRIN 81 MG/1
81 TABLET ORAL DAILY
Status: DISCONTINUED | OUTPATIENT
Start: 2024-09-21 | End: 2024-09-26 | Stop reason: HOSPADM

## 2024-09-20 RX ORDER — IBUPROFEN 600 MG/1
1 TABLET ORAL
Status: DISCONTINUED | OUTPATIENT
Start: 2024-09-20 | End: 2024-09-26 | Stop reason: HOSPADM

## 2024-09-20 RX ORDER — ACETAMINOPHEN 650 MG/1
650 SUPPOSITORY RECTAL EVERY 4 HOURS PRN
Status: DISCONTINUED | OUTPATIENT
Start: 2024-09-20 | End: 2024-09-26 | Stop reason: HOSPADM

## 2024-09-20 RX ORDER — ACETAMINOPHEN 325 MG/1
650 TABLET ORAL EVERY 4 HOURS PRN
Status: DISCONTINUED | OUTPATIENT
Start: 2024-09-20 | End: 2024-09-26 | Stop reason: HOSPADM

## 2024-09-20 RX ORDER — INSULIN LISPRO 100 [IU]/ML
1-200 INJECTION, SOLUTION INTRAVENOUS; SUBCUTANEOUS
Status: DISCONTINUED | OUTPATIENT
Start: 2024-09-20 | End: 2024-09-26 | Stop reason: HOSPADM

## 2024-09-20 RX ORDER — BISACODYL 5 MG/1
5 TABLET, DELAYED RELEASE ORAL DAILY PRN
Status: DISCONTINUED | OUTPATIENT
Start: 2024-09-20 | End: 2024-09-26 | Stop reason: HOSPADM

## 2024-09-20 RX ORDER — SODIUM CHLORIDE 0.9 % (FLUSH) 0.9 %
10 SYRINGE (ML) INJECTION EVERY 12 HOURS SCHEDULED
Status: DISCONTINUED | OUTPATIENT
Start: 2024-09-20 | End: 2024-09-26 | Stop reason: HOSPADM

## 2024-09-20 RX ORDER — SODIUM CHLORIDE 9 MG/ML
40 INJECTION, SOLUTION INTRAVENOUS AS NEEDED
Status: DISCONTINUED | OUTPATIENT
Start: 2024-09-20 | End: 2024-09-26 | Stop reason: HOSPADM

## 2024-09-20 RX ORDER — LEVOTHYROXINE SODIUM 75 UG/1
75 TABLET ORAL DAILY
Status: DISCONTINUED | OUTPATIENT
Start: 2024-09-21 | End: 2024-09-26 | Stop reason: HOSPADM

## 2024-09-20 RX ORDER — LOSARTAN POTASSIUM 50 MG/1
100 TABLET ORAL DAILY
Status: DISCONTINUED | OUTPATIENT
Start: 2024-09-21 | End: 2024-09-24

## 2024-09-20 RX ORDER — DEXTROSE MONOHYDRATE 25 G/50ML
10-50 INJECTION, SOLUTION INTRAVENOUS
Status: DISCONTINUED | OUTPATIENT
Start: 2024-09-20 | End: 2024-09-26 | Stop reason: HOSPADM

## 2024-09-20 RX ORDER — AMOXICILLIN 250 MG
2 CAPSULE ORAL 2 TIMES DAILY PRN
Status: DISCONTINUED | OUTPATIENT
Start: 2024-09-20 | End: 2024-09-26 | Stop reason: HOSPADM

## 2024-09-20 RX ADMIN — ENOXAPARIN SODIUM 30 MG: 100 INJECTION SUBCUTANEOUS at 23:35

## 2024-09-20 RX ADMIN — PRAVASTATIN SODIUM 40 MG: 20 TABLET ORAL at 23:04

## 2024-09-20 RX ADMIN — IOPAMIDOL 49 ML: 612 INJECTION, SOLUTION INTRAVENOUS at 19:25

## 2024-09-20 RX ADMIN — INSULIN LISPRO 2 UNITS: 100 INJECTION, SOLUTION INTRAVENOUS; SUBCUTANEOUS at 23:05

## 2024-09-20 RX ADMIN — SODIUM CHLORIDE 5 MG/HR: 900 INJECTION, SOLUTION INTRAVENOUS at 17:27

## 2024-09-20 RX ADMIN — CEFTRIAXONE 1000 MG: 1 INJECTION, POWDER, FOR SOLUTION INTRAMUSCULAR; INTRAVENOUS at 18:57

## 2024-09-20 RX ADMIN — SODIUM CHLORIDE 10 MG/HR: 900 INJECTION, SOLUTION INTRAVENOUS at 18:36

## 2024-09-20 RX ADMIN — SODIUM CHLORIDE 7.5 MG/HR: 900 INJECTION, SOLUTION INTRAVENOUS at 17:52

## 2024-09-20 RX ADMIN — INSULIN GLARGINE 10 UNITS: 100 INJECTION, SOLUTION SUBCUTANEOUS at 23:04

## 2024-09-20 RX ADMIN — Medication 10 ML: at 23:05

## 2024-09-20 RX ADMIN — DOXYCYCLINE 100 MG: 100 INJECTION, POWDER, LYOPHILIZED, FOR SOLUTION INTRAVENOUS at 19:43

## 2024-09-20 NOTE — ED PROVIDER NOTES
[facility] 3  Emergency Department Encounter  Emergency Medicine Physician Note       Pt Name: Maura Jiménez  MRN: [id number]  Pt :   1945  Room Number:  325/1  Date of encounter:  2024  PCP: Sofia Martinez APRN  ED Provider: Rudy Montenegro MD    Historian: Patient      HPI:  Chief Complaint: Shortness of breath        Context: Maura Jiménez is a 79 y.o. female who presents to the ED for shortness of breath.  Patient reports shortness of breath over the past few days with increased cough.  Patient was just recently discharged from this hospital for COPD exacerbation.  She wears 3 L nasal cannula at baseline.  She was found to be hypoxic by EMS placed on nonrebreather.  She was found to have a heart rate in the 180s to 190s.  Adenosine was attempted x 2.      PAST MEDICAL HISTORY  Past Medical History:   Diagnosis Date    COPD (chronic obstructive pulmonary disease)     Disease of thyroid gland     Hyperlipidemia     Osteoarthritis     Type 2 diabetes mellitus without complication, without long-term current use of insulin 2018         PAST SURGICAL HISTORY  Past Surgical History:   Procedure Laterality Date    APPENDECTOMY      CATARACT EXTRACTION, BILATERAL      HYSTERECTOMY           FAMILY HISTORY  Family History   Problem Relation Age of Onset    Heart disease Father     Diabetes Brother          SOCIAL HISTORY  Social History     Socioeconomic History    Marital status:    Tobacco Use    Smoking status: Former     Current packs/day: 0.00     Types: Cigarettes     Quit date: 2018     Years since quittin.9    Smokeless tobacco: Never   Vaping Use    Vaping status: Never Used   Substance and Sexual Activity    Alcohol use: No    Drug use: No    Sexual activity: Never         ALLERGIES  Shellfish-derived products, Codeine, Codeine, and Shellfish-derived products        REVIEW OF SYSTEMS  Systems reviewed and negative      PHYSICAL  EXAM    I have reviewed the triage vital signs and nursing notes.    ED Triage Vitals   Temp Heart Rate Resp BP SpO2   09/20/24 1703 09/20/24 1703 09/20/24 1703 -- 09/20/24 1709   98.1 °F (36.7 °C) (!) 155 24  100 %      Temp src Heart Rate Source Patient Position BP Location FiO2 (%)   09/20/24 1703 09/20/24 1703 -- -- --   Oral Monitor          Physical Exam  Constitutional:       Appearance: She is ill-appearing.      Comments: Cachectic   HENT:      Head: Normocephalic.      Right Ear: External ear normal.      Left Ear: External ear normal.   Cardiovascular:      Rate and Rhythm: Tachycardia present. Rhythm irregular.      Pulses: Normal pulses.   Pulmonary:      Effort: Pulmonary effort is normal.      Breath sounds: Rales present.   Musculoskeletal:         General: No deformity.      Cervical back: Neck supple.      Right lower leg: No edema.      Left lower leg: No edema.   Skin:     General: Skin is warm.   Neurological:      General: No focal deficit present.         LAB RESULTS  Recent Results (from the past 24 hour(s))   CBC Auto Differential    Collection Time: 09/20/24  5:27 PM    Specimen: Blood   Result Value Ref Range    WBC 24.25 (H) 3.40 - 10.80 10*3/mm3    RBC 3.85 3.77 - 5.28 10*6/mm3    Hemoglobin 11.3 (L) 12.0 - 15.9 g/dL    Hematocrit 36.7 34.0 - 46.6 %    MCV 95.3 79.0 - 97.0 fL    MCH 29.4 26.6 - 33.0 pg    MCHC 30.8 (L) 31.5 - 35.7 g/dL    RDW 12.6 12.3 - 15.4 %    RDW-SD 43.4 37.0 - 54.0 fl    MPV 11.3 6.0 - 12.0 fL    Platelets 355 140 - 450 10*3/mm3    Neutrophil % 81.3 (H) 42.7 - 76.0 %    Lymphocyte % 1.0 (L) 19.6 - 45.3 %    Monocyte % 4.6 (L) 5.0 - 12.0 %    Eosinophil % 12.1 (H) 0.3 - 6.2 %    Basophil % 0.2 0.0 - 1.5 %    Immature Grans % 0.8 (H) 0.0 - 0.5 %    Neutrophils, Absolute 19.72 (H) 1.70 - 7.00 10*3/mm3    Lymphocytes, Absolute 0.25 (L) 0.70 - 3.10 10*3/mm3    Monocytes, Absolute 1.11 (H) 0.10 - 0.90 10*3/mm3    Eosinophils, Absolute 2.94 (H) 0.00 - 0.40 10*3/mm3     Basophils, Absolute 0.04 0.00 - 0.20 10*3/mm3    Immature Grans, Absolute 0.19 (H) 0.00 - 0.05 10*3/mm3    nRBC 0.0 0.0 - 0.2 /100 WBC   Lactic Acid, Plasma    Collection Time: 09/20/24  5:27 PM    Specimen: Blood   Result Value Ref Range    Lactate 1.5 0.5 - 2.0 mmol/L   Manual Differential    Collection Time: 09/20/24  5:27 PM    Specimen: Blood   Result Value Ref Range    Neutrophil % 81.0 (H) 42.7 - 76.0 %    Lymphocyte % 4.0 (L) 19.6 - 45.3 %    Monocyte % 2.0 (L) 5.0 - 12.0 %    Bands %  13.0 (H) 0.0 - 5.0 %    Neutrophils Absolute 22.80 (H) 1.70 - 7.00 10*3/mm3    Lymphocytes Absolute 0.97 0.70 - 3.10 10*3/mm3    Monocytes Absolute 0.49 0.10 - 0.90 10*3/mm3    RBC Morphology Normal Normal    WBC Morphology Normal Normal    Platelet Morphology Normal Normal   Blood Gas, Arterial With Co-Ox    Collection Time: 09/20/24  5:49 PM    Specimen: Arterial Blood   Result Value Ref Range    Site Left Radial     Luciano's Test Positive     pH, Arterial 7.251 (C) 7.300 - 7.500 pH units    pCO2, Arterial 74.1 (C) 35.0 - 45.0 mm Hg    pO2, Arterial 77.3 75.0 - 100.0 mm Hg    HCO3, Arterial 32.5 (H) 22.0 - 28.0 mmol/L    Base Excess, Arterial 3.8 (H) 0.0 - 2.0 mmol/L    O2 Saturation, Arterial 94.6 94.0 - 100.0 %    Hematocrit, Blood Gas 31.3 %    Oxyhemoglobin 93.2 (L) 94 - 99 %    Methemoglobin 0.40 0.00 - 1.50 %    Carboxyhemoglobin 1.1 0 - 2 %    A-a DO2      Barometric Pressure for Blood Gas 730 mmHg    Modality Simple Mask     Flow Rate 7.0 lpm    Ventilator Mode NA     Notified Who DR LEVINE     Notified By [id number]     Notified Time 09/20/2024 17:52     Collected by [id number]     pH, Temp Corrected      pCO2, Temperature Corrected      pO2, Temperature Corrected     Comprehensive Metabolic Panel    Collection Time: 09/20/24  6:11 PM    Specimen: Arm, Right; Blood   Result Value Ref Range    Glucose 392 (H) 65 - 99 mg/dL    BUN 25 (H) 8 - 23 mg/dL    Creatinine 0.52 (L) 0.57 - 1.00 mg/dL    Sodium 145 136 - 145 mmol/L     Potassium 4.0 3.5 - 5.2 mmol/L    Chloride 102 98 - 107 mmol/L    CO2 31.6 (H) 22.0 - 29.0 mmol/L    Calcium 8.9 8.6 - 10.5 mg/dL    Total Protein 6.3 6.0 - 8.5 g/dL    Albumin 3.3 (L) 3.5 - 5.2 g/dL    ALT (SGPT) 21 1 - 33 U/L    AST (SGOT) 20 1 - 32 U/L    Alkaline Phosphatase 107 39 - 117 U/L    Total Bilirubin 0.2 0.0 - 1.2 mg/dL    Globulin 3.0 gm/dL    A/G Ratio 1.1 g/dL    BUN/Creatinine Ratio 48.1 (H) 7.0 - 25.0    Anion Gap 11.4 5.0 - 15.0 mmol/L    eGFR 94.1 >60.0 mL/min/1.73   D-dimer, Quantitative    Collection Time: 09/20/24  6:11 PM    Specimen: Arm, Right; Blood   Result Value Ref Range    D-Dimer, Quantitative 0.99 (H) 0.00 - 0.80 MCGFEU/mL   BNP    Collection Time: 09/20/24  6:11 PM    Specimen: Arm, Right; Blood   Result Value Ref Range    proBNP 1,124.0 0.0 - 1,800.0 pg/mL   CK    Collection Time: 09/20/24  6:11 PM    Specimen: Arm, Right; Blood   Result Value Ref Range    Creatine Kinase 49 20 - 180 U/L   High Sensitivity Troponin T    Collection Time: 09/20/24  6:11 PM    Specimen: Arm, Right; Blood   Result Value Ref Range    HS Troponin T 30 (H) <14 ng/L   Ethanol    Collection Time: 09/20/24  6:11 PM    Specimen: Arm, Right; Blood   Result Value Ref Range    Ethanol <10 0 - 10 mg/dL    Ethanol % <0.010 %   Magnesium    Collection Time: 09/20/24  6:11 PM    Specimen: Arm, Right; Blood   Result Value Ref Range    Magnesium 2.0 1.6 - 2.4 mg/dL   Phosphorus    Collection Time: 09/20/24  6:11 PM    Specimen: Arm, Right; Blood   Result Value Ref Range    Phosphorus 3.2 2.5 - 4.5 mg/dL   TSH    Collection Time: 09/20/24  6:11 PM    Specimen: Arm, Right; Blood   Result Value Ref Range    TSH 0.292 0.270 - 4.200 uIU/mL   High Sensitivity Troponin T 2Hr    Collection Time: 09/20/24  8:26 PM    Specimen: Blood   Result Value Ref Range    HS Troponin T 55 (C) <14 ng/L    Troponin T Delta 25 (C) >=-4 - <+4 ng/L   Blood Gas, Arterial With Co-Ox    Collection Time: 09/20/24 10:04 PM    Specimen: Arterial  Blood   Result Value Ref Range    Site Right Brachial     Luciano's Test Positive     pH, Arterial 7.327 7.300 - 7.500 pH units    pCO2, Arterial 64.7 (C) 35.0 - 45.0 mm Hg    pO2, Arterial 178.0 (H) 75.0 - 100.0 mm Hg    HCO3, Arterial 33.8 (H) 22.0 - 28.0 mmol/L    Base Excess, Arterial 6.4 (H) 0.0 - 2.0 mmol/L    O2 Saturation, Arterial 100.1 (H) 94.0 - 100.0 %    Hematocrit, Blood Gas 30.2 %    Oxyhemoglobin 98.9 94 - 99 %    Methemoglobin 0.50 0.00 - 1.50 %    Carboxyhemoglobin 0.7 0 - 2 %    A-a DO2 163.1 mmHg    Barometric Pressure for Blood Gas 731 mmHg    Modality BiPap     FIO2 60 %    Ventilator Mode NIV     Collected by [id number]     pH, Temp Corrected      pCO2, Temperature Corrected      pO2, Temperature Corrected     Procalcitonin    Collection Time: 09/20/24 10:21 PM    Specimen: Blood   Result Value Ref Range    Procalcitonin 0.24 0.00 - 0.25 ng/mL   Urinalysis With Microscopic If Indicated (No Culture) - Urine, Clean Catch    Collection Time: 09/20/24 10:40 PM    Specimen: Urine, Clean Catch   Result Value Ref Range    Color, UA Yellow Yellow, Straw    Appearance, UA Clear Clear    pH, UA 5.5 5.0 - 8.0    Specific Gravity, UA >1.030 (H) 1.005 - 1.030    Glucose, UA >=1000 mg/dL (3+) (A) Negative    Ketones, UA 15 mg/dL (1+) (A) Negative    Bilirubin, UA Negative Negative    Blood, UA Negative Negative    Protein, UA 30 mg/dL (1+) (A) Negative    Leuk Esterase, UA Negative Negative    Nitrite, UA Negative Negative    Urobilinogen, UA 0.2 E.U./dL 0.2 - 1.0 E.U./dL   Respiratory Panel PCR w/COVID-19(SARS-CoV-2) MICHAEL/SHASHANK/NAVJOT/PAD/COR/HAYLEY In-House, NP Swab in UTM/VTM, 2 HR TAT - Swab, Nasopharynx    Collection Time: 09/20/24 10:40 PM    Specimen: Nasopharynx; Swab   Result Value Ref Range    ADENOVIRUS, PCR Not Detected Not Detected    Coronavirus 229E Not Detected Not Detected    Coronavirus HKU1 Not Detected Not Detected    Coronavirus NL63 Not Detected Not Detected    Coronavirus OC43 Not Detected Not  Detected    COVID19 Detected (C) Not Detected - Ref. Range    Human Metapneumovirus Not Detected Not Detected    Human Rhinovirus/Enterovirus Not Detected Not Detected    Influenza A PCR Not Detected Not Detected    Influenza B PCR Not Detected Not Detected    Parainfluenza Virus 1 Not Detected Not Detected    Parainfluenza Virus 2 Not Detected Not Detected    Parainfluenza Virus 3 Not Detected Not Detected    Parainfluenza Virus 4 Not Detected Not Detected    RSV, PCR Not Detected Not Detected    Bordetella pertussis pcr Not Detected Not Detected    Bordetella parapertussis PCR Not Detected Not Detected    Chlamydophila pneumoniae PCR Not Detected Not Detected    Mycoplasma pneumo by PCR Not Detected Not Detected   Urinalysis, Microscopic Only - Urine, Clean Catch    Collection Time: 09/20/24 10:40 PM    Specimen: Urine, Clean Catch   Result Value Ref Range    RBC, UA None Seen None Seen, 0-2 /HPF    WBC, UA 0-2 None Seen, 0-2 /HPF    Bacteria, UA Trace (A) None Seen /HPF    Squamous Epithelial Cells, UA 0-2 None Seen, 0-2 /HPF    Hyaline Casts, UA None Seen None Seen /LPF    Methodology Manual Light Microscopy    POC Glucose 4x Daily Before Meals & at Bedtime    Collection Time: 09/20/24 10:47 PM    Specimen: Blood   Result Value Ref Range    Glucose 315 (H) 70 - 130 mg/dL       If labs were ordered, I independently reviewed the results and considered them in treating the patient.        RADIOLOGY  CT Angiogram Chest Pulmonary Embolism    Result Date: 9/20/2024  FINAL REPORT TECHNIQUE: null CLINICAL HISTORY: Shortness of breath, tachycardia, +dimer, eval PE COMPARISON: null FINDINGS: Exam: CTA Chest with IV contrast. Procedure: 98 mL of contrast. Coronal and sagittal MIP reformats were performed Comparison: 09/14/2024 Clinical history: Shortness of breath, tachycardia, positive D-dimer, evaluate PE Findings: Limited due to patient motion artifact. No central pulmonary emboli. Small pulmonary emboli are not  excluded secondary to limitations of the study. No thoracic aortic aneurysm or dissection. Atherosclerotic calcifications are seen at the aorta and coronary arteries. No hilar or mediastinal adenopathy. No significant pericardial fluid collection. No pneumothorax. No pleural fluid collection. Opacification of the right and left lower lobe bronchi which may be reflective of secretions or aspiration. New bilateral lower lobe airspace disease likely pneumonia Severe emphysema. Pleural-parenchymal scarring and calcification seen at the lung apices bilaterally, unchanged. Visualized liver and spleen do not demonstrate any acute process No thoracic spine compression fractures     Impression: 1. No pulmonary emboli. 2. No thoracic aortic aneurysm or dissection 3. Severe emphysema. 4. New bilateral lower lobe airspace disease, likely pneumonia. Opacification of the right and left lower lobe bronchi which may be reflective of aspiration or secretions. Authenticated and Electronically Signed by Sammie Lozano MD on 09/20/2024 08:33:43 PM     PROCEDURES    Critical Care    Performed by: Rudy Montenegro MD  Authorized by: Rudy Montenegro MD    Critical care provider statement:     Critical care time (minutes):  48    Critical care was necessary to treat or prevent imminent or life-threatening deterioration of the following conditions:  Respiratory failure, circulatory failure and sepsis    Critical care was time spent personally by me on the following activities:  Ordering and review of radiographic studies, ordering and review of laboratory studies, pulse oximetry, re-evaluation of patient's condition and review of old charts    Care discussed with: admitting provider        ECG 12 Lead Tachycardia   Final Result      Telemetry Scan   Final Result          MEDICATIONS GIVEN IN ER    Medications   dilTIAZem (CARDIZEM) 100 mg in 100 mL NS infusion (ADV) (10 mg/hr Intravenous Currently Infusing 9/20/24 2119)   aspirin EC tablet  81 mg (has no administration in time range)   folic acid (FOLVITE) tablet 1 mg (has no administration in time range)   ipratropium-albuterol (DUO-NEB) nebulizer solution 3 mL (has no administration in time range)   levothyroxine (SYNTHROID, LEVOTHROID) tablet 75 mcg (has no administration in time range)   losartan (COZAAR) tablet 100 mg (has no administration in time range)   pravastatin (PRAVACHOL) tablet 40 mg (40 mg Oral Given 9/20/24 2304)   sodium chloride 0.9 % flush 10 mL (10 mL Intravenous Given 9/20/24 2305)   sodium chloride 0.9 % flush 10 mL (has no administration in time range)   sodium chloride 0.9 % infusion 40 mL (has no administration in time range)   ondansetron (ZOFRAN) injection 4 mg (has no administration in time range)   nitroglycerin (NITROSTAT) SL tablet 0.4 mg (has no administration in time range)   Potassium Replacement - Follow Nurse / BPA Driven Protocol (has no administration in time range)   Magnesium Low Dose Replacement - Follow Nurse / BPA Driven Protocol (has no administration in time range)   Phosphorus Replacement - Follow Nurse / BPA Driven Protocol (has no administration in time range)   Calcium Replacement - Follow Nurse / BPA Driven Protocol (has no administration in time range)   acetaminophen (TYLENOL) tablet 650 mg (has no administration in time range)     Or   acetaminophen (TYLENOL) 160 MG/5ML oral solution 650 mg (has no administration in time range)     Or   acetaminophen (TYLENOL) suppository 650 mg (has no administration in time range)   sennosides-docusate (PERICOLACE) 8.6-50 MG per tablet 2 tablet (has no administration in time range)     And   polyethylene glycol (MIRALAX) packet 17 g (has no administration in time range)     And   bisacodyl (DULCOLAX) EC tablet 5 mg (has no administration in time range)     And   bisacodyl (DULCOLAX) suppository 10 mg (has no administration in time range)   melatonin tablet 5 mg (has no administration in time range)   cefTRIAXone  (ROCEPHIN) 2,000 mg in sodium chloride 0.9 % 100 mL IVPB-VTB (has no administration in time range)   doxycycline (VIBRAMYCIN) 100 mg in sodium chloride 0.9 % 100 mL IVPB-VTB (has no administration in time range)   methylPREDNISolone sodium succinate (SOLU-Medrol) injection 60 mg (has no administration in time range)   Pharmacy to Dose enoxaparin (LOVENOX) (has no administration in time range)   insulin glargine (LANTUS, SEMGLEE) injection 1-200 Units (10 Units Subcutaneous Given 9/20/24 2304)   insulin lispro (humaLOG) injection 1-200 Units (2 Units Subcutaneous Given 9/20/24 2305)   insulin lispro (humaLOG) injection 1-200 Units (has no administration in time range)   dextrose (GLUTOSE) oral gel 15 g (has no administration in time range)   dextrose (D50W) (25 g/50 mL) IV injection 10-50 mL (has no administration in time range)   Glucagon (GLUCAGEN) injection 1 mg (has no administration in time range)   Enoxaparin Sodium (LOVENOX) syringe 30 mg (30 mg Subcutaneous Given 9/20/24 2335)   dilTIAZem (CARDIZEM) bolus from bag 1 mg/mL solution 10 mg (10 mg Intravenous Bolus from Bag 9/20/24 1730)   cefTRIAXone (ROCEPHIN) 1,000 mg in sodium chloride 0.9 % 100 mL IVPB-VTB (0 mg Intravenous Stopped 9/20/24 1943)   doxycycline (VIBRAMYCIN) 100 mg in sodium chloride 0.9 % 100 mL IVPB-VTB (0 mg Intravenous Stopped 9/20/24 1950)   iopamidol (ISOVUE-300) 61 % injection 60 mL (49 mL Intravenous Given 9/20/24 1925)         MEDICAL DECISION MAKING, PROGRESS, and CONSULTS    All labs, if obtained, have been independently reviewed by me.  All radiology studies, if obtained, have been reviewed by me and the radiologist dictating the report.  All EKG's, if obtained, have been independently viewed and interpreted by me.      Discussion below represents my analysis of pertinent findings related to patient's condition, differential diagnosis, treatment plan and final disposition.                         Differential diagnosis:    ACS,  arrhythmia, CHF, COPD, PE, PNA, others.      Additional sources:    - Discussed/ obtained information from independent historians:  EMS at bedside    - External (non-ED) record review:  Reviewed rhythm strip from EMS after adenosine administration which demonstrated the presence of flutter waves    - Chronic or social conditions impacting care:  COPD, cachexia/malnutrition    - Shared decision making:        Orders placed during this visit:  Orders Placed This Encounter   Procedures    Critical Care    Blood Culture - Blood,    Blood Culture - Blood,    Respiratory Panel PCR w/COVID-19(SARS-CoV-2) MICHAEL/SHASHANK/NAVJOT/PAD/COR/HAYLEY In-House, NP Swab in UTM/VTM, 2 HR TAT - Swab, Nasopharynx    XR Chest 1 View    CT Angiogram Chest Pulmonary Embolism    Comprehensive Metabolic Panel    D-dimer, Quantitative    BNP    CK    High Sensitivity Troponin T    Ethanol    Magnesium    Phosphorus    TSH    Urinalysis With Microscopic If Indicated (No Culture) - Urine, Clean Catch    CBC Auto Differential    Lactic Acid, Plasma    Blood Gas, Arterial -With Co-Ox Panel: Yes    Blood Gas, Arterial With Co-Ox    Manual Differential    High Sensitivity Troponin T 2Hr    Basic Metabolic Panel    CBC Auto Differential    Procalcitonin    Blood Gas, Arterial -With Co-Ox Panel: Yes    Blood Gas, Arterial With Co-Ox    Urinalysis, Microscopic Only - Urine, Clean Catch    Diet: Cardiac, Diabetic; Healthy Heart (2-3 Na+); Consistent Carbohydrate; Fluid Consistency: Thin (IDDSI 0)    Notify Provider - NPPV    Vital Signs    Intake & Output    Weigh Patient    Oral Care    Maintain IV Access    Telemetry - Place Orders & Notify Provider of Results When Patient Experiences Acute Chest Pain, Dysrhythmia or Respiratory Distress    May Be Off Telemetry for Tests    Place Sequential Compression Device    Maintain Sequential Compression Device    Up With Assistance    Continuous Pulse Oximetry    Initiate Glucommander™ SQ    RN to Order STAT Glucose (Lab  Performed) for POC Glucose <10 or >600    Code Status and Medical Interventions: No CPR (Do Not Attempt to Resuscitate); Limited Support; No intubation (DNI)    Inpatient Cardiology Consult    Patient is on Glucommander    OT Consult: Eval & Treat Discharge Placement Assessment    PT Consult: Eval & Treat Functional Mobility Below Baseline    NPPV Settings BiPAP    Incentive Spirometry    POC Glucose PRN    POC Glucose 4x Daily Before Meals & at Bedtime    ECG 12 Lead Tachycardia    Telemetry Scan    Insert Peripheral IV    Inpatient Admission    CBC & Differential         Additional orders considered but not ordered:      ED Course/MDM Discussion:    Patient is a 79-year-old female who presents for shortness of breath.  She has a history of chronic respiratory failure with hypoxia, COPD, cachexia, hypertension, diabetes.  Rhythm strip reviewed from adenosine administration by EMS.  It appears she has atrial flutter with flutter waves present between QRSs.  On arrival heart rate was in the 150s.      EKG demonstrated atrial fib/flutter with rapid ventricular response. Troponin mildly elevated suspect demand ischemia.    She has leukocytosis with bandemia and meets SIRS criteria. Started on Rocephin and doxycycline. Judicious fluid use given recent echo demonstrating severe pulmonary hypertension. CTA chest showed no PE.    She was started on Cardizem which helped with rate control. Her ABG showed respiratory acidosis and she was started on BiPAP.     Patient requires admission to the hospital for sepsis, acute hypoxic and hypercapnic respiratory failure in the setting of COPD, pneumonia, and atrial fibrillation/flutter with rapid ventricular response. Discussed with admitting provider.            ED Course as of 09/21/24 0119   Fri Sep 20, 2024   1719 EKG demonstrates atrial flutter with rapid ventricular response with a ventricular rate around 150 normal QRS normal QTc on my interpretation [DW]   175 Chest x-ray  demonstrates hyperinflation on my interpretation of imaging [DW]      ED Course User Index  [DW] Rudy Montenegro MD              Consultants:    Hospitalist    Shared Decision Making:  After my consideration of clinical presentation and any laboratory/radiology studies obtained, I discussed the findings with the patient/patient representative who is in agreement with the treatment plan and the final disposition.   Risks and benefits of discharge and/or observation/admission were discussed.         AS OF 01:19 EDT VITALS:    BP - 114/43  HR - 75  TEMP - 98.3 °F (36.8 °C) (Oral)  O2 SATS - 100%                  DIAGNOSIS  Final diagnoses:   Atrial flutter, unspecified type   Acute respiratory failure with hypoxia and hypercapnia   Pulmonary hypertension   Sepsis, due to unspecified organism, unspecified whether acute organ dysfunction present         DISPOSITION  ED Disposition       ED Disposition   Decision to Admit    Condition   --    Comment   Level of Care: Telemetry [5]   Diagnosis: Acute respiratory failure with hypoxia [612667]   Certification: I Certify That Inpatient Hospital Services Are Medically Necessary For Greater Than 2 Midnights                     Please note that portions of this document were completed with voice recognition software.        Rudy Montenegro MD  09/21/24 0119     PRINCIPAL DISCHARGE DIAGNOSIS  Diagnosis: Acute pulmonary edema  Assessment and Plan of Treatment: You are signing out against medical advice and are at a increased risk of death. You MUST follow up with your primary care provider as soon as possible.      SECONDARY DISCHARGE DIAGNOSES  Diagnosis: Respiratory distress  Assessment and Plan of Treatment:

## 2024-09-21 LAB
ANION GAP SERPL CALCULATED.3IONS-SCNC: 6.6 MMOL/L (ref 5–15)
BASOPHILS # BLD AUTO: 0.01 10*3/MM3 (ref 0–0.2)
BASOPHILS NFR BLD AUTO: 0.1 % (ref 0–1.5)
BUN SERPL-MCNC: 22 MG/DL (ref 8–23)
BUN/CREAT SERPL: 59.5 (ref 7–25)
CALCIUM SPEC-SCNC: 9 MG/DL (ref 8.6–10.5)
CHLORIDE SERPL-SCNC: 107 MMOL/L (ref 98–107)
CO2 SERPL-SCNC: 34.4 MMOL/L (ref 22–29)
CREAT SERPL-MCNC: 0.37 MG/DL (ref 0.57–1)
DEPRECATED RDW RBC AUTO: 42.8 FL (ref 37–54)
EGFRCR SERPLBLD CKD-EPI 2021: 102.1 ML/MIN/1.73
EOSINOPHIL # BLD AUTO: 0.01 10*3/MM3 (ref 0–0.4)
EOSINOPHIL NFR BLD AUTO: 0.1 % (ref 0.3–6.2)
ERYTHROCYTE [DISTWIDTH] IN BLOOD BY AUTOMATED COUNT: 12.2 % (ref 12.3–15.4)
GLUCOSE BLDC GLUCOMTR-MCNC: 118 MG/DL (ref 70–130)
GLUCOSE BLDC GLUCOMTR-MCNC: 266 MG/DL (ref 70–130)
GLUCOSE BLDC GLUCOMTR-MCNC: 272 MG/DL (ref 70–130)
GLUCOSE BLDC GLUCOMTR-MCNC: 273 MG/DL (ref 70–130)
GLUCOSE SERPL-MCNC: 111 MG/DL (ref 65–99)
HCT VFR BLD AUTO: 34 % (ref 34–46.6)
HGB BLD-MCNC: 10.2 G/DL (ref 12–15.9)
IMM GRANULOCYTES # BLD AUTO: 0.08 10*3/MM3 (ref 0–0.05)
IMM GRANULOCYTES NFR BLD AUTO: 0.5 % (ref 0–0.5)
LYMPHOCYTES # BLD AUTO: 0.39 10*3/MM3 (ref 0.7–3.1)
LYMPHOCYTES NFR BLD AUTO: 2.3 % (ref 19.6–45.3)
MCH RBC QN AUTO: 28.9 PG (ref 26.6–33)
MCHC RBC AUTO-ENTMCNC: 30 G/DL (ref 31.5–35.7)
MCV RBC AUTO: 96.3 FL (ref 79–97)
MONOCYTES # BLD AUTO: 0.64 10*3/MM3 (ref 0.1–0.9)
MONOCYTES NFR BLD AUTO: 3.8 % (ref 5–12)
NEUTROPHILS NFR BLD AUTO: 15.8 10*3/MM3 (ref 1.7–7)
NEUTROPHILS NFR BLD AUTO: 93.2 % (ref 42.7–76)
NRBC BLD AUTO-RTO: 0 /100 WBC (ref 0–0.2)
PLATELET # BLD AUTO: 275 10*3/MM3 (ref 140–450)
PMV BLD AUTO: 10.6 FL (ref 6–12)
POTASSIUM SERPL-SCNC: 3.7 MMOL/L (ref 3.5–5.2)
RBC # BLD AUTO: 3.53 10*6/MM3 (ref 3.77–5.28)
SODIUM SERPL-SCNC: 148 MMOL/L (ref 136–145)
WBC NRBC COR # BLD AUTO: 16.93 10*3/MM3 (ref 3.4–10.8)

## 2024-09-21 PROCEDURE — 63710000001 INSULIN GLARGINE PER 5 UNITS: Performed by: INTERNAL MEDICINE

## 2024-09-21 PROCEDURE — 85025 COMPLETE CBC W/AUTO DIFF WBC: CPT | Performed by: INTERNAL MEDICINE

## 2024-09-21 PROCEDURE — 25010000002 ENOXAPARIN PER 10 MG: Performed by: INTERNAL MEDICINE

## 2024-09-21 PROCEDURE — 82948 REAGENT STRIP/BLOOD GLUCOSE: CPT | Performed by: INTERNAL MEDICINE

## 2024-09-21 PROCEDURE — 80048 BASIC METABOLIC PNL TOTAL CA: CPT | Performed by: INTERNAL MEDICINE

## 2024-09-21 PROCEDURE — 63710000001 INSULIN LISPRO (HUMAN) PER 5 UNITS: Performed by: INTERNAL MEDICINE

## 2024-09-21 PROCEDURE — 99233 SBSQ HOSP IP/OBS HIGH 50: CPT | Performed by: FAMILY MEDICINE

## 2024-09-21 PROCEDURE — 25010000002 CEFTRIAXONE PER 250 MG: Performed by: INTERNAL MEDICINE

## 2024-09-21 PROCEDURE — 94799 UNLISTED PULMONARY SVC/PX: CPT

## 2024-09-21 PROCEDURE — 82948 REAGENT STRIP/BLOOD GLUCOSE: CPT

## 2024-09-21 PROCEDURE — 99222 1ST HOSP IP/OBS MODERATE 55: CPT | Performed by: STUDENT IN AN ORGANIZED HEALTH CARE EDUCATION/TRAINING PROGRAM

## 2024-09-21 PROCEDURE — 25010000002 METHYLPREDNISOLONE PER 125 MG: Performed by: INTERNAL MEDICINE

## 2024-09-21 RX ORDER — METOPROLOL TARTRATE 25 MG/1
25 TABLET, FILM COATED ORAL EVERY 12 HOURS SCHEDULED
Status: DISCONTINUED | OUTPATIENT
Start: 2024-09-21 | End: 2024-09-26 | Stop reason: HOSPADM

## 2024-09-21 RX ADMIN — INSULIN LISPRO 3 UNITS: 100 INJECTION, SOLUTION INTRAVENOUS; SUBCUTANEOUS at 17:49

## 2024-09-21 RX ADMIN — ASPIRIN 81 MG: 81 TABLET, COATED ORAL at 09:15

## 2024-09-21 RX ADMIN — Medication 10 ML: at 21:57

## 2024-09-21 RX ADMIN — PRAVASTATIN SODIUM 40 MG: 20 TABLET ORAL at 21:57

## 2024-09-21 RX ADMIN — INSULIN LISPRO 1 UNITS: 100 INJECTION, SOLUTION INTRAVENOUS; SUBCUTANEOUS at 21:57

## 2024-09-21 RX ADMIN — METHYLPREDNISOLONE SODIUM SUCCINATE 60 MG: 125 INJECTION, POWDER, FOR SOLUTION INTRAMUSCULAR; INTRAVENOUS at 06:19

## 2024-09-21 RX ADMIN — SODIUM CHLORIDE 2000 MG: 9 INJECTION, SOLUTION INTRAVENOUS at 09:15

## 2024-09-21 RX ADMIN — ENOXAPARIN SODIUM 30 MG: 100 INJECTION SUBCUTANEOUS at 21:58

## 2024-09-21 RX ADMIN — LEVOTHYROXINE SODIUM 75 MCG: 0.07 TABLET ORAL at 09:15

## 2024-09-21 RX ADMIN — LOSARTAN POTASSIUM 100 MG: 50 TABLET, FILM COATED ORAL at 09:15

## 2024-09-21 RX ADMIN — INSULIN GLARGINE 9 UNITS: 100 INJECTION, SOLUTION SUBCUTANEOUS at 21:57

## 2024-09-21 RX ADMIN — FOLIC ACID 1 MG: 1 TABLET ORAL at 09:15

## 2024-09-21 RX ADMIN — METOPROLOL TARTRATE 25 MG: 25 TABLET, FILM COATED ORAL at 13:56

## 2024-09-21 RX ADMIN — DOXYCYCLINE 100 MG: 100 INJECTION, POWDER, LYOPHILIZED, FOR SOLUTION INTRAVENOUS at 10:40

## 2024-09-21 RX ADMIN — METHYLPREDNISOLONE SODIUM SUCCINATE 60 MG: 125 INJECTION, POWDER, FOR SOLUTION INTRAMUSCULAR; INTRAVENOUS at 17:49

## 2024-09-21 RX ADMIN — ENOXAPARIN SODIUM 30 MG: 100 INJECTION SUBCUTANEOUS at 13:05

## 2024-09-21 RX ADMIN — Medication 10 ML: at 09:16

## 2024-09-21 RX ADMIN — INSULIN LISPRO 2 UNITS: 100 INJECTION, SOLUTION INTRAVENOUS; SUBCUTANEOUS at 12:32

## 2024-09-21 NOTE — H&P
[facility] HOSPITALIST   HISTORY AND PHYSICAL      Name:  Maura Jiménez   Age:  79 y.o.  Sex:  female  :  1945  MRN:  [id number]   Visit Number:  [id number]  Admission Date:  2024  Date Of Service:  24  Primary Care Physician:  Sofia Martinez APRN    Chief Complaint:     Shortness of breath    History Of Presenting Illness:      Patient is a chronically ill 79-year-old female with history significant for advanced COPD on 3 L who presents to the emergency room with complaints of worsening shortness of breath.  Patient recently discharged from our facility 1824.  She was treated for acute on chronic respiratory failure with hypoxia related to COPD exacerbation.  Patient noted to have pulmonary lung nodule for which outpatient follow-up was advised.  She was discharged home on 3 L nasal cannula which is her baseline.  States that she has been compliant with all medications.  Was resting at home when she suddenly fell like she could not catch her breath.  Symptoms worsened and she continued to have a hard time taking a deep breath.  Denies productive cough.  Denies fever.  Denies chest pain, nausea/vomiting or diarrhea.  At the time my exam patient states that she is overall feeling a little bit better.    ED summary: Upon arrival patient afebrile, tachycardic normotensive and hypoxic requiring 10 L NIV.  Patient did not respond to adenosine x 2.  Found to be in A-fib with RVR, started on Cardizem drip with improvement in right.  ABG 7.251/74/77/33/95% on 7 L.  Troponin elevated 30-55.  CMP stable except for glucose 392.  TSH within normal limits.  D-dimer 0.99.  Blood cultures obtained.  Ethanol negative.  WBC 24.  CTA of the chest negative for PE.  Does show severe emphysema.  New bilateral lower lobe airspace disease likely pneumonia.  Patient received Rocephin, doxycycline.  She was continued on Cardizem drip and hospitalist asked to admit.    Review Of  Systems:    All systems were reviewed and negative except as mentioned in history of presenting illness, assessment and plan.    Past Medical History: Patient  has a past medical history of COPD (chronic obstructive pulmonary disease), Disease of thyroid gland, Hyperlipidemia, Osteoarthritis, and Type 2 diabetes mellitus without complication, without long-term current use of insulin (11/1/2018).    Past Surgical History: Patient  has a past surgical history that includes Appendectomy; Hysterectomy; and Cataract extraction, bilateral.    Social History: Patient  reports that she quit smoking about 5 years ago. Her smoking use included cigarettes. She has never used smokeless tobacco. She reports that she does not drink alcohol and does not use drugs.    Family History:  Patient's family history has been reviewed and found to be noncontributory.     Allergies:      Shellfish-derived products, Codeine, Codeine, and Shellfish-derived products    Home Medications:    Prior to Admission Medications       Prescriptions Last Dose Informant Patient Reported? Taking?    amLODIPine (NORVASC) 5 MG tablet   Yes No    Take 1 tablet by mouth Daily.    Budeson-Glycopyrrol-Formoterol (BREZTRI) 160-9-4.8 MCG/ACT aerosol inhaler   Yes No    Inhale 2 puffs 2 (Two) Times a Day.    Cyanocobalamin (VITAMIN B 12 PO)   Yes No    Take 1,000 Units by mouth Daily.    EQ Aspirin Adult Low Dose 81 MG EC tablet   Yes No    Take 1 tablet by mouth Daily.    folic acid (FOLVITE) 1 MG tablet   Yes No    Take 1 tablet by mouth Daily.    ipratropium-albuterol (DUO-NEB) 0.5-2.5 mg/3 ml nebulizer   No No    Take 3 mL by nebulization 4 (Four) Times a Day As Needed for Wheezing or Shortness of Air.    levothyroxine (SYNTHROID, LEVOTHROID) 75 MCG tablet   Yes No    Take 1 tablet by mouth Daily.    losartan (COZAAR) 100 MG tablet   Yes No    Take 1 tablet by mouth Daily.    metFORMIN ER (GLUCOPHAGE-XR) 500 MG 24 hr tablet   Yes No    Take 1 tablet by mouth  "Every 12 (Twelve) Hours.    pravastatin (PRAVACHOL) 40 MG tablet   Yes No    Take 1 tablet by mouth every night at bedtime.    predniSONE (DELTASONE) 10 MG tablet   No No    Take 6 tablets by mouth Daily for 3 days, THEN 4 tablets Daily for 3 days, THEN 2 tablets Daily for 3 days, THEN 1 tablet Daily for 3 days.          ED Medications:    Medications   dilTIAZem (CARDIZEM) 100 mg in 100 mL NS infusion (ADV) (10 mg/hr Intravenous Currently Infusing 9/20/24 2119)   dilTIAZem (CARDIZEM) bolus from bag 1 mg/mL solution 10 mg (10 mg Intravenous Bolus from Bag 9/20/24 1730)   cefTRIAXone (ROCEPHIN) 1,000 mg in sodium chloride 0.9 % 100 mL IVPB-VTB (0 mg Intravenous Stopped 9/20/24 1943)   doxycycline (VIBRAMYCIN) 100 mg in sodium chloride 0.9 % 100 mL IVPB-VTB (0 mg Intravenous Stopped 9/20/24 1950)   iopamidol (ISOVUE-300) 61 % injection 60 mL (49 mL Intravenous Given 9/20/24 1925)     Vital Signs:  Temp:  [98.1 °F (36.7 °C)] 98.1 °F (36.7 °C)  Heart Rate:  [] 85  Resp:  [24] 24  BP: ()/() 124/45        09/20/24 1709   Weight: 30.8 kg (68 lb)     Body mass index is 13.28 kg/m².    Physical Exam:     Most recent vital Signs: /45   Pulse 85   Temp 98.1 °F (36.7 °C) (Oral)   Resp 24   Ht 152.4 cm (60\")   Wt 30.8 kg (68 lb)   SpO2 100%   BMI 13.28 kg/m²     Physical Exam  Vitals reviewed.   Constitutional:       General: She is not in acute distress.     Appearance: She is ill-appearing.      Comments: Emaciated, frail elderly female   HENT:      Head: Normocephalic and atraumatic.      Right Ear: External ear normal.      Left Ear: External ear normal.      Mouth/Throat:      Mouth: Mucous membranes are moist.      Pharynx: Oropharynx is clear.   Eyes:      Extraocular Movements: Extraocular movements intact.      Conjunctiva/sclera: Conjunctivae normal.   Cardiovascular:      Rate and Rhythm: Normal rate and regular rhythm.      Pulses: Normal pulses.      Heart sounds: Normal heart sounds. " "  Pulmonary:      Effort: Respiratory distress present.      Comments: Poor air movement bilaterally  Abdominal:      General: Bowel sounds are normal.      Palpations: Abdomen is soft.   Musculoskeletal:      Right lower leg: No edema.      Left lower leg: No edema.   Skin:     General: Skin is warm and dry.   Neurological:      General: No focal deficit present.      Mental Status: She is oriented to person, place, and time.         Laboratory data:    I have reviewed the labs done in the emergency room.    Results from last 7 days   Lab Units 09/20/24  1811 09/18/24  0633 09/17/24  0614 09/15/24  0621 09/14/24  1807   SODIUM mmol/L 145 145 142   < > 140   POTASSIUM mmol/L 4.0 4.3 4.2   < > 4.4   CHLORIDE mmol/L 102 100 98   < > 96*   CO2 mmol/L 31.6* 36.1* 34.5*   < > 36.3*   BUN mg/dL 25* 42* 43*   < > 23   CREATININE mg/dL 0.52* 0.53* 0.49*   < > 0.64   CALCIUM mg/dL 8.9 9.4 9.7   < > 9.4   BILIRUBIN mg/dL 0.2  --   --   --  0.2   ALK PHOS U/L 107  --   --   --  59   ALT (SGPT) U/L 21  --   --   --  14   AST (SGOT) U/L 20  --   --   --  16   GLUCOSE mg/dL 392* 247* 244*   < > 504*    < > = values in this interval not displayed.     Results from last 7 days   Lab Units 09/20/24  1727 09/18/24  0633 09/17/24  0614   WBC 10*3/mm3 24.25* 20.33* 23.76*   HEMOGLOBIN g/dL 11.3* 10.2* 9.9*   HEMATOCRIT % 36.7 33.3* 32.8*   PLATELETS 10*3/mm3 355 274 287         Results from last 7 days   Lab Units 09/20/24 2026 09/20/24  1811 09/14/24  2038   CK TOTAL U/L  --  49  --    HSTROP T ng/L 55* 30* 17*     Results from last 7 days   Lab Units 09/20/24  1811   PROBNP pg/mL 1,124.0             Results from last 7 days   Lab Units 09/20/24  1749   PH, ARTERIAL pH units 7.251*   PO2 ART mm Hg 77.3   PCO2, ARTERIAL mm Hg 74.1*   HCO3 ART mmol/L 32.5*           Invalid input(s): \"USDES\", \"NITRITITE\", \"BACT\", \"EP\"    Pain Management Panel           No data to display                EKG:      EKG personally reviewed, Estelle RVR with " a rate of 154 bpm.    Radiology:    CT Angiogram Chest Pulmonary Embolism    Result Date: 9/20/2024  FINAL REPORT TECHNIQUE: null CLINICAL HISTORY: Shortness of breath, tachycardia, +dimer, eval PE COMPARISON: null FINDINGS: Exam: CTA Chest with IV contrast. Procedure: 98 mL of contrast. Coronal and sagittal MIP reformats were performed Comparison: 09/14/2024 Clinical history: Shortness of breath, tachycardia, positive D-dimer, evaluate PE Findings: Limited due to patient motion artifact. No central pulmonary emboli. Small pulmonary emboli are not excluded secondary to limitations of the study. No thoracic aortic aneurysm or dissection. Atherosclerotic calcifications are seen at the aorta and coronary arteries. No hilar or mediastinal adenopathy. No significant pericardial fluid collection. No pneumothorax. No pleural fluid collection. Opacification of the right and left lower lobe bronchi which may be reflective of secretions or aspiration. New bilateral lower lobe airspace disease likely pneumonia Severe emphysema. Pleural-parenchymal scarring and calcification seen at the lung apices bilaterally, unchanged. Visualized liver and spleen do not demonstrate any acute process No thoracic spine compression fractures     Impression: 1. No pulmonary emboli. 2. No thoracic aortic aneurysm or dissection 3. Severe emphysema. 4. New bilateral lower lobe airspace disease, likely pneumonia. Opacification of the right and left lower lobe bronchi which may be reflective of aspiration or secretions. Authenticated and Electronically Signed by Sammie Lozano MD on 09/20/2024 08:33:43 PM     Assessment:    Acute hypoxic/hypercapnic respiratory failure  COPD exacerbation  Pneumonia  A-fib with RVR  Elevated troponin  Hyperglycemia/type 2 diabetes  Lung nodule  Hyperlipidemia  Hypertension  Hypothyroidism  Severe malnutrition  Impaired mobility and ADL    Plan:    Acute hypoxic/hypercapnic respiratory failure  COPD  exacerbation  COVID Pneumonia  BiPAP.  Repeat ABG.  Patient on 3 L nasal cannula at baseline  Empiric Rocephin and doxycycline  Respiratory PCR pending  Solu-Medrol, bronchodilator  A-fib with RVR  Elevated troponin  Continue Cardizem drip  Suspect A-fib and elevated troponin secondary to respiratory distress.  Low suspicion for ACS  Cardiology consult in the a.m.  Recent echocardiogram 9/16/2024 EF 72% with normal diastolic function  Therapeutic Lovenox  Hyperglycemia/type 2 diabetes  Suspect hyperglycemia in the setting of recent steroid use  Glucomander protocol    Further orders as clinical course dictates.  PT/OT.    Risk Assessment: High  DVT Prophylaxis: Therapeutic Lovenox  Code Status: DNR/DNI  Diet: As tolerated            Ronaldo Morales DO  09/20/24  21:43 EDT    Dictated utilizing Dragon dictation.

## 2024-09-21 NOTE — PLAN OF CARE
Problem: Noninvasive Ventilation Acute  Goal: Effective Unassisted Ventilation and Oxygenation  Outcome: Ongoing, Progressing     Problem: Adult Inpatient Plan of Care  Goal: Plan of Care Review  Outcome: Ongoing, Progressing  Goal: Patient-Specific Goal (Individualized)  Outcome: Ongoing, Progressing  Goal: Absence of Hospital-Acquired Illness or Injury  Outcome: Ongoing, Progressing  Intervention: Identify and Manage Fall Risk  Recent Flowsheet Documentation  Taken 9/21/2024 1600 by Haley Orantes RN  Safety Promotion/Fall Prevention:   safety round/check completed   room organization consistent  Taken 9/21/2024 0845 by Haley Orantes RN  Safety Promotion/Fall Prevention:   safety round/check completed   room organization consistent  Intervention: Prevent Skin Injury  Recent Flowsheet Documentation  Taken 9/21/2024 1600 by Haley Orantes RN  Body Position:   right   turned  Taken 9/21/2024 0845 by Haley Orantes RN  Body Position:   turned   right  Intervention: Prevent Infection  Recent Flowsheet Documentation  Taken 9/21/2024 0845 by Haley Orantes RN  Infection Prevention:   single patient room provided   rest/sleep promoted  Goal: Optimal Comfort and Wellbeing  Outcome: Ongoing, Progressing  Intervention: Provide Person-Centered Care  Recent Flowsheet Documentation  Taken 9/21/2024 0845 by Haley Orantes RN  Trust Relationship/Rapport:   care explained   choices provided  Goal: Readiness for Transition of Care  Outcome: Ongoing, Progressing     Problem: Adjustment to Illness (Sepsis/Septic Shock)  Goal: Optimal Coping  Outcome: Ongoing, Progressing     Problem: Bleeding (Sepsis/Septic Shock)  Goal: Absence of Bleeding  Outcome: Ongoing, Progressing     Problem: Glycemic Control Impaired (Sepsis/Septic Shock)  Goal: Blood Glucose Level Within Desired Range  Outcome: Ongoing, Progressing     Problem: Infection Progression (Sepsis/Septic Shock)  Goal: Absence of Infection Signs and  Symptoms  Outcome: Ongoing, Progressing  Intervention: Initiate Sepsis Management  Recent Flowsheet Documentation  Taken 9/21/2024 0845 by Haley Orantes, RN  Infection Prevention:   single patient room provided   rest/sleep promoted     Problem: Nutrition Impaired (Sepsis/Septic Shock)  Goal: Optimal Nutrition Intake  Outcome: Ongoing, Progressing     Problem: Skin Injury Risk Increased  Goal: Skin Health and Integrity  Outcome: Ongoing, Progressing  Intervention: Optimize Skin Protection  Recent Flowsheet Documentation  Taken 9/21/2024 1600 by Haley Orantes RN  Head of Bed (HOB) Positioning: HOB elevated     Problem: COPD (Chronic Obstructive Pulmonary Disease) Comorbidity  Goal: Maintenance of COPD Symptom Control  Outcome: Ongoing, Progressing     Problem: Diabetes Comorbidity  Goal: Blood Glucose Level Within Targeted Range  Outcome: Ongoing, Progressing     Problem: Hypertension Comorbidity  Goal: Blood Pressure in Desired Range  Outcome: Ongoing, Progressing     Problem: Fall Injury Risk  Goal: Absence of Fall and Fall-Related Injury  Outcome: Ongoing, Progressing  Intervention: Promote Injury-Free Environment  Recent Flowsheet Documentation  Taken 9/21/2024 1600 by Haley Orantes, RN  Safety Promotion/Fall Prevention:   safety round/check completed   room organization consistent  Taken 9/21/2024 0845 by Haley Orantes RN  Safety Promotion/Fall Prevention:   safety round/check completed   room organization consistent   Goal Outcome Evaluation:

## 2024-09-21 NOTE — CONSULTS
[facility]   Cardiology Consult Note    Patient Name: Maura Jiménez  : 1945  MRN: [id number]  Primary Care Physician:  Sofia Martinez APRN  Referring Physician: Rudy Montenegro MD    Date of admission: 2024    Subjective   Subjective     Reason for Consultation : Troponin elevation, A-fib    Chief Complaint : Shortness of breath    HPI:  Maura Jiménez is a 79 y.o. female with history of advanced COPD, type 2 diabetes, and hyperlipidemia who presented to the hospital with acute shortness of breath.  This is worsened over the last week and includes a dry cough and wheezing.  She was seen 4 days ago in the ER and discharged home but did not improve.  She arrived hypertens she was found to be in A-fib with RVR jay with blood pressure 189/79, hypoxic, and tachycardic.    IV Cardizem drip was started.  She had a mild troponin elevation to 55.  Cardiology was consulted for evaluation.  Patient tested positive for COVID     Review of Systems   All systems were reviewed and negative except for: Shortness of breath, cough    Personal History     Past Medical History:   Diagnosis Date    COPD (chronic obstructive pulmonary disease)     Disease of thyroid gland     Hyperlipidemia     Osteoarthritis     Type 2 diabetes mellitus without complication, without long-term current use of insulin 2018        Family History: family history includes Diabetes in her brother; Heart disease in her father. Otherwise pertinent FHx was reviewed and not pertinent to current issue.    Social History:  reports that she quit smoking about 5 years ago. Her smoking use included cigarettes. She has never used smokeless tobacco. She reports that she does not drink alcohol and does not use drugs.    Home Medications:  Budeson-Glycopyrrol-Formoterol, Cyanocobalamin, amLODIPine, aspirin, folic acid, ipratropium-albuterol, levothyroxine, losartan, metFORMIN ER, pravastatin, and predniSONE    Allergies:  Allergies    Allergen Reactions    Shellfish-Derived Products Nausea And Vomiting    Codeine Confusion     unknown    Codeine Other (See Comments)     Unable to tolerate    Shellfish-Derived Products GI Intolerance       Objective    Objective     Vitals:   Temp:  [98.1 °F (36.7 °C)-99.1 °F (37.3 °C)] 98.1 °F (36.7 °C)  Heart Rate:  [] 72  Resp:  [16-24] 16  BP: ()/() 105/37  Flow (L/min):  [4-15] 4      Physical Exam:   Constitutional: Elderly and frail appearance.  BMI of 12    Eyes: PERRLA, sclerae anicteric, no conjunctival injection   HENT: NCAT, mucous membranes moist   Neck: Supple, no thyromegaly, no lymphadenopathy, trachea midline   Respiratory: Decreased breath sounds, increased work of breathing   Cardiovascular: RRR, no murmurs, rubs, or gallops, palpable pedal pulses bilaterally   Gastrointestinal: Positive bowel sounds, soft, nontender, nondistended   Musculoskeletal: No bilateral ankle edema, no clubbing or cyanosis to extremities   Psychiatric: Appropriate affect, cooperative   Neurologic: Oriented x 3, strength symmetric in all extremities, Cranial Nerves grossly intact to confrontation, speech clear   Skin: No rashes     Result Review    Result Review:  I have personally reviewed the results from the time of this admission to 9/21/2024 11:42 EDT and agree with these findings:  [x]  Laboratory  []  Microbiology  [x]  Radiology  [x]  EKG/Telemetry   [x]  Cardiology/Vascular   []  Pathology  [x]  Old records  []  Other:  Most notable findings include:     CMP          9/18/2024    06:33 9/20/2024    18:11 9/21/2024    07:19   CMP   Glucose 247  392  111    BUN 42  25  22    Creatinine 0.53  0.52  0.37    EGFR 93.6  94.1  102.1    Sodium 145  145  148    Potassium 4.3  4.0  3.7    Chloride 100  102  107    Calcium 9.4  8.9  9.0    Total Protein  6.3     Albumin  3.3     Globulin  3.0     Total Bilirubin  0.2     Alkaline Phosphatase  107     AST (SGOT)  20     ALT (SGPT)  21      Albumin/Globulin Ratio  1.1     BUN/Creatinine Ratio 79.2  48.1  59.5    Anion Gap 8.9  11.4  6.6       CBC          9/18/2024    06:33 9/20/2024    17:27 9/21/2024    07:19   CBC   WBC 20.33  24.25  16.93    RBC 3.51  3.85  3.53    Hemoglobin 10.2  11.3  10.2    Hematocrit 33.3  36.7  34.0    MCV 94.9  95.3  96.3    MCH 29.1  29.4  28.9    MCHC 30.6  30.8  30.0    RDW 12.3  12.6  12.2    Platelets 274  355  275       Lab Results   Component Value Date    TROPONINT 55 (C) 09/20/2024         Assessment & Plan   Assessment / Plan     Brief Patient Summary:  Maura Jiménez is a 79 y.o. female who presented to the hospital with acute respiratory failure secondary to COVID.  She was found to be in A-fib with a mild troponin elevation     Active Hospital Problems:  Active Hospital Problems    Diagnosis     **Acute respiratory failure with hypoxia    COVID-pneumonia  Type II MI: Likely secondary to A-fib and COVID-pneumonia.  Patient is not having any symptoms of ACS.  ECG does not show ischemia  A-fib with RVR: Likely secondary to COVID.  [id number] score of 3.  Recommend rate control at this time as TIFFANIE cardioversion with COVID is not likely to solve the A-fib until patient has improved her clinical status.      Plan:   -Treat underlying medical issues.  Rate controlled A-fib.  -Would work on transitioning to oral metoprolol and weaning Cardizem drip  -Anticoagulation indicated if patient does not have any major contraindications.  -No ischemic workup planned at this time    Thank you for the consult.  We will sign off.  Please call if you have any further questions    Electronically signed by Syd Mckenzie MD, 09/21/24, 11:42 AM EDT.

## 2024-09-21 NOTE — PROGRESS NOTES
[facility]    Name:  Maura Jiménez   Age:  79 y.o.  Sex:  female  :  1945  MRN:  [id number]   Visit Number:  [id number]  Admission Date:  2024  Date Of Service:  24  Primary Care Physician:  Sofia Martinez APRN     LOS: 1 day :    Chief Complaint:      Shortness of breath    Subjective:    Patient seen earlier this morning, somewhat less short of breath than when she came in.  Notes chronic issues with breathing, has underlying COPD and wears oxygen at home.  Denied any chest pains.  Was seen by cardiology appreciate the recommendations    Hospital Course:    The patient is a chronically ill 79-year-old female with a history of chronic obstructive pulmonary disease with chronic respiratory failure, severe malnutrition, diabetes, hypothyroidism, who presented from home due to increasing shortness of breath, cough.  She actually just had a recent hospital stay for acute on chronic respiratory failure and COPD exacerbation, had lung nodule noted at the time.    Her workup in the emergency room she was tachycardic with evidence of atrial fibrillation, she was hypoxic initially up to 10 L.  She had an ABG with a pH of 7.251 and a pO2 of 74.  Troponin was elevated at 30 with repeat of 55.  A kidney function test was normal.  Her TSH the normal limits.  Dimer was 0.99.  A white count of 24.  CT imaging of the chest was negative for pulmonary embolism, concern for emphysema as well as bilateral lower lobe airspace disease.  Patient also tested positive for COVID-19.    Patient admitted initially started on Rocephin, doxycycline, bronchodilators and steroids in addition to Cardizem drip.  She was seen by cardiology.  She has become bradycardic now and Cardizem drip has been weaned off.  She was started on oral metoprolol and has been on therapeutic Lovenox.    Review of Systems:     All systems were reviewed and negative except as mentioned in  subjective, assessment and plan.    Vital Signs:    Temp:  [98.1 °F (36.7 °C)-99.1 °F (37.3 °C)] 98.5 °F (36.9 °C)  Heart Rate:  [] 73  Resp:  [16-24] 18  BP: ()/() 143/67    Intake and output:    I/O last 3 completed shifts:  In: 335.3 [I.V.:135.3; IV Piggyback:200]  Out: 500 [Urine:500]  I/O this shift:  In: 400 [P.O.:400]  Out: 500 [Urine:500]    Physical Examination:    General Appearance:  Alert and cooperative.  Extremely frail, malnourished cachectic   Head:  Atraumatic and normocephalic.   Eyes: Conjunctivae and sclerae normal, no icterus. No pallor.   Throat: No oral lesions, no thrush, oral mucosa moist.   Neck: Supple, trachea midline, no thyromegaly.   Lungs:   Scattered wheezes, diminished, rhonchi   Heart:  Bradycardic normal S1 and S2, no murmur, no gallop, no rub. No JVD.   Abdomen:   Normal bowel sounds, no masses, no organomegaly. Soft, nontender, nondistended, no rebound tenderness.   Extremities: Supple, no edema, no cyanosis, no clubbing.  Poor muscle mass   Skin: No bleeding or rash.   Neurologic: Alert and oriented x 3. No facial asymmetry. Moves all four limbs. No tremors.      Laboratory results:    Results from last 7 days   Lab Units 09/21/24  0719 09/20/24  1811 09/18/24  0633 09/15/24  0621 09/14/24  1807   SODIUM mmol/L 148* 145 145   < > 140   POTASSIUM mmol/L 3.7 4.0 4.3   < > 4.4   CHLORIDE mmol/L 107 102 100   < > 96*   CO2 mmol/L 34.4* 31.6* 36.1*   < > 36.3*   BUN mg/dL 22 25* 42*   < > 23   CREATININE mg/dL 0.37* 0.52* 0.53*   < > 0.64   CALCIUM mg/dL 9.0 8.9 9.4   < > 9.4   BILIRUBIN mg/dL  --  0.2  --   --  0.2   ALK PHOS U/L  --  107  --   --  59   ALT (SGPT) U/L  --  21  --   --  14   AST (SGOT) U/L  --  20  --   --  16   GLUCOSE mg/dL 111* 392* 247*   < > 504*    < > = values in this interval not displayed.     Results from last 7 days   Lab Units 09/21/24  0719 09/20/24  1727 09/18/24  0633   WBC 10*3/mm3 16.93* 24.25* 20.33*   HEMOGLOBIN g/dL 10.2* 11.3*  10.2*   HEMATOCRIT % 34.0 36.7 33.3*   PLATELETS 10*3/mm3 275 355 274         Results from last 7 days   Lab Units 09/20/24 2026 09/20/24 1811 09/14/24 2038   CK TOTAL U/L  --  49  --    HSTROP T ng/L 55* 30* 17*         Recent Labs     06/26/24  1545 09/20/24  1749 09/20/24  2204   PHART 7.402 7.251* 7.327   VJA6EXS 52.8* 74.1* 64.7*   PO2ART 65.0* 77.3 178.0*   AHK6VJS 32.9* 32.5* 33.8*   BASEEXCESS 6.9* 3.8* 6.4*      I have reviewed the patient's laboratory results.    Radiology results:    XR Chest 1 View    Result Date: 9/21/2024  AP PORTABLE CHEST .  CLINICAL HISTORY: Shortness of air. Tachycardia..  COMPARISON: 9/14/2024.  FINDINGS: An AP portable view of the chest was obtained. Defibrillator pads obscure portions of the left lung. There is evidence of old granulomatous disease. However, the lungs are clear of airspace consolidation. There is severe bilateral diffuse emphysema. There is no apparent pleural disease or acute osseous abnormality.      Impression: Emphysema. No acute abnormality..       This report was signed and finalized on 9/21/2024 10:10 AM by Ihsan Rhodes MD.      CT Angiogram Chest Pulmonary Embolism    Result Date: 9/20/2024  FINAL REPORT TECHNIQUE: null CLINICAL HISTORY: Shortness of breath, tachycardia, +dimer, eval PE COMPARISON: null FINDINGS: Exam: CTA Chest with IV contrast. Procedure: 98 mL of contrast. Coronal and sagittal MIP reformats were performed Comparison: 09/14/2024 Clinical history: Shortness of breath, tachycardia, positive D-dimer, evaluate PE Findings: Limited due to patient motion artifact. No central pulmonary emboli. Small pulmonary emboli are not excluded secondary to limitations of the study. No thoracic aortic aneurysm or dissection. Atherosclerotic calcifications are seen at the aorta and coronary arteries. No hilar or mediastinal adenopathy. No significant pericardial fluid collection. No pneumothorax. No pleural fluid collection. Opacification of the right  and left lower lobe bronchi which may be reflective of secretions or aspiration. New bilateral lower lobe airspace disease likely pneumonia Severe emphysema. Pleural-parenchymal scarring and calcification seen at the lung apices bilaterally, unchanged. Visualized liver and spleen do not demonstrate any acute process No thoracic spine compression fractures     Impression: Impression: 1. No pulmonary emboli. 2. No thoracic aortic aneurysm or dissection 3. Severe emphysema. 4. New bilateral lower lobe airspace disease, likely pneumonia. Opacification of the right and left lower lobe bronchi which may be reflective of aspiration or secretions. Authenticated and Electronically Signed by Sammie Lozano MD on 09/20/2024 08:33:43 PM   I have reviewed the patient's radiology reports.    Medication Review:     I have reviewed the patient's active and prn medications.     Problem List:      Acute respiratory failure with hypoxia      Assessment:      Acute hypoxic/hypercapnic respiratory failure  COPD exacerbation  Pneumonia  A-fib with RVR  Elevated troponin  Hyperglycemia/type 2 diabetes  Lung nodule  Hyperlipidemia  Hypertension  Hypothyroidism  Severe malnutrition  Impaired mobility and ADL    Plan:    Acute hypoxic/hypercapnic respiratory failure  COPD exacerbation  COVID Pneumonia  Will stop doxycycline, continue Rocephin in setting of COVID-pneumonia with steroids and bronchodilators.  Encourage BiPAP use.  Patient is severely malnourished likely pulmonary cachexia contributing.  A-fib with RVR  Elevated troponin  Cardiology did see the patient.  Will stop Cardizem drip, has actually been bradycardic now he started her on metoprolol.  Most recent echo with EF 72% and diastolic function was okay.  Low suspicion for any ACS at this point.  Currently on therapeutic Lovenox will switch to DOAC prior to discharge.  Hyperglycemia/type 2 diabetes  Placed on Glucomander protocol.    DVT Prophylaxis: Full dose Lovenox  Code  Status: DNR  Diet: As tolerated  Discharge Plan: Multiple days    Ce Rock,   09/21/24  16:15 EDT    Dictated utilizing Dragon dictation.

## 2024-09-21 NOTE — PLAN OF CARE
Goal Outcome Evaluation:  Plan of Care Reviewed With: patient        Progress: no change  Outcome Evaluation: New admit overnight. Patient has rested well. Oxygen improved and heart rate has improved. Plan of care ongoing.

## 2024-09-21 NOTE — CASE MANAGEMENT/SOCIAL WORK
Discharge Planning Assessment  [facility]     Patient Name: Maura Jiménez  MRN: [id number]  Today's Date: 9/21/2024    Admit Date: 9/20/2024    Plan: Return home with Home Health   Discharge Needs Assessment       Row Name 09/21/24 1237       Living Environment    People in Home spouse    Current Living Arrangements home    Potentially Unsafe Housing Conditions none    In the past 12 months has the electric, gas, oil, or water company threatened to shut off services in your home? No    Primary Care Provided by self;spouse/significant other    Provides Primary Care For no one, unable/limited ability to care for self    Family Caregiver if Needed spouse    Quality of Family Relationships involved    Able to Return to Prior Arrangements yes       Resource/Environmental Concerns    Resource/Environmental Concerns none    Transportation Concerns none       Transportation Needs    In the past 12 months, has lack of transportation kept you from medical appointments or from getting medications? no    In the past 12 months, has lack of transportation kept you from meetings, work, or from getting things needed for daily living? No       Food Insecurity    Within the past 12 months, you worried that your food would run out before you got the money to buy more. Never true    Within the past 12 months, the food you bought just didn't last and you didn't have money to get more. Never true       Transition Planning    Patient/Family Anticipates Transition to home with help/services    Patient/Family Anticipated Services at Transition none    Transportation Anticipated car, drives self;family or friend will provide       Discharge Needs Assessment    Readmission Within the Last 30 Days previous discharge plan unsuccessful    Equipment Currently Used at Home commode;pulse ox;walker, rolling;oxygen;respiratory supplies    Concerns to be Addressed discharge planning    Anticipated Changes Related to Illness inability to care  for self    Outpatient/Agency/Support Group Needs homecare agency                   Discharge Plan       Row Name 09/21/24 1243       Plan    Plan Return home with Home Health    Plan Comments Spoke to pt regarding Discharge plans .Confirmed  address,phone number and primary care provider as being correct  on face sheet .She is a readmit Home Health [facility] was to start .She has Oxygen 3 LNC [place] AT this  time plan is to return home with Home Health                  Continued Care and Services - Admitted Since 9/20/2024    No active coordination exists for this encounter.       Selected Continued Care - Prior Encounters Includes continued care and service providers with selected services from prior encounters from 6/22/2024 to 9/21/2024      Discharged on 9/18/2024 Admission date: 9/14/2024 - Discharge disposition: Home or Self Care      Durable Medical Equipment       Service Provider Selected Services Address Phone Fax Patient Preferred    [facility] [place] [facility] [address] [address] [phone] --       Internal Comment last updated by Vane Martinez RN 9/18/2024 0908    [facility]                         Home Medical Care       Service Provider Selected Services Address Phone Fax Patient Preferred    [facility] [address] [address] [phone] --                             Demographic Summary       Row Name 09/21/24 1234       General Information    Admission Type inpatient    Arrived From emergency department    Required Notices Provided Important Message from Medicare    Referral Source admission list    Reason for Consult discharge planning    Preferred Language English                   Functional Status       Row Name 09/21/24 1234       Functional Status    Usual Activity Tolerance poor       Physical Activity    On average, how many days per week do you engage in  moderate to strenuous exercise (like a brisk walk)? 0 days    On average, how many minutes do you engage in exercise at this level? 0 min    Number of minutes of exercise per week 0       Functional Status, IADL    Medications assistive person    Meal Preparation completely dependent    Housekeeping completely dependent    Laundry completely dependent    Shopping completely dependent       Mental Status    General Appearance WDL WDL                   Psychosocial    No documentation.                  Abuse/Neglect    No documentation.                  Legal    No documentation.                  Substance Abuse    No documentation.                  Patient Forms    No documentation.                     Swathi López RN

## 2024-09-22 LAB
ALBUMIN SERPL-MCNC: 2.9 G/DL (ref 3.5–5.2)
ALBUMIN/GLOB SERPL: 1.2 G/DL
ALP SERPL-CCNC: 66 U/L (ref 39–117)
ALT SERPL W P-5'-P-CCNC: 14 U/L (ref 1–33)
ANION GAP SERPL CALCULATED.3IONS-SCNC: 4 MMOL/L (ref 5–15)
AST SERPL-CCNC: 11 U/L (ref 1–32)
BASOPHILS # BLD AUTO: 0.02 10*3/MM3 (ref 0–0.2)
BASOPHILS NFR BLD AUTO: 0.1 % (ref 0–1.5)
BILIRUB SERPL-MCNC: 0.2 MG/DL (ref 0–1.2)
BUN SERPL-MCNC: 27 MG/DL (ref 8–23)
BUN/CREAT SERPL: 64.3 (ref 7–25)
CALCIUM SPEC-SCNC: 9 MG/DL (ref 8.6–10.5)
CHLORIDE SERPL-SCNC: 106 MMOL/L (ref 98–107)
CO2 SERPL-SCNC: 36 MMOL/L (ref 22–29)
CREAT SERPL-MCNC: 0.42 MG/DL (ref 0.57–1)
DEPRECATED RDW RBC AUTO: 42.7 FL (ref 37–54)
EGFRCR SERPLBLD CKD-EPI 2021: 99 ML/MIN/1.73
EOSINOPHIL # BLD AUTO: 0 10*3/MM3 (ref 0–0.4)
EOSINOPHIL NFR BLD AUTO: 0 % (ref 0.3–6.2)
ERYTHROCYTE [DISTWIDTH] IN BLOOD BY AUTOMATED COUNT: 12.2 % (ref 12.3–15.4)
GLOBULIN UR ELPH-MCNC: 2.5 GM/DL
GLUCOSE BLDC GLUCOMTR-MCNC: 107 MG/DL (ref 70–130)
GLUCOSE BLDC GLUCOMTR-MCNC: 110 MG/DL (ref 70–130)
GLUCOSE BLDC GLUCOMTR-MCNC: 143 MG/DL (ref 70–130)
GLUCOSE BLDC GLUCOMTR-MCNC: 171 MG/DL (ref 70–130)
GLUCOSE SERPL-MCNC: 163 MG/DL (ref 65–99)
HCT VFR BLD AUTO: 30.8 % (ref 34–46.6)
HGB BLD-MCNC: 9.4 G/DL (ref 12–15.9)
IMM GRANULOCYTES # BLD AUTO: 0.15 10*3/MM3 (ref 0–0.05)
IMM GRANULOCYTES NFR BLD AUTO: 0.8 % (ref 0–0.5)
LYMPHOCYTES # BLD AUTO: 0.26 10*3/MM3 (ref 0.7–3.1)
LYMPHOCYTES NFR BLD AUTO: 1.4 % (ref 19.6–45.3)
MCH RBC QN AUTO: 29.3 PG (ref 26.6–33)
MCHC RBC AUTO-ENTMCNC: 30.5 G/DL (ref 31.5–35.7)
MCV RBC AUTO: 96 FL (ref 79–97)
MONOCYTES # BLD AUTO: 0.35 10*3/MM3 (ref 0.1–0.9)
MONOCYTES NFR BLD AUTO: 1.9 % (ref 5–12)
NEUTROPHILS NFR BLD AUTO: 18.13 10*3/MM3 (ref 1.7–7)
NEUTROPHILS NFR BLD AUTO: 95.8 % (ref 42.7–76)
NRBC BLD AUTO-RTO: 0 /100 WBC (ref 0–0.2)
PLATELET # BLD AUTO: 281 10*3/MM3 (ref 140–450)
PMV BLD AUTO: 10.6 FL (ref 6–12)
POTASSIUM SERPL-SCNC: 4.2 MMOL/L (ref 3.5–5.2)
PROT SERPL-MCNC: 5.4 G/DL (ref 6–8.5)
RBC # BLD AUTO: 3.21 10*6/MM3 (ref 3.77–5.28)
SODIUM SERPL-SCNC: 146 MMOL/L (ref 136–145)
WBC NRBC COR # BLD AUTO: 18.91 10*3/MM3 (ref 3.4–10.8)

## 2024-09-22 PROCEDURE — 99232 SBSQ HOSP IP/OBS MODERATE 35: CPT | Performed by: FAMILY MEDICINE

## 2024-09-22 PROCEDURE — 25010000002 CEFTRIAXONE PER 250 MG: Performed by: INTERNAL MEDICINE

## 2024-09-22 PROCEDURE — 63710000001 INSULIN GLARGINE PER 5 UNITS: Performed by: INTERNAL MEDICINE

## 2024-09-22 PROCEDURE — 25010000002 METHYLPREDNISOLONE PER 40 MG: Performed by: FAMILY MEDICINE

## 2024-09-22 PROCEDURE — 63710000001 INSULIN LISPRO (HUMAN) PER 5 UNITS: Performed by: INTERNAL MEDICINE

## 2024-09-22 PROCEDURE — 85025 COMPLETE CBC W/AUTO DIFF WBC: CPT | Performed by: FAMILY MEDICINE

## 2024-09-22 PROCEDURE — 82948 REAGENT STRIP/BLOOD GLUCOSE: CPT | Performed by: INTERNAL MEDICINE

## 2024-09-22 PROCEDURE — 94660 CPAP INITIATION&MGMT: CPT

## 2024-09-22 PROCEDURE — 82948 REAGENT STRIP/BLOOD GLUCOSE: CPT

## 2024-09-22 PROCEDURE — 97162 PT EVAL MOD COMPLEX 30 MIN: CPT

## 2024-09-22 PROCEDURE — 25010000002 METHYLPREDNISOLONE PER 125 MG: Performed by: INTERNAL MEDICINE

## 2024-09-22 PROCEDURE — 25010000002 ENOXAPARIN PER 10 MG: Performed by: INTERNAL MEDICINE

## 2024-09-22 PROCEDURE — 80053 COMPREHEN METABOLIC PANEL: CPT | Performed by: FAMILY MEDICINE

## 2024-09-22 RX ORDER — METHYLPREDNISOLONE SODIUM SUCCINATE 40 MG/ML
20 INJECTION, POWDER, LYOPHILIZED, FOR SOLUTION INTRAMUSCULAR; INTRAVENOUS EVERY 12 HOURS
Status: DISCONTINUED | OUTPATIENT
Start: 2024-09-22 | End: 2024-09-23

## 2024-09-22 RX ADMIN — Medication 10 ML: at 21:59

## 2024-09-22 RX ADMIN — APIXABAN 2.5 MG: 2.5 TABLET, FILM COATED ORAL at 21:58

## 2024-09-22 RX ADMIN — SODIUM CHLORIDE 2000 MG: 9 INJECTION, SOLUTION INTRAVENOUS at 09:24

## 2024-09-22 RX ADMIN — METHYLPREDNISOLONE SODIUM SUCCINATE 60 MG: 125 INJECTION, POWDER, FOR SOLUTION INTRAMUSCULAR; INTRAVENOUS at 06:05

## 2024-09-22 RX ADMIN — LOSARTAN POTASSIUM 100 MG: 50 TABLET, FILM COATED ORAL at 09:24

## 2024-09-22 RX ADMIN — PRAVASTATIN SODIUM 40 MG: 20 TABLET ORAL at 21:58

## 2024-09-22 RX ADMIN — INSULIN LISPRO 3 UNITS: 100 INJECTION, SOLUTION INTRAVENOUS; SUBCUTANEOUS at 12:13

## 2024-09-22 RX ADMIN — ENOXAPARIN SODIUM 30 MG: 100 INJECTION SUBCUTANEOUS at 09:23

## 2024-09-22 RX ADMIN — FOLIC ACID 1 MG: 1 TABLET ORAL at 09:23

## 2024-09-22 RX ADMIN — INSULIN LISPRO 4 UNITS: 100 INJECTION, SOLUTION INTRAVENOUS; SUBCUTANEOUS at 09:22

## 2024-09-22 RX ADMIN — METOPROLOL TARTRATE 25 MG: 25 TABLET, FILM COATED ORAL at 09:23

## 2024-09-22 RX ADMIN — INSULIN LISPRO 3 UNITS: 100 INJECTION, SOLUTION INTRAVENOUS; SUBCUTANEOUS at 17:12

## 2024-09-22 RX ADMIN — LEVOTHYROXINE SODIUM 75 MCG: 0.07 TABLET ORAL at 09:24

## 2024-09-22 RX ADMIN — ASPIRIN 81 MG: 81 TABLET, COATED ORAL at 09:23

## 2024-09-22 RX ADMIN — METHYLPREDNISOLONE SODIUM SUCCINATE 20 MG: 40 INJECTION, POWDER, FOR SOLUTION INTRAMUSCULAR; INTRAVENOUS at 17:12

## 2024-09-22 RX ADMIN — INSULIN GLARGINE 9 UNITS: 100 INJECTION, SOLUTION SUBCUTANEOUS at 21:58

## 2024-09-22 NOTE — PROGRESS NOTES
RT EQUIPMENT DEVICE RELATED - SKIN ASSESSMENT    Rogers Score:  Rogers Score: 18     RT Medical Equipment/Device:     NIV Mask:  Under-the-nose   size:  a    Skin Assessment:      Nose:  intact    Device Skin Pressure Protection:        Nurse Notification:  No    Robin Fallon, RRT

## 2024-09-22 NOTE — PLAN OF CARE
Problem: Adult Inpatient Plan of Care  Goal: Plan of Care Review  9/22/2024 0437 by Kiran Nuñez, RN  Outcome: Ongoing, Progressing  Flowsheets (Taken 9/22/2024 0430)  Outcome Evaluation: No acute events overnight. Patient has rested well. Atempted to use Bipap but was only able to tollerate 30min. Patient states that she does feel better. Back on her home 3L nasal canual. VSS. Plan of care ongoing.  9/22/2024 0430 by Kiran Nuñez, RN  Outcome: Ongoing, Progressing  Flowsheets (Taken 9/22/2024 0430)  Progress: improving  Plan of Care Reviewed With: patient  Outcome Evaluation: No acute events overnight. Patient has rested well. Atempted to use Bipap but was only able to tollerate 30min. Patient states that she does feel better. Back on her home 3L nasal canual. VSS. Plan of care ongoing.   Goal Outcome Evaluation:  Plan of Care Reviewed With: patient        Progress: improving  Outcome Evaluation: No acute events overnight. Patient has rested well. Atempted to use Bipap but was only able to tollerate 30min. Patient states that she does feel better. Back on her home 3L nasal canual. VSS. Plan of care ongoing.

## 2024-09-22 NOTE — PROGRESS NOTES
RT EQUIPMENT DEVICE RELATED - SKIN ASSESSMENT    Rogers Score:  Rogers Score: 16     RT Medical Equipment/Device:     NIV Mask:  Full-face    size: s    Skin Assessment:      Nose:  Intact    Device Skin Pressure Protection:  Skin-to-device areas padded:  None Required    Nurse Notification:  No    Lorraine Livingston, CRT

## 2024-09-22 NOTE — PROGRESS NOTES
[facility]    Name:  Maura Jiménez   Age:  79 y.o.  Sex:  female  :  1945  MRN:  [id number]   Visit Number:  [id number]  Admission Date:  2024  Date Of Service:  24  Primary Care Physician:  Sofia Martinez APRN     LOS: 2 days :    Chief Complaint:      Shortness of breath    Subjective:    Patient seen again today.  Is overall feeling better not quite back to her baseline.    Hospital Course:    The patient is a chronically ill 79-year-old female with a history of chronic obstructive pulmonary disease with chronic respiratory failure, severe malnutrition, diabetes, hypothyroidism, who presented from home due to increasing shortness of breath, cough.  She actually just had a recent hospital stay for acute on chronic respiratory failure and COPD exacerbation, had lung nodule noted at the time.    Her workup in the emergency room she was tachycardic with evidence of atrial fibrillation, she was hypoxic initially up to 10 L.  She had an ABG with a pH of 7.251 and a pO2 of 74.  Troponin was elevated at 30 with repeat of 55.  A kidney function test was normal.  Her TSH the normal limits.  Dimer was 0.99.  A white count of 24.  CT imaging of the chest was negative for pulmonary embolism, concern for emphysema as well as bilateral lower lobe airspace disease.  Patient also tested positive for COVID-19.    Patient admitted initially started on Rocephin, doxycycline, bronchodilators and steroids in addition to Cardizem drip.  She was seen by cardiology.  She has become bradycardic now and Cardizem drip has been weaned off.  She was started on oral metoprolol and has been on therapeutic Lovenox.    Review of Systems:     All systems were reviewed and negative except as mentioned in subjective, assessment and plan.    Vital Signs:    Temp:  [98.1 °F (36.7 °C)-98.9 °F (37.2 °C)] 98.3 °F (36.8 °C)  Heart Rate:  [47-81] 81  Resp:  [16-20] 18  BP:  (105-148)/(37-67) 140/56    Intake and output:    I/O last 3 completed shifts:  In: 785.3 [P.O.:450; I.V.:135.3; IV Piggyback:200]  Out: 1250 [Urine:1250]  No intake/output data recorded.    Physical Examination:    General Appearance:  Alert and cooperative.  Extremely frail, malnourished cachectic   Head:  Atraumatic and normocephalic.   Eyes: Conjunctivae and sclerae normal, no icterus. No pallor.   Throat: No oral lesions, no thrush, oral mucosa moist.   Neck: Supple, trachea midline, no thyromegaly.   Lungs:   Mild wheezes nontachypneic   Heart:  Bradycardic normal S1 and S2, no murmur, no gallop, no rub. No JVD.   Abdomen:   Normal bowel sounds, no masses, no organomegaly. Soft, nontender, nondistended, no rebound tenderness.   Extremities: Supple, no edema, no cyanosis, no clubbing.  Poor muscle mass   Skin: No bleeding or rash.   Neurologic: Alert and oriented x 3. No facial asymmetry. Moves all four limbs. No tremors.      Laboratory results:    Results from last 7 days   Lab Units 09/22/24  0728 09/21/24  0719 09/20/24  1811   SODIUM mmol/L 146* 148* 145   POTASSIUM mmol/L 4.2 3.7 4.0   CHLORIDE mmol/L 106 107 102   CO2 mmol/L 36.0* 34.4* 31.6*   BUN mg/dL 27* 22 25*   CREATININE mg/dL 0.42* 0.37* 0.52*   CALCIUM mg/dL 9.0 9.0 8.9   BILIRUBIN mg/dL 0.2  --  0.2   ALK PHOS U/L 66  --  107   ALT (SGPT) U/L 14  --  21   AST (SGOT) U/L 11  --  20   GLUCOSE mg/dL 163* 111* 392*     Results from last 7 days   Lab Units 09/22/24  0728 09/21/24  0719 09/20/24  1727   WBC 10*3/mm3 18.91* 16.93* 24.25*   HEMOGLOBIN g/dL 9.4* 10.2* 11.3*   HEMATOCRIT % 30.8* 34.0 36.7   PLATELETS 10*3/mm3 281 275 355         Results from last 7 days   Lab Units 09/20/24 2026 09/20/24  1811   CK TOTAL U/L  --  49   HSTROP T ng/L 55* 30*     Results from last 7 days   Lab Units 09/20/24  1811 09/20/24  1810   BLOODCX  No growth at 24 hours No growth at 24 hours     Recent Labs     06/26/24  1545 09/20/24  1749 09/20/24  2204   PHART  7.402 7.251* 7.327   NQR5ECE 52.8* 74.1* 64.7*   PO2ART 65.0* 77.3 178.0*   JDR5OMF 32.9* 32.5* 33.8*   BASEEXCESS 6.9* 3.8* 6.4*      I have reviewed the patient's laboratory results.    Radiology results:    XR Chest 1 View    Result Date: 9/21/2024  AP PORTABLE CHEST .  CLINICAL HISTORY: Shortness of air. Tachycardia..  COMPARISON: 9/14/2024.  FINDINGS: An AP portable view of the chest was obtained. Defibrillator pads obscure portions of the left lung. There is evidence of old granulomatous disease. However, the lungs are clear of airspace consolidation. There is severe bilateral diffuse emphysema. There is no apparent pleural disease or acute osseous abnormality.      Impression: Emphysema. No acute abnormality..       This report was signed and finalized on 9/21/2024 10:10 AM by Ihsan Rhodes MD.      CT Angiogram Chest Pulmonary Embolism    Result Date: 9/20/2024  FINAL REPORT TECHNIQUE: null CLINICAL HISTORY: Shortness of breath, tachycardia, +dimer, eval PE COMPARISON: null FINDINGS: Exam: CTA Chest with IV contrast. Procedure: 98 mL of contrast. Coronal and sagittal MIP reformats were performed Comparison: 09/14/2024 Clinical history: Shortness of breath, tachycardia, positive D-dimer, evaluate PE Findings: Limited due to patient motion artifact. No central pulmonary emboli. Small pulmonary emboli are not excluded secondary to limitations of the study. No thoracic aortic aneurysm or dissection. Atherosclerotic calcifications are seen at the aorta and coronary arteries. No hilar or mediastinal adenopathy. No significant pericardial fluid collection. No pneumothorax. No pleural fluid collection. Opacification of the right and left lower lobe bronchi which may be reflective of secretions or aspiration. New bilateral lower lobe airspace disease likely pneumonia Severe emphysema. Pleural-parenchymal scarring and calcification seen at the lung apices bilaterally, unchanged. Visualized liver and spleen do not  demonstrate any acute process No thoracic spine compression fractures     Impression: Impression: 1. No pulmonary emboli. 2. No thoracic aortic aneurysm or dissection 3. Severe emphysema. 4. New bilateral lower lobe airspace disease, likely pneumonia. Opacification of the right and left lower lobe bronchi which may be reflective of aspiration or secretions. Authenticated and Electronically Signed by Sammie Lozano MD on 09/20/2024 08:33:43 PM   I have reviewed the patient's radiology reports.    Medication Review:     I have reviewed the patient's active and prn medications.     Problem List:      Acute respiratory failure with hypoxia      Assessment:      Acute hypoxic/hypercapnic respiratory failure  COPD exacerbation  Pneumonia  A-fib with RVR  Elevated troponin  Hyperglycemia/type 2 diabetes  Lung nodule  Hyperlipidemia  Hypertension  Hypothyroidism  Severe malnutrition  Impaired mobility and ADL    Plan:    Acute hypoxic/hypercapnic respiratory failure  COPD exacerbation  COVID Pneumonia  Continue with steroids and bronchodilators in addition to Rocephin.  Patient does not wish to use BiPAP.  A-fib with RVR  Elevated troponin  Cardiology did see the patient.  Will stop Cardizem drip, has actually been bradycardic now he started her on metoprolol.  Most recent echo with EF 72% and diastolic function was okay.  Low suspicion for ACS.  Will start on Eliquis this evening.  Hyperglycemia/type 2 diabetes  Placed on Glucomander protocol.    DVT Prophylaxis: Eliquis  Code Status: DNR  Diet: As tolerated  Discharge Plan: Home tomorrow    Ce Rock DO  09/22/24  10:34 EDT    Dictated utilizing Dragon dictation.

## 2024-09-22 NOTE — THERAPY EVALUATION
Patient Name: Maura Jiménez  : 1945    MRN: [id number]                              Today's Date: 2024       Admit Date: 2024    Visit Dx:     ICD-10-CM ICD-9-CM   1. Atrial flutter, unspecified type  I48.92 427.32   2. Acute respiratory failure with hypoxia and hypercapnia  J96.01 518.81    J96.02    3. Pulmonary hypertension  I27.20 416.8   4. Sepsis, due to unspecified organism, unspecified whether acute organ dysfunction present  A41.9 038.9     995.91     Patient Active Problem List   Diagnosis    Respiratory failure with hypoxia    Chronic obstructive pulmonary disease with acute exacerbation    Type 2 diabetes mellitus without complication, without long-term current use of insulin    COPD exacerbation    Acute on chronic respiratory failure with hypoxia    Chronic respiratory failure with hypoxia    Rhinovirus infection    Severe malnutrition    Impaired mobility    Acute respiratory failure with hypoxia     Past Medical History:   Diagnosis Date    COPD (chronic obstructive pulmonary disease)     Disease of thyroid gland     Hyperlipidemia     Osteoarthritis     Type 2 diabetes mellitus without complication, without long-term current use of insulin 2018     Past Surgical History:   Procedure Laterality Date    APPENDECTOMY      CATARACT EXTRACTION, BILATERAL      HYSTERECTOMY        General Information       Row Name 24 1301          Physical Therapy Time and Intention    Document Type evaluation  -     Mode of Treatment physical therapy  -       Row Name 24 1301          General Information    Patient Profile Reviewed yes  -     Prior Level of Function independent:;all household mobility;community mobility;ADL's  -     Existing Precautions/Restrictions fall  -     Barriers to Rehab previous functional deficit;medically complex  -       Row Name 24 1301          Living Environment    People in Home spouse  -       Row Name 24 1301           Cognition    Orientation Status (Cognition) oriented x 4  -MK       Row Name 09/22/24 1301          Safety Issues, Functional Mobility    Safety Issues Affecting Function (Mobility) insight into deficits/self-awareness;safety precaution awareness;safety precautions follow-through/compliance  -     Impairments Affecting Function (Mobility) endurance/activity tolerance;shortness of breath  -               User Key  (r) = Recorded By, (t) = Taken By, (c) = Cosigned By      Initials Name Provider Type    Ronaldo Mckinney, PT Physical Therapist                   Mobility       Row Name 09/22/24 1302          Bed Mobility    Bed Mobility supine-sit  -MK     Supine-Sit [place] (Bed Mobility) modified independence  -     Assistive Device (Bed Mobility) bed rails;head of bed elevated  -       Row Name 09/22/24 1302          Sit-Stand Transfer    Sit-Stand [place] (Transfers) contact guard  -     Assistive Device (Sit-Stand Transfers) walker, front-wheeled  -       Row Name 09/22/24 1302          Gait/Stairs (Locomotion)    [place] Level (Gait) contact guard  -     Assistive Device (Gait) walker, front-wheeled  -     Patient was able to Ambulate yes  -MK     Distance in Feet (Gait) 18  -MK     Deviations/Abnormal Patterns (Gait) festinating/shuffling;gait speed decreased;base of support, narrow  -               User Key  (r) = Recorded By, (t) = Taken By, (c) = Cosigned By      Initials Name Provider Type    Ronaldo Mckinney, PT Physical Therapist                   Obj/Interventions       Row Name 09/22/24 1303          Range of Motion Comprehensive    General Range of Motion no range of motion deficits identified  -       Row Name 09/22/24 1303          Strength Comprehensive (MMT)    General Manual Muscle Testing (MMT) Assessment lower extremity strength deficits identified  -     Comment, General Manual Muscle Testing (MMT) Assessment b le 4/5  -       Row Name 09/22/24 1303           Balance    Balance Assessment sitting static balance;sitting dynamic balance;sit to stand dynamic balance;standing static balance;standing dynamic balance  -MK     Static Sitting Balance modified independence  -MK     Dynamic Sitting Balance modified independence  -MK     Position, Sitting Balance unsupported;sitting edge of bed  -MK     Sit to Stand Dynamic Balance contact guard  -MK     Static Standing Balance contact guard  -MK     Dynamic Standing Balance contact guard  -MK     Position/Device Used, Standing Balance walker, front-wheeled  -MK     Balance Interventions sitting;standing;sit to stand  -MK               User Key  (r) = Recorded By, (t) = Taken By, (c) = Cosigned By      Initials Name Provider Type    MK Ronalod Rocha, PT Physical Therapist                   Goals/Plan       Row Name 09/22/24 1309          Transfer Goal 1 (PT)    Activity/Assistive Device (Transfer Goal 1, PT) transfers, all  -MK     [place] Level/Cues Needed (Transfer Goal 1, PT) independent  -MK     Time Frame (Transfer Goal 1, PT) long term goal (LTG);10 days  -MK     Progress/Outcome (Transfer Goal 1, PT) new goal  -       Row Name 09/22/24 1309          Gait Training Goal 1 (PT)    Activity/Assistive Device (Gait Training Goal 1, PT) gait (walking locomotion)  -MK     [place] Level (Gait Training Goal 1, PT) independent  -MK     Distance (Gait Training Goal 1, PT) 100 ft  -MK     Time Frame (Gait Training Goal 1, PT) long term goal (LTG);10 days  -MK     Progress/Outcome (Gait Training Goal 1, PT) new goal  -       Row Name 09/22/24 1309          Patient Education Goal (PT)    Activity (Patient Education Goal, PT) Pt  to participate in B LE ther ex at least 3x per week  -MK     [place]/Cues/Accuracy (Memory Goal 2, PT) demonstrates adequately  -MK     Time Frame (Patient Education Goal, PT) long term goal (LTG);10 days  -MK     Progress/Outcome (Patient Education Goal, PT) new goal  -       Row Name 09/22/24  5479          Therapy Assessment/Plan (PT)    Planned Therapy Interventions (PT) balance training;bed mobility training;gait training;home exercise program;manual therapy techniques;motor coordination training;neuromuscular re-education;patient/family education;strengthening;transfer training  -               User Key  (r) = Recorded By, (t) = Taken By, (c) = Cosigned By      Initials Name Provider Type    Ronaldo Mckinney, PT Physical Therapist                   Clinical Impression       Row Name 09/22/24 1305          Pain    Pretreatment Pain Rating 0/10 - no pain  -     Posttreatment Pain Rating 0/10 - no pain  -     Additional Documentation Pain Scale: Numbers Pre/Post-Treatment (Group)  -       Row Name 09/22/24 1301          Plan of Care Review    Plan of Care Reviewed With patient  -     Progress no change  -     Outcome Evaluation Pt participated in PT evaluation this date. Pt reports that she went home after being discharged last week from this facility. Pt reports that she has continued to be on 3 L. Pt reports that she was IND prior to admission. Pt completed bed mobility mod I and sat EOB mod I with no change in dizziness. Pt completed STS CGA using RW and ambulated 18 ft CGA using RW with 3 L O2 donned. pt was seated and left with all needs met. Pt reported increased fatigue compared to last admission. Pt is expected to benefit from skilled PT during this inpatient stay to maximize functional mobility and decrease SOA.  -       Row Name 09/22/24 1300          Therapy Assessment/Plan (PT)    Patient/Family Therapy Goals Statement (PT) go home  -     Rehab Potential (PT) good, to achieve stated therapy goals  -     Criteria for Skilled Interventions Met (PT) yes  -     Therapy Frequency (PT) 5 times/wk  -     Predicted Duration of Therapy Intervention (PT) 2 weeks  -       Row Name 09/22/24 1308          Vital Signs    Pre SpO2 (%) 98  -     O2 Delivery Pre Treatment  supplemental O2  3 L  -MK     Intra SpO2 (%) 92  -MK     O2 Delivery Intra Treatment supplemental O2  3 L  -MK     Post SpO2 (%) 96  -MK     O2 Delivery Post Treatment supplemental O2  3 L  -MK     Pre Patient Position Supine  -MK     Intra Patient Position Standing  -MK     Post Patient Position Sitting  -       Row Name 09/22/24 1304          Positioning and Restraints    Pre-Treatment Position in bed  -MK     Post Treatment Position chair  -MK     In Chair reclined;call light within reach;encouraged to call for assist;exit alarm on  -               User Key  (r) = Recorded By, (t) = Taken By, (c) = Cosigned By      Initials Name Provider Type    Ronaldo Mckinney PT Physical Therapist                   Outcome Measures       Row Name 09/22/24 1309          How much help from another person do you currently need...    Turning from your back to your side while in flat bed without using bedrails? 4  -MK     Moving from lying on back to sitting on the side of a flat bed without bedrails? 4  -MK     Moving to and from a bed to a chair (including a wheelchair)? 3  -MK     Standing up from a chair using your arms (e.g., wheelchair, bedside chair)? 3  -MK     Climbing 3-5 steps with a railing? 2  -MK     To walk in hospital room? 3  -MK     AM-PAC 6 Clicks Score (PT) 19  -MK     Highest Level of Mobility Goal 6 --> Walk 10 steps or more  -       Row Name 09/22/24 1309          Functional Assessment    Outcome Measure Options AM-PAC 6 Clicks Basic Mobility (PT)  -               User Key  (r) = Recorded By, (t) = Taken By, (c) = Cosigned By      Initials Name Provider Type    Ronaldo Mckinney PT Physical Therapist                                 Physical Therapy Education       Title: PT OT SLP Therapies (In Progress)       Topic: Physical Therapy (In Progress)       Point: Mobility training (Done)       Learning Progress Summary             Patient Acceptance, E,D, DU,VU by YASH at 9/22/2024 1310                          Point: Home exercise program (Not Started)       Learner Progress:  Not documented in this visit.              Point: Body mechanics (Not Started)       Learner Progress:  Not documented in this visit.                              User Key       Initials Effective Dates Name Provider Type Discipline     06/13/23 -  Ronaldo oRcha PT Physical Therapist PT                  PT Recommendation and Plan  Planned Therapy Interventions (PT): balance training, bed mobility training, gait training, home exercise program, manual therapy techniques, motor coordination training, neuromuscular re-education, patient/family education, strengthening, transfer training  Plan of Care Reviewed With: patient  Progress: no change  Outcome Evaluation: Pt participated in PT evaluation this date. Pt reports that she went home after being discharged last week from this facility. Pt reports that she has continued to be on 3 L. Pt reports that she was IND prior to admission. Pt completed bed mobility mod I and sat EOB mod I with no change in dizziness. Pt completed STS CGA using RW and ambulated 18 ft CGA using RW with 3 L O2 donned. pt was seated and left with all needs met. Pt reported increased fatigue compared to last admission. Pt is expected to benefit from skilled PT during this inpatient stay to maximize functional mobility and decrease SOA.     Time Calculation:   PT Evaluation Complexity  History, PT Evaluation Complexity: 3 or more personal factors and/or comorbidities  Examination of Body Systems (PT Eval Complexity): total of 3 or more elements  Clinical Presentation (PT Evaluation Complexity): evolving  Clinical Decision Making (PT Evaluation Complexity): moderate complexity  Overall Complexity (PT Evaluation Complexity): moderate complexity     PT Charges       Row Name 09/22/24 1015             Time Calculation    Start Time 1015  -MK      PT Received On 09/22/24  -      PT Goal Re-Cert Due Date 10/02/24  -                 User Key  (r) = Recorded By, (t) = Taken By, (c) = Cosigned By      Initials Name Provider Type    MK Ronaldo Rocha, PT Physical Therapist                  Therapy Charges for Today       Code Description Service Date Service Provider Modifiers Qty    36415022308 HC PT EVAL MOD COMPLEXITY 3 9/22/2024 Ronaldo Rocha, PT GP 1            PT G-Codes  Outcome Measure Options: AM-PAC 6 Clicks Basic Mobility (PT)  AM-PAC 6 Clicks Score (PT): 19  PT Discharge Summary  Anticipated Discharge Disposition (PT): home with assist    Ronaldo Rocha PT  9/22/2024

## 2024-09-22 NOTE — PLAN OF CARE
Goal Outcome Evaluation:  Plan of Care Reviewed With: patient        Progress: no change  Outcome Evaluation: Pt participated in PT evaluation this date. Pt reports that she went home after being discharged last week from this facility. Pt reports that she has continued to be on 3 L. Pt reports that she was IND prior to admission. Pt completed bed mobility mod I and sat EOB mod I with no change in dizziness. Pt completed STS CGA using RW and ambulated 18 ft CGA using RW with 3 L O2 donned. pt was seated and left with all needs met. Pt reported increased fatigue compared to last admission. Pt is expected to benefit from skilled PT during this inpatient stay to maximize functional mobility and decrease SOA.      Anticipated Discharge Disposition (PT): home with assist

## 2024-09-22 NOTE — PLAN OF CARE
Problem: Noninvasive Ventilation Acute  Goal: Effective Unassisted Ventilation and Oxygenation  Intervention: Monitor and Manage Noninvasive Ventilation  Recent Flowsheet Documentation  Taken 9/22/2024 1428 by Robin Fallon, RRT  NPPV/CPAP Maintenance: (pt not wearing at this time) other (see comments)   Goal Outcome Evaluation:

## 2024-09-23 LAB
ANION GAP SERPL CALCULATED.3IONS-SCNC: 2.8 MMOL/L (ref 5–15)
BUN SERPL-MCNC: 24 MG/DL (ref 8–23)
BUN/CREAT SERPL: 66.7 (ref 7–25)
CALCIUM SPEC-SCNC: 9.1 MG/DL (ref 8.6–10.5)
CHLORIDE SERPL-SCNC: 108 MMOL/L (ref 98–107)
CO2 SERPL-SCNC: 37.2 MMOL/L (ref 22–29)
CREAT SERPL-MCNC: 0.36 MG/DL (ref 0.57–1)
EGFRCR SERPLBLD CKD-EPI 2021: 102.8 ML/MIN/1.73
GLUCOSE BLDC GLUCOMTR-MCNC: 199 MG/DL (ref 70–130)
GLUCOSE BLDC GLUCOMTR-MCNC: 200 MG/DL (ref 70–130)
GLUCOSE BLDC GLUCOMTR-MCNC: 297 MG/DL (ref 70–130)
GLUCOSE BLDC GLUCOMTR-MCNC: 83 MG/DL (ref 70–130)
GLUCOSE SERPL-MCNC: 85 MG/DL (ref 65–99)
POTASSIUM SERPL-SCNC: 4.5 MMOL/L (ref 3.5–5.2)
SODIUM SERPL-SCNC: 148 MMOL/L (ref 136–145)

## 2024-09-23 PROCEDURE — 94660 CPAP INITIATION&MGMT: CPT

## 2024-09-23 PROCEDURE — 63710000001 INSULIN GLARGINE PER 5 UNITS: Performed by: INTERNAL MEDICINE

## 2024-09-23 PROCEDURE — 94640 AIRWAY INHALATION TREATMENT: CPT

## 2024-09-23 PROCEDURE — 97110 THERAPEUTIC EXERCISES: CPT

## 2024-09-23 PROCEDURE — 97530 THERAPEUTIC ACTIVITIES: CPT

## 2024-09-23 PROCEDURE — 82948 REAGENT STRIP/BLOOD GLUCOSE: CPT

## 2024-09-23 PROCEDURE — 25010000002 CEFTRIAXONE PER 250 MG: Performed by: INTERNAL MEDICINE

## 2024-09-23 PROCEDURE — 25010000002 METHYLPREDNISOLONE PER 40 MG: Performed by: FAMILY MEDICINE

## 2024-09-23 PROCEDURE — 94799 UNLISTED PULMONARY SVC/PX: CPT

## 2024-09-23 PROCEDURE — 97166 OT EVAL MOD COMPLEX 45 MIN: CPT

## 2024-09-23 PROCEDURE — 80048 BASIC METABOLIC PNL TOTAL CA: CPT | Performed by: FAMILY MEDICINE

## 2024-09-23 PROCEDURE — 63710000001 INSULIN LISPRO (HUMAN) PER 5 UNITS: Performed by: INTERNAL MEDICINE

## 2024-09-23 PROCEDURE — 99232 SBSQ HOSP IP/OBS MODERATE 35: CPT | Performed by: FAMILY MEDICINE

## 2024-09-23 PROCEDURE — 99223 1ST HOSP IP/OBS HIGH 75: CPT | Performed by: INTERNAL MEDICINE

## 2024-09-23 PROCEDURE — 82948 REAGENT STRIP/BLOOD GLUCOSE: CPT | Performed by: INTERNAL MEDICINE

## 2024-09-23 PROCEDURE — 63710000001 PREDNISONE PER 1 MG: Performed by: INTERNAL MEDICINE

## 2024-09-23 RX ORDER — BUDESONIDE AND FORMOTEROL FUMARATE DIHYDRATE 160; 4.5 UG/1; UG/1
2 AEROSOL RESPIRATORY (INHALATION)
Status: DISCONTINUED | OUTPATIENT
Start: 2024-09-23 | End: 2024-09-26 | Stop reason: HOSPADM

## 2024-09-23 RX ORDER — IPRATROPIUM BROMIDE AND ALBUTEROL SULFATE 2.5; .5 MG/3ML; MG/3ML
3 SOLUTION RESPIRATORY (INHALATION)
Status: DISCONTINUED | OUTPATIENT
Start: 2024-09-23 | End: 2024-09-23

## 2024-09-23 RX ORDER — ALBUTEROL SULFATE 90 UG/1
2 INHALANT RESPIRATORY (INHALATION)
Status: DISCONTINUED | OUTPATIENT
Start: 2024-09-23 | End: 2024-09-26 | Stop reason: HOSPADM

## 2024-09-23 RX ORDER — FLUCONAZOLE 100 MG/1
100 TABLET ORAL EVERY 24 HOURS
Status: DISCONTINUED | OUTPATIENT
Start: 2024-09-23 | End: 2024-09-23

## 2024-09-23 RX ORDER — PREDNISONE 20 MG/1
40 TABLET ORAL
Status: DISCONTINUED | OUTPATIENT
Start: 2024-09-23 | End: 2024-09-26 | Stop reason: HOSPADM

## 2024-09-23 RX ADMIN — BUDESONIDE AND FORMOTEROL FUMARATE DIHYDRATE 2 PUFF: 160; 4.5 AEROSOL RESPIRATORY (INHALATION) at 21:49

## 2024-09-23 RX ADMIN — INSULIN LISPRO 2 UNITS: 100 INJECTION, SOLUTION INTRAVENOUS; SUBCUTANEOUS at 21:48

## 2024-09-23 RX ADMIN — Medication 10 ML: at 08:17

## 2024-09-23 RX ADMIN — INSULIN GLARGINE 7 UNITS: 100 INJECTION, SOLUTION SUBCUTANEOUS at 21:47

## 2024-09-23 RX ADMIN — METOPROLOL TARTRATE 25 MG: 25 TABLET, FILM COATED ORAL at 08:16

## 2024-09-23 RX ADMIN — FOLIC ACID 1 MG: 1 TABLET ORAL at 08:16

## 2024-09-23 RX ADMIN — PRAVASTATIN SODIUM 40 MG: 20 TABLET ORAL at 21:48

## 2024-09-23 RX ADMIN — SODIUM CHLORIDE 2000 MG: 9 INJECTION, SOLUTION INTRAVENOUS at 08:16

## 2024-09-23 RX ADMIN — LOSARTAN POTASSIUM 100 MG: 50 TABLET, FILM COATED ORAL at 08:16

## 2024-09-23 RX ADMIN — LEVOTHYROXINE SODIUM 75 MCG: 0.07 TABLET ORAL at 08:16

## 2024-09-23 RX ADMIN — ASPIRIN 81 MG: 81 TABLET, COATED ORAL at 08:16

## 2024-09-23 RX ADMIN — Medication 10 ML: at 21:48

## 2024-09-23 RX ADMIN — ALBUTEROL SULFATE 2 PUFF: 90 AEROSOL, METERED RESPIRATORY (INHALATION) at 21:49

## 2024-09-23 RX ADMIN — ALBUTEROL SULFATE 2 PUFF: 90 AEROSOL, METERED RESPIRATORY (INHALATION) at 16:42

## 2024-09-23 RX ADMIN — INSULIN LISPRO 2 UNITS: 100 INJECTION, SOLUTION INTRAVENOUS; SUBCUTANEOUS at 11:57

## 2024-09-23 RX ADMIN — APIXABAN 2.5 MG: 2.5 TABLET, FILM COATED ORAL at 21:48

## 2024-09-23 RX ADMIN — PREDNISONE 40 MG: 20 TABLET ORAL at 15:28

## 2024-09-23 RX ADMIN — APIXABAN 2.5 MG: 2.5 TABLET, FILM COATED ORAL at 08:16

## 2024-09-23 RX ADMIN — METHYLPREDNISOLONE SODIUM SUCCINATE 20 MG: 40 INJECTION, POWDER, FOR SOLUTION INTRAMUSCULAR; INTRAVENOUS at 06:35

## 2024-09-23 NOTE — PROGRESS NOTES
[facility]    Name:  Maura Jiménez   Age:  79 y.o.  Sex:  female  :  1945  MRN:  [id number]   Visit Number:  [id number]  Admission Date:  2024  Date Of Service:  24  Primary Care Physician:  Sofia Martinez APRN     LOS: 3 days :    Chief Complaint:      Shortness of breath    Subjective:    Patient looks and feels better than yesterday, but has yet to get up and do much today.  Still with fairly significant cough at times.  She does not wish to wear BiPAP and will discontinue that.  I did tell her I would have Dr. Brink to see her for any further recommendations in regards to her advanced COPD and current COVID infection.    Hospital Course:    The patient is a chronically ill 79-year-old female with a history of chronic obstructive pulmonary disease with chronic respiratory failure, severe malnutrition, diabetes, hypothyroidism, who presented from home due to increasing shortness of breath, cough.  She actually just had a recent hospital stay for acute on chronic respiratory failure and COPD exacerbation, had lung nodule noted at the time.    Her workup in the emergency room she was tachycardic with evidence of atrial fibrillation, she was hypoxic initially up to 10 L.  She had an ABG with a pH of 7.251 and a pO2 of 74.  Troponin was elevated at 30 with repeat of 55.  A kidney function test was normal.  Her TSH the normal limits.  Dimer was 0.99.  A white count of 24.  CT imaging of the chest was negative for pulmonary embolism, concern for emphysema as well as bilateral lower lobe airspace disease.  Patient also tested positive for COVID-19.    Patient admitted initially started on Rocephin, doxycycline, bronchodilators and steroids in addition to Cardizem drip.  She was seen by cardiology.  She has become bradycardic now and Cardizem drip has been weaned off.  She was started on oral metoprolol and has been on therapeutic Lovenox.   This was switched to Eliquis.    Review of Systems:     All systems were reviewed and negative except as mentioned in subjective, assessment and plan.    Vital Signs:    Temp:  [97.6 °F (36.4 °C)-98.4 °F (36.9 °C)] 97.6 °F (36.4 °C)  Heart Rate:  [67] 67  Resp:  [16-18] 18  BP: (101-154)/(36-61) 130/36    Intake and output:    I/O last 3 completed shifts:  In: 840 [P.O.:840]  Out: 250 [Urine:250]  I/O this shift:  In: 120 [P.O.:120]  Out: -     Physical Examination:    General Appearance:  Alert and cooperative.  Extremely frail, malnourished cachectic   Head:  Atraumatic and normocephalic.   Eyes: Conjunctivae and sclerae normal, no icterus. No pallor.   Throat: No oral lesions, no thrush, oral mucosa moist.   Neck: Supple, trachea midline, no thyromegaly.   Lungs:   Breath sounds remain diminished, scattered rhonchi present   Heart:  Bradycardic normal S1 and S2, no murmur, no gallop, no rub. No JVD.   Abdomen:   Normal bowel sounds, no masses, no organomegaly. Soft, nontender, nondistended, no rebound tenderness.   Extremities: Supple, no edema, no cyanosis, no clubbing.  Poor muscle mass   Skin: No bleeding or rash.   Neurologic: Alert and oriented x 3. No facial asymmetry. Moves all four limbs. No tremors.      Laboratory results:    Results from last 7 days   Lab Units 09/23/24  0604 09/22/24  0728 09/21/24  0719 09/20/24  1811   SODIUM mmol/L 148* 146* 148* 145   POTASSIUM mmol/L 4.5 4.2 3.7 4.0   CHLORIDE mmol/L 108* 106 107 102   CO2 mmol/L 37.2* 36.0* 34.4* 31.6*   BUN mg/dL 24* 27* 22 25*   CREATININE mg/dL 0.36* 0.42* 0.37* 0.52*   CALCIUM mg/dL 9.1 9.0 9.0 8.9   BILIRUBIN mg/dL  --  0.2  --  0.2   ALK PHOS U/L  --  66  --  107   ALT (SGPT) U/L  --  14  --  21   AST (SGOT) U/L  --  11  --  20   GLUCOSE mg/dL 85 163* 111* 392*     Results from last 7 days   Lab Units 09/22/24  0728 09/21/24  0719 09/20/24  1727   WBC 10*3/mm3 18.91* 16.93* 24.25*   HEMOGLOBIN g/dL 9.4* 10.2* 11.3*   HEMATOCRIT % 30.8*  34.0 36.7   PLATELETS 10*3/mm3 281 275 355         Results from last 7 days   Lab Units 09/20/24 2026 09/20/24  1811   CK TOTAL U/L  --  49   HSTROP T ng/L 55* 30*     Results from last 7 days   Lab Units 09/20/24  1811 09/20/24  1810   BLOODCX  No growth at 2 days No growth at 2 days     Recent Labs     06/26/24  1545 09/20/24  1749 09/20/24  2204   PHART 7.402 7.251* 7.327   OVO6EFN 52.8* 74.1* 64.7*   PO2ART 65.0* 77.3 178.0*   KFP8YHW 32.9* 32.5* 33.8*   BASEEXCESS 6.9* 3.8* 6.4*      I have reviewed the patient's laboratory results.    Radiology results:    No radiology results from the last 24 hrs  I have reviewed the patient's radiology reports.    Medication Review:     I have reviewed the patient's active and prn medications.     Problem List:      Acute respiratory failure with hypoxia      Assessment:      Acute hypoxic/hypercapnic respiratory failure  COPD exacerbation  Pneumonia  A-fib with RVR  Elevated troponin  Hyperglycemia/type 2 diabetes  Lung nodule  Hyperlipidemia  Hypertension  Hypothyroidism  Severe malnutrition  Impaired mobility and ADL    Plan:    Acute hypoxic/hypercapnic respiratory failure  COPD exacerbation  COVID Pneumonia  Had been on steroids in addition to Rocephin as well as bronchodilators.  Will discontinue NIPPV as patient does not tolerate this.  I did have Dr. Brink pulmonology see patient for any further recommendations to improve her symptomatology.  She has fairly advanced COPD  A-fib with RVR  Elevated troponin  Was seen previously by cardiology, she was weaned off Cardizem drip and started on oral metoprolol and appears to be in rhythm now.  She was initially on full dose Lovenox switched to Eliquis.  No obvious complications at this time.  Hyperglycemia/type 2 diabetes  Placed on Glucomander protocol.    Hopeful for discharge in 1 to 2 days home    DVT Prophylaxis: Eliquis  Code Status: DNR  Diet: As tolerated  Discharge Plan: Home 1 to 2 days    [place],  DO  09/23/24  14:19 EDT    Dictated utilizing Dragon dictation.

## 2024-09-23 NOTE — THERAPY EVALUATION
Patient Name: Maura Jiménez  : 1945    MRN: [id number]                              Today's Date: 2024       Admit Date: 2024    Visit Dx:     ICD-10-CM ICD-9-CM   1. Atrial flutter, unspecified type  I48.92 427.32   2. Acute respiratory failure with hypoxia and hypercapnia  J96.01 518.81    J96.02    3. Pulmonary hypertension  I27.20 416.8   4. Sepsis, due to unspecified organism, unspecified whether acute organ dysfunction present  A41.9 038.9     995.91     Patient Active Problem List   Diagnosis    Respiratory failure with hypoxia    Chronic obstructive pulmonary disease with acute exacerbation    Type 2 diabetes mellitus without complication, without long-term current use of insulin    COPD exacerbation    Acute on chronic respiratory failure with hypoxia    Chronic respiratory failure with hypoxia    Rhinovirus infection    Severe malnutrition    Impaired mobility    Acute respiratory failure with hypoxia     Past Medical History:   Diagnosis Date    COPD (chronic obstructive pulmonary disease)     Disease of thyroid gland     Hyperlipidemia     Osteoarthritis     Type 2 diabetes mellitus without complication, without long-term current use of insulin 2018     Past Surgical History:   Procedure Laterality Date    APPENDECTOMY      CATARACT EXTRACTION, BILATERAL      HYSTERECTOMY        General Information       Row Name 24 1353          OT Time and Intention    Document Type evaluation  -     Mode of Treatment occupational therapy  -       Row Name 24 7719          General Information    Patient Profile Reviewed yes  -     Prior Level of Function independent:;ADL's;all household mobility  -     Existing Precautions/Restrictions fall;oxygen therapy device and L/min  -     Barriers to Rehab previous functional deficit;medically complex  -       Row Name 24 5577          Occupational Profile    Reason for Services/Referral (Occupational Profile) ADL  decline  -[facility] Name 09/23/24 1355          Living Environment    People in Home spouse  -[facility] Name 09/23/24 1355          Home Main Entrance    Number of Stairs, Main Entrance one  -[facility] Name 09/23/24 1355          Stairs Within Home, Primary    Number of Stairs, Within Home, Primary none  -[facility] Name 09/23/24 1355          Cognition    Orientation Status (Cognition) oriented x 4  -[facility] Name 09/23/24 1355          Safety Issues, Functional Mobility    Safety Issues Affecting Function (Mobility) awareness of need for assistance;insight into deficits/self-awareness;safety precaution awareness;safety precautions follow-through/compliance  -     Impairments Affecting Function (Mobility) endurance/activity tolerance;shortness of breath  -               User Key  (r) = Recorded By, (t) = Taken By, (c) = Cosigned By      Initials Name Provider Type    Evangelina Stephen Occupational Therapist                     Mobility/ADL's       [facility] Name 09/23/24 1357          Bed Mobility    Bed Mobility supine-sit  -     Supine-Sit [place] (Bed Mobility) modified independence  -     Assistive Device (Bed Mobility) head of bed elevated  -[facility] Name 09/23/24 1357          Sit-Stand Transfer    Sit-Stand [place] (Transfers) contact guard  -     Assistive Device (Sit-Stand Transfers) walker, front-wheeled  -[facility] Name 09/23/24 1357          Functional Mobility    Functional Mobility- Ind. Level contact guard assist  -     Functional Mobility- Device walker, front-wheeled  -     Functional Mobility-Distance (Feet) 8  -     Functional Mobility- Safety Issues supplemental O2  -     Patient was able to Ambulate yes  -[facility] Name 09/23/24 1357          Activities of Daily Living    BADL Assessment/Intervention bathing;upper body dressing;lower body dressing;grooming;feeding;toileting  -[facility] Name 09/23/24 1357          Bathing Assessment/Intervention     [place] Level (Bathing) minimum assist (75% patient effort)  -AH       Row Name 09/23/24 1357          Upper Body Dressing Assessment/Training    [place] Level (Upper Body Dressing) set up  -AH       Row Name 09/23/24 1357          Lower Body Dressing Assessment/Training    [place] Level (Lower Body Dressing) set up  -AH       Row Name 09/23/24 1357          Grooming Assessment/Training    [place] Level (Grooming) set up  -AH       Row Name 09/23/24 1357          Self-Feeding Assessment/Training    [place] Level (Feeding) independent  -AH       Row Name 09/23/24 1357          Toileting Assessment/Training    [place] Level (Toileting) minimum assist (75% patient effort)  -               User Key  (r) = Recorded By, (t) = Taken By, (c) = Cosigned By      Initials Name Provider Type     Evangelina Cruz Occupational Therapist                   Obj/Interventions       Row Name 09/23/24 1358          Sensory Assessment (Somatosensory)    Sensory Assessment (Somatosensory) sensation intact  -AH       Row Name 09/23/24 1358          Vision Assessment/Intervention    Visual Impairment/Limitations WFL  -AH       Row Name 09/23/24 1358          Range of Motion Comprehensive    General Range of Motion bilateral upper extremity ROM L  -AH       Row Name 09/23/24 1358          Strength Comprehensive (MMT)    Comment, General Manual Muscle Testing (MMT) Assessment BUE 3+/5  -               User Key  (r) = Recorded By, (t) = Taken By, (c) = Cosigned By      Initials Name Provider Type    Evangelina Stephen Occupational Therapist                   Goals/Plan       Row Name 09/23/24 1408          Transfer Goal 1 (OT)    Activity/Assistive Device (Transfer Goal 1, OT) sit-to-stand/stand-to-sit;walker, rolling  -     [place] Level/Cues Needed (Transfer Goal 1, OT) standby assist  -     Time Frame (Transfer Goal 1, OT) by discharge  -     Progress/Outcome (Transfer Goal 1, OT) goal  ongoing  -[facility] Name 09/23/24 1408          Bathing Goal 1 (OT)    Activity/Device (Bathing Goal 1, OT) bathing skills, all  -     [place] Level/Cues Needed (Bathing Goal 1, OT) contact guard required  -     Time Frame (Bathing Goal 1, OT) long term goal (LTG);5 days  -     Progress/Outcomes (Bathing Goal 1, OT) goal ongoing  -AH       Row Name 09/23/24 1408          Dressing Goal 1 (OT)    Activity/Device (Dressing Goal 1, OT) lower body dressing  -     [place]/Cues Needed (Dressing Goal 1, OT) supervision required  -AH     Time Frame (Dressing Goal 1, OT) long term goal (LTG);5 days  -     Progress/Outcome (Dressing Goal 1, OT) goal ongoing  -AH       Row Name 09/23/24 1408          Toileting Goal 1 (OT)    Activity/Device (Toileting Goal 1, OT) toileting skills, all  -     [place] Level/Cues Needed (Toileting Goal 1, OT) supervision required  -     Time Frame (Toileting Goal 1, OT) by discharge  -     Progress/Outcome (Toileting Goal 1, OT) goal ongoing  -AH       Row Name 09/23/24 1408          Strength Goal 1 (OT)    Strength Goal 1 (OT) Pt will perform UB strengthening ex using theraband for resistance.  -     Time Frame (Strength Goal 1, OT) by discharge  -     Progress/Outcome (Strength Goal 1, OT) goal ongoing  -AH       Row Name 09/23/24 1408          Therapy Assessment/Plan (OT)    Planned Therapy Interventions (OT) activity tolerance training;BADL retraining;patient/caregiver education/training;transfer/mobility retraining;strengthening exercise  -               User Key  (r) = Recorded By, (t) = Taken By, (c) = Cosigned By      Initials Name Provider Type    Evangelina Stephen Occupational Therapist                   Clinical Impression       [facility] Name 09/23/24 4699          Pain Assessment    Pretreatment Pain Rating 0/10 - no pain  -     Posttreatment Pain Rating 0/10 - no pain  -     Pain Intervention(s) Repositioned;Ambulation/increased activity  -        Row Name 09/23/24 1359          Plan of Care Review    Plan of Care Reviewed With patient;spouse  -     Progress no change  -     Outcome Evaluation Pt seen for OT evaluation today.  Pt lives at home with her  in a SL house with 1 KRISSY.  Pt reports she is normally able to bathe and dress herself, but does report she fatigues quickly with self care tasks.  Pt states she has a RW, w/c and oxygen at home.  Pt reports her and her  use the same rollator at home, just take turns using it.  Pt might benefit from rollator at home upon d/c.  Pt received supine in bed with 3L O2 and was independent to sit eob, stood with cga and walked 8' with cga using RW.  Pts O2 sats dropped to 90% fairly quickly with activity.  Pt presents with weakness, decreased activity tolerance and decreased independence with ADL tasks.  Pt is expected to benefit from skilled OT to focus on her endurance, energy conservation and independence with ADL tasks.  Pt would benefit from home health services upon d/c.  -       Row Name 09/23/24 3740          Therapy Assessment/Plan (OT)    Patient/Family Therapy Goal Statement (OT) d/c home  -     Rehab Potential (OT) good, to achieve stated therapy goals  -     Criteria for Skilled Therapeutic Interventions Met (OT) yes;skilled treatment is necessary  -     Therapy Frequency (OT) 3 times/wk  -       Row Name 09/23/24 1576          Therapy Plan Review/Discharge Plan (OT)    Anticipated Discharge Disposition (OT) home with home health  -       Row Name 09/23/24 4216          Vital Signs    Pre SpO2 (%) 98  -AH     O2 Delivery Pre Treatment supplemental O2  3L  -AH     Intra SpO2 (%) 90  -AH     O2 Delivery Intra Treatment supplemental O2  -AH     Post SpO2 (%) 94  -AH     O2 Delivery Post Treatment supplemental O2  -AH     Pre Patient Position Supine  -AH     Intra Patient Position Standing  -AH     Post Patient Position Supine  -AH       Row Name 09/23/24 5023           Positioning and Restraints    Pre-Treatment Position in bed  -AH     Post Treatment Position bed  -AH     In Bed supine;call light within reach;encouraged to call for assist;with family/caregiver  -               User Key  (r) = Recorded By, (t) = Taken By, (c) = Cosigned By      Initials Name Provider Type    Evangelina Stephen Occupational Therapist                   Outcome Measures       Row Name 09/23/24 1410          How much help from another is currently needed...    Putting on and taking off regular lower body clothing? 3  -AH     Bathing (including washing, rinsing, and drying) 3  -AH     Toileting (which includes using toilet bed pan or urinal) 3  -AH     Putting on and taking off regular upper body clothing 4  -AH     Taking care of personal grooming (such as brushing teeth) 4  -AH     Eating meals 4  -AH     AM-PAC 6 Clicks Score (OT) 21  -AH       Row Name 09/23/24 0800          How much help from another person do you currently need...    Turning from your back to your side while in flat bed without using bedrails? 4  -AW     Moving from lying on back to sitting on the side of a flat bed without bedrails? 4  -AW     Moving to and from a bed to a chair (including a wheelchair)? 3  -AW     Standing up from a chair using your arms (e.g., wheelchair, bedside chair)? 3  -AW     Climbing 3-5 steps with a railing? 2  -AW     To walk in hospital room? 3  -AW     AM-PAC 6 Clicks Score (PT) 19  -AW     Highest Level of Mobility Goal 6 --> Walk 10 steps or more  -       Row Name 09/23/24 1410          Functional Assessment    Outcome Measure Options AM-PAC 6 Clicks Daily Activity (OT)  -               User Key  (r) = Recorded By, (t) = Taken By, (c) = Cosigned By      Initials Name Provider Type    Evangelina Stephen Occupational Therapist    Melissa Khalil, RN Registered Nurse                    Occupational Therapy Education       Title: PT OT SLP Therapies (In Progress)       Topic: Occupational Therapy  (In Progress)       Point: ADL training (Done)       Description:   Instruct learner(s) on proper safety adaptation and remediation techniques during self care or transfers.   Instruct in proper use of assistive devices.                  Learning Progress Summary             Patient Acceptance, E,TB, VU by  at 9/23/2024 1410    Comment: Role of OT/POC                         Point: Home exercise program (Not Started)       Description:   Instruct learner(s) on appropriate technique for monitoring, assisting and/or progressing therapeutic exercises/activities.                  Learner Progress:  Not documented in this visit.              Point: Precautions (Not Started)       Description:   Instruct learner(s) on prescribed precautions during self-care and functional transfers.                  Learner Progress:  Not documented in this visit.              Point: Body mechanics (Not Started)       Description:   Instruct learner(s) on proper positioning and spine alignment during self-care, functional mobility activities and/or exercises.                  Learner Progress:  Not documented in this visit.                              User Key       Initials Effective Dates Name Provider Type Discipline     06/16/21 -  Evangelina Cruz Occupational Therapist OT                  OT Recommendation and Plan  Planned Therapy Interventions (OT): activity tolerance training, BADL retraining, patient/caregiver education/training, transfer/mobility retraining, strengthening exercise  Therapy Frequency (OT): 3 times/wk  Plan of Care Review  Plan of Care Reviewed With: patient, spouse  Progress: no change  Outcome Evaluation: Pt seen for OT evaluation today.  Pt lives at home with her  in a SL house with 1 KRISSY.  Pt reports she is normally able to bathe and dress herself, but does report she fatigues quickly with self care tasks.  Pt states she has a RW, w/c and oxygen at home.  Pt reports her and her  use the same  rollator at home, just take turns using it.  Pt might benefit from rollator at home upon d/c.  Pt received supine in bed with 3L O2 and was independent to sit eob, stood with cga and walked 8' with cga using RW.  Pts O2 sats dropped to 90% fairly quickly with activity.  Pt presents with weakness, decreased activity tolerance and decreased independence with ADL tasks.  Pt is expected to benefit from skilled OT to focus on her endurance, energy conservation and independence with ADL tasks.  Pt would benefit from home health services upon d/c.     Time Calculation:   Evaluation Complexity (OT)  Review Occupational Profile/Medical/Therapy History Complexity: expanded/moderate complexity  Assessment, Occupational Performance/Identification of Deficit Complexity: 3-5 performance deficits  Clinical Decision Making Complexity (OT): detailed assessment/moderate complexity  Overall Complexity of Evaluation (OT): moderate complexity     Time Calculation- OT       Row Name 09/23/24 1411             Time Calculation- OT    OT Start Time 1104  -AH      OT Received On 09/23/24  -      OT Goal Re-Cert Due Date 10/03/24  -         Untimed Charges    OT Eval/Re-eval Minutes 43  -AH         Total Minutes    Untimed Charges Total Minutes 43  -AH       Total Minutes 43  -AH                User Key  (r) = Recorded By, (t) = Taken By, (c) = Cosigned By      Initials Name Provider Type    Evangelina Stephen Occupational Therapist                  Therapy Charges for Today       Code Description Service Date Service Provider Modifiers Qty    [id number]  OT EVAL MOD COMPLEXITY 3 9/23/2024 Evangelina Cruz GO 1                 Evangelina Cruz  9/23/2024

## 2024-09-23 NOTE — PROGRESS NOTES
"Dietitian Assessment    Patient Name: Maura Jiménez  YOB: 1945  MRN: [id number]  Admission date: 9/20/2024    Comment:        Clinical Nutrition Assessment      Reason for Assessment MST=3/BMI   H&P  Past Medical History:   Diagnosis Date    COPD (chronic obstructive pulmonary disease)     Disease of thyroid gland     Hyperlipidemia     Osteoarthritis     Type 2 diabetes mellitus without complication, without long-term current use of insulin 11/1/2018       Past Surgical History:   Procedure Laterality Date    APPENDECTOMY      CATARACT EXTRACTION, BILATERAL      HYSTERECTOMY              Current Problems        Encounter Information        Trending Narrative     9/23: Pt admitted d/t respiratory failure and is COVID+. EMR shows wt loss of 6# (8.6%) within 3 months. Wt loss is significant. Average PO intake 45% x 5 meals. RD will order Boost glucose control TID to help meet estimated needs.      Anthropometrics        Current Height, Weight Height: 152.4 cm (60\")  Weight: 28.7 kg (63 lb 4.4 oz) (09/20/24 2226)   Trending Weight Hx     This admission:              PTA:     Wt Readings from Last 30 Encounters:   09/20/24 2226 28.7 kg (63 lb 4.4 oz)   09/20/24 1709 30.8 kg (68 lb)   09/16/24 1555 28.8 kg (63 lb 7.9 oz)   09/14/24 2126 28.8 kg (63 lb 7.9 oz)   09/14/24 1746 30.8 kg (68 lb)   09/10/24 0927 30.8 kg (68 lb)   06/26/24 1437 31.3 kg (69 lb)   02/04/24 1158 31.8 kg (70 lb)   02/21/23 0500 32.7 kg (72 lb 1.5 oz)   02/20/23 1420 34.1 kg (75 lb 2.8 oz)   02/20/23 0912 36.3 kg (80 lb)   09/15/22 0932 35.4 kg (78 lb)   09/12/22 1325 35.5 kg (78 lb 3.2 oz)   09/12/22 1005 35.8 kg (79 lb)   11/05/18 0315 45.2 kg (99 lb 10.4 oz)   11/04/18 0500 45.1 kg (99 lb 6.4 oz)   11/03/18 0437 45.7 kg (100 lb 11.2 oz)   11/02/18 0645 44.9 kg (99 lb)   11/01/18 0439 43.6 kg (96 lb 3.2 oz)   10/31/18 1842 41.7 kg (92 lb)   10/31/18 1548 42.2 kg (93 lb)   05/10/18 0925 95.5 kg (210 lb 8.6 oz)   04/26/18 0937 " 95.5 kg (210 lb 8.6 oz)      BMI kg/m2 Body mass index is 12.36 kg/m².     Labs        Pertinent Labs     Results from last 7 days   Lab Units 09/23/24  0604 09/22/24 0728 09/21/24 0719 09/20/24  1811   SODIUM mmol/L 148* 146* 148* 145   POTASSIUM mmol/L 4.5 4.2 3.7 4.0   CHLORIDE mmol/L 108* 106 107 102   CO2 mmol/L 37.2* 36.0* 34.4* 31.6*   BUN mg/dL 24* 27* 22 25*   CREATININE mg/dL 0.36* 0.42* 0.37* 0.52*   CALCIUM mg/dL 9.1 9.0 9.0 8.9   BILIRUBIN mg/dL  --  0.2  --  0.2   ALK PHOS U/L  --  66  --  107   ALT (SGPT) U/L  --  14  --  21   AST (SGOT) U/L  --  11  --  20   GLUCOSE mg/dL 85 163* 111* 392*       Results from last 7 days   Lab Units 09/22/24 0728 09/21/24 0719 09/20/24  1811   MAGNESIUM mg/dL  --   --  2.0   PHOSPHORUS mg/dL  --   --  3.2   HEMOGLOBIN g/dL 9.4*   < >  --    HEMATOCRIT % 30.8*   < >  --     < > = values in this interval not displayed.       Lab Results   Component Value Date    HGBA1C 7.30 (H) 09/14/2024            Medications       Scheduled Medications apixaban, 2.5 mg, Oral, Q12H  aspirin, 81 mg, Oral, Daily  cefTRIAXone, 2,000 mg, Intravenous, Q24H  folic acid, 1 mg, Oral, Daily  insulin glargine, 1-200 Units, Subcutaneous, Nightly - Glucommander  insulin lispro, 1-200 Units, Subcutaneous, 4x Daily With Meals & Nightly  levothyroxine, 75 mcg, Oral, Daily  losartan, 100 mg, Oral, Daily  methylPREDNISolone sodium succinate, 20 mg, Intravenous, Q12H  metoprolol tartrate, 25 mg, Oral, Q12H  pravastatin, 40 mg, Oral, Nightly  sodium chloride, 10 mL, Intravenous, Q12H        Infusions Pharmacy to Dose enoxaparin (LOVENOX),          PRN Medications   acetaminophen **OR** acetaminophen **OR** acetaminophen    senna-docusate sodium **AND** polyethylene glycol **AND** bisacodyl **AND** bisacodyl    Calcium Replacement - Follow Nurse / BPA Driven Protocol    dextrose    dextrose    glucagon (human recombinant)    insulin lispro    ipratropium-albuterol    Magnesium Low Dose Replacement -  Follow Nurse / BPA Driven Protocol    melatonin    nitroglycerin    ondansetron    Pharmacy to Dose enoxaparin (LOVENOX)    Phosphorus Replacement - Follow Nurse / BPA Driven Protocol    Potassium Replacement - Follow Nurse / BPA Driven Protocol    sodium chloride    sodium chloride     Physical Findings        Trending Physical   Appearance, NFPE    --  Edema  None noted     Bowel Function LBM: 9/19     Tubes Peripheral IV     Chewing/Swallowing WNL     Skin WNL       Estimated/Assessed Needs       Energy Requirements    EST Needs, Method, Wt used 0189-2466 kcal (35-40 kcal/kg CBW)       Protein Requirements    EST Needs, Method, Wt used 40-58 g (1.4-2 g pro/kg CBW)       Fluid Requirements     Estimated Needs (mL/day) 1878-4547 mL       Current Nutrition Orders & Evaluation of Intake       Oral Nutrition     Food Allergies Shellfish    Current PO Diet Diet: Cardiac, Diabetic; Healthy Heart (2-3 Na+); Consistent Carbohydrate; Fluid Consistency: Thin (IDDSI 0)   Supplement    PO Evaluation     Trending % PO Intake 9/23: 45% x 5 meals     Enteral Nutrition    Enteral Route    Order, Modulars, Flushes    Residual/Tolerance    TF Observation         Parenteral Nutrition     TPN Route    Total # Days on TPN    TPN Order, Lipid Details    MVI & Trace Element Freq    TPN Observation       Nutrition Diagnosis         Nutrition Dx Problem 1 Underweight r/t COPD AEB BMI=12.36.      Nutrition Dx Problem 2        Intervention Goal         Intervention Goal(s) Maintain CBW/promote wt gain  PO intake meet >50% of estimated needs  Adhere to ONS     Nutrition Intervention        RD Action Will order Boost glucose control TID     Nutrition Prescription          Diet Prescription CCD, HH   Supplement Prescription Boost glucose control TID     Enteral Prescription        TPN Prescription      Monitor/Evaluation        Monitor Per protocol, I&O, PO intake, Supplement intake, Pertinent labs, Weight, Skin status, GI status, Symptoms,  POC/GOC, Swallow function, Hemodynamic stability     RD to f/up    Electronically signed by:  Kelsey Sargent RD  09/23/24 09:15 EDT

## 2024-09-23 NOTE — THERAPY TREATMENT NOTE
Patient Name: Maura Jiménez  : 1945    MRN: [id number]                              Today's Date: 2024       Admit Date: 2024    Visit Dx:     ICD-10-CM ICD-9-CM   1. Atrial flutter, unspecified type  I48.92 427.32   2. Acute respiratory failure with hypoxia and hypercapnia  J96.01 518.81    J96.02    3. Pulmonary hypertension  I27.20 416.8   4. Sepsis, due to unspecified organism, unspecified whether acute organ dysfunction present  A41.9 038.9     995.91     Patient Active Problem List   Diagnosis    Respiratory failure with hypoxia    Chronic obstructive pulmonary disease with acute exacerbation    Type 2 diabetes mellitus without complication, without long-term current use of insulin    COPD exacerbation    Acute on chronic respiratory failure with hypoxia    Chronic respiratory failure with hypoxia    Rhinovirus infection    Severe malnutrition    Impaired mobility    Acute respiratory failure with hypoxia     Past Medical History:   Diagnosis Date    COPD (chronic obstructive pulmonary disease)     Disease of thyroid gland     Hyperlipidemia     Osteoarthritis     Type 2 diabetes mellitus without complication, without long-term current use of insulin 2018     Past Surgical History:   Procedure Laterality Date    APPENDECTOMY      CATARACT EXTRACTION, BILATERAL      HYSTERECTOMY        General Information       Row Name 24 180          Physical Therapy Time and Intention    Mode of Treatment physical therapy  -CC       Row Name 24 1808          General Information    Patient Profile Reviewed yes  -CC     Existing Precautions/Restrictions fall;oxygen therapy device and L/min  -CC       Row Name 24 180          Safety Issues, Functional Mobility    Safety Issues Affecting Function (Mobility) awareness of need for assistance;insight into deficits/self-awareness;safety precautions follow-through/compliance  -CC     Impairments Affecting Function (Mobility)  endurance/activity tolerance;shortness of breath  -               User Key  (r) = Recorded By, (t) = Taken By, (c) = Cosigned By      Initials Name Provider Type     Kelli Ireland PTA Physical Therapist Assistant                   Mobility       Row Name 09/23/24 1809          Bed Mobility    Bed Mobility supine-sit  -CC     Supine-Sit [place] (Bed Mobility) modified independence  -     Assistive Device (Bed Mobility) bed rails;head of bed elevated  -       Row Name 09/23/24 1809          Sit-Stand Transfer    Sit-Stand [place] (Transfers) contact guard;standby assist  -     Assistive Device (Sit-Stand Transfers) walker, front-wheeled  -       Row Name 09/23/24 1809          Gait/Stairs (Locomotion)    [place] Level (Gait) contact guard  -     Assistive Device (Gait) walker, front-wheeled  -     Patient was able to Ambulate yes  -CC     Distance in Feet (Gait) 3  -CC     Deviations/Abnormal Patterns (Gait) gait speed decreased;base of support, narrow;festinating/shuffling  -               User Key  (r) = Recorded By, (t) = Taken By, (c) = Cosigned By      Initials Name Provider Type     Kelli Ireland PTA Physical Therapist Assistant                   Obj/Interventions    No documentation.                  Goals/Plan    No documentation.                  Clinical Impression       Row Name 09/23/24 1811          Pain    Pretreatment Pain Rating 0/10 - no pain  -     Posttreatment Pain Rating 0/10 - no pain  -     Additional Documentation Pain Scale: Numbers Pre/Post-Treatment (Group)  -[facility] Name 09/23/24 1811          Plan of Care Review    Plan of Care Reviewed With patient  -     Outcome Evaluation Pt agreeable to physical therapy. Performed supine <->sit with SBA/CGA, sitting EOB x10 mins, sit <->stand with CGA and performed side stepping to HOB x3 feet. Performd B LE ex in sitting AP, LAQ, hip abd, marching 1x10 reps with occ rest breaks. Con't with PT POC  and progress as tolerated  -CC       Row Name 09/23/24 1811          Positioning and Restraints    Pre-Treatment Position in bed  -CC     Post Treatment Position bed  -CC     In Bed notified nsg;supine;fowlers;call light within reach;encouraged to call for assist  -CC               User Key  (r) = Recorded By, (t) = Taken By, (c) = Cosigned By      Initials Name Provider Type     Kelli Ireland PTA Physical Therapist Assistant                   Outcome Measures       Row Name 09/23/24 1816 09/23/24 0800       How much help from another person do you currently need...    Turning from your back to your side while in flat bed without using bedrails? 4  -CC 4  -AW    Moving from lying on back to sitting on the side of a flat bed without bedrails? 3  -CC 4  -AW    Moving to and from a bed to a chair (including a wheelchair)? 3  -CC 3  -AW    Standing up from a chair using your arms (e.g., wheelchair, bedside chair)? 3  -CC 3  -AW    Climbing 3-5 steps with a railing? 2  -CC 2  -AW    To walk in hospital room? 3  -CC 3  -AW    AM-PAC 6 Clicks Score (PT) 18  -CC 19  -AW    Highest Level of Mobility Goal 6 --> Walk 10 steps or more  -CC 6 --> Walk 10 steps or more  -AW      Row Name 09/23/24 1816 09/23/24 1410       Functional Assessment    Outcome Measure Options AM-PAC 6 Clicks Basic Mobility (PT)  -CC AM-PAC 6 Clicks Daily Activity (OT)  -              User Key  (r) = Recorded By, (t) = Taken By, (c) = Cosigned By      Initials Name Provider Type    Evangelina Stephen Occupational Therapist     Kelli Ireland PTA Physical Therapist Assistant    Melissa Khalil RN Registered Nurse                                 Physical Therapy Education       Title: PT OT SLP Therapies (In Progress)       Topic: Physical Therapy (Done)       Point: Mobility training (Done)       Learning Progress Summary             Patient Acceptance, E, VU by MICHELLE at 9/23/2024 0934    Acceptance, E,D, DU,VU by YASH at 9/22/2024 1310                          Point: Home exercise program (Done)       Learning Progress Summary             Patient Acceptance, E,TB, VU,NR by  at 9/23/2024 1817    Comment: Perform ex daily    Acceptance, E, VU by  at 9/23/2024 0934                         Point: Body mechanics (Done)       Learning Progress Summary             Patient Acceptance, E, VU by  at 9/23/2024 0934                                         User Key       Initials Effective Dates Name Provider Type Discipline     06/16/21 -  Kelli Ireland PTA Physical Therapist Assistant PT     06/13/23 -  Ronaldo Rocha, PT Physical Therapist PT    AW 09/18/24 -  Melissa Fitzgerald RN Registered Nurse Nurse                  PT Recommendation and Plan     Plan of Care Reviewed With: patient  Outcome Evaluation: Pt agreeable to physical therapy. Performed supine <->sit with SBA/CGA, sitting EOB x10 mins, sit <->stand with CGA and performed side stepping to HOB x3 feet. Performd B LE ex in sitting AP, LAQ, hip abd, marching 1x10 reps with occ rest breaks. Con't with PT POC and progress as tolerated     Time Calculation:         PT Charges       Row Name 09/23/24 1818             Time Calculation    PT Received On 09/23/24  -CC      PT Goal Re-Cert Due Date 10/02/24  -CC         Time Calculation- PT    Total Timed Code Minutes- PT 30 minute(s)  -CC         Timed Charges    94681 - PT Therapeutic Exercise Minutes 15  -CC      97159 - PT Therapeutic Activity Minutes 15  -CC         Total Minutes    Timed Charges Total Minutes 30  -CC       Total Minutes 30  -CC                User Key  (r) = Recorded By, (t) = Taken By, (c) = Cosigned By      Initials Name Provider Type     Kelli Ireland PTA Physical Therapist Assistant                  Therapy Charges for Today       Code Description Service Date Service Provider Modifiers Qty    32964197417 HC PT THER PROC EA 15 MIN 9/23/2024 Kelli Ireland PTA GP 1    00676506651 HC PT THERAPEUTIC ACT EA 15 MIN  9/23/2024 Kelli Ireland, PTA GP 1            PT G-Codes  Outcome Measure Options: AM-PAC 6 Clicks Basic Mobility (PT)  AM-PAC 6 Clicks Score (PT): 18  AM-PAC 6 Clicks Score (OT): 21       Kelli Ireland, JEOVANY  9/23/2024

## 2024-09-23 NOTE — PLAN OF CARE
Problem: Noninvasive Ventilation Acute  Goal: Effective Unassisted Ventilation and Oxygenation  Outcome: Ongoing, Progressing     Problem: Adult Inpatient Plan of Care  Goal: Plan of Care Review  Outcome: Ongoing, Progressing  Flowsheets  Taken 9/23/2024 0933 by Melissa Fitzgerald RN  Progress: no change  Taken 9/22/2024 1304 by Ronaldo Rocha PT  Plan of Care Reviewed With: patient  Goal: Patient-Specific Goal (Individualized)  Outcome: Ongoing, Progressing  Goal: Absence of Hospital-Acquired Illness or Injury  Outcome: Ongoing, Progressing  Intervention: Identify and Manage Fall Risk  Recent Flowsheet Documentation  Taken 9/23/2024 0800 by Melissa Fitzgerald RN  Safety Promotion/Fall Prevention: safety round/check completed  Intervention: Prevent Skin Injury  Recent Flowsheet Documentation  Taken 9/23/2024 0800 by Melissa Fitzgerald RN  Body Position: sitting up in bed  Intervention: Prevent and Manage VTE (Venous Thromboembolism) Risk  Recent Flowsheet Documentation  Taken 9/23/2024 0800 by Melissa Fitzgerald RN  Activity Management: activity encouraged  Intervention: Prevent Infection  Recent Flowsheet Documentation  Taken 9/23/2024 0800 by Melissa Fitzgerald RN  Infection Prevention: environmental surveillance performed  Goal: Optimal Comfort and Wellbeing  Outcome: Ongoing, Progressing  Goal: Readiness for Transition of Care  Outcome: Ongoing, Progressing     Problem: Adjustment to Illness (Sepsis/Septic Shock)  Goal: Optimal Coping  Outcome: Ongoing, Progressing     Problem: Bleeding (Sepsis/Septic Shock)  Goal: Absence of Bleeding  Outcome: Ongoing, Progressing     Problem: Glycemic Control Impaired (Sepsis/Septic Shock)  Goal: Blood Glucose Level Within Desired Range  Outcome: Ongoing, Progressing     Problem: Infection Progression (Sepsis/Septic Shock)  Goal: Absence of Infection Signs and Symptoms  Outcome: Ongoing, Progressing  Intervention: Initiate Sepsis Management  Recent Flowsheet Documentation  Taken 9/23/2024 0800  by Fitzgerald, Melissa, RN  Infection Prevention: environmental surveillance performed  Intervention: Promote Recovery  Recent Flowsheet Documentation  Taken 9/23/2024 0800 by Melissa Fitzgerald RN  Activity Management: activity encouraged     Problem: Nutrition Impaired (Sepsis/Septic Shock)  Goal: Optimal Nutrition Intake  Outcome: Ongoing, Progressing     Problem: Skin Injury Risk Increased  Goal: Skin Health and Integrity  Outcome: Ongoing, Progressing  Intervention: Optimize Skin Protection  Recent Flowsheet Documentation  Taken 9/23/2024 0800 by Melissa Fitzgerald RN  Head of Bed (HOB) Positioning: HOB elevated     Problem: COPD (Chronic Obstructive Pulmonary Disease) Comorbidity  Goal: Maintenance of COPD Symptom Control  Outcome: Ongoing, Progressing  Intervention: Maintain COPD-Symptom Control  Recent Flowsheet Documentation  Taken 9/23/2024 0800 by Melissa Fitzgerald RN  Medication Review/Management: medications reviewed     Problem: Diabetes Comorbidity  Goal: Blood Glucose Level Within Targeted Range  Outcome: Ongoing, Progressing     Problem: Hypertension Comorbidity  Goal: Blood Pressure in Desired Range  Outcome: Ongoing, Progressing  Intervention: Maintain Blood Pressure Management  Recent Flowsheet Documentation  Taken 9/23/2024 0800 by Melissa Fitzgerald RN  Medication Review/Management: medications reviewed     Problem: Fall Injury Risk  Goal: Absence of Fall and Fall-Related Injury  Outcome: Ongoing, Progressing  Intervention: Identify and Manage Contributors  Recent Flowsheet Documentation  Taken 9/23/2024 0800 by Melissa Fitzgerald RN  Medication Review/Management: medications reviewed  Intervention: Promote Injury-Free Environment  Recent Flowsheet Documentation  Taken 9/23/2024 0800 by Melissa Fitzgerald RN  Safety Promotion/Fall Prevention: safety round/check completed   Goal Outcome Evaluation:           Progress: no change

## 2024-09-23 NOTE — PLAN OF CARE
Goal Outcome Evaluation:  Plan of Care Reviewed With: patient           Outcome Evaluation: Pt agreeable to physical therapy. Performed supine <->sit with SBA/CGA, sitting EOB x10 mins, sit <->stand with CGA and performed side stepping to HOB x3 feet. Performd B LE ex in sitting AP, LAQ, hip abd, marching 1x10 reps with occ rest breaks. Con't with PT POC and progress as tolerated

## 2024-09-23 NOTE — CASE MANAGEMENT/SOCIAL WORK
Met with pt, IMM obtained. Pt plans to dc home with spouse and [facility]. She states she was readmitted before HH services had started, and is feeling very unwell today.

## 2024-09-23 NOTE — PLAN OF CARE
Goal Outcome Evaluation:  Plan of Care Reviewed With: patient, spouse        Progress: no change  Outcome Evaluation: Pt seen for OT evaluation today.  Pt lives at home with her  in a [place] house with 1 KRIS[place].  Pt reports she is normally able to bathe and dress herself, but does report she fatigues quickly with self care tasks.  Pt states she has a RW, w/c and oxygen at home.  Pt reports her and her  use the same rollator at home, just take turns using it.  Pt might benefit from rollator at home upon d/c.  Pt received supine in bed with 3L O2 and was independent to sit eob, stood with cga and walked 8' with cga using RW.  Pts O2 sats dropped to 90% fairly quickly with activity.  Pt presents with weakness, decreased activity tolerance and decreased independence with ADL tasks.  Pt is expected to benefit from skilled OT to focus on her endurance, energy conservation and independence with ADL tasks.  Pt would benefit from home health services upon d/c.      Anticipated Discharge Disposition (OT): home with home health

## 2024-09-23 NOTE — CONSULTS
Date of admission:  9/20/2024    Date of consultation:   September 23, 2024    Requested by:   Ce Rock, *    PCP: Sofia Martinez APRN    Reason:  COPD exacerbation.  COVID-19.    History of Present Illness:  79 y.o. female with past medical history significant for severe COPD who started complaining of shortness of breath, cough and phlegm production for the past few days. Patient was not complaining of subjective chills.  The patient says that her symptoms started somewhat slowly. she says that the shortness of breath is worse than baseline.  Shortness of breath seems to be worse, compared to her baseline    she denies any sick contacts.     she was using her medications regularly at home.  The patient says that she also started using rescue medication more frequently without any significant improvement     The patient's symptoms continued to worsen and she was admitted to the hospital and pulmonary Consultation was requested for further recommendations.       Review of System: All other review of systems negative except indicated in HPI       Past Medical History: Pertinent history reviewed, as appropriate. Negative, except noted below or in HPI.  If this history could not be obtained due to a reason, the reason is listed in the HPI.  Past Medical History:   Diagnosis Date    COPD (chronic obstructive pulmonary disease)     Disease of thyroid gland     Hyperlipidemia     Osteoarthritis     Type 2 diabetes mellitus without complication, without long-term current use of insulin 11/1/2018         Past Surgical History: Pertinent history reviewed, as appropriate. Negative, except noted below or in HPI. If this history could not be obtained due to a reason, the reason is listed in the HPI.  Past Surgical History:   Procedure Laterality Date    APPENDECTOMY      CATARACT EXTRACTION, BILATERAL      HYSTERECTOMY           Family History: Pertinent history reviewed, as appropriate. Negative, except  "noted below or in HPI. If this history could not be obtained due to a reason, the reason is listed in the HPI.  Family History   Problem Relation Age of Onset    Heart disease Father     Diabetes Brother          Social History: Pertinent history reviewed, as appropriate. Negative, except noted below or in HPI. If this history could not be obtained due to a reason, the reason is listed in the HPI.  Social History     Socioeconomic History    Marital status:    Tobacco Use    Smoking status: Former     Current packs/day: 0.00     Types: Cigarettes     Quit date: 2018     Years since quittin.9    Smokeless tobacco: Never   Vaping Use    Vaping status: Never Used   Substance and Sexual Activity    Alcohol use: No    Drug use: No    Sexual activity: Never             Physical Exam:  BP (!) 130/36 (BP Location: Right arm, Patient Position: Lying)   Pulse 67   Temp 97.6 °F (36.4 °C) (Oral)   Resp 18   Ht 152.4 cm (60\")   Wt 28.7 kg (63 lb 4.4 oz)   SpO2 100%   BMI 12.36 kg/m²     Constitutional:            Vital signs reviewed                     General: Mild respiratory distress noted.    Eyes:            Extraocular movement was intact.            Pupils appeared equal            Conjunctiva: Pink    ENT:             Hearing was intact              No nasal erythema noted.              Oropharynx was moist. No lesions noted.     Neck:             Supple. No JVD noted.              Thyroid gland did not seem to be enlarged    Cardiovascular:              S1 + S2. Regular     Lungs/Respiratory:            Respiratory effort was labored            Hyperresonance to percussion.            Decreased Air Entry Bilaterally with mild to moderate wheezing.    Abdomen/GI:            Soft.  Bowel sounds were positive.  No obvious organomegaly.    Musculoskeletal/Extremities:            No edema noted.            No cyanosis noted            No clubbing noted            Gait could not be assessed at this " "time.    Neurologic:             Awake, alert and oriented x 3.             Able to follow simple commands.    Psychiatric:             Affect appeared fair.             Awake, alert and oriented x 3.    Skin:             No rash noted.             Warm and dry      Labs: Reviewed. Pertinent labs were noted.   Results from last 7 days   Lab Units 09/22/24  0728 09/21/24  0719 09/20/24  1727 09/18/24  0633 09/17/24  0614   WBC 10*3/mm3 18.91* 16.93* 24.25* 20.33* 23.76*   HEMOGLOBIN g/dL 9.4* 10.2* 11.3* 10.2* 9.9*   HEMATOCRIT % 30.8* 34.0 36.7 33.3* 32.8*   PLATELETS 10*3/mm3 281 275 355 274 287   NEUTROPHIL % % 95.8* 93.2* 81.3* 96.6* 95.0*   NEUTROS ABS 10*3/mm3 18.13* 15.80* 19.72*  22.80* 19.62* 22.57*   EOSINOPHIL % % 0.0* 0.1* 12.1* 0.0* 0.0*   EOS ABS 10*3/mm3 0.00 0.01 2.94* 0.00 0.00   LYMPHOCYTE % % 1.4* 2.3* 1.0* 1.0* 1.1*   LYMPHS ABS 10*3/mm3 0.26* 0.39* 0.25* 0.21* 0.26*       Lab Results   Component Value Date    PROCALCITO 0.24 09/20/2024    PROCALCITO 0.04 02/20/2023    PROCALCITO 0.20 09/15/2022       Lab Results   Component Value Date    CRP 2.50 (H) 02/20/2023       No results found for: \"SEDRATE\"    Lab Results   Component Value Date    PROBNP 1,124.0 09/20/2024    PROBNP 779.3 09/14/2024    PROBNP 319.0 09/10/2024       Results from last 7 days   Lab Units 09/23/24  0604 09/22/24  0728 09/21/24  0719 09/20/24  1811   SODIUM mmol/L 148* 146* 148* 145   POTASSIUM mmol/L 4.5 4.2 3.7 4.0   CHLORIDE mmol/L 108* 106 107 102   CO2 mmol/L 37.2* 36.0* 34.4* 31.6*   BUN mg/dL 24* 27* 22 25*   CREATININE mg/dL 0.36* 0.42* 0.37* 0.52*   CALCIUM mg/dL 9.1 9.0 9.0 8.9   ANION GAP mmol/L 2.8* 4.0* 6.6 11.4   BILIRUBIN mg/dL  --  0.2  --  0.2   ALK PHOS U/L  --  66  --  107   ALT (SGPT) U/L  --  14  --  21   AST (SGOT) U/L  --  11  --  20   GLUCOSE mg/dL 85 163* 111* 392*   TOTAL PROTEIN g/dL  --  5.4*  --  6.3   ALBUMIN g/dL  --  2.9*  --  3.3*       Results from last 7 days   Lab Units 09/20/24  2189 " "  MAGNESIUM mg/dL 2.0   PHOSPHORUS mg/dL 3.2       Lab Results   Component Value Date    TSH 0.292 09/20/2024    TSH 0.048 (L) 11/02/2018    TSH 0.27 (L) 06/05/2016       No results found for: \"FREET4\"    No results found for: \"INR\"    Lab Results   Component Value Date    CKTOTAL 49 09/20/2024       No components found for: \"HSTROPT\"    Lab Results   Component Value Date    TROPONINT 55 (C) 09/20/2024    TROPONINT 30 (H) 09/20/2024    TROPONINT 17 (H) 09/14/2024       No results found for: \"DDIMER\"    No results found for: \"LIPASE\"    Brief Urine Lab Results  (Last result in the past 365 days)        Color   Clarity   Blood   Leuk Est   Nitrite   Protein   CREAT   Urine HCG        09/20/24 2240 Yellow   Clear   Negative   Negative   Negative   30 mg/dL (1+)                   Micro: As of September 23, 2024   Lab Results   Component Value Date    RESPCX  02/20/2023     Moderate growth (3+) Normal respiratory naga. No S. aureus or Pseudomonas aeruginosa detected. Final report.    RESPCX Rejected 11/01/2018     No results found for: \"BCIDPCR\"  Lab Results   Component Value Date    BLOODCX No growth at 2 days 09/20/2024    BLOODCX No growth at 2 days 09/20/2024    BLOODCX No growth at 5 days 09/15/2022     No results found for: \"URINECX\"  No results found for: \"MRSACX\"  No results found for: \"MRSAPCR\"  No results found for: \"URCX\"  No components found for: \"LOWRESPCF\"  No results found for: \"THROATCX\"  No results found for: \"CULTURES\"  No components found for: \"STREPBCX\"  No results found for: \"STREPPNEUAG\"  No results found for: \"LEGIONELLA\"  No results found for: \"LEGANTIGENUR\"  No results found for: \"MYCOPLASCX\"  No results found for: \"GCCX\"  No results found for: \"WOUNDCX\"  No results found for: \"BODYFLDCX\"    Lab Results   Component Value Date    FLU Negative 10/31/2018    FLU Negative 10/31/2018       Lab Results   Component Value Date    ADENOVIRUS Not Detected 09/20/2024     Lab Results   Component Value Date " "   IX220V Not Detected 09/20/2024     Lab Results   Component Value Date    CVHKU1 Not Detected 09/20/2024     Lab Results   Component Value Date    CVNL63 Not Detected 09/20/2024     Lab Results   Component Value Date    CVOC43 Not Detected 09/20/2024     Lab Results   Component Value Date    HUMETPNEVS Not Detected 09/20/2024     Lab Results   Component Value Date    HURVEV Not Detected 09/20/2024     Lab Results   Component Value Date    FLUBPCR Not Detected 09/20/2024     Lab Results   Component Value Date    PARAINFLUE Not Detected 09/20/2024     Lab Results   Component Value Date    PARAFLUV2 Not Detected 09/20/2024     Lab Results   Component Value Date    PARAFLUV3 Not Detected 09/20/2024     Lab Results   Component Value Date    PARAFLUV4 Not Detected 09/20/2024     Lab Results   Component Value Date    BPERTPCR Not Detected 09/20/2024     No results found for: \"[id number]\"  Lab Results   Component Value Date    CPNEUPCR Not Detected 09/20/2024     Lab Results   Component Value Date    MPNEUMO Not Detected 09/20/2024     Lab Results   Component Value Date    FLUAPCR Not Detected 09/20/2024     No results found for: \"FLUAH3\"  No results found for: \"FLUAH1\"  Lab Results   Component Value Date    RSV Not Detected 09/20/2024     Lab Results   Component Value Date    BPARAPCR Not Detected 09/20/2024       COVID 19:  Lab Results   Component Value Date    COVID19 Detected (C) 09/20/2024       No results found for: \"THCURSCR\"  No results found for: \"PCPUR\"  No results found for: \"COCAINEUR\"  No results found for: \"METAMPSCNUR\"  No results found for: \"LABOPIASCN\"  No results found for: \"AMPHETSCREEN\"  No results found for: \"LABBENZSCN\"  No results found for: \"TRICYCLICSCN\"  No results found for: \"LABMETHSCN\"  No results found for: \"BARBITSCNUR\"  No results found for: \"OXYCODONESCN\"  No results found for: \"PROPOXSCN\"  No results found for: \"BUPRENORSCNU\"  Lab Results   Component Value Date    ETHANOLMGDL <10 " 09/20/2024     Lab Results   Component Value Date    ETOHPCT <0.010 09/20/2024       ABG:  Recent Labs     06/26/24  1545 09/20/24  1749 09/20/24  2204   PHART 7.402 7.251* 7.327   XOU5TRR 52.8* 74.1* 64.7*   PO2ART 65.0* 77.3 178.0*   QKL1KTQ 32.9* 32.5* 33.8*   BASEEXCESS 6.9* 3.8* 6.4*       Lab Results   Component Value Date    LACTATE 1.5 09/20/2024    LACTATE 2.0 09/15/2022    LACTATE 1.7 10/31/2018       Imaging Study: Latest imaging studies was reviewed personally.   Imaging Results (Last 72 Hours)       Procedure Component Value Units Date/Time    XR Chest 1 View [[id number]] Collected: 09/21/24 1008     Updated: 09/21/24 1012    Narrative:      AP PORTABLE CHEST .     CLINICAL HISTORY: Shortness of air. Tachycardia..     COMPARISON: 9/14/2024.     FINDINGS: An AP portable view of the chest was obtained. Defibrillator  pads obscure portions of the left lung. There is evidence of old  granulomatous disease. However, the lungs are clear of airspace  consolidation. There is severe bilateral diffuse emphysema. There is no  apparent pleural disease or acute osseous abnormality.       Impression:      Emphysema. No acute abnormality..                    This report was signed and finalized on 9/21/2024 10:10 AM by Ihsan Rhodes MD.       CT Angiogram Chest Pulmonary Embolism [[id number]] Collected: 09/20/24 2033     Updated: 09/20/24 2035    Narrative:      FINAL REPORT    TECHNIQUE:  null    CLINICAL HISTORY:  Shortness of breath, tachycardia, +dimer, eval PE    COMPARISON:  null    FINDINGS:  Exam: CTA Chest with IV contrast.    Procedure: 98 mL of contrast. Coronal and sagittal MIP reformats were performed    Comparison: 09/14/2024    Clinical history: Shortness of breath, tachycardia, positive D-dimer, evaluate PE    Findings:    Limited due to patient motion artifact.    No central pulmonary emboli. Small pulmonary emboli are not excluded secondary to limitations of the study.    No thoracic aortic aneurysm or  dissection.    Atherosclerotic calcifications are seen at the aorta and coronary arteries.    No hilar or mediastinal adenopathy.    No significant pericardial fluid collection.    No pneumothorax.    No pleural fluid collection.    Opacification of the right and left lower lobe bronchi which may be reflective of secretions or aspiration.    New bilateral lower lobe airspace disease likely pneumonia    Severe emphysema.    Pleural-parenchymal scarring and calcification seen at the lung apices bilaterally, unchanged.    Visualized liver and spleen do not demonstrate any acute process    No thoracic spine compression fractures      Impression:      Impression:    1. No pulmonary emboli.    2. No thoracic aortic aneurysm or dissection    3. Severe emphysema.    4. New bilateral lower lobe airspace disease, likely pneumonia. Opacification of the right and left lower lobe bronchi which may be reflective of aspiration or secretions.    Authenticated and Electronically Signed by Sammie Lozano MD  on 09/20/2024 08:33:43 PM            ECHO:  Results for orders placed during the hospital encounter of 09/14/24    Adult Transthoracic Echo Complete W/ Cont if Necessary Per Protocol    Interpretation Summary    Left ventricular systolic function is hyperdynamic (EF > 70%). Calculated left ventricular EF = 72.6%    Left ventricular diastolic function was normal.    Estimated right ventricular systolic pressure from tricuspid regurgitation is markedly elevated (>55 mmHg).    Severe pulmonary hypertension.      Results for orders placed during the hospital encounter of 10/31/18    Adult Transthoracic Echo Complete W/ Cont if Necessary Per Protocol    Interpretation Summary  · Left ventricular systolic function is hyperdynamic (EF > 70).  · Mild to moderate tricuspid valve regurgitation is present.  · Calculated right ventricular systolic pressure from tricuspid regurgitation is 60 mmHg.        Pharmacy to Dose enoxaparin  (LOVENOX),           Assessment:  1.  Acute hypercarbic respiratory failure   2.  Acute exacerbation of COPD   3.  COVID-19 infection   4.  History of smoking  5.  Chronic hypoxemia    Discussion/Recommendations:   I have adjusted the nebulized treatments as appropriate.     I will adjust the patient's steroids and start the patient on prednisone.    I will also start the patient on Spiriva.    The patient will continue Symbicort.    I reviewed the patient's last PFTs, FEV1 was only 32% predicted.    The patient is currently requiring slightly higher oxygen than her baseline.    I have asked the nursing staff to decrease FiO2 as tolerated.    The patient is unable to tolerate BiPAP and although she does have acute hypercarbic respiratory failure, it seems to be slowly improving despite her inability to use BiPAP.    ABG will be repeated in the morning.    All relevant recommendations were, and will be, discussed with referring provider Ce Rock, *, as indicated    I would like to thank you for the opportunity to participate in the care of this patient.  We will communicate changes and recommendations, if and when necessary.      This document was electronically signed by Khushi Brink MD on 09/23/24 at 12:41 EDT      Dictated utilizing Dragon dictation.

## 2024-09-24 LAB
ANION GAP SERPL CALCULATED.3IONS-SCNC: 2.7 MMOL/L (ref 5–15)
ANISOCYTOSIS BLD QL: NORMAL
BASOPHILS # BLD AUTO: 0.01 10*3/MM3 (ref 0–0.2)
BASOPHILS NFR BLD AUTO: 0.1 % (ref 0–1.5)
BUN SERPL-MCNC: 19 MG/DL (ref 8–23)
BUN/CREAT SERPL: 54.3 (ref 7–25)
CALCIUM SPEC-SCNC: 9.2 MG/DL (ref 8.6–10.5)
CHLORIDE SERPL-SCNC: 106 MMOL/L (ref 98–107)
CO2 SERPL-SCNC: 41.3 MMOL/L (ref 22–29)
CREAT SERPL-MCNC: 0.35 MG/DL (ref 0.57–1)
DEPRECATED RDW RBC AUTO: 43 FL (ref 37–54)
EGFRCR SERPLBLD CKD-EPI 2021: 103.5 ML/MIN/1.73
EOSINOPHIL # BLD AUTO: 0.01 10*3/MM3 (ref 0–0.4)
EOSINOPHIL NFR BLD AUTO: 0.1 % (ref 0.3–6.2)
ERYTHROCYTE [DISTWIDTH] IN BLOOD BY AUTOMATED COUNT: 12.2 % (ref 12.3–15.4)
GLUCOSE BLDC GLUCOMTR-MCNC: 152 MG/DL (ref 70–130)
GLUCOSE BLDC GLUCOMTR-MCNC: 223 MG/DL (ref 70–130)
GLUCOSE BLDC GLUCOMTR-MCNC: 279 MG/DL (ref 70–130)
GLUCOSE BLDC GLUCOMTR-MCNC: 285 MG/DL (ref 70–130)
GLUCOSE SERPL-MCNC: 152 MG/DL (ref 65–99)
HCT VFR BLD AUTO: 33.2 % (ref 34–46.6)
HGB BLD-MCNC: 9.9 G/DL (ref 12–15.9)
HYPOCHROMIA BLD QL: NORMAL
IMM GRANULOCYTES # BLD AUTO: 0.06 10*3/MM3 (ref 0–0.05)
IMM GRANULOCYTES NFR BLD AUTO: 0.5 % (ref 0–0.5)
LYMPHOCYTES # BLD AUTO: 0.56 10*3/MM3 (ref 0.7–3.1)
LYMPHOCYTES NFR BLD AUTO: 4.8 % (ref 19.6–45.3)
MCH RBC QN AUTO: 28.8 PG (ref 26.6–33)
MCHC RBC AUTO-ENTMCNC: 29.8 G/DL (ref 31.5–35.7)
MCV RBC AUTO: 96.5 FL (ref 79–97)
MONOCYTES # BLD AUTO: 0.56 10*3/MM3 (ref 0.1–0.9)
MONOCYTES NFR BLD AUTO: 4.8 % (ref 5–12)
NEUTROPHILS NFR BLD AUTO: 10.44 10*3/MM3 (ref 1.7–7)
NEUTROPHILS NFR BLD AUTO: 89.7 % (ref 42.7–76)
NRBC BLD AUTO-RTO: 0 /100 WBC (ref 0–0.2)
PLATELET # BLD AUTO: 291 10*3/MM3 (ref 140–450)
PMV BLD AUTO: 10.7 FL (ref 6–12)
POIKILOCYTOSIS BLD QL SMEAR: NORMAL
POTASSIUM SERPL-SCNC: 4.3 MMOL/L (ref 3.5–5.2)
RBC # BLD AUTO: 3.44 10*6/MM3 (ref 3.77–5.28)
SMALL PLATELETS BLD QL SMEAR: ADEQUATE
SODIUM SERPL-SCNC: 150 MMOL/L (ref 136–145)
WBC MORPH BLD: NORMAL
WBC NRBC COR # BLD AUTO: 11.64 10*3/MM3 (ref 3.4–10.8)

## 2024-09-24 PROCEDURE — 82948 REAGENT STRIP/BLOOD GLUCOSE: CPT | Performed by: INTERNAL MEDICINE

## 2024-09-24 PROCEDURE — 63710000001 INSULIN GLARGINE PER 5 UNITS: Performed by: INTERNAL MEDICINE

## 2024-09-24 PROCEDURE — 63710000001 INSULIN LISPRO (HUMAN) PER 5 UNITS: Performed by: INTERNAL MEDICINE

## 2024-09-24 PROCEDURE — 80048 BASIC METABOLIC PNL TOTAL CA: CPT | Performed by: FAMILY MEDICINE

## 2024-09-24 PROCEDURE — 82948 REAGENT STRIP/BLOOD GLUCOSE: CPT

## 2024-09-24 PROCEDURE — 94799 UNLISTED PULMONARY SVC/PX: CPT

## 2024-09-24 PROCEDURE — 99232 SBSQ HOSP IP/OBS MODERATE 35: CPT | Performed by: INTERNAL MEDICINE

## 2024-09-24 PROCEDURE — 99232 SBSQ HOSP IP/OBS MODERATE 35: CPT | Performed by: NURSE PRACTITIONER

## 2024-09-24 PROCEDURE — 97110 THERAPEUTIC EXERCISES: CPT

## 2024-09-24 PROCEDURE — 63710000001 PREDNISONE PER 1 MG: Performed by: INTERNAL MEDICINE

## 2024-09-24 PROCEDURE — 85025 COMPLETE CBC W/AUTO DIFF WBC: CPT | Performed by: FAMILY MEDICINE

## 2024-09-24 PROCEDURE — 85007 BL SMEAR W/DIFF WBC COUNT: CPT | Performed by: FAMILY MEDICINE

## 2024-09-24 PROCEDURE — 25010000002 CEFTRIAXONE PER 250 MG: Performed by: INTERNAL MEDICINE

## 2024-09-24 RX ORDER — DEXTROSE MONOHYDRATE AND SODIUM CHLORIDE 5; .45 G/100ML; G/100ML
50 INJECTION, SOLUTION INTRAVENOUS CONTINUOUS
Status: DISCONTINUED | OUTPATIENT
Start: 2024-09-24 | End: 2024-09-26 | Stop reason: HOSPADM

## 2024-09-24 RX ORDER — LOSARTAN POTASSIUM 25 MG/1
25 TABLET ORAL DAILY
Status: DISCONTINUED | OUTPATIENT
Start: 2024-09-24 | End: 2024-09-26 | Stop reason: HOSPADM

## 2024-09-24 RX ORDER — LOPERAMIDE HCL 2 MG
2 CAPSULE ORAL 4 TIMES DAILY PRN
Status: DISCONTINUED | OUTPATIENT
Start: 2024-09-24 | End: 2024-09-26 | Stop reason: HOSPADM

## 2024-09-24 RX ADMIN — ALBUTEROL SULFATE 2 PUFF: 90 AEROSOL, METERED RESPIRATORY (INHALATION) at 08:45

## 2024-09-24 RX ADMIN — INSULIN LISPRO 5 UNITS: 100 INJECTION, SOLUTION INTRAVENOUS; SUBCUTANEOUS at 18:00

## 2024-09-24 RX ADMIN — ALBUTEROL SULFATE 2 PUFF: 90 AEROSOL, METERED RESPIRATORY (INHALATION) at 14:50

## 2024-09-24 RX ADMIN — INSULIN GLARGINE 7 UNITS: 100 INJECTION, SOLUTION SUBCUTANEOUS at 21:20

## 2024-09-24 RX ADMIN — APIXABAN 2.5 MG: 2.5 TABLET, FILM COATED ORAL at 21:21

## 2024-09-24 RX ADMIN — LEVOTHYROXINE SODIUM 75 MCG: 0.07 TABLET ORAL at 08:40

## 2024-09-24 RX ADMIN — BUDESONIDE AND FORMOTEROL FUMARATE DIHYDRATE 2 PUFF: 160; 4.5 AEROSOL RESPIRATORY (INHALATION) at 08:45

## 2024-09-24 RX ADMIN — Medication 10 ML: at 09:45

## 2024-09-24 RX ADMIN — INSULIN LISPRO 2 UNITS: 100 INJECTION, SOLUTION INTRAVENOUS; SUBCUTANEOUS at 08:40

## 2024-09-24 RX ADMIN — ASPIRIN 81 MG: 81 TABLET, COATED ORAL at 08:40

## 2024-09-24 RX ADMIN — LOSARTAN POTASSIUM 25 MG: 25 TABLET, FILM COATED ORAL at 10:38

## 2024-09-24 RX ADMIN — PRAVASTATIN SODIUM 40 MG: 20 TABLET ORAL at 21:21

## 2024-09-24 RX ADMIN — DEXTROSE AND SODIUM CHLORIDE 50 ML/HR: 5; 450 INJECTION, SOLUTION INTRAVENOUS at 12:53

## 2024-09-24 RX ADMIN — LOPERAMIDE HYDROCHLORIDE 2 MG: 2 CAPSULE ORAL at 21:21

## 2024-09-24 RX ADMIN — PREDNISONE 40 MG: 20 TABLET ORAL at 08:40

## 2024-09-24 RX ADMIN — APIXABAN 2.5 MG: 2.5 TABLET, FILM COATED ORAL at 08:40

## 2024-09-24 RX ADMIN — INSULIN LISPRO 1 UNITS: 100 INJECTION, SOLUTION INTRAVENOUS; SUBCUTANEOUS at 21:21

## 2024-09-24 RX ADMIN — Medication 10 ML: at 21:40

## 2024-09-24 RX ADMIN — INSULIN LISPRO 3 UNITS: 100 INJECTION, SOLUTION INTRAVENOUS; SUBCUTANEOUS at 12:31

## 2024-09-24 RX ADMIN — ALBUTEROL SULFATE 2 PUFF: 90 AEROSOL, METERED RESPIRATORY (INHALATION) at 16:18

## 2024-09-24 RX ADMIN — METOPROLOL TARTRATE 25 MG: 25 TABLET, FILM COATED ORAL at 09:39

## 2024-09-24 RX ADMIN — TIOTROPIUM BROMIDE INHALATION SPRAY 2 PUFF: 3.12 SPRAY, METERED RESPIRATORY (INHALATION) at 08:44

## 2024-09-24 RX ADMIN — FOLIC ACID 1 MG: 1 TABLET ORAL at 08:40

## 2024-09-24 NOTE — PROGRESS NOTES
[facility]    Name:  Maura Jiménez   Age:  79 y.o.  Sex:  female  :  1945  MRN:  [id number]   Visit Number:  [id number]  Admission Date:  2024  Date Of Service:  24  Primary Care Physician:  Sofia Martinez APRN     LOS: 4 days :    Chief Complaint:      Shortness of air    Subjective:    Patient seen and examined.  Slightly labored breathing noted.  States this is her baseline.  Requesting to go home today.    Hospital Course:      The patient is a chronically ill 79-year-old female with a history of chronic obstructive pulmonary disease with chronic respiratory failure, severe malnutrition, diabetes, hypothyroidism, who presented from home due to increasing shortness of breath, cough.  She actually just had a recent hospital stay for acute on chronic respiratory failure and COPD exacerbation, had lung nodule noted at the time.     Her workup in the emergency room she was tachycardic with evidence of atrial fibrillation, she was hypoxic initially up to 10 L.  She had an ABG with a pH of 7.251 and a pO2 of 74.  Troponin was elevated at 30 with repeat of 55. Her TSH the normal limits.  Dimer was 0.99.  A white count of 24.  CT imaging of the chest was negative for pulmonary embolism, concern for emphysema as well as bilateral lower lobe airspace disease.  Patient also tested positive for COVID-19.     Patient admitted initially started on Rocephin, doxycycline, bronchodilators and steroids in addition to Cardizem drip.  She was seen by cardiology.  She has become bradycardic now and Cardizem drip has been weaned off.  She was started on oral metoprolol and has been on therapeutic Lovenox.  This was switched to Eliquis.  Patient noted to have worsening hypernatremia during admission.  Likely secondary to overall poor p.o. intake.  Patient initiated on dextrose.    Review of Systems:     All systems were reviewed and negative except as  mentioned in subjective, assessment and plan.    Vital Signs:    Temp:  [97 °F (36.1 °C)-97.9 °F (36.6 °C)] 97 °F (36.1 °C)  Heart Rate:  [61-82] 76  Resp:  [18-20] 20  BP: (102-153)/(42-52) 148/52    Intake and output:    I/O last 3 completed shifts:  In: 357 [P.O.:357]  Out: -   I/O this shift:  In: 120 [P.O.:120]  Out: 250 [Urine:250]    Physical Examination:    General Appearance:  Alert and cooperative.  Chronically ill/cachectic appearing elderly female.   Head:  Atraumatic and normocephalic.   Eyes: Conjunctivae and sclerae normal, no icterus. No pallor.   Throat: No oral lesions, no thrush, oral mucosa moist.   Neck: Supple, trachea midline, no thyromegaly.   Lungs:   Breath sounds heard bilaterally equally.  No wheezing or crackles. No Pleural rub or bronchial breathing.  Coarseness noted slightly labored on 3 L nasal cannula.   Heart:  Normal S1 and S2, no murmur, no gallop, no rub. No JVD.   Abdomen:   Normal bowel sounds, no masses, no organomegaly. Soft, nontender, nondistended, no rebound tenderness.   Extremities: Supple, no edema, no cyanosis, no clubbing.   Skin: No bleeding or rash.   Neurologic: Alert and oriented x 3. No facial asymmetry. Moves all four limbs. No tremors.  Severe generalized weakness.     Laboratory results:    Results from last 7 days   Lab Units 09/24/24  0751 09/23/24  0604 09/22/24  0728 09/21/24  0719 09/20/24  1811   SODIUM mmol/L 150* 148* 146*   < > 145   POTASSIUM mmol/L 4.3 4.5 4.2   < > 4.0   CHLORIDE mmol/L 106 108* 106   < > 102   CO2 mmol/L 41.3* 37.2* 36.0*   < > 31.6*   BUN mg/dL 19 24* 27*   < > 25*   CREATININE mg/dL 0.35* 0.36* 0.42*   < > 0.52*   CALCIUM mg/dL 9.2 9.1 9.0   < > 8.9   BILIRUBIN mg/dL  --   --  0.2  --  0.2   ALK PHOS U/L  --   --  66  --  107   ALT (SGPT) U/L  --   --  14  --  21   AST (SGOT) U/L  --   --  11  --  20   GLUCOSE mg/dL 152* 85 163*   < > 392*    < > = values in this interval not displayed.     Results from last 7 days   Lab Units  09/24/24  0751 09/22/24  0728 09/21/24  0719   WBC 10*3/mm3 11.64* 18.91* 16.93*   HEMOGLOBIN g/dL 9.9* 9.4* 10.2*   HEMATOCRIT % 33.2* 30.8* 34.0   PLATELETS 10*3/mm3 291 281 275         Results from last 7 days   Lab Units 09/20/24 2026 09/20/24  1811   CK TOTAL U/L  --  49   HSTROP T ng/L 55* 30*     Results from last 7 days   Lab Units 09/20/24  1811 09/20/24  1810   BLOODCX  No growth at 3 days No growth at 3 days     Recent Labs     06/26/24  1545 09/20/24  1749 09/20/24  2204   PHART 7.402 7.251* 7.327   QPS8GVJ 52.8* 74.1* 64.7*   PO2ART 65.0* 77.3 178.0*   [id number] 32.9* 32.5* 33.8*   BASEEXCESS 6.9* 3.8* 6.4*      I have reviewed the patient's laboratory results.    Radiology results:    No radiology results from the last 24 hrs  I have reviewed the patient's radiology reports.    Medication Review:     I have reviewed the patient's active and prn medications.     Problem List:      Acute respiratory failure with hypoxia      Assessment:    Acute hypoxic/hypercapnic respiratory failure  COPD exacerbation  Viral pneumonia  Acute COVID-19  New onset A-fib with RVR  Elevated troponin  Hypernatremia likely secondary to p.o. intake poor  Hyperglycemia/type 2 diabetes  Lung nodule  Hyperlipidemia  Hypertension  Hypothyroidism  Severe malnutrition  Impaired mobility and ADL    Plan:    Acute hypoxic/hypercapnic respiratory failure  COPD exacerbation  COVID Pneumonia  Had been on steroids in addition to Rocephin as well as bronchodilators.  Will discontinue NIPPV as patient does not tolerate this.  I did have Dr. Brink pulmonology see patient for any further recommendations to improve her symptomatology.  She has fairly advanced COPD  Continue oral prednisone.  Hypernatremia noted.  Likely secondary to poor p.o. intake.  Gentle D5W fluids added.  Will repeat in AM.  A-fib with RVR  Elevated troponin  Was seen previously by cardiology, she was weaned off Cardizem drip and started on oral metoprolol and appears to  be in rhythm now.  She was initially on full dose Lovenox switched to Eliquis.  No obvious complications at this time.  Echo noted EF 72% with severe pulmonary hypertension noted with elevated right sided pressures.  Hyperglycemia/type 2 diabetes  Placed on Glucomander protocol.  Will likely worsen due to D5W with underlying hyponatremia.      I have reviewed the copied text and it is accurate as of 9/24/2024      DVT Prophylaxis: Eliquis  Code Status: DNR  Diet: As tolerated  Discharge Plan: Home with home health 1 to 2 days       Rachel Cat, BRITTNY  09/24/24  11:55 EDT    Dictated utilizing Dragon dictation.

## 2024-09-24 NOTE — CASE MANAGEMENT/SOCIAL WORK
Pt is anxious to go home. She plans on resuming [facility] services when she returns home with spouse.

## 2024-09-24 NOTE — PLAN OF CARE
Problem: Noninvasive Ventilation Acute  Goal: Effective Unassisted Ventilation and Oxygenation  Outcome: Progressing     Problem: Adult Inpatient Plan of Care  Goal: Plan of Care Review  Outcome: Progressing  Goal: Patient-Specific Goal (Individualized)  Outcome: Progressing  Goal: Absence of Hospital-Acquired Illness or Injury  Outcome: Progressing  Intervention: Identify and Manage Fall Risk  Recent Flowsheet Documentation  Taken 9/24/2024 1800 by Danielle Roberts RN  Safety Promotion/Fall Prevention: safety round/check completed  Taken 9/24/2024 1600 by Danielle Roberts RN  Safety Promotion/Fall Prevention: safety round/check completed  Taken 9/24/2024 1400 by Danielle Roberts RN  Safety Promotion/Fall Prevention: safety round/check completed  Taken 9/24/2024 1200 by Danielle Roberts RN  Safety Promotion/Fall Prevention: safety round/check completed  Taken 9/24/2024 1000 by Danielle Roberts RN  Safety Promotion/Fall Prevention: safety round/check completed  Taken 9/24/2024 0800 by Danielle Roberts RN  Safety Promotion/Fall Prevention: safety round/check completed  Intervention: Prevent Skin Injury  Recent Flowsheet Documentation  Taken 9/24/2024 1800 by Danielle Roberts RN  Body Position:   position changed independently   sitting up in bed  Taken 9/24/2024 1600 by Danielle Roberts RN  Body Position:   position changed independently   sitting up in bed  Taken 9/24/2024 1400 by Danielle Roberts RN  Body Position:   tilted   right  Taken 9/24/2024 1200 by Danielle Roberts RN  Body Position: supine, legs elevated  Taken 9/24/2024 1000 by Danielle Roberts RN  Body Position:   tilted   right  Taken 9/24/2024 0800 by Danielle Roberts RN  Body Position: supine, legs elevated  Intervention: Prevent and Manage VTE (Venous Thromboembolism) Risk  Recent Flowsheet Documentation  Taken 9/24/2024 1800 by Danielle Roberts RN  Activity Management: activity encouraged  Taken 9/24/2024 1600 by Danielle Roberts RN  Activity  Management: activity encouraged  Taken 9/24/2024 1400 by Danielle Roberts RN  Activity Management: activity encouraged  Taken 9/24/2024 1200 by Danielle Roberts RN  Activity Management: activity encouraged  Taken 9/24/2024 1000 by Danielle Roberts RN  Activity Management: activity encouraged  Taken 9/24/2024 0800 by Danielle Roberts RN  Activity Management: activity encouraged  Range of Motion: active ROM (range of motion) encouraged  Intervention: Prevent Infection  Recent Flowsheet Documentation  Taken 9/24/2024 0800 by Danielle Roberts RN  Infection Prevention: single patient room provided  Goal: Optimal Comfort and Wellbeing  Outcome: Progressing  Intervention: Provide Person-Centered Care  Recent Flowsheet Documentation  Taken 9/24/2024 0800 by Danielle Roberts RN  Trust Relationship/Rapport:   care explained   choices provided  Goal: Readiness for Transition of Care  Outcome: Progressing     Problem: Adjustment to Illness (Sepsis/Septic Shock)  Goal: Optimal Coping  Outcome: Progressing     Problem: Bleeding (Sepsis/Septic Shock)  Goal: Absence of Bleeding  Outcome: Progressing     Problem: Glycemic Control Impaired (Sepsis/Septic Shock)  Goal: Blood Glucose Level Within Desired Range  Outcome: Progressing     Problem: Infection Progression (Sepsis/Septic Shock)  Goal: Absence of Infection Signs and Symptoms  Outcome: Progressing  Intervention: Initiate Sepsis Management  Recent Flowsheet Documentation  Taken 9/24/2024 0800 by Danielle Roberts RN  Infection Prevention: single patient room provided  Isolation Precautions: precautions maintained  Intervention: Promote Recovery  Recent Flowsheet Documentation  Taken 9/24/2024 1800 by Danielle Roberts RN  Activity Management: activity encouraged  Taken 9/24/2024 1600 by Danielle Roberts RN  Activity Management: activity encouraged  Taken 9/24/2024 1400 by Danielle Roberts RN  Activity Management: activity encouraged  Taken 9/24/2024 1200 by Danielle Roberts  RN  Activity Management: activity encouraged  Taken 9/24/2024 1000 by Danielle Roberts RN  Activity Management: activity encouraged  Taken 9/24/2024 0800 by Danielle Roberts RN  Activity Management: activity encouraged     Problem: Nutrition Impaired (Sepsis/Septic Shock)  Goal: Optimal Nutrition Intake  Outcome: Progressing     Problem: Skin Injury Risk Increased  Goal: Skin Health and Integrity  Outcome: Progressing  Intervention: Optimize Skin Protection  Recent Flowsheet Documentation  Taken 9/24/2024 1200 by Danielle Roberts RN  Head of Bed (HOB) Positioning: HOB at 30 degrees     Problem: COPD (Chronic Obstructive Pulmonary Disease) Comorbidity  Goal: Maintenance of COPD Symptom Control  Outcome: Progressing  Intervention: Maintain COPD-Symptom Control  Recent Flowsheet Documentation  Taken 9/24/2024 0800 by Danielle Roberts RN  Medication Review/Management: medications reviewed     Problem: Diabetes Comorbidity  Goal: Blood Glucose Level Within Targeted Range  Outcome: Progressing     Problem: Hypertension Comorbidity  Goal: Blood Pressure in Desired Range  Outcome: Progressing  Intervention: Maintain Blood Pressure Management  Recent Flowsheet Documentation  Taken 9/24/2024 0800 by Danielle Roberts RN  Medication Review/Management: medications reviewed     Problem: Fall Injury Risk  Goal: Absence of Fall and Fall-Related Injury  Outcome: Progressing  Intervention: Identify and Manage Contributors  Recent Flowsheet Documentation  Taken 9/24/2024 0800 by Danielle Roberts RN  Medication Review/Management: medications reviewed  Intervention: Promote Injury-Free Environment  Recent Flowsheet Documentation  Taken 9/24/2024 1800 by Danielle Roberts RN  Safety Promotion/Fall Prevention: safety round/check completed  Taken 9/24/2024 1600 by Danielle Roberts RN  Safety Promotion/Fall Prevention: safety round/check completed  Taken 9/24/2024 1400 by Danielle Roberts RN  Safety Promotion/Fall Prevention: safety  round/check completed  Taken 9/24/2024 1200 by Danielle Roberts, RN  Safety Promotion/Fall Prevention: safety round/check completed  Taken 9/24/2024 1000 by Danielle Roberts, RN  Safety Promotion/Fall Prevention: safety round/check completed  Taken 9/24/2024 0800 by Danielle Roberts, RN  Safety Promotion/Fall Prevention: safety round/check completed   Goal Outcome Evaluation:

## 2024-09-24 NOTE — PLAN OF CARE
Goal Outcome Evaluation:  Plan of Care Reviewed With: patient, spouse        Progress: improving  Outcome Evaluation: Pt received supine in bed willing to work with therapy.  Pt sat eob independently and participated in UB ex using yellow theraband for resistance. Pt completed 2 sets of 5 STS transfers with rest break in between.  Pt was on 3L O2 via nc with O2 sats dropping to 91% with activity.  Pt c/o SOA and fatigues quickly.  Pt was left in bed with call light within reach and her  at bedside.  Cont OT per POC.      Anticipated Discharge Disposition (OT): home with home health

## 2024-09-24 NOTE — PROGRESS NOTES
"  CC: COPD exacerbation.  COVID-19 infection.    S: Does complain of some increased cough on occasion but denies any significant worsening shortness of breath.  She actually feels that she is closer to her baseline.  Is currently on 3 L/min nasal cannula.    ROS: Positive for cough, shortness of breath, and mild anxiety. Denies chest pain, diarrhea or fever.    O: /52   Pulse 76   Temp 97 °F (36.1 °C) (Oral)   Resp 20   Ht 152.4 cm (60\")   Wt 28.7 kg (63 lb 4.4 oz)   SpO2 100%   BMI 12.36 kg/m²     Vital signs reviewed.  General/Constitutional: Minimal respiratory distress noted.  Neck: No obvious JVD   Cardiovascular: S1 + S2.  Regular  Lungs/Respiratory: Bilateral scattered wheezing heard.  No significant crackles noted  Musculoskeletal/Extremities: No edema noted.  Neurologic: AAOx3. Was able to follow commands       Labs: Reviewed.   Results from last 7 days   Lab Units 09/24/24  0751 09/22/24  0728 09/21/24  0719 09/20/24  1727 09/18/24  0633   WBC 10*3/mm3 11.64* 18.91* 16.93* 24.25* 20.33*   HEMOGLOBIN g/dL 9.9* 9.4* 10.2* 11.3* 10.2*   HEMATOCRIT % 33.2* 30.8* 34.0 36.7 33.3*   PLATELETS 10*3/mm3 291 281 275 355 274       Lab Results   Component Value Date    PROCALCITO 0.24 09/20/2024    PROCALCITO 0.04 02/20/2023    PROCALCITO 0.20 09/15/2022       Lab Results   Component Value Date    CRP 2.50 (H) 02/20/2023       No results found for: \"SEDRATE\"    Lab Results   Component Value Date    PROBNP 1,124.0 09/20/2024    PROBNP 779.3 09/14/2024    PROBNP 319.0 09/10/2024       Results from last 7 days   Lab Units 09/24/24  0751 09/23/24  0604 09/22/24  0728 09/21/24  0719 09/20/24  1811   SODIUM mmol/L 150* 148* 146*   < > 145   POTASSIUM mmol/L 4.3 4.5 4.2   < > 4.0   CHLORIDE mmol/L 106 108* 106   < > 102   CO2 mmol/L 41.3* 37.2* 36.0*   < > 31.6*   BUN mg/dL 19 24* 27*   < > 25*   CREATININE mg/dL 0.35* 0.36* 0.42*   < > 0.52*   CALCIUM mg/dL 9.2 9.1 9.0   < > 8.9   ANION GAP mmol/L 2.7* 2.8* " "4.0*   < > 11.4   BILIRUBIN mg/dL  --   --  0.2  --  0.2   ALK PHOS U/L  --   --  66  --  107   ALT (SGPT) U/L  --   --  14  --  21   AST (SGOT) U/L  --   --  11  --  20   GLUCOSE mg/dL 152* 85 163*   < > 392*   TOTAL PROTEIN g/dL  --   --  5.4*  --  6.3   ALBUMIN g/dL  --   --  2.9*  --  3.3*    < > = values in this interval not displayed.       Results from last 7 days   Lab Units 09/20/24  1811   MAGNESIUM mg/dL 2.0   PHOSPHORUS mg/dL 3.2             No results found for: \"DDIMER\"    Brief Urine Lab Results  (Last result in the past 365 days)        Color   Clarity   Blood   Leuk Est   Nitrite   Protein   CREAT   Urine HCG        09/20/24 2240 Yellow   Clear   Negative   Negative   Negative   30 mg/dL (1+)                     Micro: As of September 24, 2024   Lab Results   Component Value Date    RESPCX  02/20/2023     Moderate growth (3+) Normal respiratory naga. No S. aureus or Pseudomonas aeruginosa detected. Final report.    RESPCX Rejected 11/01/2018     No results found for: \"BCIDPCR\"  Lab Results   Component Value Date    BLOODCX No growth at 3 days 09/20/2024    BLOODCX No growth at 3 days 09/20/2024    BLOODCX No growth at 5 days 09/15/2022     No results found for: \"URINECX\"  No results found for: \"MRSACX\"  No results found for: \"MRSAPCR\"  No results found for: \"URCX\"  No components found for: \"LOWRESPCF\"  No results found for: \"THROATCX\"  No results found for: \"CULTURES\"  No components found for: \"STREPBCX\"  No results found for: \"STREPPNEUAG\"  No results found for: \"LEGIONELLA\"  No results found for: \"LEGANTIGENUR\"  No results found for: \"MYCOPLASCX\"  No results found for: \"GCCX\"  No results found for: \"WOUNDCX\"  No results found for: \"BODYFLDCX\"    Lab Results   Component Value Date    FLU Negative 10/31/2018    FLU Negative 10/31/2018       Lab Results   Component Value Date    ADENOVIRUS Not Detected 09/20/2024     Lab Results   Component Value Date    OB312T Not Detected 09/20/2024     Lab " "Results   Component Value Date    CVHKU1 Not Detected 09/20/2024     Lab Results   Component Value Date    CVNL63 Not Detected 09/20/2024     Lab Results   Component Value Date    CVOC43 Not Detected 09/20/2024     Lab Results   Component Value Date    HUMETPNEVS Not Detected 09/20/2024     Lab Results   Component Value Date    HURVEV Not Detected 09/20/2024     Lab Results   Component Value Date    FLUBPCR Not Detected 09/20/2024     Lab Results   Component Value Date    PARAINFLUE Not Detected 09/20/2024     Lab Results   Component Value Date    PARAFLUV2 Not Detected 09/20/2024     Lab Results   Component Value Date    PARAFLUV3 Not Detected 09/20/2024     Lab Results   Component Value Date    PARAFLUV4 Not Detected 09/20/2024     Lab Results   Component Value Date    BPERTPCR Not Detected 09/20/2024     No results found for: \"BSUBT00680\"  Lab Results   Component Value Date    CPNEUPCR Not Detected 09/20/2024     Lab Results   Component Value Date    MPNEUMO Not Detected 09/20/2024     Lab Results   Component Value Date    FLUAPCR Not Detected 09/20/2024     No results found for: \"FLUAH3\"  No results found for: \"FLUAH1\"  Lab Results   Component Value Date    RSV Not Detected 09/20/2024     Lab Results   Component Value Date    BPARAPCR Not Detected 09/20/2024       COVID 19:  Lab Results   Component Value Date    COVID19 Detected (C) 09/20/2024           ABG:   Recent Labs     06/26/24  1545 09/20/24  1749 09/20/24  2204   PHART 7.402 7.251* 7.327   BEN8AQO 52.8* 74.1* 64.7*   PO2ART 65.0* 77.3 178.0*   CFJ2GNA 32.9* 32.5* 33.8*   BASEEXCESS 6.9* 3.8* 6.4*         Pharmacy to Dose enoxaparin (LOVENOX),           CXRay: Latest imaging study was reviewed personally.   Imaging Results (Last 24 Hours)       ** No results found for the last 24 hours. **            Assessment & Recommendations/Plan:   1.  Acute hypercarbic and hypoxic respiratory failure  Clinically improved  Will consider ABG if clinically " indicated  Oxygen close to her baseline    2.  COPD exacerbation  Currently on prednisone  Continue Symbicort and Spiriva  Last FEV1 of 32% predicted, consistent with severe COPD.    3.  COVID-19 infection  Likely trigger for COPD exacerbation    4.  Hypernatremia  Possibly due to dehydration?  Being managed by hospitalist service    5.  Chronic hypoxemia  Close to her baseline of 3 L/min    6.  Pulmonary hypertension  Most likely secondary to severe COPD  Will consider repeat echo in 6 months, on an outpatient basis    7.  Discharge disposition/recommendations  No need to follow-up with [facility]  Keep appointment with Bella MART on 10/23/2024 at 10:30 AM at [facility] pulmonary clinic. Already scheduled in the system.  Follow-up with BRITTNY Marsh on 5/21/2025.  Already scheduled in the system.  Prednisone 40 mg for total of 5 days.  Will need to be ordered by discharging provider.  Maintenance inhalers. Already ordered.     I have discussed patient's overall clinical status with hospitalist BRITTNY especially with regards to respiratory status and possibility of discharge today or tomorrow, from a pulmonary standpoint.      Plan was also discussed with nursing staff, as necessary.     This document was electronically signed by Khushi Brink MD on 09/24/24 at 09:54 EDT      Dictated utilizing Dragon dictation.

## 2024-09-24 NOTE — THERAPY TREATMENT NOTE
Patient Name: Maura Jiménez  : 1945    MRN: [id number]                              Today's Date: 2024       Admit Date: 2024    Visit Dx:     ICD-10-CM ICD-9-CM   1. Atrial flutter, unspecified type  I48.92 427.32   2. Acute respiratory failure with hypoxia and hypercapnia  J96.01 518.81    J96.02    3. Pulmonary hypertension  I27.20 416.8   4. Sepsis, due to unspecified organism, unspecified whether acute organ dysfunction present  A41.9 038.9     995.91     Patient Active Problem List   Diagnosis    Respiratory failure with hypoxia    Chronic obstructive pulmonary disease with acute exacerbation    Type 2 diabetes mellitus without complication, without long-term current use of insulin    COPD exacerbation    Acute on chronic respiratory failure with hypoxia    Chronic respiratory failure with hypoxia    Rhinovirus infection    Severe malnutrition    Impaired mobility    Acute respiratory failure with hypoxia     Past Medical History:   Diagnosis Date    COPD (chronic obstructive pulmonary disease)     Disease of thyroid gland     Hyperlipidemia     Osteoarthritis     Type 2 diabetes mellitus without complication, without long-term current use of insulin 2018     Past Surgical History:   Procedure Laterality Date    APPENDECTOMY      CATARACT EXTRACTION, BILATERAL      HYSTERECTOMY        General Information       Row Name 24 1343          OT Time and Intention    Document Type therapy note (daily note)  -     Mode of Treatment occupational therapy  -       Row Name 24 1345          General Information    Patient Profile Reviewed yes  -     Existing Precautions/Restrictions fall;oxygen therapy device and L/min  -               User Key  (r) = Recorded By, (t) = Taken By, (c) = Cosigned By      Initials Name Provider Type     Evangelina Cruz Occupational Therapist                     Mobility/ADL's       Row Name 24 0328          Bed Mobility    Supine-Sit  [place] (Bed Mobility) modified independence  -     Assistive Device (Bed Mobility) bed rails;head of bed elevated  -[facility] Name 09/24/24 1343          Transfers    Transfers sit-stand transfer  -     Comment, (Transfers) pt performed 5 sts transfers x2  -[facility] Name 09/24/24 1343          Sit-Stand Transfer    Sit-Stand [place] (Transfers) contact guard;standby assist  -               User Key  (r) = Recorded By, (t) = Taken By, (c) = Cosigned By      Initials Name Provider Type     Evangelina Cruz Occupational Therapist                   Obj/Interventions       [facility] Name 09/24/24 1350          Shoulder (Therapeutic Exercise)    Shoulder (Therapeutic Exercise) strengthening exercise  -     Shoulder Strengthening (Therapeutic Exercise) bilateral;horizontal aBduction/aDduction;resistance band;yellow;10 repetitions  -AH       Row Name 09/24/24 1350          Elbow/Forearm (Therapeutic Exercise)    Elbow/Forearm (Therapeutic Exercise) strengthening exercise  -     Elbow/Forearm Strengthening (Therapeutic Exercise) bilateral;flexion;extension;resistance band;yellow;10 repetitions  -AH       Row Name 09/24/24 1350          Motor Skills    Therapeutic Exercise shoulder;elbow/forearm  -               User Key  (r) = Recorded By, (t) = Taken By, (c) = Cosigned By      Initials Name Provider Type     Evangelina Cruz Occupational Therapist                   Goals/Plan    No documentation.                  Clinical Impression       Row Name 09/24/24 1350          Pain Assessment    Pretreatment Pain Rating 0/10 - no pain  -     Posttreatment Pain Rating 0/10 - no pain  -     Pain Intervention(s) Repositioned;Ambulation/increased activity  -AH       Row Name 09/24/24 1350          Plan of Care Review    Plan of Care Reviewed With patient;spouse  -     Progress improving  -     Outcome Evaluation Pt received supine in bed willing to work with therapy.  Pt sat eob independently and  participated in UB ex using yellow theraband for resistance. Pt completed 2 sets of 5 STS transfers with rest break in between.  Pt was on 3L O2 via nc with O2 sats dropping to 91% with activity.  Pt c/o SOA and fatigues quickly.  Pt was left in bed with call light within reach and her  at bedside.  Cont OT per POC.  -       Row Name 09/24/24 1350          Vital Signs    Pre SpO2 (%) 95  -AH     O2 Delivery Pre Treatment supplemental O2  3L  -AH     Intra SpO2 (%) 91  -AH     O2 Delivery Intra Treatment supplemental O2  -AH     Post SpO2 (%) 96  -AH     O2 Delivery Post Treatment supplemental O2  -AH       Row Name 09/24/24 1350          Positioning and Restraints    Pre-Treatment Position in bed  -AH     Post Treatment Position bed  -AH     In Bed fowlers;call light within reach;encouraged to call for assist;with family/caregiver  -               User Key  (r) = Recorded By, (t) = Taken By, (c) = Cosigned By      Initials Name Provider Type    Evangelina Stephen Occupational Therapist                   Outcome Measures       Row Name 09/24/24 1353          How much help from another is currently needed...    Putting on and taking off regular lower body clothing? 3  -AH     Bathing (including washing, rinsing, and drying) 3  -AH     Toileting (which includes using toilet bed pan or urinal) 3  -AH     Putting on and taking off regular upper body clothing 4  -AH     Taking care of personal grooming (such as brushing teeth) 4  -AH     Eating meals 4  -AH     AM-PAC 6 Clicks Score (OT) 21  -       Row Name 09/24/24 0800          How much help from another person do you currently need...    Turning from your back to your side while in flat bed without using bedrails? 4  -MH     Moving from lying on back to sitting on the side of a flat bed without bedrails? 3  -MH     Moving to and from a bed to a chair (including a wheelchair)? 3  -MH     Standing up from a chair using your arms (e.g., wheelchair, bedside  chair)? 3  -MH     Climbing 3-5 steps with a railing? 2  -MH     To walk in hospital room? 3  -MH     AM-PAC 6 Clicks Score (PT) 18  -MH     Highest Level of Mobility Goal 6 --> Walk 10 steps or more  -       Row Name 09/24/24 1353          Functional Assessment    Outcome Measure Options AM-PAC 6 Clicks Daily Activity (OT)  -               User Key  (r) = Recorded By, (t) = Taken By, (c) = Cosigned By      Initials Name Provider Type     Evangelina Cruz Occupational Therapist     Danielle Roberts, RN Registered Nurse                    Occupational Therapy Education       Title: PT OT SLP Therapies (In Progress)       Topic: Occupational Therapy (In Progress)       Point: ADL training (Done)       Description:   Instruct learner(s) on proper safety adaptation and remediation techniques during self care or transfers.   Instruct in proper use of assistive devices.                  Learning Progress Summary             Patient Acceptance, E,TB, VU by  at 9/24/2024 1353    Comment: benefit of working with therapy to improve functional strength and endurance    Acceptance, E,TB, VU by  at 9/23/2024 1410    Comment: Role of OT/POC                         Point: Home exercise program (Done)       Description:   Instruct learner(s) on appropriate technique for monitoring, assisting and/or progressing therapeutic exercises/activities.                  Learning Progress Summary             Patient Acceptance, E,TB, VU by  at 9/24/2024 1353    Comment: benefit of working with therapy to improve functional strength and endurance                         Point: Precautions (Not Started)       Description:   Instruct learner(s) on prescribed precautions during self-care and functional transfers.                  Learner Progress:  Not documented in this visit.              Point: Body mechanics (Not Started)       Description:   Instruct learner(s) on proper positioning and spine alignment during self-care, functional  mobility activities and/or exercises.                  Learner Progress:  Not documented in this visit.                              User Key       Initials Effective Dates Name Provider Type Discipline     06/16/21 -  Evangelina Cruz Occupational Therapist OT                  OT Recommendation and Plan  Planned Therapy Interventions (OT): activity tolerance training, BADL retraining, patient/caregiver education/training, transfer/mobility retraining, strengthening exercise  Therapy Frequency (OT): 3 times/wk  Plan of Care Review  Plan of Care Reviewed With: patient, spouse  Progress: improving  Outcome Evaluation: Pt received supine in bed willing to work with therapy.  Pt sat eob independently and participated in UB ex using yellow theraband for resistance. Pt completed 2 sets of 5 STS transfers with rest break in between.  Pt was on 3L O2 via nc with O2 sats dropping to 91% with activity.  Pt c/o SOA and fatigues quickly.  Pt was left in bed with call light within reach and her  at bedside.  Cont OT per POC.     Time Calculation:   Evaluation Complexity (OT)  Review Occupational Profile/Medical/Therapy History Complexity: expanded/moderate complexity  Assessment, Occupational Performance/Identification of Deficit Complexity: 3-5 performance deficits  Clinical Decision Making Complexity (OT): detailed assessment/moderate complexity  Overall Complexity of Evaluation (OT): moderate complexity     Time Calculation- OT       Row Name 09/24/24 1150             Time Calculation- OT    OT Start Time 1150  -      OT Stop Time 1206  -      OT Time Calculation (min) 16 min  -      OT Received On 09/24/24  -      OT Goal Re-Cert Due Date 10/03/24  -         Timed Charges    48453 - OT Therapeutic Exercise Minutes 8  -AH      72214 - OT Therapeutic Activity Minutes 8  -         Total Minutes    Timed Charges Total Minutes 16  -       Total Minutes 16  -                User Key  (r) = Recorded By, (t) =  Taken By, (c) = Cosigned By      Initials Name Provider Type    Evangelina Stephen Occupational Therapist                  Therapy Charges for Today       Code Description Service Date Service Provider Modifiers Qty    07455273177  OT EVAL MOD COMPLEXITY 3 9/23/2024 Evangelina Cruz 1    [id number]  OT THER PROC EA 15 MIN 9/24/2024 Evangelina Cruz 1                 Evangelina Cruz  9/24/2024

## 2024-09-24 NOTE — PROGRESS NOTES
"Dietitian Follow-up    Patient Name: Maura Jiménez  YOB: 1945  MRN: [id number]  Admission date: 9/20/2024    Comment:      Clinical Nutrition Follow-up   Encounter Information        Trending Narrative     9/24: Average PO intake 46% x 7 meals. Pt is receiving Boost glucose control TID to help promote PO intake/wt stability.     9/23: Pt admitted d/t respiratory failure and is COVID+. EMR shows wt loss of 6# (8.6%) within 3 months. Wt loss is significant. Average PO intake 45% x 5 meals. RD will order Boost glucose control TID to help meet estimated needs.      Anthropometrics        Current Height, Weight Height: 152.4 cm (60\")  Weight: 28.7 kg (63 lb 4.4 oz) (09/20/24 2226)       Trending Weight Hx     This admission:              PTA:     Wt Readings from Last 30 Encounters:   09/20/24 2226 28.7 kg (63 lb 4.4 oz)   09/20/24 1709 30.8 kg (68 lb)   09/16/24 1555 28.8 kg (63 lb 7.9 oz)   09/14/24 2126 28.8 kg (63 lb 7.9 oz)   09/14/24 1746 30.8 kg (68 lb)   09/10/24 0927 30.8 kg (68 lb)   06/26/24 1437 31.3 kg (69 lb)   02/04/24 1158 31.8 kg (70 lb)   02/21/23 0500 32.7 kg (72 lb 1.5 oz)   02/20/23 1420 34.1 kg (75 lb 2.8 oz)   02/20/23 0912 36.3 kg (80 lb)   09/15/22 0932 35.4 kg (78 lb)   09/12/22 1325 35.5 kg (78 lb 3.2 oz)   09/12/22 1005 35.8 kg (79 lb)   11/05/18 0315 45.2 kg (99 lb 10.4 oz)   11/04/18 0500 45.1 kg (99 lb 6.4 oz)   11/03/18 0437 45.7 kg (100 lb 11.2 oz)   11/02/18 0645 44.9 kg (99 lb)   11/01/18 0439 43.6 kg (96 lb 3.2 oz)   10/31/18 1842 41.7 kg (92 lb)   10/31/18 1548 42.2 kg (93 lb)   05/10/18 0925 95.5 kg (210 lb 8.6 oz)   04/26/18 0937 95.5 kg (210 lb 8.6 oz)      BMI kg/m2 Body mass index is 12.36 kg/m².     Labs        Pertinent Labs Results from last 7 days   Lab Units 09/24/24  0751 09/23/24  0604 09/22/24  0728 09/21/24  0719 09/20/24  1811   SODIUM mmol/L 150* 148* 146*   < > 145   POTASSIUM mmol/L 4.3 4.5 4.2   < > 4.0   CHLORIDE mmol/L 106 108* 106   < > 102 "   CO2 mmol/L 41.3* 37.2* 36.0*   < > 31.6*   BUN mg/dL 19 24* 27*   < > 25*   CREATININE mg/dL 0.35* 0.36* 0.42*   < > 0.52*   CALCIUM mg/dL 9.2 9.1 9.0   < > 8.9   BILIRUBIN mg/dL  --   --  0.2  --  0.2   ALK PHOS U/L  --   --  66  --  107   ALT (SGPT) U/L  --   --  14  --  21   AST (SGOT) U/L  --   --  11  --  20   GLUCOSE mg/dL 152* 85 163*   < > 392*    < > = values in this interval not displayed.     Results from last 7 days   Lab Units 09/24/24  0751 09/21/24  0719 09/20/24  1811   MAGNESIUM mg/dL  --   --  2.0   PHOSPHORUS mg/dL  --   --  3.2   HEMOGLOBIN g/dL 9.9*   < >  --    HEMATOCRIT % 33.2*   < >  --     < > = values in this interval not displayed.         Medications    Scheduled Medications albuterol sulfate HFA, 2 puff, Inhalation, 4x Daily - RT  apixaban, 2.5 mg, Oral, Q12H  aspirin, 81 mg, Oral, Daily  budesonide-formoterol, 2 puff, Inhalation, BID - RT  cefTRIAXone, 2,000 mg, Intravenous, Q24H  folic acid, 1 mg, Oral, Daily  insulin glargine, 1-200 Units, Subcutaneous, Nightly - Glucommander  insulin lispro, 1-200 Units, Subcutaneous, 4x Daily With Meals & Nightly  levothyroxine, 75 mcg, Oral, Daily  losartan, 25 mg, Oral, Daily  metoprolol tartrate, 25 mg, Oral, Q12H  pravastatin, 40 mg, Oral, Nightly  predniSONE, 40 mg, Oral, Daily With Breakfast  sodium chloride, 10 mL, Intravenous, Q12H  tiotropium bromide monohydrate, 2 puff, Inhalation, Daily - RT        Infusions dextrose 5 % and sodium chloride 0.45 %, 50 mL/hr  Pharmacy to Dose enoxaparin (LOVENOX),         PRN Medications   acetaminophen **OR** acetaminophen **OR** acetaminophen    senna-docusate sodium **AND** polyethylene glycol **AND** bisacodyl **AND** bisacodyl    Calcium Replacement - Follow Nurse / BPA Driven Protocol    dextrose    dextrose    glucagon (human recombinant)    insulin lispro    ipratropium-albuterol    Magnesium Low Dose Replacement - Follow Nurse / BPA Driven Protocol    melatonin    nitroglycerin    ondansetron     Pharmacy to Dose enoxaparin (LOVENOX)    Phosphorus Replacement - Follow Nurse / BPA Driven Protocol    Potassium Replacement - Follow Nurse / BPA Driven Protocol    sodium chloride    sodium chloride     Physical Findings        Trending Physical   Appearance, NFPE    --  Edema  None noted     Bowel Function LBM: 9/24     Tubes Peripheral IV     Chewing/Swallowing WNL     Skin WNL     --  Current Nutrition Orders & Evaluation of Intake       Oral Nutrition     Food Allergies Shellfish   Current PO Diet Diet: Cardiac, Diabetic; Healthy Heart (2-3 Na+); Consistent Carbohydrate; Fluid Consistency: Thin (IDDSI 0)   Supplement Boost Glucose Control TID   PO Evaluation     Trending % PO Intake 9/24: 46% x 7 meals  9/23: 45% x 5 meals      Nutrition Diagnosis         Nutrition Dx Problem 1 Underweight r/t COPD AEB BMI=12.36.       Nutrition Dx Problem 2        Intervention Goal         Intervention Goal(s) Maintain CBW/promote wt gain  PO intake meet >50% of estimated needs  Adhere to ONS     Nutrition Intervention        RD Action No action      Nutrition Prescription          Diet Prescription HH, CCD   Supplement Prescription Boost glucose control TID   Enteral Nutrition Prescription     TPN Prescription       Monitor/Evaluation        Monitor Per protocol, I&O, PO intake, Supplement intake, Pertinent labs, Weight, Skin status, GI status, Symptoms, POC/GOC, Swallow function, Hemodynamic stability     RD to f/up    Electronically signed by:  Kelsey Sargent RD  09/24/24 12:23 EDT

## 2024-09-25 LAB
ANION GAP SERPL CALCULATED.3IONS-SCNC: 5.2 MMOL/L (ref 5–15)
BACTERIA SPEC AEROBE CULT: NORMAL
BACTERIA SPEC AEROBE CULT: NORMAL
BUN SERPL-MCNC: 12 MG/DL (ref 8–23)
BUN/CREAT SERPL: 38.7 (ref 7–25)
CALCIUM SPEC-SCNC: 8.9 MG/DL (ref 8.6–10.5)
CHLORIDE SERPL-SCNC: 99 MMOL/L (ref 98–107)
CO2 SERPL-SCNC: 38.8 MMOL/L (ref 22–29)
CREAT SERPL-MCNC: 0.31 MG/DL (ref 0.57–1)
EGFRCR SERPLBLD CKD-EPI 2021: 106.5 ML/MIN/1.73
GLUCOSE BLDC GLUCOMTR-MCNC: 135 MG/DL (ref 70–130)
GLUCOSE BLDC GLUCOMTR-MCNC: 240 MG/DL (ref 70–130)
GLUCOSE BLDC GLUCOMTR-MCNC: 249 MG/DL (ref 70–130)
GLUCOSE BLDC GLUCOMTR-MCNC: 82 MG/DL (ref 70–130)
GLUCOSE SERPL-MCNC: 94 MG/DL (ref 65–99)
POTASSIUM SERPL-SCNC: 4.1 MMOL/L (ref 3.5–5.2)
SODIUM SERPL-SCNC: 143 MMOL/L (ref 136–145)

## 2024-09-25 PROCEDURE — 97530 THERAPEUTIC ACTIVITIES: CPT

## 2024-09-25 PROCEDURE — 25010000002 CEFTRIAXONE PER 250 MG: Performed by: INTERNAL MEDICINE

## 2024-09-25 PROCEDURE — 80048 BASIC METABOLIC PNL TOTAL CA: CPT | Performed by: NURSE PRACTITIONER

## 2024-09-25 PROCEDURE — 97110 THERAPEUTIC EXERCISES: CPT

## 2024-09-25 PROCEDURE — 63710000001 INSULIN GLARGINE PER 5 UNITS: Performed by: INTERNAL MEDICINE

## 2024-09-25 PROCEDURE — 94799 UNLISTED PULMONARY SVC/PX: CPT

## 2024-09-25 PROCEDURE — 82948 REAGENT STRIP/BLOOD GLUCOSE: CPT | Performed by: INTERNAL MEDICINE

## 2024-09-25 PROCEDURE — 94660 CPAP INITIATION&MGMT: CPT

## 2024-09-25 PROCEDURE — 82948 REAGENT STRIP/BLOOD GLUCOSE: CPT

## 2024-09-25 PROCEDURE — 63710000001 INSULIN LISPRO (HUMAN) PER 5 UNITS: Performed by: INTERNAL MEDICINE

## 2024-09-25 PROCEDURE — 99232 SBSQ HOSP IP/OBS MODERATE 35: CPT | Performed by: NURSE PRACTITIONER

## 2024-09-25 PROCEDURE — 63710000001 PREDNISONE PER 1 MG: Performed by: INTERNAL MEDICINE

## 2024-09-25 RX ADMIN — INSULIN LISPRO 5 UNITS: 100 INJECTION, SOLUTION INTRAVENOUS; SUBCUTANEOUS at 17:01

## 2024-09-25 RX ADMIN — ALBUTEROL SULFATE 2 PUFF: 90 AEROSOL, METERED RESPIRATORY (INHALATION) at 17:02

## 2024-09-25 RX ADMIN — LEVOTHYROXINE SODIUM 75 MCG: 0.07 TABLET ORAL at 08:15

## 2024-09-25 RX ADMIN — PRAVASTATIN SODIUM 40 MG: 20 TABLET ORAL at 22:14

## 2024-09-25 RX ADMIN — PREDNISONE 40 MG: 20 TABLET ORAL at 08:43

## 2024-09-25 RX ADMIN — INSULIN LISPRO 1 UNITS: 100 INJECTION, SOLUTION INTRAVENOUS; SUBCUTANEOUS at 12:16

## 2024-09-25 RX ADMIN — APIXABAN 2.5 MG: 2.5 TABLET, FILM COATED ORAL at 08:15

## 2024-09-25 RX ADMIN — SODIUM CHLORIDE 2000 MG: 9 INJECTION, SOLUTION INTRAVENOUS at 08:16

## 2024-09-25 RX ADMIN — TIOTROPIUM BROMIDE INHALATION SPRAY 2 PUFF: 3.12 SPRAY, METERED RESPIRATORY (INHALATION) at 08:45

## 2024-09-25 RX ADMIN — Medication 10 ML: at 08:27

## 2024-09-25 RX ADMIN — METOPROLOL TARTRATE 25 MG: 25 TABLET, FILM COATED ORAL at 08:15

## 2024-09-25 RX ADMIN — ASPIRIN 81 MG: 81 TABLET, COATED ORAL at 08:15

## 2024-09-25 RX ADMIN — INSULIN GLARGINE 6 UNITS: 100 INJECTION, SOLUTION SUBCUTANEOUS at 22:13

## 2024-09-25 RX ADMIN — ALBUTEROL SULFATE 2 PUFF: 90 AEROSOL, METERED RESPIRATORY (INHALATION) at 12:16

## 2024-09-25 RX ADMIN — INSULIN LISPRO 1 UNITS: 100 INJECTION, SOLUTION INTRAVENOUS; SUBCUTANEOUS at 22:14

## 2024-09-25 RX ADMIN — APIXABAN 2.5 MG: 2.5 TABLET, FILM COATED ORAL at 22:14

## 2024-09-25 RX ADMIN — ALBUTEROL SULFATE 2 PUFF: 90 AEROSOL, METERED RESPIRATORY (INHALATION) at 08:25

## 2024-09-25 RX ADMIN — Medication 10 ML: at 22:18

## 2024-09-25 RX ADMIN — FOLIC ACID 1 MG: 1 TABLET ORAL at 08:15

## 2024-09-25 RX ADMIN — LOSARTAN POTASSIUM 25 MG: 25 TABLET, FILM COATED ORAL at 08:15

## 2024-09-25 RX ADMIN — BUDESONIDE AND FORMOTEROL FUMARATE DIHYDRATE 2 PUFF: 160; 4.5 AEROSOL RESPIRATORY (INHALATION) at 08:44

## 2024-09-25 RX ADMIN — DEXTROSE AND SODIUM CHLORIDE 50 ML/HR: 5; 450 INJECTION, SOLUTION INTRAVENOUS at 08:48

## 2024-09-25 NOTE — THERAPY TREATMENT NOTE
Patient Name: Maura Jiménez  : 1945    MRN: [id number]                              Today's Date: 2024       Admit Date: 2024    Visit Dx:     ICD-10-CM ICD-9-CM   1. Atrial flutter, unspecified type  I48.92 427.32   2. Acute respiratory failure with hypoxia and hypercapnia  J96.01 518.81    J96.02    3. Pulmonary hypertension  I27.20 416.8   4. Sepsis, due to unspecified organism, unspecified whether acute organ dysfunction present  A41.9 038.9     995.91     Patient Active Problem List   Diagnosis    Respiratory failure with hypoxia    Chronic obstructive pulmonary disease with acute exacerbation    Type 2 diabetes mellitus without complication, without long-term current use of insulin    COPD exacerbation    Acute on chronic respiratory failure with hypoxia    Chronic respiratory failure with hypoxia    Rhinovirus infection    Severe malnutrition    Impaired mobility    Acute respiratory failure with hypoxia     Past Medical History:   Diagnosis Date    COPD (chronic obstructive pulmonary disease)     Disease of thyroid gland     Hyperlipidemia     Osteoarthritis     Type 2 diabetes mellitus without complication, without long-term current use of insulin 2018     Past Surgical History:   Procedure Laterality Date    APPENDECTOMY      CATARACT EXTRACTION, BILATERAL      HYSTERECTOMY        General Information       Row Name 24 1329          Physical Therapy Time and Intention    Document Type therapy note (daily note)  -CC     Mode of Treatment physical therapy  -CC       Row Name 24 1329          General Information    Patient Profile Reviewed yes  -CC     Existing Precautions/Restrictions fall;oxygen therapy device and L/min  -CC       Row Name 24 1329          Safety Issues, Functional Mobility    Safety Issues Affecting Function (Mobility) awareness of need for assistance;insight into deficits/self-awareness;safety precautions follow-through/compliance  -CC      Impairments Affecting Function (Mobility) endurance/activity tolerance;shortness of breath;balance  -CC               User Key  (r) = Recorded By, (t) = Taken By, (c) = Cosigned By      Initials Name Provider Type    CC Kelli Ireland PTA Physical Therapist Assistant                   Mobility       Row Name 09/25/24 1330          Bed Mobility    Bed Mobility supine-sit  -CC     Supine-Sit [place] (Bed Mobility) modified independence  -CC     Assistive Device (Bed Mobility) bed rails;head of bed elevated  -CC     Comment, (Bed Mobility) HOB slightly elevated  -CC       Row Name 09/25/24 1330          Sit-Stand Transfer    Sit-Stand [place] (Transfers) standby assist  -CC     Assistive Device (Sit-Stand Transfers) walker, front-wheeled  -CC       Row Name 09/25/24 1330          Gait/Stairs (Locomotion)    [place] Level (Gait) standby assist  -CC     Assistive Device (Gait) walker, front-wheeled  -CC     Patient was able to Ambulate yes  -CC     Distance in Feet (Gait) 4  -CC     Deviations/Abnormal Patterns (Gait) gait speed decreased;base of support, narrow;surya decreased  -CC               User Key  (r) = Recorded By, (t) = Taken By, (c) = Cosigned By      Initials Name Provider Type    CC Kelli Ireland PTA Physical Therapist Assistant                   Obj/Interventions    No documentation.                  Goals/Plan    No documentation.                  Clinical Impression       Row Name 09/25/24 1331          Pain    Pretreatment Pain Rating 0/10 - no pain  -CC     Posttreatment Pain Rating 0/10 - no pain  -CC     Additional Documentation Pain Scale: Numbers Pre/Post-Treatment (Group)  -       Row Name 09/25/24 1331          Plan of Care Review    Plan of Care Reviewed With patient  -CC     Progress improving  -     Outcome Evaluation Pt agreeable to physical therapy. Performed supine ->sit mod I with HOB slightly elevated, sit <->stand with SBA and amb with RW x4 feet with turns  and stepping back to chair. Performd B LE ex in sitting AP, LAQ, hip abd, marching 1x10 reps with occ rest breaks. Con't with PT POC and progress as tolerated  -CC       Row Name 09/25/24 1331          Positioning and Restraints    Pre-Treatment Position in bed  -CC     Post Treatment Position chair  -CC     In Chair notified nsg;reclined;call light within reach;encouraged to call for assist;exit alarm on  -CC               User Key  (r) = Recorded By, (t) = Taken By, (c) = Cosigned By      Initials Name Provider Type     Kelli Ireland PTA Physical Therapist Assistant                   Outcome Measures       Row Name 09/25/24 1334          How much help from another person do you currently need...    Turning from your back to your side while in flat bed without using bedrails? 4  -CC     Moving from lying on back to sitting on the side of a flat bed without bedrails? 3  -CC     Moving to and from a bed to a chair (including a wheelchair)? 3  -CC     Standing up from a chair using your arms (e.g., wheelchair, bedside chair)? 3  -CC     Climbing 3-5 steps with a railing? 2  -CC     To walk in hospital room? 3  -CC     AM-PAC 6 Clicks Score (PT) 18  -CC     Highest Level of Mobility Goal 6 --> Walk 10 steps or more  -       Row Name 09/25/24 1334          Functional Assessment    Outcome Measure Options AM-PAC 6 Clicks Basic Mobility (PT)  -CC               User Key  (r) = Recorded By, (t) = Taken By, (c) = Cosigned By      Initials Name Provider Type     Kelli Ireland PTA Physical Therapist Assistant                                 Physical Therapy Education       Title: PT OT SLP Therapies (In Progress)       Topic: Physical Therapy (Done)       Point: Mobility training (Done)       Learning Progress Summary             Patient Acceptance, E,TB, VU by CC at 9/25/2024 1335    Comment: Increase mobility daily    Acceptance, E, VU by AW at 9/23/2024 0934    Acceptance, E,D, DU,VU by  at 9/22/2024 1310                          Point: Home exercise program (Done)       Learning Progress Summary             Patient Acceptance, E,TB, VU,NR by  at 9/23/2024 1817    Comment: Perform ex daily    Acceptance, E, VU by  at 9/23/2024 0934                         Point: Body mechanics (Done)       Learning Progress Summary             Patient Acceptance, E, VU by  at 9/23/2024 0934                                         User Key       Initials Effective Dates Name Provider Type Discipline     06/16/21 -  Kelli Ireland PTA Physical Therapist Assistant PT     06/13/23 -  Ronaldo Rocha, PT Physical Therapist PT    AW 09/18/24 -  Melissa Fitzgerald, TRUE Registered Nurse Nurse                  PT Recommendation and Plan     Plan of Care Reviewed With: patient  Progress: improving  Outcome Evaluation: Pt agreeable to physical therapy. Performed supine ->sit mod I with HOB slightly elevated, sit <->stand with SBA and amb with RW x4 feet with turns and stepping back to chair. Performd B LE ex in sitting AP, LAQ, hip abd, marching 1x10 reps with occ rest breaks. Con't with PT POC and progress as tolerated     Time Calculation:         PT Charges       Row Name 09/25/24 1335             Time Calculation    PT Received On 09/25/24  -CC      PT Goal Re-Cert Due Date 10/02/24  -CC         Time Calculation- PT    Total Timed Code Minutes- PT 28 minute(s)  -CC         Timed Charges    91811 - PT Therapeutic Exercise Minutes 15  -CC      37100 - PT Therapeutic Activity Minutes 13  -CC         Total Minutes    Timed Charges Total Minutes 28  -CC       Total Minutes 28  -CC                User Key  (r) = Recorded By, (t) = Taken By, (c) = Cosigned By      Initials Name Provider Type     Kelli Ireland PTA Physical Therapist Assistant                  Therapy Charges for Today       Code Description Service Date Service Provider Modifiers Qty    31794128467 HC PT THER PROC EA 15 MIN 9/25/2024 Kelli Ireland PTA GP 1     [id number]  PT THERAPEUTIC ACT EA 15 MIN 9/25/2024 Kelli Ireland, PTA GP 1            PT G-Codes  Outcome Measure Options: AM-PAC 6 Clicks Basic Mobility (PT)  AM-PAC 6 Clicks Score (PT): 18  AM-PAC 6 Clicks Score (OT): 21       Kelli Ireland PTA  9/25/2024

## 2024-09-25 NOTE — CASE MANAGEMENT/SOCIAL WORK
Pt not feeling well, testing ordered for Cdiff. CN following for changes, pt planning to return home with spouse and HH.

## 2024-09-25 NOTE — PLAN OF CARE
Goal Outcome Evaluation:  Plan of Care Reviewed With: patient        Progress: improving  Outcome Evaluation: Pt appeared to be resting throughout the shift. Eager to get back home. No significant events, continuing to monitor.

## 2024-09-25 NOTE — PLAN OF CARE
Problem: Noninvasive Ventilation Acute  Goal: Effective Unassisted Ventilation and Oxygenation  Outcome: Progressing   Goal Outcome Evaluation:         On s/b pt refused

## 2024-09-25 NOTE — PROGRESS NOTES
[facility]    Name:  Maura Jiménez   Age:  79 y.o.  Sex:  female  :  1945  MRN:  [id number]   Visit Number:  [id number]  Admission Date:  2024  Date Of Service:  24  Primary Care Physician:  Sofia Martinez APRN     LOS: 5 days :    Chief Complaint:      Shortness of air    Subjective:    Patient seen and examined.  Noted to have several episodes of diarrhea overnight.  States feeling very weak and does not feel like going home today.  Not able to eat much.  Sodium did improve with morning labs.  Stools to be sent down for GI panel and C. difficile testing.    Hospital Course:      The patient is a chronically ill 79-year-old female with a history of chronic obstructive pulmonary disease with chronic respiratory failure, severe malnutrition, diabetes, hypothyroidism, who presented from home due to increasing shortness of breath, cough.  She actually just had a recent hospital stay for acute on chronic respiratory failure and COPD exacerbation, had lung nodule noted at the time.     Her workup in the emergency room she was tachycardic with evidence of atrial fibrillation, she was hypoxic initially up to 10 L.  She had an ABG with a pH of 7.251 and a pO2 of 74.  Troponin was elevated at 30 with repeat of 55. Her TSH the normal limits.  Dimer was 0.99.  A white count of 24.  CT imaging of the chest was negative for pulmonary embolism, concern for emphysema as well as bilateral lower lobe airspace disease.  Patient also tested positive for COVID-19.     Patient admitted initially started on Rocephin, doxycycline, bronchodilators and steroids in addition to Cardizem drip.  She was seen by cardiology.  She has become bradycardic now and Cardizem drip has been weaned off.  She was started on oral metoprolol and has been on therapeutic Lovenox.  This was switched to Eliquis.  Patient noted to have worsening hypernatremia during admission.  Likely  secondary to overall poor p.o. intake.  Patient initiated on D5 half-normal saline with overall improvement.  Unfortunately patient noted to have several episodes of diarrhea with stool studies pending.    Review of Systems:     All systems were reviewed and negative except as mentioned in subjective, assessment and plan.    Vital Signs:    Temp:  [97.1 °F (36.2 °C)-98 °F (36.7 °C)] 97.6 °F (36.4 °C)  Heart Rate:  [69-78] 76  Resp:  [18-20] 18  BP: (119-164)/(51-66) 133/62    Intake and output:    I/O last 3 completed shifts:  In: 837 [P.O.:837]  Out: 550 [Urine:550]  I/O this shift:  In: 120 [P.O.:120]  Out: -     Physical Examination:    General Appearance:  Alert and cooperative.  Chronically ill/cachectic appearing elderly female.   Head:  Atraumatic and normocephalic.   Eyes: Conjunctivae and sclerae normal, no icterus. No pallor.   Throat: No oral lesions, no thrush, oral mucosa moist.   Neck: Supple, trachea midline, no thyromegaly.   Lungs:   Breath sounds heard bilaterally equally.  No wheezing or crackles. No Pleural rub or bronchial breathing.  Coarseness noted slightly labored on 3 L nasal cannula.   Heart:  Normal S1 and S2, no murmur, no gallop, no rub. No JVD.   Abdomen:   Normal bowel sounds, no masses, no organomegaly. Soft, nontender, nondistended, no rebound tenderness.   Extremities: Supple, no edema, no cyanosis, no clubbing.   Skin: No bleeding or rash.   Neurologic: Alert and oriented x 3. No facial asymmetry. Moves all four limbs. No tremors.  Severe generalized weakness.     Laboratory results:    Results from last 7 days   Lab Units 09/25/24  0727 09/24/24  0751 09/23/24  0604 09/22/24  0728 09/21/24  0719 09/20/24  1811   SODIUM mmol/L 143 150* 148* 146*   < > 145   POTASSIUM mmol/L 4.1 4.3 4.5 4.2   < > 4.0   CHLORIDE mmol/L 99 106 108* 106   < > 102   CO2 mmol/L 38.8* 41.3* 37.2* 36.0*   < > 31.6*   BUN mg/dL 12 19 24* 27*   < > 25*   CREATININE mg/dL 0.31* 0.35* 0.36* 0.42*   < > 0.52*    CALCIUM mg/dL 8.9 9.2 9.1 9.0   < > 8.9   BILIRUBIN mg/dL  --   --   --  0.2  --  0.2   ALK PHOS U/L  --   --   --  66  --  107   ALT (SGPT) U/L  --   --   --  14  --  21   AST (SGOT) U/L  --   --   --  11  --  20   GLUCOSE mg/dL 94 152* 85 163*   < > 392*    < > = values in this interval not displayed.     Results from last 7 days   Lab Units 09/24/24  0751 09/22/24  0728 09/21/24  0719   WBC 10*3/mm3 11.64* 18.91* 16.93*   HEMOGLOBIN g/dL 9.9* 9.4* 10.2*   HEMATOCRIT % 33.2* 30.8* 34.0   PLATELETS 10*3/mm3 291 281 275         Results from last 7 days   Lab Units 09/20/24  2026 09/20/24  1811   CK TOTAL U/L  --  49   HSTROP T ng/L 55* 30*     Results from last 7 days   Lab Units 09/20/24  1811 09/20/24  1810   BLOODCX  No growth at 4 days No growth at 4 days     Recent Labs     06/26/24  1545 09/20/24  1749 09/20/24  2204   PHART 7.402 7.251* 7.327   UOD3XCA 52.8* 74.1* 64.7*   PO2ART 65.0* 77.3 178.0*   DOR0HMH 32.9* 32.5* 33.8*   BASEEXCESS 6.9* 3.8* 6.4*      I have reviewed the patient's laboratory results.    Radiology results:    No radiology results from the last 24 hrs  I have reviewed the patient's radiology reports.    Medication Review:     I have reviewed the patient's active and prn medications.     Problem List:      Acute respiratory failure with hypoxia      Assessment:    Acute hypoxic/hypercapnic respiratory failure  COPD exacerbation  Viral pneumonia  Acute COVID-19  New onset A-fib with RVR  Acute diarrhea  Elevated troponin  Hypernatremia likely secondary to p.o. intake poor  Hyperglycemia/type 2 diabetes  Lung nodule  Hyperlipidemia  Hypertension  Hypothyroidism  Severe malnutrition  Impaired mobility and ADL    Plan:    Acute hypoxic/hypercapnic respiratory failure  COPD exacerbation  COVID Pneumonia  Had been on steroids in addition to Rocephin as well as bronchodilators.  Will discontinue NIPPV as patient does not tolerate this.  I did have Dr. Brink pulmonology see patient for any  further recommendations to improve her symptomatology.  She has fairly advanced COPD  Continue oral prednisone.  Hypernatremia noted.  Likely secondary to poor p.o. intake.  Gentle D5W fluids added with improvement.  Unfortunately overall poor PO intake with acute diarrhea now.  Could be underlying symptom of COVID.  Stool studies pending.  Overall weakness worsening.  A-fib with RVR  Elevated troponin  Was seen previously by cardiology, she was weaned off Cardizem drip and started on oral metoprolol and appears to be in rhythm now.  She was initially on full dose Lovenox switched to Eliquis.  No obvious complications at this time.  Echo noted EF 72% with severe pulmonary hypertension noted with elevated right sided pressures.  Hyperglycemia/type 2 diabetes  Placed on Glucomander protocol.  Will likely worsen due to D5W with underlying hyponatremia.      I have reviewed the copied text and it is accurate as of 9/25/2024      DVT Prophylaxis: Eliquis  Code Status: DNR  Diet: As tolerated  Discharge Plan: Home with home health 1 to 2 days       BRITTNY Hernandez  09/25/24  11:30 EDT    Dictated utilizing Dragon dictation.

## 2024-09-25 NOTE — PLAN OF CARE
Goal Outcome Evaluation:  Plan of Care Reviewed With: patient        Progress: improving  Outcome Evaluation: Pt agreeable to physical therapy. Performed supine ->sit mod I with HOB slightly elevated, sit <->stand with SBA and amb with RW x4 feet with turns and stepping back to chair. Performd B LE ex in sitting AP, LAQ, hip abd, marching 1x10 reps with occ rest breaks. Con't with PT POC and progress as tolerated

## 2024-09-26 ENCOUNTER — READMISSION MANAGEMENT (OUTPATIENT)
Dept: CALL CENTER | Facility: HOSPITAL | Age: 79
End: 2024-09-26
Payer: MEDICARE

## 2024-09-26 VITALS
BODY MASS INDEX: 12.42 KG/M2 | OXYGEN SATURATION: 96 % | TEMPERATURE: 97.1 F | SYSTOLIC BLOOD PRESSURE: 153 MMHG | DIASTOLIC BLOOD PRESSURE: 62 MMHG | RESPIRATION RATE: 17 BRPM | HEART RATE: 80 BPM | HEIGHT: 60 IN | WEIGHT: 63.27 LBS

## 2024-09-26 PROBLEM — D89.831 CYTOKINE RELEASE SYNDROME, GRADE 1: Status: ACTIVE | Noted: 2024-09-26

## 2024-09-26 LAB
ADV 40+41 DNA STL QL NAA+NON-PROBE: NOT DETECTED
ANION GAP SERPL CALCULATED.3IONS-SCNC: 4.4 MMOL/L (ref 5–15)
ASTRO TYP 1-8 RNA STL QL NAA+NON-PROBE: NOT DETECTED
BASOPHILS # BLD AUTO: 0.02 10*3/MM3 (ref 0–0.2)
BASOPHILS NFR BLD AUTO: 0.2 % (ref 0–1.5)
BUN SERPL-MCNC: 8 MG/DL (ref 8–23)
BUN/CREAT SERPL: 17.8 (ref 7–25)
C CAYETANENSIS DNA STL QL NAA+NON-PROBE: NOT DETECTED
C COLI+JEJ+UPSA DNA STL QL NAA+NON-PROBE: NOT DETECTED
C DIFF GDH + TOXINS A+B STL QL IA.RAPID: NEGATIVE
C DIFF GDH + TOXINS A+B STL QL IA.RAPID: NEGATIVE
CALCIUM SPEC-SCNC: 9.4 MG/DL (ref 8.6–10.5)
CHLORIDE SERPL-SCNC: 93 MMOL/L (ref 98–107)
CO2 SERPL-SCNC: 40.6 MMOL/L (ref 22–29)
CREAT SERPL-MCNC: 0.45 MG/DL (ref 0.57–1)
CRYPTOSP DNA STL QL NAA+NON-PROBE: NOT DETECTED
DEPRECATED RDW RBC AUTO: 41.9 FL (ref 37–54)
E HISTOLYT DNA STL QL NAA+NON-PROBE: NOT DETECTED
EAEC PAA PLAS AGGR+AATA ST NAA+NON-PRB: NOT DETECTED
EC STX1+STX2 GENES STL QL NAA+NON-PROBE: NOT DETECTED
EGFRCR SERPLBLD CKD-EPI 2021: 97.4 ML/MIN/1.73
EOSINOPHIL # BLD AUTO: 0.1 10*3/MM3 (ref 0–0.4)
EOSINOPHIL NFR BLD AUTO: 0.9 % (ref 0.3–6.2)
EPEC EAE GENE STL QL NAA+NON-PROBE: NOT DETECTED
ERYTHROCYTE [DISTWIDTH] IN BLOOD BY AUTOMATED COUNT: 12 % (ref 12.3–15.4)
ETEC LTA+ST1A+ST1B TOX ST NAA+NON-PROBE: NOT DETECTED
G LAMBLIA DNA STL QL NAA+NON-PROBE: NOT DETECTED
GLUCOSE BLDC GLUCOMTR-MCNC: 105 MG/DL (ref 70–130)
GLUCOSE SERPL-MCNC: 159 MG/DL (ref 65–99)
HCT VFR BLD AUTO: 39.9 % (ref 34–46.6)
HGB BLD-MCNC: 12.2 G/DL (ref 12–15.9)
IMM GRANULOCYTES # BLD AUTO: 0.07 10*3/MM3 (ref 0–0.05)
IMM GRANULOCYTES NFR BLD AUTO: 0.6 % (ref 0–0.5)
LYMPHOCYTES # BLD AUTO: 0.84 10*3/MM3 (ref 0.7–3.1)
LYMPHOCYTES NFR BLD AUTO: 7.2 % (ref 19.6–45.3)
MCH RBC QN AUTO: 28.8 PG (ref 26.6–33)
MCHC RBC AUTO-ENTMCNC: 30.6 G/DL (ref 31.5–35.7)
MCV RBC AUTO: 94.3 FL (ref 79–97)
MONOCYTES # BLD AUTO: 0.56 10*3/MM3 (ref 0.1–0.9)
MONOCYTES NFR BLD AUTO: 4.8 % (ref 5–12)
NEUTROPHILS NFR BLD AUTO: 10.03 10*3/MM3 (ref 1.7–7)
NEUTROPHILS NFR BLD AUTO: 86.3 % (ref 42.7–76)
NOROVIRUS GI+II RNA STL QL NAA+NON-PROBE: NOT DETECTED
NRBC BLD AUTO-RTO: 0 /100 WBC (ref 0–0.2)
P SHIGELLOIDES DNA STL QL NAA+NON-PROBE: NOT DETECTED
PLATELET # BLD AUTO: 342 10*3/MM3 (ref 140–450)
PMV BLD AUTO: 11.6 FL (ref 6–12)
POTASSIUM SERPL-SCNC: 4.3 MMOL/L (ref 3.5–5.2)
RBC # BLD AUTO: 4.23 10*6/MM3 (ref 3.77–5.28)
RVA RNA STL QL NAA+NON-PROBE: NOT DETECTED
S ENT+BONG DNA STL QL NAA+NON-PROBE: NOT DETECTED
SAPO I+II+IV+V RNA STL QL NAA+NON-PROBE: NOT DETECTED
SHIGELLA SP+EIEC IPAH ST NAA+NON-PROBE: NOT DETECTED
SODIUM SERPL-SCNC: 138 MMOL/L (ref 136–145)
V CHOL+PARA+VUL DNA STL QL NAA+NON-PROBE: NOT DETECTED
V CHOLERAE DNA STL QL NAA+NON-PROBE: NOT DETECTED
WBC NRBC COR # BLD AUTO: 11.62 10*3/MM3 (ref 3.4–10.8)
Y ENTEROCOL DNA STL QL NAA+NON-PROBE: NOT DETECTED

## 2024-09-26 PROCEDURE — 87449 NOS EACH ORGANISM AG IA: CPT | Performed by: NURSE PRACTITIONER

## 2024-09-26 PROCEDURE — 87324 CLOSTRIDIUM AG IA: CPT | Performed by: NURSE PRACTITIONER

## 2024-09-26 PROCEDURE — 94660 CPAP INITIATION&MGMT: CPT

## 2024-09-26 PROCEDURE — 94799 UNLISTED PULMONARY SVC/PX: CPT

## 2024-09-26 PROCEDURE — 87507 IADNA-DNA/RNA PROBE TQ 12-25: CPT | Performed by: NURSE PRACTITIONER

## 2024-09-26 PROCEDURE — 63710000001 PREDNISONE PER 1 MG: Performed by: INTERNAL MEDICINE

## 2024-09-26 PROCEDURE — 82948 REAGENT STRIP/BLOOD GLUCOSE: CPT | Performed by: INTERNAL MEDICINE

## 2024-09-26 PROCEDURE — 80048 BASIC METABOLIC PNL TOTAL CA: CPT | Performed by: NURSE PRACTITIONER

## 2024-09-26 PROCEDURE — 99239 HOSP IP/OBS DSCHRG MGMT >30: CPT | Performed by: NURSE PRACTITIONER

## 2024-09-26 PROCEDURE — 85025 COMPLETE CBC W/AUTO DIFF WBC: CPT | Performed by: NURSE PRACTITIONER

## 2024-09-26 RX ORDER — PREDNISONE 20 MG/1
TABLET ORAL
Qty: 21 TABLET | Refills: 0 | Status: ON HOLD | OUTPATIENT
Start: 2024-09-26 | End: 2024-10-08

## 2024-09-26 RX ORDER — METFORMIN HCL 500 MG
500 TABLET, EXTENDED RELEASE 24 HR ORAL EVERY 12 HOURS SCHEDULED
Status: ON HOLD
Start: 2024-09-26

## 2024-09-26 RX ORDER — METOPROLOL TARTRATE 25 MG/1
25 TABLET, FILM COATED ORAL EVERY 12 HOURS SCHEDULED
Qty: 60 TABLET | Refills: 0 | Status: ON HOLD | OUTPATIENT
Start: 2024-09-26

## 2024-09-26 RX ORDER — AMLODIPINE BESYLATE 5 MG/1
5 TABLET ORAL DAILY
Status: ON HOLD
Start: 2024-09-26

## 2024-09-26 RX ORDER — LOSARTAN POTASSIUM 25 MG/1
25 TABLET ORAL DAILY
Qty: 30 TABLET | Refills: 0 | Status: ON HOLD | OUTPATIENT
Start: 2024-09-27

## 2024-09-26 RX ADMIN — ALBUTEROL SULFATE 2 PUFF: 90 AEROSOL, METERED RESPIRATORY (INHALATION) at 09:55

## 2024-09-26 RX ADMIN — ASPIRIN 81 MG: 81 TABLET, COATED ORAL at 09:50

## 2024-09-26 RX ADMIN — Medication 10 ML: at 09:55

## 2024-09-26 RX ADMIN — BUDESONIDE AND FORMOTEROL FUMARATE DIHYDRATE 2 PUFF: 160; 4.5 AEROSOL RESPIRATORY (INHALATION) at 09:55

## 2024-09-26 RX ADMIN — APIXABAN 2.5 MG: 2.5 TABLET, FILM COATED ORAL at 09:50

## 2024-09-26 RX ADMIN — PREDNISONE 40 MG: 20 TABLET ORAL at 09:49

## 2024-09-26 RX ADMIN — METOPROLOL TARTRATE 25 MG: 25 TABLET, FILM COATED ORAL at 09:49

## 2024-09-26 RX ADMIN — LEVOTHYROXINE SODIUM 75 MCG: 0.07 TABLET ORAL at 09:50

## 2024-09-26 RX ADMIN — TIOTROPIUM BROMIDE INHALATION SPRAY 2 PUFF: 3.12 SPRAY, METERED RESPIRATORY (INHALATION) at 09:55

## 2024-09-26 RX ADMIN — FOLIC ACID 1 MG: 1 TABLET ORAL at 09:49

## 2024-09-26 RX ADMIN — LOSARTAN POTASSIUM 25 MG: 25 TABLET, FILM COATED ORAL at 09:49

## 2024-09-26 NOTE — DISCHARGE SUMMARY
[facility]      Name:  Maura Jiménez   Age:  79 y.o.  Sex:  female  :  1945  MRN:  [id number]   Visit Number:  [id number]    Admission Date:  2024  Date of Discharge:  2024  Primary Care Physician:  Sofia Martinez APRN    Important issues to note:    -Patient admitted with acute on chronic hypoxic/hypercapnic respiratory failure due to underlying COPD exacerbation/viral pneumonia due to acute COVID-19.  Noted to have new onset atrial fibrillation with RVR for which she was initiated on Eliquis and metoprolol with overall improvement.  -Poor p.o. intake noted with current weight of 63 pounds.  Hypernatremia.  Improved with D5W.  -Noted to have acute diarrhea with stool studies and C. difficile negative.  Improved prior to discharge.  -Otherwise patient respiratory status at baseline.  Will follow-up with COPD clinic as scheduled with tapered dose of prednisone.  -Strict return precautions given.    Discharge Diagnoses:     Acute hypoxic/hypercapnic respiratory failure  COPD exacerbation  Viral pneumonia  Acute COVID-19  New onset A-fib with RVR  Acute diarrhea  Elevated troponin  Hypernatremia likely secondary to p.o. intake poor  Hyperglycemia/type 2 diabetes  Lung nodule  Hyperlipidemia  Hypertension  Hypothyroidism  Severe malnutrition  Impaired mobility and ADL    Problem List:     Active Hospital Problems    Diagnosis  POA    **Acute respiratory failure with hypoxia [J96.01]  Yes    Cytokine release syndrome, grade 1 [D89.831]  No    Cytokine release syndrome, grade 1 [D89.831]  No      Resolved Hospital Problems   No resolved problems to display.     Presenting Problem:    Chief Complaint   Patient presents with    Shortness of Breath    Rapid Heart Rate      Consults:     Consulting Physician(s)         Provider   Role Specialty     Khushi Brink MD      Consulting Physician Pulmonary Disease          Procedures  Performed:        History of presenting illness/Hospital Course:    The patient is a chronically ill 79-year-old female with a history of chronic obstructive pulmonary disease with chronic respiratory failure, severe malnutrition, diabetes, hypothyroidism, who presented from home due to increasing shortness of breath, cough.  She actually just had a recent hospital stay for acute on chronic respiratory failure and COPD exacerbation, had lung nodule noted at the time.     Her workup in the emergency room she was tachycardic with evidence of atrial fibrillation, she was hypoxic initially up to 10 L.  She had an ABG with a pH of 7.251 and a pO2 of 74.  Troponin was elevated at 30 with repeat of 55. Her TSH the normal limits.  Dimer was 0.99.  A white count of 24.  CT imaging of the chest was negative for pulmonary embolism, concern for emphysema as well as bilateral lower lobe airspace disease.  Patient also tested positive for COVID-19.     Patient admitted initially started on Rocephin, doxycycline, bronchodilators and steroids in addition to Cardizem drip.  She was seen by cardiology.  She has become bradycardic now and Cardizem drip has been weaned off.  She was started on oral metoprolol and has been on therapeutic Lovenox.  This was switched to Eliquis.  Patient noted to have worsening hypernatremia during admission.  Likely secondary to overall poor p.o. intake.  Patient initiated on D5 half-normal saline with overall improvement.  Unfortunately patient noted to have several episodes of diarrhea with stool studies negative.  Overall reassuring labs and vitals noted with continued fluids.  Patient advised to increase p.o. intake/Ensure/boost as tolerated.  She will continue prolonged taper of prednisone and follow-up closely with COPD clinic.  Strict return precautions given.    Vital Signs:    Temp:  [97.1 °F (36.2 °C)-97.6 °F (36.4 °C)] 97.1 °F (36.2 °C)  Heart Rate:  [62-80] 80  Resp:  [12-18] 17  BP:  (108-165)/(47-62) 153/62    Physical Exam:    General Appearance:  Alert and cooperative.  Chronically ill cachectic appearing elderly female.  Thin.   Head:  Atraumatic and normocephalic.   Eyes: Conjunctivae and sclerae normal, no icterus. No pallor.   Ears:  Ears with no abnormalities noted.   Throat: No oral lesions, no thrush, oral mucosa moist.   Neck: Supple, trachea midline, no thyromegaly.   Back:   No kyphoscoliosis present. No tenderness to palpation.   Lungs:   Breath sounds heard bilaterally equally.  Mild coarseness throughout slightly labored which is baseline on 3 L nasal cannula.   Heart:  Normal S1 and S2, no murmur, no gallop, no rub. No JVD.   Abdomen:   Normal bowel sounds, no masses, no organomegaly. Soft, nontender, nondistended, no rebound tenderness.   Extremities: Supple, no edema, no cyanosis, no clubbing.   Pulses: Pulses palpable bilaterally.   Skin: No bleeding or rash.   Neurologic: Alert and oriented x 3. No facial asymmetry. Moves all four limbs. No tremors.  Generalized weakness.     Pertinent Lab Results:     Results from last 7 days   Lab Units 09/26/24  0840 09/25/24  0727 09/24/24  0751 09/23/24  0604 09/22/24  0728 09/21/24  0719 09/20/24  1811   SODIUM mmol/L 138 143 150*   < > 146*   < > 145   POTASSIUM mmol/L 4.3 4.1 4.3   < > 4.2   < > 4.0   CHLORIDE mmol/L 93* 99 106   < > 106   < > 102   CO2 mmol/L 40.6* 38.8* 41.3*   < > 36.0*   < > 31.6*   BUN mg/dL 8 12 19   < > 27*   < > 25*   CREATININE mg/dL 0.45* 0.31* 0.35*   < > 0.42*   < > 0.52*   CALCIUM mg/dL 9.4 8.9 9.2   < > 9.0   < > 8.9   BILIRUBIN mg/dL  --   --   --   --  0.2  --  0.2   ALK PHOS U/L  --   --   --   --  66  --  107   ALT (SGPT) U/L  --   --   --   --  14  --  21   AST (SGOT) U/L  --   --   --   --  11  --  20   GLUCOSE mg/dL 159* 94 152*   < > 163*   < > 392*    < > = values in this interval not displayed.     Results from last 7 days   Lab Units 09/26/24  0840 09/24/24  0751 09/22/24  0728   WBC 10*3/mm3  11.62* 11.64* 18.91*   HEMOGLOBIN g/dL 12.2 9.9* 9.4*   HEMATOCRIT % 39.9 33.2* 30.8*   PLATELETS 10*3/mm3 342 291 281         Results from last 7 days   Lab Units 09/20/24 2026 09/20/24  1811   CK TOTAL U/L  --  49   HSTROP T ng/L 55* 30*     Results from last 7 days   Lab Units 09/20/24  1811   PROBNP pg/mL 1,124.0             Results from last 7 days   Lab Units 09/20/24  2204   PH, ARTERIAL pH units 7.327   PO2 ART mm Hg 178.0*   PCO2, ARTERIAL mm Hg 64.7*   HCO3 ART mmol/L 33.8*     Results from last 7 days   Lab Units 09/20/24 1811 09/20/24  1810   BLOODCX  No growth at 5 days No growth at 5 days       Pertinent Radiology Results:    Imaging Results (All)       Procedure Component Value Units Date/Time    XR Chest 1 View [[id number]] Collected: 09/21/24 1008     Updated: 09/21/24 1012    Narrative:      AP PORTABLE CHEST .     CLINICAL HISTORY: Shortness of air. Tachycardia..     COMPARISON: 9/14/2024.     FINDINGS: An AP portable view of the chest was obtained. Defibrillator  pads obscure portions of the left lung. There is evidence of old  granulomatous disease. However, the lungs are clear of airspace  consolidation. There is severe bilateral diffuse emphysema. There is no  apparent pleural disease or acute osseous abnormality.       Impression:      Emphysema. No acute abnormality..                    This report was signed and finalized on 9/21/2024 10:10 AM by Ihsna Rhodes MD.       CT Angiogram Chest Pulmonary Embolism [[id number]] Collected: 09/20/24 2033     Updated: 09/20/24 2035    Narrative:      FINAL REPORT    TECHNIQUE:  null    CLINICAL HISTORY:  Shortness of breath, tachycardia, +dimer, eval PE    COMPARISON:  null    FINDINGS:  Exam: CTA Chest with IV contrast.    Procedure: 98 mL of contrast. Coronal and sagittal MIP reformats were performed    Comparison: 09/14/2024    Clinical history: Shortness of breath, tachycardia, positive D-dimer, evaluate PE    Findings:    Limited due to patient  motion artifact.    No central pulmonary emboli. Small pulmonary emboli are not excluded secondary to limitations of the study.    No thoracic aortic aneurysm or dissection.    Atherosclerotic calcifications are seen at the aorta and coronary arteries.    No hilar or mediastinal adenopathy.    No significant pericardial fluid collection.    No pneumothorax.    No pleural fluid collection.    Opacification of the right and left lower lobe bronchi which may be reflective of secretions or aspiration.    New bilateral lower lobe airspace disease likely pneumonia    Severe emphysema.    Pleural-parenchymal scarring and calcification seen at the lung apices bilaterally, unchanged.    Visualized liver and spleen do not demonstrate any acute process    No thoracic spine compression fractures      Impression:      Impression:    1. No pulmonary emboli.    2. No thoracic aortic aneurysm or dissection    3. Severe emphysema.    4. New bilateral lower lobe airspace disease, likely pneumonia. Opacification of the right and left lower lobe bronchi which may be reflective of aspiration or secretions.    Authenticated and Electronically Signed by Sammie Lozano MD  on 09/20/2024 08:33:43 PM            Echo:    Results for orders placed during the hospital encounter of 09/14/24    Adult Transthoracic Echo Complete W/ Cont if Necessary Per Protocol    Interpretation Summary    Left ventricular systolic function is hyperdynamic (EF > 70%). Calculated left ventricular EF = 72.6%    Left ventricular diastolic function was normal.    Estimated right ventricular systolic pressure from tricuspid regurgitation is markedly elevated (>55 mmHg).    Severe pulmonary hypertension.    Condition on Discharge:      Stable.    Code status during the hospital stay:    Code Status and Medical Interventions: No CPR (Do Not Attempt to Resuscitate); Limited Support; No intubation (DNI)   Ordered at: 09/20/24 5077     Medical Intervention Limits:    No  intubation (DNI)     Code Status (Patient has no pulse and is not breathing):    No CPR (Do Not Attempt to Resuscitate)     Medical Interventions (Patient has pulse or is breathing):    Limited Support     [place] [facility] [place] Medications:       Discharge Medications        New Medications        Instructions Start Date   apixaban 2.5 MG tablet tablet  Commonly known as: ELIQUIS   2.5 mg, Oral, Every 12 Hours Scheduled      metoprolol tartrate 25 MG tablet  Commonly known as: LOPRESSOR   25 mg, Oral, Every 12 Hours Scheduled             Changes to Medications        Instructions Start Date   amLODIPine 5 MG tablet  Commonly known as: NORVASC  What changed: additional instructions   5 mg, Oral, Daily, Hold for now until seen by PCP.      losartan 25 MG tablet  Commonly known as: COZAAR  What changed:   medication strength  how much to take   25 mg, Oral, Daily   Start Date: September 27, 2024     metFORMIN  MG 24 hr tablet  Commonly known as: GLUCOPHAGE-XR  What changed: additional instructions   500 mg, Oral, Every 12 Hours Scheduled, Hold due to diarrhea.      predniSONE 10 MG tablet  Commonly known as: DELTASONE  What changed: See the new instructions.   Take 6 tablets by mouth Daily for 2 days, THEN 5 tablets Daily for 2 days, THEN 4 tablets Daily for 2 days, THEN 3 tablets Daily for 2 days, THEN 2 tablets Daily for 2 days, THEN 1 tablet Daily for 2 days.   Start Date: September 26, 2024            Continue These Medications        Instructions Start Date   Budeson-Glycopyrrol-Formoterol 160-9-4.8 MCG/ACT aerosol inhaler  Commonly known as: BREZTRI   2 puffs, Inhalation, 2 Times Daily      EQ Aspirin Adult Low Dose 81 MG EC tablet  Generic drug: aspirin   1 tablet, Oral, Daily      folic acid 1 MG tablet  Commonly known as: FOLVITE   1 mg, Oral, Daily      ipratropium-albuterol 0.5-2.5 mg/3 ml nebulizer  Commonly known as: DUO-NEB   3 mL, Nebulization, 4 Times Daily  PRN      levothyroxine 75 MCG tablet  Commonly known as: SYNTHROID, LEVOTHROID   75 mcg, Oral, Daily      pravastatin 40 MG tablet  Commonly known as: PRAVACHOL   40 mg, Oral, Every Night at Bedtime      VITAMIN B 12 PO   1,000 Units, Oral, Daily             Discharge Diet:     Diet Instructions       Diet: Diabetic Diets; Consistent Carbohydrate; Regular (IDDSI 7); Thin (IDDSI 0)      Discharge Diet: Diabetic Diets    Diabetic Diet: Consistent Carbohydrate    Texture: Regular (IDDSI 7)    Fluid Consistency: Thin (IDDSI 0)          Activity at Discharge:     Activity Instructions       Activity as Tolerated            Follow-up Appointments:    Additional Instructions for the Follow-ups that You Need to Schedule       Ambulatory Referral to Disease State Management   As directed      To dept: [facility] [[id number]]   What program(s) are you referring for?: COPD   Follow-up needed: Yes               Follow-up Information       Sofia Martinez APRN Follow up in 1 week(s).    Specialty: Family Medicine  Contact information:  [address] [facility] [address]  [phone]                           Future Appointments   Date Time Provider Department Center   10/23/2024 10:30 AM Bella Mccullough APRN [facility] None   5/21/2025 11:30 AM Abby Dozier APRN [facility]     Test Results Pending at Discharge:           BRITTNY Hernandez  09/26/24  10:18 EDT    Time: I spent 45 minutes on this discharge activity which included: face-to-face encounter with the patient, reviewing the data in the system, coordination of the care with the nursing staff as well as consultants, documentation, and entering orders.     Dictated utilizing Dragon dictation.

## 2024-09-26 NOTE — DISCHARGE INSTRUCTIONS
Patient to be discharged home with home health.  Continue prednisone as directed.  Follow with pulmonology and PCP as scheduled.  Continue to increase p.o. intake.  Return to emergency department for any worsening symptoms.

## 2024-09-26 NOTE — CASE MANAGEMENT/SOCIAL WORK
Case Management Discharge Note                Selected Continued Care - Admitted Since 9/20/2024       Destination    No services have been selected for the patient.                Durable Medical Equipment    No services have been selected for the patient.                Dialysis/Infusion    No services have been selected for the patient.                Home Medical Care Coordination complete.      Service Provider Selected Services Address Phone Fax Patient Preferred    [facility]  [address], [place] [id number] [phone] --              Therapy    No services have been selected for the patient.                Community Resources    No services have been selected for the patient.                Community & DME    No services have been selected for the patient.                    Selected Continued Care - Prior Encounters Includes continued care and service providers with selected services from prior encounters from 6/22/2024 to 9/26/2024      Discharged on 9/18/2024 Admission date: 9/14/2024 - Discharge disposition: Home or Self Care      Durable Medical Equipment       Service Provider Selected Services Address Phone Fax Patient Preferred    [facility] Durable Medical Equipment [address] [address] [phone] --       Internal Comment last updated by Vane Martinez, RN 9/18/2024 0908    [facility]                         Home Medical Care       Service Provider Selected Services Address Phone Fax Patient Preferred    [facility] [address] , [facility] [address] [phone] --                          Transportation Services  Private: Car    Final Discharge Disposition Code: 06 - home with home health care

## 2024-09-26 NOTE — PLAN OF CARE
Goal Outcome Evaluation:  Plan of Care Reviewed With: patient        Progress: improving  Outcome Evaluation: Pt maintained stable vs throughout shift. No episodes of diarrhea during shift. No significant events, continuing to monitor.

## 2024-09-26 NOTE — CASE MANAGEMENT/SOCIAL WORK
Met with pt , IMM obtained. Pt updated [facility], used previously and preferred, will be her HH service provider. She states her  will be driving her home.

## 2024-09-26 NOTE — PLAN OF CARE
Problem: Noninvasive Ventilation Acute  Goal: Effective Unassisted Ventilation and Oxygenation  Outcome: Progressing   Goal Outcome Evaluation:      Pt refused

## 2024-09-27 ENCOUNTER — APPOINTMENT (OUTPATIENT)
Dept: GENERAL RADIOLOGY | Facility: HOSPITAL | Age: 79
DRG: 177 | End: 2024-09-27
Payer: MEDICARE

## 2024-09-27 ENCOUNTER — HOSPITAL ENCOUNTER (INPATIENT)
Facility: HOSPITAL | Age: 79
LOS: 8 days | Discharge: HOME HEALTH | DRG: 177 | End: 2024-10-05
Attending: STUDENT IN AN ORGANIZED HEALTH CARE EDUCATION/TRAINING PROGRAM | Admitting: INTERNAL MEDICINE
Payer: MEDICARE

## 2024-09-27 ENCOUNTER — READMISSION MANAGEMENT (OUTPATIENT)
Dept: CALL CENTER | Facility: HOSPITAL | Age: 79
End: 2024-09-27
Payer: MEDICARE

## 2024-09-27 DIAGNOSIS — I48.0 PAROXYSMAL ATRIAL FIBRILLATION: ICD-10-CM

## 2024-09-27 DIAGNOSIS — U07.1 COVID-19: ICD-10-CM

## 2024-09-27 DIAGNOSIS — J18.9 ATYPICAL PNEUMONIA: ICD-10-CM

## 2024-09-27 DIAGNOSIS — J44.1 COPD WITH ACUTE EXACERBATION: ICD-10-CM

## 2024-09-27 DIAGNOSIS — J44.1 CHRONIC OBSTRUCTIVE PULMONARY DISEASE WITH ACUTE EXACERBATION: ICD-10-CM

## 2024-09-27 DIAGNOSIS — J96.21 ACUTE ON CHRONIC RESPIRATORY FAILURE WITH HYPOXIA: ICD-10-CM

## 2024-09-27 DIAGNOSIS — J96.01 ACUTE RESPIRATORY FAILURE WITH HYPOXIA: Primary | ICD-10-CM

## 2024-09-27 DIAGNOSIS — J96.11 CHRONIC RESPIRATORY FAILURE WITH HYPOXIA: ICD-10-CM

## 2024-09-27 PROBLEM — I10 ESSENTIAL HYPERTENSION: Status: ACTIVE | Noted: 2024-09-27

## 2024-09-27 PROBLEM — E78.2 MIXED HYPERLIPIDEMIA: Status: ACTIVE | Noted: 2024-09-27

## 2024-09-27 PROBLEM — E88.09 HYPOALBUMINEMIA: Status: ACTIVE | Noted: 2024-09-27

## 2024-09-27 PROBLEM — E03.8 OTHER SPECIFIED HYPOTHYROIDISM: Status: ACTIVE | Noted: 2024-09-27

## 2024-09-27 LAB
ALBUMIN SERPL-MCNC: 3.2 G/DL (ref 3.5–5.2)
ALBUMIN/GLOB SERPL: 1.2 G/DL
ALP SERPL-CCNC: 72 U/L (ref 39–117)
ALT SERPL W P-5'-P-CCNC: 20 U/L (ref 1–33)
ANION GAP SERPL CALCULATED.3IONS-SCNC: 9 MMOL/L (ref 5–15)
ARTERIAL PATENCY WRIST A: POSITIVE
AST SERPL-CCNC: 18 U/L (ref 1–32)
ATMOSPHERIC PRESS: ABNORMAL MM[HG]
B PARAPERT DNA SPEC QL NAA+PROBE: NOT DETECTED
B PERT DNA SPEC QL NAA+PROBE: NOT DETECTED
BASE EXCESS BLDA CALC-SCNC: 18.8 MMOL/L (ref 0–2)
BASOPHILS # BLD AUTO: 0.01 10*3/MM3 (ref 0–0.2)
BASOPHILS NFR BLD AUTO: 0.1 % (ref 0–1.5)
BDY SITE: ABNORMAL
BILIRUB SERPL-MCNC: 0.3 MG/DL (ref 0–1.2)
BILIRUB UR QL STRIP: NEGATIVE
BODY TEMPERATURE: 37
BUN SERPL-MCNC: 13 MG/DL (ref 8–23)
BUN/CREAT SERPL: 25.5 (ref 7–25)
C PNEUM DNA NPH QL NAA+NON-PROBE: NOT DETECTED
CALCIUM SPEC-SCNC: 8.9 MG/DL (ref 8.6–10.5)
CHLORIDE SERPL-SCNC: 94 MMOL/L (ref 98–107)
CLARITY UR: CLEAR
CO2 BLDA-SCNC: 47.6 MMOL/L (ref 22–33)
CO2 SERPL-SCNC: 35 MMOL/L (ref 22–29)
COHGB MFR BLD: 1.1 % (ref 0–2)
COLOR UR: YELLOW
CREAT SERPL-MCNC: 0.51 MG/DL (ref 0.57–1)
CRP SERPL-MCNC: 1 MG/DL (ref 0–0.5)
D DIMER PPP FEU-MCNC: 0.43 MCGFEU/ML (ref 0–0.8)
D-LACTATE SERPL-SCNC: 1.9 MMOL/L (ref 0.5–2)
DEPRECATED RDW RBC AUTO: 43.2 FL (ref 37–54)
EGFRCR SERPLBLD CKD-EPI 2021: 94.5 ML/MIN/1.73
EOSINOPHIL # BLD AUTO: 0 10*3/MM3 (ref 0–0.4)
EOSINOPHIL NFR BLD AUTO: 0 % (ref 0.3–6.2)
EPAP: 0
ERYTHROCYTE [DISTWIDTH] IN BLOOD BY AUTOMATED COUNT: 12.1 % (ref 12.3–15.4)
FLUAV SUBTYP SPEC NAA+PROBE: NOT DETECTED
FLUBV RNA ISLT QL NAA+PROBE: NOT DETECTED
GLOBULIN UR ELPH-MCNC: 2.7 GM/DL
GLUCOSE SERPL-MCNC: 378 MG/DL (ref 65–99)
GLUCOSE UR STRIP-MCNC: ABNORMAL MG/DL
HADV DNA SPEC NAA+PROBE: NOT DETECTED
HCO3 BLDA-SCNC: 45.6 MMOL/L (ref 20–26)
HCOV 229E RNA SPEC QL NAA+PROBE: NOT DETECTED
HCOV HKU1 RNA SPEC QL NAA+PROBE: NOT DETECTED
HCOV NL63 RNA SPEC QL NAA+PROBE: NOT DETECTED
HCOV OC43 RNA SPEC QL NAA+PROBE: NOT DETECTED
HCT VFR BLD AUTO: 36.8 % (ref 34–46.6)
HCT VFR BLD CALC: 30.1 % (ref 38–51)
HGB BLD-MCNC: 10.9 G/DL (ref 12–15.9)
HGB BLDA-MCNC: 9.8 G/DL (ref 14–18)
HGB UR QL STRIP.AUTO: NEGATIVE
HMPV RNA NPH QL NAA+NON-PROBE: NOT DETECTED
HOLD SPECIMEN: NORMAL
HPIV1 RNA ISLT QL NAA+PROBE: NOT DETECTED
HPIV2 RNA SPEC QL NAA+PROBE: NOT DETECTED
HPIV3 RNA NPH QL NAA+PROBE: NOT DETECTED
HPIV4 P GENE NPH QL NAA+PROBE: NOT DETECTED
IMM GRANULOCYTES # BLD AUTO: 0.06 10*3/MM3 (ref 0–0.05)
IMM GRANULOCYTES NFR BLD AUTO: 0.5 % (ref 0–0.5)
INHALED O2 CONCENTRATION: 32 %
IPAP: 0
KETONES UR QL STRIP: ABNORMAL
LEUKOCYTE ESTERASE UR QL STRIP.AUTO: NEGATIVE
LYMPHOCYTES # BLD AUTO: 0.23 10*3/MM3 (ref 0.7–3.1)
LYMPHOCYTES NFR BLD AUTO: 1.8 % (ref 19.6–45.3)
M PNEUMO IGG SER IA-ACNC: NOT DETECTED
MAGNESIUM SERPL-MCNC: 2.3 MG/DL (ref 1.6–2.4)
MCH RBC QN AUTO: 28.7 PG (ref 26.6–33)
MCHC RBC AUTO-ENTMCNC: 29.6 G/DL (ref 31.5–35.7)
MCV RBC AUTO: 96.8 FL (ref 79–97)
METHGB BLD QL: 0.3 % (ref 0–1.5)
MODALITY: ABNORMAL
MONOCYTES # BLD AUTO: 0.22 10*3/MM3 (ref 0.1–0.9)
MONOCYTES NFR BLD AUTO: 1.7 % (ref 5–12)
NEUTROPHILS NFR BLD AUTO: 12.55 10*3/MM3 (ref 1.7–7)
NEUTROPHILS NFR BLD AUTO: 95.9 % (ref 42.7–76)
NITRITE UR QL STRIP: NEGATIVE
NRBC BLD AUTO-RTO: 0 /100 WBC (ref 0–0.2)
NT-PROBNP SERPL-MCNC: 1024 PG/ML (ref 0–1800)
OXYHGB MFR BLDV: 96.9 % (ref 94–99)
PAW @ PEAK INSP FLOW SETTING VENT: 0 CMH2O
PCO2 BLDA: 65.9 MM HG (ref 35–45)
PCO2 TEMP ADJ BLD: 65.9 MM HG (ref 35–45)
PH BLDA: 7.45 PH UNITS (ref 7.35–7.45)
PH UR STRIP.AUTO: 7.5 [PH] (ref 5–8)
PH, TEMP CORRECTED: 7.45 PH UNITS
PHOSPHATE SERPL-MCNC: 3.3 MG/DL (ref 2.5–4.5)
PLATELET # BLD AUTO: 286 10*3/MM3 (ref 140–450)
PMV BLD AUTO: 12.4 FL (ref 6–12)
PO2 BLDA: 91.6 MM HG (ref 83–108)
PO2 TEMP ADJ BLD: 91.6 MM HG (ref 83–108)
POTASSIUM SERPL-SCNC: 5 MMOL/L (ref 3.5–5.2)
PROCALCITONIN SERPL-MCNC: 0.04 NG/ML (ref 0–0.25)
PROT SERPL-MCNC: 5.9 G/DL (ref 6–8.5)
PROT UR QL STRIP: NEGATIVE
RBC # BLD AUTO: 3.8 10*6/MM3 (ref 3.77–5.28)
RHINOVIRUS RNA SPEC NAA+PROBE: NOT DETECTED
RSV RNA NPH QL NAA+NON-PROBE: NOT DETECTED
SARS-COV-2 RNA NPH QL NAA+NON-PROBE: DETECTED
SODIUM SERPL-SCNC: 138 MMOL/L (ref 136–145)
SP GR UR STRIP: 1.02 (ref 1–1.03)
TOTAL RATE: 0 BREATHS/MINUTE
TROPONIN T SERPL HS-MCNC: 19 NG/L
TSH SERPL DL<=0.05 MIU/L-ACNC: 0.97 UIU/ML (ref 0.27–4.2)
UROBILINOGEN UR QL STRIP: ABNORMAL
WBC NRBC COR # BLD AUTO: 13.07 10*3/MM3 (ref 3.4–10.8)
WHOLE BLOOD HOLD COAG: NORMAL
WHOLE BLOOD HOLD SPECIMEN: NORMAL

## 2024-09-27 PROCEDURE — 87449 NOS EACH ORGANISM AG IA: CPT | Performed by: PHYSICIAN ASSISTANT

## 2024-09-27 PROCEDURE — 86140 C-REACTIVE PROTEIN: CPT | Performed by: STUDENT IN AN ORGANIZED HEALTH CARE EDUCATION/TRAINING PROGRAM

## 2024-09-27 PROCEDURE — 99291 CRITICAL CARE FIRST HOUR: CPT

## 2024-09-27 PROCEDURE — 84134 ASSAY OF PREALBUMIN: CPT | Performed by: STUDENT IN AN ORGANIZED HEALTH CARE EDUCATION/TRAINING PROGRAM

## 2024-09-27 PROCEDURE — 84100 ASSAY OF PHOSPHORUS: CPT | Performed by: STUDENT IN AN ORGANIZED HEALTH CARE EDUCATION/TRAINING PROGRAM

## 2024-09-27 PROCEDURE — 0202U NFCT DS 22 TRGT SARS-COV-2: CPT | Performed by: STUDENT IN AN ORGANIZED HEALTH CARE EDUCATION/TRAINING PROGRAM

## 2024-09-27 PROCEDURE — 36600 WITHDRAWAL OF ARTERIAL BLOOD: CPT

## 2024-09-27 PROCEDURE — 25010000002 AZITHROMYCIN PER 500 MG: Performed by: STUDENT IN AN ORGANIZED HEALTH CARE EDUCATION/TRAINING PROGRAM

## 2024-09-27 PROCEDURE — 25810000003 LACTATED RINGERS PER 1000 ML: Performed by: STUDENT IN AN ORGANIZED HEALTH CARE EDUCATION/TRAINING PROGRAM

## 2024-09-27 PROCEDURE — 84443 ASSAY THYROID STIM HORMONE: CPT | Performed by: STUDENT IN AN ORGANIZED HEALTH CARE EDUCATION/TRAINING PROGRAM

## 2024-09-27 PROCEDURE — 93005 ELECTROCARDIOGRAM TRACING: CPT | Performed by: STUDENT IN AN ORGANIZED HEALTH CARE EDUCATION/TRAINING PROGRAM

## 2024-09-27 PROCEDURE — 83050 HGB METHEMOGLOBIN QUAN: CPT

## 2024-09-27 PROCEDURE — 99223 1ST HOSP IP/OBS HIGH 75: CPT | Performed by: FAMILY MEDICINE

## 2024-09-27 PROCEDURE — 80053 COMPREHEN METABOLIC PANEL: CPT | Performed by: STUDENT IN AN ORGANIZED HEALTH CARE EDUCATION/TRAINING PROGRAM

## 2024-09-27 PROCEDURE — 25010000002 CEFTRIAXONE PER 250 MG: Performed by: STUDENT IN AN ORGANIZED HEALTH CARE EDUCATION/TRAINING PROGRAM

## 2024-09-27 PROCEDURE — 25010000002 METHYLPREDNISOLONE PER 125 MG: Performed by: STUDENT IN AN ORGANIZED HEALTH CARE EDUCATION/TRAINING PROGRAM

## 2024-09-27 PROCEDURE — 25810000003 SODIUM CHLORIDE 0.9 % SOLUTION 250 ML FLEX CONT: Performed by: STUDENT IN AN ORGANIZED HEALTH CARE EDUCATION/TRAINING PROGRAM

## 2024-09-27 PROCEDURE — 85025 COMPLETE CBC W/AUTO DIFF WBC: CPT | Performed by: STUDENT IN AN ORGANIZED HEALTH CARE EDUCATION/TRAINING PROGRAM

## 2024-09-27 PROCEDURE — 82375 ASSAY CARBOXYHB QUANT: CPT

## 2024-09-27 PROCEDURE — 83735 ASSAY OF MAGNESIUM: CPT | Performed by: STUDENT IN AN ORGANIZED HEALTH CARE EDUCATION/TRAINING PROGRAM

## 2024-09-27 PROCEDURE — 85379 FIBRIN DEGRADATION QUANT: CPT | Performed by: STUDENT IN AN ORGANIZED HEALTH CARE EDUCATION/TRAINING PROGRAM

## 2024-09-27 PROCEDURE — 84484 ASSAY OF TROPONIN QUANT: CPT | Performed by: STUDENT IN AN ORGANIZED HEALTH CARE EDUCATION/TRAINING PROGRAM

## 2024-09-27 PROCEDURE — 81003 URINALYSIS AUTO W/O SCOPE: CPT | Performed by: STUDENT IN AN ORGANIZED HEALTH CARE EDUCATION/TRAINING PROGRAM

## 2024-09-27 PROCEDURE — 25810000003 SODIUM CHLORIDE 0.9 % SOLUTION: Performed by: STUDENT IN AN ORGANIZED HEALTH CARE EDUCATION/TRAINING PROGRAM

## 2024-09-27 PROCEDURE — 94799 UNLISTED PULMONARY SVC/PX: CPT

## 2024-09-27 PROCEDURE — 83880 ASSAY OF NATRIURETIC PEPTIDE: CPT | Performed by: STUDENT IN AN ORGANIZED HEALTH CARE EDUCATION/TRAINING PROGRAM

## 2024-09-27 PROCEDURE — 82805 BLOOD GASES W/O2 SATURATION: CPT

## 2024-09-27 PROCEDURE — 84145 PROCALCITONIN (PCT): CPT | Performed by: STUDENT IN AN ORGANIZED HEALTH CARE EDUCATION/TRAINING PROGRAM

## 2024-09-27 PROCEDURE — 25010000002 MAGNESIUM SULFATE IN D5W 1G/100ML (PREMIX) 1-5 GM/100ML-% SOLUTION: Performed by: STUDENT IN AN ORGANIZED HEALTH CARE EDUCATION/TRAINING PROGRAM

## 2024-09-27 PROCEDURE — 71045 X-RAY EXAM CHEST 1 VIEW: CPT

## 2024-09-27 PROCEDURE — 83605 ASSAY OF LACTIC ACID: CPT | Performed by: STUDENT IN AN ORGANIZED HEALTH CARE EDUCATION/TRAINING PROGRAM

## 2024-09-27 PROCEDURE — 87040 BLOOD CULTURE FOR BACTERIA: CPT | Performed by: STUDENT IN AN ORGANIZED HEALTH CARE EDUCATION/TRAINING PROGRAM

## 2024-09-27 PROCEDURE — 36415 COLL VENOUS BLD VENIPUNCTURE: CPT

## 2024-09-27 RX ORDER — SODIUM CHLORIDE, SODIUM LACTATE, POTASSIUM CHLORIDE, CALCIUM CHLORIDE 600; 310; 30; 20 MG/100ML; MG/100ML; MG/100ML; MG/100ML
125 INJECTION, SOLUTION INTRAVENOUS CONTINUOUS
Status: DISCONTINUED | OUTPATIENT
Start: 2024-09-27 | End: 2024-09-28

## 2024-09-27 RX ORDER — NICOTINE POLACRILEX 4 MG
15 LOZENGE BUCCAL
Status: DISCONTINUED | OUTPATIENT
Start: 2024-09-27 | End: 2024-10-05 | Stop reason: HOSPADM

## 2024-09-27 RX ORDER — IPRATROPIUM BROMIDE AND ALBUTEROL SULFATE 2.5; .5 MG/3ML; MG/3ML
3 SOLUTION RESPIRATORY (INHALATION)
Status: DISCONTINUED | OUTPATIENT
Start: 2024-09-27 | End: 2024-10-05 | Stop reason: HOSPADM

## 2024-09-27 RX ORDER — DEXTROSE MONOHYDRATE 25 G/50ML
25 INJECTION, SOLUTION INTRAVENOUS
Status: DISCONTINUED | OUTPATIENT
Start: 2024-09-27 | End: 2024-10-05 | Stop reason: HOSPADM

## 2024-09-27 RX ORDER — IBUPROFEN 600 MG/1
1 TABLET ORAL
Status: DISCONTINUED | OUTPATIENT
Start: 2024-09-27 | End: 2024-10-05 | Stop reason: HOSPADM

## 2024-09-27 RX ORDER — SODIUM CHLORIDE 0.9 % (FLUSH) 0.9 %
10 SYRINGE (ML) INJECTION AS NEEDED
Status: DISCONTINUED | OUTPATIENT
Start: 2024-09-27 | End: 2024-10-05 | Stop reason: HOSPADM

## 2024-09-27 RX ORDER — IPRATROPIUM BROMIDE AND ALBUTEROL SULFATE 2.5; .5 MG/3ML; MG/3ML
3 SOLUTION RESPIRATORY (INHALATION) ONCE
Status: COMPLETED | OUTPATIENT
Start: 2024-09-27 | End: 2024-09-27

## 2024-09-27 RX ORDER — INSULIN LISPRO 100 [IU]/ML
2-9 INJECTION, SOLUTION INTRAVENOUS; SUBCUTANEOUS
Status: DISCONTINUED | OUTPATIENT
Start: 2024-09-27 | End: 2024-10-05 | Stop reason: HOSPADM

## 2024-09-27 RX ORDER — METHYLPREDNISOLONE SODIUM SUCCINATE 125 MG/2ML
125 INJECTION, POWDER, LYOPHILIZED, FOR SOLUTION INTRAMUSCULAR; INTRAVENOUS ONCE
Status: COMPLETED | OUTPATIENT
Start: 2024-09-27 | End: 2024-09-27

## 2024-09-27 RX ORDER — MAGNESIUM SULFATE 1 G/100ML
1 INJECTION INTRAVENOUS ONCE
Status: COMPLETED | OUTPATIENT
Start: 2024-09-27 | End: 2024-09-27

## 2024-09-27 RX ADMIN — SODIUM CHLORIDE, POTASSIUM CHLORIDE, SODIUM LACTATE AND CALCIUM CHLORIDE 125 ML/HR: 600; 310; 30; 20 INJECTION, SOLUTION INTRAVENOUS at 21:53

## 2024-09-27 RX ADMIN — IPRATROPIUM BROMIDE AND ALBUTEROL SULFATE 3 ML: 2.5; .5 SOLUTION RESPIRATORY (INHALATION) at 17:28

## 2024-09-27 RX ADMIN — MAGNESIUM SULFATE HEPTAHYDRATE 1 G: 10 INJECTION, SOLUTION INTRAVENOUS at 17:55

## 2024-09-27 RX ADMIN — AZITHROMYCIN 500 MG: 500 INJECTION, POWDER, LYOPHILIZED, FOR SOLUTION INTRAVENOUS at 23:02

## 2024-09-27 RX ADMIN — METHYLPREDNISOLONE SODIUM SUCCINATE 125 MG: 125 INJECTION INTRAMUSCULAR; INTRAVENOUS at 17:55

## 2024-09-27 RX ADMIN — SODIUM CHLORIDE 1000 MG: 900 INJECTION INTRAVENOUS at 21:53

## 2024-09-27 RX ADMIN — SODIUM CHLORIDE 500 ML: 9 INJECTION, SOLUTION INTRAVENOUS at 17:55

## 2024-09-27 NOTE — ED PROVIDER NOTES
EMERGENCY DEPARTMENT ENCOUNTER    Pt Name: Maura Jiménez  MRN: [id number]  Pt :   1945  Room Number:  16  Date of encounter:  2024  PCP: Sofia Martinez APRN  ED Provider: Johnny Fuller MD    Historian: Patient, daughter      HPI:  Chief Complaint: Dyspnea        Context: Maura Jiménez is a  79-year-old female with history of severe malnutrition, COPD with baseline 4 L oxygen requirement, multiple recent hospitalizations for hypoxia who presents because of progressively worsening dyspnea she says this is been going on for weeks but got significantly worse today.  Upon arrival here she was satting in the 70s on her baseline 4 L and she says she does not usually use more oxygen than that.  She has not had productive cough or appreciated fevers.  She does complain of generalized weakness it has been progressively worsening with her recent hospitalizations and illnesses.  No other complaints at this time.      PAST MEDICAL HISTORY  Past Medical History:   Diagnosis Date    COPD (chronic obstructive pulmonary disease)     Disease of thyroid gland     Hyperlipidemia     Osteoarthritis     Type 2 diabetes mellitus without complication, without long-term current use of insulin 2018         PAST SURGICAL HISTORY  Past Surgical History:   Procedure Laterality Date    APPENDECTOMY      CATARACT EXTRACTION, BILATERAL      HYSTERECTOMY           FAMILY HISTORY  Family History   Problem Relation Age of Onset    Heart disease Father     Diabetes Brother          SOCIAL HISTORY  Social History     Socioeconomic History    Marital status:    Tobacco Use    Smoking status: Former     Current packs/day: 0.00     Types: Cigarettes     Quit date: 2018     Years since quittin.0    Smokeless tobacco: Never   Vaping Use    Vaping status: Never Used   Substance and Sexual Activity    Alcohol use: No    Drug use: No    Sexual activity: Never         ALLERGIES  Shellfish-derived  products, Codeine, Codeine, and Shellfish-derived products        REVIEW OF SYSTEMS  Review of Systems       All systems reviewed and negative except for those discussed in HPI.       PHYSICAL EXAM    I have reviewed the triage vital signs and nursing notes.    ED Triage Vitals   Temp Heart Rate Resp BP SpO2   09/27/24 1713 09/27/24 1703 09/27/24 1703 09/27/24 1703 09/27/24 1700   98.7 °F (37.1 °C) 80 24 145/49 (!) 85 %      Temp src Heart Rate Source Patient Position BP Location FiO2 (%)   09/27/24 1713 -- -- -- --   Oral           Physical Exam  GENERAL:   Appears acute on chronically ill, dyspneic, cachectic  HENT: Nares patent.  EYES: No scleral icterus.  CV: Regular rhythm, regular rate.  RESPIRATORY: Normal effort.  Shallow breathing but without distinct rhonchi or wheezing breathing is shallow and effort may be silent chest i.  ABDOMEN: Soft, nontender  MUSCULOSKELETAL: No deformities.   NEURO: Alert, moves all extremities, follows commands.  SKIN: Warm, dry, scattered bruising      LAB RESULTS  Recent Results (from the past 24 hour(s))   Comprehensive Metabolic Panel    Collection Time: 09/27/24  5:04 PM    Specimen: Blood   Result Value Ref Range    Glucose 378 (H) 65 - 99 mg/dL    BUN 13 8 - 23 mg/dL    Creatinine 0.51 (L) 0.57 - 1.00 mg/dL    Sodium 138 136 - 145 mmol/L    Potassium 5.0 3.5 - 5.2 mmol/L    Chloride 94 (L) 98 - 107 mmol/L    CO2 35.0 (H) 22.0 - 29.0 mmol/L    Calcium 8.9 8.6 - 10.5 mg/dL    Total Protein 5.9 (L) 6.0 - 8.5 g/dL    Albumin 3.2 (L) 3.5 - 5.2 g/dL    ALT (SGPT) 20 1 - 33 U/L    AST (SGOT) 18 1 - 32 U/L    Alkaline Phosphatase 72 39 - 117 U/L    Total Bilirubin 0.3 0.0 - 1.2 mg/dL    Globulin 2.7 gm/dL    A/G Ratio 1.2 g/dL    BUN/Creatinine Ratio 25.5 (H) 7.0 - 25.0    Anion Gap 9.0 5.0 - 15.0 mmol/L    eGFR 94.5 >60.0 mL/min/1.73   BNP    Collection Time: 09/27/24  5:04 PM    Specimen: Blood   Result Value Ref Range    proBNP 1,024.0 0.0 - 1,800.0 pg/mL   Single High  Sensitivity Troponin T    Collection Time: 09/27/24  5:04 PM    Specimen: Blood   Result Value Ref Range    HS Troponin T 19 (H) <14 ng/L   Green Top (Gel)    Collection Time: 09/27/24  5:04 PM   Result Value Ref Range    Extra Tube Hold for add-ons.    Lavender Top    Collection Time: 09/27/24  5:04 PM   Result Value Ref Range    Extra Tube hold for add-on    Gold Top - SST    Collection Time: 09/27/24  5:04 PM   Result Value Ref Range    Extra Tube Hold for add-ons.    Gray Top    Collection Time: 09/27/24  5:04 PM   Result Value Ref Range    Extra Tube Hold for add-ons.    Light Blue Top    Collection Time: 09/27/24  5:04 PM   Result Value Ref Range    Extra Tube Hold for add-ons.    CBC Auto Differential    Collection Time: 09/27/24  5:04 PM    Specimen: Blood   Result Value Ref Range    WBC 13.07 (H) 3.40 - 10.80 10*3/mm3    RBC 3.80 3.77 - 5.28 10*6/mm3    Hemoglobin 10.9 (L) 12.0 - 15.9 g/dL    Hematocrit 36.8 34.0 - 46.6 %    MCV 96.8 79.0 - 97.0 fL    MCH 28.7 26.6 - 33.0 pg    MCHC 29.6 (L) 31.5 - 35.7 g/dL    RDW 12.1 (L) 12.3 - 15.4 %    RDW-SD 43.2 37.0 - 54.0 fl    MPV 12.4 (H) 6.0 - 12.0 fL    Platelets 286 140 - 450 10*3/mm3    Neutrophil % 95.9 (H) 42.7 - 76.0 %    Lymphocyte % 1.8 (L) 19.6 - 45.3 %    Monocyte % 1.7 (L) 5.0 - 12.0 %    Eosinophil % 0.0 (L) 0.3 - 6.2 %    Basophil % 0.1 0.0 - 1.5 %    Immature Grans % 0.5 0.0 - 0.5 %    Neutrophils, Absolute 12.55 (H) 1.70 - 7.00 10*3/mm3    Lymphocytes, Absolute 0.23 (L) 0.70 - 3.10 10*3/mm3    Monocytes, Absolute 0.22 0.10 - 0.90 10*3/mm3    Eosinophils, Absolute 0.00 0.00 - 0.40 10*3/mm3    Basophils, Absolute 0.01 0.00 - 0.20 10*3/mm3    Immature Grans, Absolute 0.06 (H) 0.00 - 0.05 10*3/mm3    nRBC 0.0 0.0 - 0.2 /100 WBC   Lactic Acid, Plasma    Collection Time: 09/27/24  5:04 PM    Specimen: Blood   Result Value Ref Range    Lactate 1.9 0.5 - 2.0 mmol/L   Procalcitonin    Collection Time: 09/27/24  5:04 PM    Specimen: Blood   Result Value Ref  Range    Procalcitonin 0.04 0.00 - 0.25 ng/mL   C-reactive Protein    Collection Time: 09/27/24  5:04 PM    Specimen: Blood   Result Value Ref Range    C-Reactive Protein 1.00 (H) 0.00 - 0.50 mg/dL   Magnesium    Collection Time: 09/27/24  5:04 PM    Specimen: Blood   Result Value Ref Range    Magnesium 2.3 1.6 - 2.4 mg/dL   Phosphorus    Collection Time: 09/27/24  5:04 PM    Specimen: Blood   Result Value Ref Range    Phosphorus 3.3 2.5 - 4.5 mg/dL   TSH    Collection Time: 09/27/24  5:04 PM    Specimen: Blood   Result Value Ref Range    TSH 0.967 0.270 - 4.200 uIU/mL   ECG 12 Lead ED Triage Standing Order; SOA    Collection Time: 09/27/24  5:22 PM   Result Value Ref Range    QT Interval 358 ms    QTC Interval 408 ms   Respiratory Panel PCR w/COVID-19(SARS-CoV-2) MICHAEL/SHASHANK/NAVJOT/PAD/COR/HAYLEY In-House, NP Swab in [place], 2 HR TAT - Swab, Nasopharynx    Collection Time: 09/27/24  5:23 PM    Specimen: Nasopharynx; Swab   Result Value Ref Range    ADENOVIRUS, PCR Not Detected Not Detected    Coronavirus 229E Not Detected Not Detected    Coronavirus HKU1 Not Detected Not Detected    Coronavirus NL63 Not Detected Not Detected    Coronavirus OC43 Not Detected Not Detected    COVID19 Detected (C) Not Detected - Ref. Range    Human Metapneumovirus Not Detected Not Detected    Human Rhinovirus/Enterovirus Not Detected Not Detected    Influenza A PCR Not Detected Not Detected    Influenza B PCR Not Detected Not Detected    Parainfluenza Virus 1 Not Detected Not Detected    Parainfluenza Virus 2 Not Detected Not Detected    Parainfluenza Virus 3 Not Detected Not Detected    Parainfluenza Virus 4 Not Detected Not Detected    RSV, PCR Not Detected Not Detected    Bordetella pertussis pcr Not Detected Not Detected    Bordetella parapertussis PCR Not Detected Not Detected    Chlamydophila pneumoniae PCR Not Detected Not Detected    Mycoplasma pneumo by PCR Not Detected Not Detected   Prealbumin    Collection Time: 09/27/24  5:33 PM     Specimen: Blood   Result Value Ref Range    Prealbumin 16.3 (L) 20.0 - 40.0 mg/dL   Blood Gas, Arterial With Co-Ox    Collection Time: 09/27/24  5:37 PM    Specimen: Arterial Blood   Result Value Ref Range    Site Right Radial     Luciano's Test Positive     pH, Arterial 7.448 7.350 - 7.450 pH units    pCO2, Arterial 65.9 (H) 35.0 - 45.0 mm Hg    pO2, Arterial 91.6 83.0 - 108.0 mm Hg    HCO3, Arterial 45.6 (H) 20.0 - 26.0 mmol/L    Base Excess, Arterial 18.8 (H) 0.0 - 2.0 mmol/L    Hemoglobin, Blood Gas 9.8 (L) 14 - 18 g/dL    Hematocrit, Blood Gas 30.1 (L) 38.0 - 51.0 %    Oxyhemoglobin 96.9 94 - 99 %    Methemoglobin 0.30 0.00 - 1.50 %    Carboxyhemoglobin 1.1 0 - 2 %    CO2 Content 47.6 (H) 22 - 33 mmol/L    Temperature 37.0     Barometric Pressure for Blood Gas      Modality Nasal Cannula     FIO2 32 %    Rate 0 Breaths/minute    PIP 0 cmH2O    IPAP 0     EPAP 0     pH, Temp Corrected 7.448 pH Units    pCO2, Temperature Corrected 65.9 (H) 35 - 45 mm Hg    pO2, Temperature Corrected 91.6 83 - 108 mm Hg   D-dimer, Quantitative    Collection Time: 09/27/24  7:36 PM    Specimen: Blood   Result Value Ref Range    D-Dimer, Quantitative 0.43 0.00 - 0.80 MCGFEU/mL   Urinalysis With Microscopic If Indicated (No Culture) - Urine, Clean Catch    Collection Time: 09/27/24  8:05 PM    Specimen: Urine, Clean Catch   Result Value Ref Range    Color, UA Yellow Yellow, Straw    Appearance, UA Clear Clear    pH, UA 7.5 5.0 - 8.0    Specific Gravity, UA 1.025 1.001 - 1.030    Glucose, UA >=1000 mg/dL (3+) (A) Negative    Ketones, UA Trace (A) Negative    Bilirubin, UA Negative Negative    Blood, UA Negative Negative    Protein, UA Negative Negative    Leuk Esterase, UA Negative Negative    Nitrite, UA Negative Negative    Urobilinogen, UA 0.2 E.U./dL 0.2 - 1.0 E.U./dL       If labs were ordered, I independently reviewed the results and considered them in treating the patient.        RADIOLOGY  XR Chest 1 View    Result Date:  9/27/2024  XR CHEST 1 VW Date of Exam: 9/27/2024 5:29 PM EDT Indication: SOA triage protocol Comparison: 9/20/2024 Findings: Heart size and pulmonary vessels are within normal limits. There is emphysematous change of the lungs. Interstitial markings are increased throughout both lungs compatible with interstitial edema or atypical pneumonia. No focal airspace consolidation. No  pleural effusion.     Impression: 1. Emphysema. 2. Increasing interstitial markings throughout both lungs which may be secondary to pulmonary additional edema or atypical pneumonia. Electronically Signed: Colton Carlin MD  9/27/2024 6:02 PM EDT  Workstation ID: [id number]     I ordered and independently reviewed the above noted radiographic studies.      I viewed images of chest x-ray which showed hyperinflated lungs and scattered opacities consistent with atypical pneumonia per my independent interpretation.    See radiologist's dictation for official interpretation.        PROCEDURES    Procedures    ECG 12 Lead ED Triage Standing Order; SOA   Preliminary Result   Test Reason : ED Triage Standing Order~   Blood Pressure :   */*   mmHG   Vent. Rate :  78 BPM     Atrial Rate :  78 BPM      P-R Int : 102 ms          QRS Dur :  66 ms       QT Int : 358 ms       P-R-T Axes :  83  83  66 degrees      QTc Int : 408 ms      Sinus rhythm with short TN with premature supraventricular complexes   Otherwise normal ECG   No previous ECGs available      Referred By: ed           Confirmed By:           MEDICATIONS GIVEN IN ER    Medications   sodium chloride 0.9 % flush 10 mL (has no administration in time range)   lactated ringers infusion (0 mL/hr Intravenous Paused 9/27/24 6967)   ipratropium-albuterol (DUO-NEB) nebulizer solution 3 mL (has no administration in time range)   dextrose (GLUTOSE) oral gel 15 g (has no administration in time range)   dextrose (D50W) (25 g/50 mL) IV injection 25 g (has no administration in time range)   glucagon (GLUCAGEN)  injection 1 mg (has no administration in time range)   Insulin Lispro (humaLOG) injection 2-9 Units (has no administration in time range)   cefTRIAXone (ROCEPHIN) 1,000 mg in sodium chloride 0.9 % 100 mL MBP (has no administration in time range)   azithromycin (ZITHROMAX) 500 mg in sodium chloride 0.9 % 250 mL IVPB-VTB (has no administration in time range)   ipratropium-albuterol (DUO-NEB) nebulizer solution 3 mL (3 mL Nebulization Given 9/27/24 1728)   methylPREDNISolone sodium succinate (SOLU-Medrol) injection 125 mg (125 mg Intravenous Given 9/27/24 1755)   magnesium sulfate in D5W 1g/100mL (PREMIX) (0 g Intravenous Stopped 9/27/24 1855)   sodium chloride 0.9 % bolus 500 mL (0 mL Intravenous Stopped 9/27/24 1825)   cefTRIAXone (ROCEPHIN) 1,000 mg in sodium chloride 0.9 % 100 mL MBP (0 mg Intravenous Stopped 9/27/24 2223)   azithromycin (ZITHROMAX) 500 mg in sodium chloride 0.9 % 250 mL IVPB-VTB (500 mg Intravenous New Bag 9/27/24 2302)         MEDICAL DECISION MAKING, PROGRESS, and CONSULTS    All labs, if obtained, have been independently reviewed by me.  All radiology studies, if obtained, have been reviewed by me and the radiologist dictating the report.  All EKG's, if obtained, have been independently viewed and interpreted by me/my attending physician.      Discussion below represents my analysis of pertinent findings related to patient's condition, differential diagnosis, treatment plan and final disposition.                         Differential diagnosis:    Acute respiratory failure, respiratory acidosis, COPD, CHF, pneumonia, pneumothorax, pulmonary embolism, anemia, electrolyte abnormality      Additional sources:    - Discussed/ obtained information from independent historians:  daughter    - External (non-ED) record review:  Chart review of previous hospitalization for respiratory failure, COPD, COVID, A-fib shows history of:  1. Acute hypoxic/hypercapnic respiratory failure  2. COPD  exacerbation  3. Viral pneumonia  4. Acute COVID-19  5. New onset A-fib with RVR  6. Acute diarrhea  7. Elevated troponin  8. Hypernatremia likely secondary to p.o. intake poor  9. Hyperglycemia/type 2 diabetes  10. Lung nodule  11. Hyperlipidemia  12. Hypertension  13. Hypothyroidism  14. Severe malnutrition  15. Impaired mobility and ADL    - Chronic or social conditions impacting care: Severe COPD, severe malnutrition, hypothyroidism, hypertension, hyperlipidemia, diabetes    - Shared decision making: Patient/patient representative in complete agreement with current plans for evaluation and management.      Orders placed during this visit:  Orders Placed This Encounter   Procedures    COVID PRE-OP / PRE-PROCEDURE SCREENING ORDER (NO ISOLATION) - Swab, Nasopharynx    Blood Culture - Blood,    Blood Culture - Blood,    Respiratory Panel PCR w/COVID-19(SARS-CoV-2) MICHAEL/SHASHANK/NAVJOT/PAD/COR/[facility]LEY In-House, NP Swab in UTM/VTM, 2 HR TAT - Swab, Nasopharynx    Clostridioides difficile Toxin - Stool, Per Rectum    Clostridioides difficile Toxin, PCR - Stool, Per Rectum    Respiratory Culture - Sputum, Cough    MRSA Screen, PCR (Inpatient) - Swab, Nares    S. Pneumo Ag Urine or CSF - Urine, Urine, Clean Catch    Legionella Antigen, Urine - Urine, Urine, Clean Catch    XR Chest 1 View    [place] Draw    Comprehensive Metabolic Panel    BNP    Single High Sensitivity Troponin T    CBC Auto Differential    Urinalysis With Microscopic If Indicated (No Culture) - Urine, Clean Catch    Blood Gas, Arterial -With Co-Ox Panel: Yes    D-dimer, Quantitative    Lactic Acid, Plasma    Procalcitonin    C-reactive Protein    Magnesium    Phosphorus    TSH    Prealbumin    Blood Gas, Arterial With Co-Ox    T4, Free    Hemoglobin A1c    NPO Diet NPO Type: Strict NPO    Undress & Gown    Continuous Pulse Oximetry    Vital Signs    Code Status and Medical Interventions: CPR (Attempt to Resuscitate); Full Support    Inpatient Nutrition Consult     Patient Isolation Contact Spore    Oxygen Therapy- Nasal Cannula; Titrate 1-6 LPM Per SpO2; 90 - 95%    POC Glucose 4x Daily Before Meals & at Bedtime    ECG 12 Lead ED Triage Standing Order; SOA    Insert Peripheral IV    Inpatient Admission    ED Bed Request    CBC & Differential    Green Top (Gel)    Lavender Top    Gold Top - SST    Gray Top    Light Blue Top         Additional orders considered but not ordered:      ED Course:    Consultants:      ED Course as of 09/28/24 0150   Fri Sep 27, 2024   0172 Chart review of previous hospitalization for respiratory failure, COPD, COVID, A-fib shows history of:  1. Acute hypoxic/hypercapnic respiratory failure  2. COPD exacerbation  3. Viral pneumonia  4. Acute COVID-19  5. New onset A-fib with RVR  6. Acute diarrhea  7. Elevated troponin  8. Hypernatremia likely secondary to p.o. intake poor  9. Hyperglycemia/type 2 diabetes  10. Lung nodule  11. Hyperlipidemia  12. Hypertension  13. Hypothyroidism  14. Severe malnutrition  15. Impaired mobility and ADL   [CC]   1712 This is an 79-year-old female with history of severe malnutrition, COPD with baseline 4 L oxygen requirement, multiple recent hospitalizations for hypoxia who presents because of progressively worsening dyspnea she says this is been going on for weeks but got significantly worse today.  Upon arrival here she was satting in the 70s on her baseline 4 L and she says she does not usually use more oxygen than that.  She has not had productive cough or appreciated fevers.  She does complain of generalized weakness it has been progressively worsening with her recent hospitalizations and illnesses.  No other complaints at this time. [CC]   1713 She arrived awake and alert visibly dyspneic lungs are relatively clear to auscultation though there may be a silent chest component to this she was satting 77% on her baseline 4 L placed on a nonrebreather mask and satting 100% we will titrate this down she may be a good  candidate for high flow nasal cannula.  Starting on COPD medications and obtaining full respiratory and infectious workup will reevaluate pending initial workup. [CC]   Sat Sep 28, 2024   0142 Chest x-ray showing atypical pneumonia started on antibiotics.  Viral panel positive for COVID-19 family says she is already been hospitalized for this once and they are estimating that she is approximately day 7-10 of her illness.  She has leukocytosis.  Significant hyperglycemia.  Negative dimer.  Feeling better after initial round of medication she seems to be satting well on her baseline oxygen now but I think at this point needs hospitalization for continued treatment of her atypical pneumonia, COVID-19, COPD exacerbation.  Patient is agreeable to admission.  Medicine team consulted for admission. [CC]      ED Course User Index  [CC] Johnny Fuller MD     40 minutes of critical care provided. This time excludes other billable procedures. Time does include preparation of documents, medical consultations, review of old records, and direct bedside care. Patient is at high risk for life-threatening deterioration due to acute respiratory failure with hypoxia, COPD exacerbation, COVID-19,.           Shared Decision Making:  After my consideration of clinical presentation and any laboratory/radiology studies obtained, I discussed the findings with the patient/patient representative who is in agreement with the treatment plan and the final disposition.   Risks and benefits of discharge and/or observation/admission were discussed.       AS OF 01:50 EDT VITALS:    BP - 105/40  HR - 61  TEMP - 98.7 °F (37.1 °C) (Oral)  O2 SATS - 100%                  DIAGNOSIS  Final diagnoses:   Acute respiratory failure with hypoxia   COPD with acute exacerbation   COVID-19   Atypical pneumonia         DISPOSITION  Admit      Please note that portions of this document were completed with voice recognition software.        Alina  Johnny BATISTA MD  09/28/24 0151

## 2024-09-27 NOTE — Clinical Note
Level of Care: Telemetry [5]   Diagnosis: Pneumonia [096012]   Admitting Physician: REYNA HALL [[id number]]   Attending Physician: REYNA HALL [[id number]]   Isolate for COVID?: Yes [1]   Certification: I Certify That Inpatient Hospital Services Are Medically Necessary For Greater Than 2 Midnights

## 2024-09-28 LAB
ANION GAP SERPL CALCULATED.3IONS-SCNC: 5 MMOL/L (ref 5–15)
BASOPHILS # BLD AUTO: 0.01 10*3/MM3 (ref 0–0.2)
BASOPHILS NFR BLD AUTO: 0.1 % (ref 0–1.5)
BUN SERPL-MCNC: 10 MG/DL (ref 8–23)
BUN/CREAT SERPL: 25.6 (ref 7–25)
CALCIUM SPEC-SCNC: 8.3 MG/DL (ref 8.6–10.5)
CHLORIDE SERPL-SCNC: 101 MMOL/L (ref 98–107)
CO2 SERPL-SCNC: 35 MMOL/L (ref 22–29)
CREAT SERPL-MCNC: 0.39 MG/DL (ref 0.57–1)
CRP SERPL-MCNC: 0.57 MG/DL (ref 0–0.5)
DEPRECATED RDW RBC AUTO: 42.7 FL (ref 37–54)
EGFRCR SERPLBLD CKD-EPI 2021: 100.8 ML/MIN/1.73
EOSINOPHIL # BLD AUTO: 0.03 10*3/MM3 (ref 0–0.4)
EOSINOPHIL NFR BLD AUTO: 0.2 % (ref 0.3–6.2)
ERYTHROCYTE [DISTWIDTH] IN BLOOD BY AUTOMATED COUNT: 12.2 % (ref 12.3–15.4)
GLUCOSE BLDC GLUCOMTR-MCNC: 202 MG/DL (ref 70–130)
GLUCOSE BLDC GLUCOMTR-MCNC: 231 MG/DL (ref 70–130)
GLUCOSE BLDC GLUCOMTR-MCNC: 240 MG/DL (ref 70–130)
GLUCOSE BLDC GLUCOMTR-MCNC: 282 MG/DL (ref 70–130)
GLUCOSE BLDC GLUCOMTR-MCNC: 87 MG/DL (ref 70–130)
GLUCOSE SERPL-MCNC: 163 MG/DL (ref 65–99)
HBA1C MFR BLD: 8.1 % (ref 4.8–5.6)
HCT VFR BLD AUTO: 31.6 % (ref 34–46.6)
HGB BLD-MCNC: 9.5 G/DL (ref 12–15.9)
IMM GRANULOCYTES # BLD AUTO: 0.05 10*3/MM3 (ref 0–0.05)
IMM GRANULOCYTES NFR BLD AUTO: 0.3 % (ref 0–0.5)
L PNEUMO1 AG UR QL IA: NEGATIVE
LYMPHOCYTES # BLD AUTO: 0.53 10*3/MM3 (ref 0.7–3.1)
LYMPHOCYTES NFR BLD AUTO: 3.6 % (ref 19.6–45.3)
MCH RBC QN AUTO: 28.9 PG (ref 26.6–33)
MCHC RBC AUTO-ENTMCNC: 30.1 G/DL (ref 31.5–35.7)
MCV RBC AUTO: 96 FL (ref 79–97)
MONOCYTES # BLD AUTO: 0.74 10*3/MM3 (ref 0.1–0.9)
MONOCYTES NFR BLD AUTO: 5 % (ref 5–12)
MRSA DNA SPEC QL NAA+PROBE: NEGATIVE
NEUTROPHILS NFR BLD AUTO: 13.31 10*3/MM3 (ref 1.7–7)
NEUTROPHILS NFR BLD AUTO: 90.8 % (ref 42.7–76)
NRBC BLD AUTO-RTO: 0 /100 WBC (ref 0–0.2)
PLATELET # BLD AUTO: 260 10*3/MM3 (ref 140–450)
PMV BLD AUTO: 11.2 FL (ref 6–12)
POTASSIUM SERPL-SCNC: 4.8 MMOL/L (ref 3.5–5.2)
PREALB SERPL-MCNC: 16.3 MG/DL (ref 20–40)
RBC # BLD AUTO: 3.29 10*6/MM3 (ref 3.77–5.28)
S PNEUM AG SPEC QL LA: NEGATIVE
SODIUM SERPL-SCNC: 141 MMOL/L (ref 136–145)
T4 FREE SERPL-MCNC: 1.58 NG/DL (ref 0.92–1.68)
WBC NRBC COR # BLD AUTO: 14.67 10*3/MM3 (ref 3.4–10.8)

## 2024-09-28 PROCEDURE — 99233 SBSQ HOSP IP/OBS HIGH 50: CPT | Performed by: INTERNAL MEDICINE

## 2024-09-28 PROCEDURE — 82948 REAGENT STRIP/BLOOD GLUCOSE: CPT

## 2024-09-28 PROCEDURE — 85025 COMPLETE CBC W/AUTO DIFF WBC: CPT | Performed by: PHYSICIAN ASSISTANT

## 2024-09-28 PROCEDURE — 83036 HEMOGLOBIN GLYCOSYLATED A1C: CPT | Performed by: PHYSICIAN ASSISTANT

## 2024-09-28 PROCEDURE — 80048 BASIC METABOLIC PNL TOTAL CA: CPT | Performed by: PHYSICIAN ASSISTANT

## 2024-09-28 PROCEDURE — 63710000001 INSULIN LISPRO (HUMAN) PER 5 UNITS: Performed by: PHYSICIAN ASSISTANT

## 2024-09-28 PROCEDURE — 87641 MR-STAPH DNA AMP PROBE: CPT | Performed by: PHYSICIAN ASSISTANT

## 2024-09-28 PROCEDURE — 94799 UNLISTED PULMONARY SVC/PX: CPT

## 2024-09-28 PROCEDURE — 94640 AIRWAY INHALATION TREATMENT: CPT

## 2024-09-28 PROCEDURE — 86140 C-REACTIVE PROTEIN: CPT | Performed by: INTERNAL MEDICINE

## 2024-09-28 PROCEDURE — 25010000002 REMDESIVIR 100 MG/20ML SOLUTION 1 EACH VIAL

## 2024-09-28 PROCEDURE — 25810000003 SODIUM CHLORIDE 0.9 % SOLUTION: Performed by: PHYSICIAN ASSISTANT

## 2024-09-28 PROCEDURE — 87070 CULTURE OTHR SPECIMN AEROBIC: CPT | Performed by: PHYSICIAN ASSISTANT

## 2024-09-28 PROCEDURE — 94664 DEMO&/EVAL PT USE INHALER: CPT

## 2024-09-28 PROCEDURE — 63710000001 DEXAMETHASONE PER 0.25 MG: Performed by: INTERNAL MEDICINE

## 2024-09-28 PROCEDURE — XW033E5 INTRODUCTION OF REMDESIVIR ANTI-INFECTIVE INTO PERIPHERAL VEIN, PERCUTANEOUS APPROACH, NEW TECHNOLOGY GROUP 5: ICD-10-PCS | Performed by: INTERNAL MEDICINE

## 2024-09-28 PROCEDURE — 94761 N-INVAS EAR/PLS OXIMETRY MLT: CPT

## 2024-09-28 PROCEDURE — 25810000003 SODIUM CHLORIDE 0.9 % SOLUTION 250 ML FLEX CONT

## 2024-09-28 PROCEDURE — 25010000002 AZITHROMYCIN PER 500 MG: Performed by: PHYSICIAN ASSISTANT

## 2024-09-28 PROCEDURE — 84439 ASSAY OF FREE THYROXINE: CPT | Performed by: PHYSICIAN ASSISTANT

## 2024-09-28 PROCEDURE — 87205 SMEAR GRAM STAIN: CPT | Performed by: PHYSICIAN ASSISTANT

## 2024-09-28 PROCEDURE — 25010000002 CEFTRIAXONE PER 250 MG: Performed by: PHYSICIAN ASSISTANT

## 2024-09-28 PROCEDURE — 25810000003 SODIUM CHLORIDE 0.9 % SOLUTION 250 ML FLEX CONT: Performed by: PHYSICIAN ASSISTANT

## 2024-09-28 RX ORDER — ASPIRIN 81 MG/1
81 TABLET ORAL DAILY
Status: DISCONTINUED | OUTPATIENT
Start: 2024-09-28 | End: 2024-10-05 | Stop reason: HOSPADM

## 2024-09-28 RX ORDER — LOSARTAN POTASSIUM 25 MG/1
25 TABLET ORAL DAILY
Status: DISCONTINUED | OUTPATIENT
Start: 2024-09-28 | End: 2024-10-05 | Stop reason: HOSPADM

## 2024-09-28 RX ORDER — SODIUM CHLORIDE 0.9 % (FLUSH) 0.9 %
10 SYRINGE (ML) INJECTION AS NEEDED
Status: DISCONTINUED | OUTPATIENT
Start: 2024-09-28 | End: 2024-10-05 | Stop reason: HOSPADM

## 2024-09-28 RX ORDER — ACETAMINOPHEN 160 MG/5ML
650 SOLUTION ORAL EVERY 4 HOURS PRN
Status: DISCONTINUED | OUTPATIENT
Start: 2024-09-28 | End: 2024-10-05 | Stop reason: HOSPADM

## 2024-09-28 RX ORDER — DEXAMETHASONE 4 MG/1
6 TABLET ORAL
Status: DISCONTINUED | OUTPATIENT
Start: 2024-09-28 | End: 2024-10-04

## 2024-09-28 RX ORDER — ONDANSETRON 2 MG/ML
4 INJECTION INTRAMUSCULAR; INTRAVENOUS EVERY 6 HOURS PRN
Status: DISCONTINUED | OUTPATIENT
Start: 2024-09-28 | End: 2024-10-05 | Stop reason: HOSPADM

## 2024-09-28 RX ORDER — ACETAMINOPHEN 650 MG/1
650 SUPPOSITORY RECTAL EVERY 4 HOURS PRN
Status: DISCONTINUED | OUTPATIENT
Start: 2024-09-28 | End: 2024-10-05 | Stop reason: HOSPADM

## 2024-09-28 RX ORDER — AMLODIPINE BESYLATE 5 MG/1
5 TABLET ORAL DAILY
Status: DISCONTINUED | OUTPATIENT
Start: 2024-09-28 | End: 2024-10-05 | Stop reason: HOSPADM

## 2024-09-28 RX ORDER — ACETAMINOPHEN 325 MG/1
650 TABLET ORAL EVERY 4 HOURS PRN
Status: DISCONTINUED | OUTPATIENT
Start: 2024-09-28 | End: 2024-10-05 | Stop reason: HOSPADM

## 2024-09-28 RX ORDER — SODIUM CHLORIDE 9 MG/ML
40 INJECTION, SOLUTION INTRAVENOUS AS NEEDED
Status: DISCONTINUED | OUTPATIENT
Start: 2024-09-28 | End: 2024-10-05 | Stop reason: HOSPADM

## 2024-09-28 RX ORDER — SODIUM CHLORIDE 0.9 % (FLUSH) 0.9 %
10 SYRINGE (ML) INJECTION EVERY 12 HOURS SCHEDULED
Status: DISCONTINUED | OUTPATIENT
Start: 2024-09-28 | End: 2024-10-05 | Stop reason: HOSPADM

## 2024-09-28 RX ORDER — GUAIFENESIN 600 MG/1
1200 TABLET, EXTENDED RELEASE ORAL EVERY 12 HOURS SCHEDULED
Status: DISCONTINUED | OUTPATIENT
Start: 2024-09-28 | End: 2024-10-05 | Stop reason: HOSPADM

## 2024-09-28 RX ORDER — ONDANSETRON 4 MG/1
4 TABLET, ORALLY DISINTEGRATING ORAL EVERY 6 HOURS PRN
Status: DISCONTINUED | OUTPATIENT
Start: 2024-09-28 | End: 2024-10-05 | Stop reason: HOSPADM

## 2024-09-28 RX ORDER — PRAVASTATIN SODIUM 40 MG
40 TABLET ORAL NIGHTLY
Status: DISCONTINUED | OUTPATIENT
Start: 2024-09-28 | End: 2024-10-05 | Stop reason: HOSPADM

## 2024-09-28 RX ORDER — NITROGLYCERIN 0.4 MG/1
0.4 TABLET SUBLINGUAL
Status: DISCONTINUED | OUTPATIENT
Start: 2024-09-28 | End: 2024-10-05 | Stop reason: HOSPADM

## 2024-09-28 RX ORDER — SODIUM CHLORIDE 9 MG/ML
100 INJECTION, SOLUTION INTRAVENOUS CONTINUOUS
Status: DISCONTINUED | OUTPATIENT
Start: 2024-09-28 | End: 2024-09-28

## 2024-09-28 RX ORDER — METOPROLOL TARTRATE 25 MG/1
25 TABLET, FILM COATED ORAL EVERY 12 HOURS SCHEDULED
Status: DISCONTINUED | OUTPATIENT
Start: 2024-09-28 | End: 2024-10-05 | Stop reason: HOSPADM

## 2024-09-28 RX ORDER — LEVOTHYROXINE SODIUM 75 UG/1
75 TABLET ORAL
Status: DISCONTINUED | OUTPATIENT
Start: 2024-09-28 | End: 2024-10-05 | Stop reason: HOSPADM

## 2024-09-28 RX ADMIN — Medication 10 ML: at 20:50

## 2024-09-28 RX ADMIN — LEVOTHYROXINE SODIUM 75 MCG: 0.07 TABLET ORAL at 05:38

## 2024-09-28 RX ADMIN — METOPROLOL TARTRATE 25 MG: 25 TABLET, FILM COATED ORAL at 09:36

## 2024-09-28 RX ADMIN — IPRATROPIUM BROMIDE AND ALBUTEROL SULFATE 3 ML: 2.5; .5 SOLUTION RESPIRATORY (INHALATION) at 06:34

## 2024-09-28 RX ADMIN — LOSARTAN POTASSIUM 25 MG: 25 TABLET, FILM COATED ORAL at 09:36

## 2024-09-28 RX ADMIN — APIXABAN 2.5 MG: 2.5 TABLET, FILM COATED ORAL at 20:49

## 2024-09-28 RX ADMIN — PRAVASTATIN SODIUM 40 MG: 40 TABLET ORAL at 20:49

## 2024-09-28 RX ADMIN — AZITHROMYCIN DIHYDRATE 500 MG: 500 INJECTION, POWDER, LYOPHILIZED, FOR SOLUTION INTRAVENOUS at 20:49

## 2024-09-28 RX ADMIN — DEXAMETHASONE 6 MG: 4 TABLET ORAL at 09:35

## 2024-09-28 RX ADMIN — AMLODIPINE BESYLATE 5 MG: 5 TABLET ORAL at 09:36

## 2024-09-28 RX ADMIN — IPRATROPIUM BROMIDE AND ALBUTEROL SULFATE 3 ML: 2.5; .5 SOLUTION RESPIRATORY (INHALATION) at 23:50

## 2024-09-28 RX ADMIN — APIXABAN 2.5 MG: 2.5 TABLET, FILM COATED ORAL at 09:35

## 2024-09-28 RX ADMIN — Medication 10 ML: at 09:38

## 2024-09-28 RX ADMIN — INSULIN LISPRO 4 UNITS: 100 INJECTION, SOLUTION INTRAVENOUS; SUBCUTANEOUS at 17:12

## 2024-09-28 RX ADMIN — GUAIFENESIN 1200 MG: 600 TABLET, EXTENDED RELEASE ORAL at 09:35

## 2024-09-28 RX ADMIN — INSULIN LISPRO 4 UNITS: 100 INJECTION, SOLUTION INTRAVENOUS; SUBCUTANEOUS at 02:44

## 2024-09-28 RX ADMIN — INSULIN LISPRO 4 UNITS: 100 INJECTION, SOLUTION INTRAVENOUS; SUBCUTANEOUS at 12:30

## 2024-09-28 RX ADMIN — INSULIN LISPRO 6 UNITS: 100 INJECTION, SOLUTION INTRAVENOUS; SUBCUTANEOUS at 20:50

## 2024-09-28 RX ADMIN — SODIUM CHLORIDE 100 ML/HR: 9 INJECTION, SOLUTION INTRAVENOUS at 02:32

## 2024-09-28 RX ADMIN — REMDESIVIR 200 MG: 100 INJECTION, POWDER, LYOPHILIZED, FOR SOLUTION INTRAVENOUS at 11:03

## 2024-09-28 RX ADMIN — GUAIFENESIN 1200 MG: 600 TABLET, EXTENDED RELEASE ORAL at 20:49

## 2024-09-28 RX ADMIN — IPRATROPIUM BROMIDE AND ALBUTEROL SULFATE 3 ML: 2.5; .5 SOLUTION RESPIRATORY (INHALATION) at 16:04

## 2024-09-28 RX ADMIN — METOPROLOL TARTRATE 25 MG: 25 TABLET, FILM COATED ORAL at 20:50

## 2024-09-28 RX ADMIN — IPRATROPIUM BROMIDE AND ALBUTEROL SULFATE 3 ML: 2.5; .5 SOLUTION RESPIRATORY (INHALATION) at 03:32

## 2024-09-28 RX ADMIN — ASPIRIN 81 MG: 81 TABLET, COATED ORAL at 09:35

## 2024-09-28 RX ADMIN — IPRATROPIUM BROMIDE AND ALBUTEROL SULFATE 3 ML: 2.5; .5 SOLUTION RESPIRATORY (INHALATION) at 12:24

## 2024-09-28 RX ADMIN — SODIUM CHLORIDE 1000 MG: 900 INJECTION INTRAVENOUS at 14:38

## 2024-09-28 RX ADMIN — IPRATROPIUM BROMIDE AND ALBUTEROL SULFATE 3 ML: 2.5; .5 SOLUTION RESPIRATORY (INHALATION) at 19:29
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
## 2024-09-28 NOTE — CONSULTS
"          Clinical Nutrition Assessment     Patient Name: Maura Jiménez  YOB: 1945  MRN: [id number]  Date of Encounter: 09/28/24 12:20 EDT  Admission date: 9/27/2024  Reason for Visit: MST score 2+, BMI, Consult, Unintentional weight loss, Reduced oral intake, Chronic Poor Intake    Assessment   Nutrition Assessment   Admission Diagnosis:  Pneumonia [J18.9]    Problem List:    Pneumonia    Type 2 diabetes mellitus without complication, without long-term current use of insulin    COPD exacerbation    Acute respiratory failure with hypoxia    Hypoalbuminemia    Essential hypertension    Mixed hyperlipidemia    Other specified hypothyroidism    Paroxysmal atrial fibrillation      PMH:   She  has a past medical history of COPD (chronic obstructive pulmonary disease), Disease of thyroid gland, Hyperlipidemia, Osteoarthritis, and Type 2 diabetes mellitus without complication, without long-term current use of insulin (11/1/2018).    PSH:  She  has a past surgical history that includes Appendectomy; Hysterectomy; and Cataract extraction, bilateral.    Applicable Nutrition History:   Hx of Malnutrition  [facility]  2/21/23 9/16/24    Anthropometrics     Height: Height: 144.8 cm (57\")  Last Filed Weight: Weight: 31.1 kg (68 lb 8 oz) (09/28/24 0218)  Method: Weight Method: Bed scale  BMI: BMI (Calculated): 14.8    UBW:     Weight      Weight (kg) Weight (lbs) Weight Method   2/4/2024 31.752 kg  70 lb  Standing scale    9/10/2024 30.845 kg  68 lb  Stated    9/14/2024 28.8 kg  63 lb 7.9 oz  Bed scale    9/16/2024 28.8 kg  63 lb 7.9 oz     9/20/2024 28.7 kg  63 lb 4.4 oz  Bed scale    9/27/2024 30.845 kg  68 lb     9/28/2024 31.071 kg  68 lb 8 oz  Bed scale      Weight change: No significant changes    Nutrition Focused Physical Exam    Date:  9/28       Unable to perform due to COVID Isolation      Subjective   Reported/Observed/Food/Nutrition Related History:     Pt is in COVID isolation, all hx obtained via room " phone. Pt with recent hospitalization at [facility] where was followed by RD. Noted at this met criteria for malnutrition, suspect with current BMI and known COPD would meet criteria for wasting once out of isolation - low creatinine. Pt reports long hx of poor appetite - primarily snacks vs meals. Drink Ensure - agreeable to receive equivalent while admitted. Denies weight changes - noted prior admission recorded 8.6% wt loss x3 months however current weight in line with reported CBW. Uncontrolled DM noted - A1c 8.1%. Denies N/V/D - hx of constipation but does not take bowel regimen at home. Known food allergies in medical records    Current Nutrition Prescription   PO: Diet: Cardiac, Diabetic; Healthy Heart (2-3 Na+); Consistent Carbohydrate; Fluid Consistency: Thin (IDDSI 0)  Oral Nutrition Supplement: N/A  Intake: Insufficient data    Assessment & Plan   Nutrition Diagnosis   Date:  9/28            Updated:    Problem Underweight    Etiology Reported anorexia, and dx of COPD   Signs/Symptoms Adult BMI 14.82   Status: New    Goal:   Nutrition to support treatment and Increase intake      Nutrition Intervention      Follow treatment progress, Care plan reviewed, Advise alternate selection, Encourage intake, Supplement provided    Encouraged PO intake on current diet as tolerated  Provide Boost GC 4x daily per request    **Please note the use of albumin/visceral proteins to assess nutritional status is not recommended/included in ASPEN malnutrition criteria. These acute inflammatory markers are unreliable can be affected by inflammation, fluid balance, and stress.    Monitoring/Evaluation:   Per protocol, I&O, PO intake, Supplement intake, Pertinent labs, Symptoms, POC/GOC    Isabella Michel, MS,RD,LD  Time Spent: 20min

## 2024-09-28 NOTE — ED NOTES
Maura JEFFERY Edmonson    Nursing Report ED to Floor:  Mental status: Alert and Oriented x4  Ambulatory status: SBA, walker   Oxygen Therapy:  2L   Cardiac Rhythm: SB  Admitted from: home  Safety Concerns:  fall risk   Social Issues: none   ED Room #:  16    ED Nurse Phone Extension - [phone] or may call [phone].      HPI:   Chief Complaint   Patient presents with    Shortness of Breath       Past Medical History:  Past Medical History:   Diagnosis Date    COPD (chronic obstructive pulmonary disease)     Disease of thyroid gland     Hyperlipidemia     Osteoarthritis     Type 2 diabetes mellitus without complication, without long-term current use of insulin 11/1/2018        Past Surgical History:  Past Surgical History:   Procedure Laterality Date    APPENDECTOMY      CATARACT EXTRACTION, BILATERAL      HYSTERECTOMY          Admitting Doctor:   REYNA Harrell DO    Consulting Provider(s):  Consults       Date and Time Order Name Status Description    9/23/2024 10:57 AM Inpatient Pulmonology Consult Completed     9/21/2024  7:55 AM Inpatient Cardiology Consult Completed              Admitting Diagnosis:   There were no encounter diagnoses.    Most Recent Vitals:   Vitals:    09/27/24 2315 09/27/24 2345 09/28/24 0000 09/28/24 0030   BP: 130/45 105/49 108/45 121/52   Pulse: 74 69 66 75   Resp:       Temp:       TempSrc:       SpO2: 99% 99% 100% 100%   Weight:       Height:           Active LDAs/IV Access:   Lines, Drains & Airways       Active LDAs       Name Placement date Placement time Site Days    Peripheral IV 09/27/24 1728 Anterior;Right Forearm 09/27/24  1728  Forearm  less than 1                    Labs (abnormal labs have a star):   Labs Reviewed   RESPIRATORY PANEL PCR W/ COVID-19 (SARS-COV-2), NP SWAB IN UTM/VTP, 2 HR TAT - Abnormal; Notable for the following components:       Result Value    COVID19 Detected (*)     All other components within normal limits    Narrative:     In the setting of a positive  respiratory panel with a viral infection PLUS a negative procalcitonin without other underlying concern for bacterial infection, consider observing off antibiotics or discontinuation of antibiotics and continue supportive care. If the respiratory panel is positive for atypical bacterial infection (Bordetella pertussis, Chlamydophila pneumoniae, or Mycoplasma pneumoniae), consider antibiotic de-escalation to target atypical bacterial infection.   COMPREHENSIVE METABOLIC PANEL - Abnormal; Notable for the following components:    Glucose 378 (*)     Creatinine 0.51 (*)     Chloride 94 (*)     CO2 35.0 (*)     Total Protein 5.9 (*)     Albumin 3.2 (*)     BUN/Creatinine Ratio 25.5 (*)     All other components within normal limits    Narrative:     GFR Normal >60  Chronic Kidney Disease <60  Kidney Failure <15    The GFR formula is only valid for adults with stable renal function between ages 18 and 70.   SINGLE HS TROPONIN T - Abnormal; Notable for the following components:    HS Troponin T 19 (*)     All other components within normal limits    Narrative:     High Sensitive Troponin T Reference Range:  <14.0 ng/L- Negative Female for AMI  <22.0 ng/L- Negative Male for AMI  >=14 - Abnormal Female indicating possible myocardial injury.  >=22 - Abnormal Male indicating possible myocardial injury.   Clinicians would have to utilize clinical acumen, EKG, Troponin, and serial changes to determine if it is an Acute Myocardial Infarction or myocardial injury due to an underlying chronic condition.        CBC WITH AUTO DIFFERENTIAL - Abnormal; Notable for the following components:    WBC 13.07 (*)     Hemoglobin 10.9 (*)     MCHC 29.6 (*)     RDW 12.1 (*)     MPV 12.4 (*)     Neutrophil % 95.9 (*)     Lymphocyte % 1.8 (*)     Monocyte % 1.7 (*)     Eosinophil % 0.0 (*)     Neutrophils, Absolute 12.55 (*)     Lymphocytes, Absolute 0.23 (*)     Immature Grans, Absolute 0.06 (*)     All other components within normal limits    URINALYSIS W/ MICROSCOPIC IF INDICATED (NO CULTURE) - Abnormal; Notable for the following components:    Glucose, UA >=1000 mg/dL (3+) (*)     Ketones, UA Trace (*)     All other components within normal limits    Narrative:     Urine microscopic not indicated.   C-REACTIVE PROTEIN - Abnormal; Notable for the following components:    C-Reactive Protein 1.00 (*)     All other components within normal limits   PREALBUMIN - Abnormal; Notable for the following components:    Prealbumin 16.3 (*)     All other components within normal limits   BLOOD GAS, ARTERIAL W/CO-OXIMETRY - Abnormal; Notable for the following components:    pCO2, Arterial 65.9 (*)     HCO3, Arterial 45.6 (*)     Base Excess, Arterial 18.8 (*)     Hemoglobin, Blood Gas 9.8 (*)     Hematocrit, Blood Gas 30.1 (*)     CO2 Content 47.6 (*)     pCO2, Temperature Corrected 65.9 (*)     All other components within normal limits   BNP (IN-HOUSE) - Normal    Narrative:     This assay is used as an aid in the diagnosis of individuals suspected of having heart failure. It can be used as an aid in the diagnosis of acute decompensated heart failure (ADHF) in patients presenting with signs and symptoms of ADHF to the emergency department (ED). In addition, NT-proBNP of <300 pg/mL indicates ADHF is not likely.    Age Range Result Interpretation  NT-proBNP Concentration (pg/mL:      <50             Positive            >450                   Gray                 300-450                    Negative             <300    50-75           Positive            >900                  Gray                300-900                  Negative            <300      >75             Positive            >1800                  Gray                300-1800                  Negative            <300   D-DIMER, QUANTITATIVE - Normal    Narrative:     According to the assay 's published package insert, a normal (<0.50 MCGFEU/mL) D-dimer result in conjunction with a non-high  "clinical probability assessment, excludes deep vein thrombosis (DVT) and pulmonary embolism (PE) with high sensitivity.    D-dimer values increase with age and this can make VTE exclusion of an older population difficult. To address this, the [employer], based on best available evidence and recent guidelines, recommends that clinicians use age-adjusted D-dimer thresholds in patients greater than 50 years of age with: a) a low probability of PE who do not meet all Pulmonary Embolism Rule Out Criteria, or b) in those with intermediate probability of PE.   The formula for an age-adjusted D-dimer cut-off is \"age/100\".  For example, a 60 year old patient would have an age-adjusted cut-off of 0.60 MCGFEU/mL and an 80 year old 0.80 MCGFEU/mL.   LACTIC ACID, PLASMA - Normal   PROCALCITONIN - Normal    Narrative:     As a Marker for Sepsis (Non-Neonates):    1. <0.5 ng/mL represents a low risk of severe sepsis and/or septic shock.  2. >2 ng/mL represents a high risk of severe sepsis and/or septic shock.    As a Marker for Lower Respiratory Tract Infections that require antibiotic therapy:    PCT on Admission    Antibiotic Therapy       6-12 Hrs later    >0.5                Strongly Recommended  >0.25 - <0.5        Recommended   0.1 - 0.25          Discouraged              Remeasure/reassess PCT  <0.1                Strongly Discouraged     Remeasure/reassess PCT    As 28 day mortality risk marker: \"Change in Procalcitonin Result\" (>80% or <=80%) if Day 0 (or Day 1) and Day 4 values are available. Refer to http://www.[employer]-pct-calculator.com    Change in PCT <=80%  A decrease of PCT levels below or equal to 80% defines a positive change in PCT test result representing a higher risk for 28-day all-cause mortality of patients diagnosed with severe sepsis for septic shock.    Change in PCT >80%  A decrease of PCT levels of more than 80% defines a negative change in PCT result representing a lower risk for " 28-day all-cause mortality of patients diagnosed with severe sepsis or septic shock.      MAGNESIUM - Normal   PHOSPHORUS - Normal   TSH - Normal   COVID PRE-OP / PRE-PROCEDURE SCREENING ORDER (NO ISOLATION)    Narrative:     The following orders were created for panel order COVID PRE-OP / PRE-PROCEDURE SCREENING ORDER (NO ISOLATION) - Swab, Nasopharynx.  Procedure                               Abnormality         Status                     ---------                               -----------         ------                     Respiratory Panel PCR w/...[[id number]]  Abnormal            Final result                 Please view results for these tests on the individual orders.   BLOOD CULTURE   BLOOD CULTURE   CLOSTRIDIOIDES DIFFICILE TOXIN    Narrative:     The following orders were created for panel order Clostridioides difficile Toxin - Stool, Per Rectum.  Procedure                               Abnormality         Status                     ---------                               -----------         ------                     Clostridioides difficile...[[id number]]                                                   Please view results for these tests on the individual orders.   CLOSTRIDIOIDES DIFFICILE TOXIN, PCR   RESPIRATORY CULTURE   MRSA SCREEN, PCR   STREP PNEUMO AG, URINE OR CSF   LEGIONELLA ANTIGEN, URINE   RAINBOW DRAW    Narrative:     The following orders were created for panel order [place] Draw.  Procedure                               Abnormality         Status                     ---------                               -----------         ------                     Green Top (Gel)[[id number]]                                  Final result               Lavender Top[[id number]]                                     Final result               Gold Top - SST[[id number]]                                   Final result               [place] Top[[id number]]                                         Final result                Light Blue Top[[id number]]                                   Final result                 Please view results for these tests on the individual orders.   BLOOD GAS, ARTERIAL   T4, FREE   HEMOGLOBIN A1C   POCT GLUCOSE FINGERSTICK   POCT GLUCOSE FINGERSTICK   POCT GLUCOSE FINGERSTICK   POCT GLUCOSE FINGERSTICK   CBC AND DIFFERENTIAL    Narrative:     The following orders were created for panel order CBC & Differential.  Procedure                               Abnormality         Status                     ---------                               -----------         ------                     CBC Auto Differential[[id number]]        Abnormal            Final result                 Please view results for these tests on the individual orders.   GREEN TOP   LAVENDER TOP   GOLD TOP - SST   GRAY TOP   LIGHT BLUE TOP       Meds Given in ED:   Medications   sodium chloride 0.9 % flush 10 mL (has no administration in time range)   lactated ringers infusion (0 mL/hr Intravenous Paused 9/27/24 2343)   ipratropium-albuterol (DUO-NEB) nebulizer solution 3 mL (has no administration in time range)   dextrose (GLUTOSE) oral gel 15 g (has no administration in time range)   dextrose (D50W) (25 g/50 mL) IV injection 25 g (has no administration in time range)   glucagon (GLUCAGEN) injection 1 mg (has no administration in time range)   Insulin Lispro (humaLOG) injection 2-9 Units (has no administration in time range)   cefTRIAXone (ROCEPHIN) 1,000 mg in sodium chloride 0.9 % 100 mL MBP (has no administration in time range)   azithromycin (ZITHROMAX) 500 mg in sodium chloride 0.9 % 250 mL IVPB-VTB (has no administration in time range)   ipratropium-albuterol (DUO-NEB) nebulizer solution 3 mL (3 mL Nebulization Given 9/27/24 1728)   methylPREDNISolone sodium succinate (SOLU-Medrol) injection 125 mg (125 mg Intravenous Given 9/27/24 1755)   magnesium sulfate in D5W 1g/100mL (PREMIX) (0 g Intravenous Stopped 9/27/24 1855)   sodium chloride  0.9 % bolus 500 mL (0 mL Intravenous Stopped 9/27/24 1825)   cefTRIAXone (ROCEPHIN) 1,000 mg in sodium chloride 0.9 % 100 mL MBP (0 mg Intravenous Stopped 9/27/24 2223)   azithromycin (ZITHROMAX) 500 mg in sodium chloride 0.9 % 250 mL IVPB-VTB (500 mg Intravenous New Bag 9/27/24 2302)     lactated ringers, 125 mL/hr, Last Rate: Stopped (09/27/24 2343)         Last NIH score:                                                          Dysphagia screening results:        [place] Coma Scale:  No data recorded     CIWA:        Restraint Type:            Isolation Status:  Contact and Airborne, Contact Spore

## 2024-09-28 NOTE — PLAN OF CARE
Problem: Adult Inpatient Plan of Care  Goal: Plan of Care Review  Outcome: Progressing  Flowsheets (Taken 9/28/2024 1751)  Progress: no change  Plan of Care Reviewed With: patient  Outcome Evaluation: Patient VSS, RA, NSR on tele, A&Ox4. Sputum culture sent to lab. Bottom red/blanchable. SOA on excertion. Up w/ 1 assist to BSC. Voiding well. Still no BM. No c/o pain. Abx given.  Goal: Patient-Specific Goal (Individualized)  Outcome: Progressing  Goal: Absence of Hospital-Acquired Illness or Injury  Outcome: Progressing  Intervention: Identify and Manage Fall Risk  Recent Flowsheet Documentation  Taken 9/28/2024 1657 by Marychuy King, RN  Safety Promotion/Fall Prevention:   activity supervised   assistive device/personal items within reach   clutter free environment maintained   fall prevention program maintained   gait belt   toileting scheduled   safety round/check completed   room organization consistent   nonskid shoes/slippers when out of bed  Taken 9/28/2024 1400 by Marychuy King, RN  Safety Promotion/Fall Prevention:   activity supervised   assistive device/personal items within reach   clutter free environment maintained   fall prevention program maintained   gait belt   toileting scheduled   safety round/check completed   room organization consistent   nonskid shoes/slippers when out of bed  Taken 9/28/2024 1245 by Marychuy King, RN  Safety Promotion/Fall Prevention:   activity supervised   assistive device/personal items within reach   clutter free environment maintained   fall prevention program maintained   gait belt   room organization consistent   safety round/check completed   toileting scheduled   nonskid shoes/slippers when out of bed  Taken 9/28/2024 1000 by Marychuy King, RN  Safety Promotion/Fall Prevention:   activity supervised   assistive device/personal items within reach   clutter free environment maintained   fall prevention program maintained   gait belt   safety  round/check completed   toileting scheduled   room organization consistent   nonskid shoes/slippers when out of bed  Taken 9/28/2024 0940 by Marychuy King, RN  Safety Promotion/Fall Prevention:   activity supervised   assistive device/personal items within reach   clutter free environment maintained   fall prevention program maintained   gait belt   toileting scheduled   safety round/check completed   room organization consistent   nonskid shoes/slippers when out of bed  Intervention: Prevent Skin Injury  Recent Flowsheet Documentation  Taken 9/28/2024 1657 by Marychuy King, RN  Body Position:   tilted   right  Skin Protection:   adhesive use limited   transparent dressing maintained   tubing/devices free from skin contact   silicone foam dressing in place  Taken 9/28/2024 1400 by Marychuy King RN  Body Position:   tilted   left  Skin Protection:   adhesive use limited   transparent dressing maintained   tubing/devices free from skin contact  Taken 9/28/2024 1245 by Marychuy King, RN  Body Position: sitting up in bed  Skin Protection:   adhesive use limited   transparent dressing maintained   tubing/devices free from skin contact  Taken 9/28/2024 1000 by Marychuy King, RN  Body Position:   tilted   right  Skin Protection:   adhesive use limited   transparent dressing maintained   tubing/devices free from skin contact  Taken 9/28/2024 0940 by Marychuy King RN  Body Position:   tilted   right  Skin Protection:   adhesive use limited   transparent dressing maintained   tubing/devices free from skin contact  Intervention: Prevent and Manage VTE (Venous Thromboembolism) Risk  Recent Flowsheet Documentation  Taken 9/28/2024 1657 by Marychuy King, RN  VTE Prevention/Management:   bilateral   sequential compression devices on  Taken 9/28/2024 1400 by Marychuy King, RN  VTE Prevention/Management:   bilateral   sequential compression devices on  Taken 9/28/2024 1245 by Marychuy King,  RN  VTE Prevention/Management:   bilateral   sequential compression devices on  Taken 9/28/2024 1000 by Marychuy King RN  VTE Prevention/Management:   bilateral   sequential compression devices on  Taken 9/28/2024 0940 by Marychuy King RN  VTE Prevention/Management:   bilateral   sequential compression devices on  Intervention: Prevent Infection  Recent Flowsheet Documentation  Taken 9/28/2024 1657 by Marychuy King RN  Infection Prevention: environmental surveillance performed  Taken 9/28/2024 1400 by Marychuy King RN  Infection Prevention: environmental surveillance performed  Taken 9/28/2024 1245 by Marychuy King RN  Infection Prevention: environmental surveillance performed  Taken 9/28/2024 1000 by Marychuy King RN  Infection Prevention: environmental surveillance performed  Taken 9/28/2024 0940 by Marychuy King RN  Infection Prevention: environmental surveillance performed  Goal: Optimal Comfort and Wellbeing  Outcome: Progressing  Intervention: Provide Person-Centered Care  Recent Flowsheet Documentation  Taken 9/28/2024 1657 by Marychuy King RN  Trust Relationship/Rapport:   care explained   choices provided   questions encouraged   questions answered  Taken 9/28/2024 1400 by Marychuy King RN  Trust Relationship/Rapport: care explained  Taken 9/28/2024 1245 by Marychuy King RN  Trust Relationship/Rapport:   care explained   choices provided  Taken 9/28/2024 1000 by Marychuy King RN  Trust Relationship/Rapport:   care explained   choices provided  Taken 9/28/2024 0940 by Marychuy King RN  Trust Relationship/Rapport:   choices provided   care explained   questions encouraged   questions answered   reassurance provided   thoughts/feelings acknowledged  Goal: Readiness for Transition of Care  Outcome: Progressing     Problem: Skin Injury Risk Increased  Goal: Skin Health and Integrity  Outcome: Progressing  Intervention: Optimize Skin Protection  Recent  Flowsheet Documentation  Taken 9/28/2024 1657 by Marychuy King RN  Pressure Reduction Techniques:   frequent weight shift encouraged   heels elevated off bed   weight shift assistance provided  Head of Bed (HOB) Positioning: HOB at 60 degrees  Pressure Reduction Devices:   specialty bed utilized   positioning supports utilized  Skin Protection:   adhesive use limited   transparent dressing maintained   tubing/devices free from skin contact   silicone foam dressing in place  Taken 9/28/2024 1400 by Marychuy King RN  Pressure Reduction Techniques: frequent weight shift encouraged  Head of Bed (HOB) Positioning: HOB at 60 degrees  Pressure Reduction Devices: positioning supports utilized  Skin Protection:   adhesive use limited   transparent dressing maintained   tubing/devices free from skin contact  Taken 9/28/2024 1245 by Marychuy King RN  Pressure Reduction Techniques:   frequent weight shift encouraged   weight shift assistance provided  Head of Bed (HOB) Positioning: HOB at 60-90 degrees  Pressure Reduction Devices: positioning supports utilized  Skin Protection:   adhesive use limited   transparent dressing maintained   tubing/devices free from skin contact  Taken 9/28/2024 1000 by Marychuy King RN  Pressure Reduction Techniques: frequent weight shift encouraged  Head of Bed (HOB) Positioning: HOB at 60 degrees  Pressure Reduction Devices: positioning supports utilized  Skin Protection:   adhesive use limited   transparent dressing maintained   tubing/devices free from skin contact  Taken 9/28/2024 0940 by Marychuy King RN  Pressure Reduction Techniques: frequent weight shift encouraged  Head of Bed (HOB) Positioning: HOB at 60 degrees  Pressure Reduction Devices: positioning supports utilized  Skin Protection:   adhesive use limited   transparent dressing maintained   tubing/devices free from skin contact     Problem: Fluid Imbalance (Pneumonia)  Goal: Fluid Balance  Outcome:  Progressing     Problem: Infection (Pneumonia)  Goal: Resolution of Infection Signs and Symptoms  Outcome: Progressing  Intervention: Prevent Infection Progression  Recent Flowsheet Documentation  Taken 9/28/2024 1657 by Marychuy King RN  Isolation Precautions:   airborne   precautions maintained  Taken 9/28/2024 1400 by Marychuy King RN  Isolation Precautions:   airborne   precautions maintained  Taken 9/28/2024 1245 by Marychuy King RN  Isolation Precautions:   airborne   precautions maintained  Taken 9/28/2024 1000 by Marychuy King RN  Isolation Precautions:   airborne   precautions maintained  Taken 9/28/2024 0940 by Marychuy King RN  Isolation Precautions:   airborne   precautions maintained     Problem: Respiratory Compromise (Pneumonia)  Goal: Effective Oxygenation and Ventilation  Outcome: Progressing  Intervention: Promote Airway Secretion Clearance  Recent Flowsheet Documentation  Taken 9/28/2024 1657 by Marychuy King RN  Cough And Deep Breathing: done with encouragement  Taken 9/28/2024 1400 by Marychuy King RN  Cough And Deep Breathing: done with encouragement  Taken 9/28/2024 1245 by Marychuy King RN  Cough And Deep Breathing: done with encouragement  Taken 9/28/2024 0940 by Marychuy King RN  Cough And Deep Breathing: done with encouragement  Intervention: Optimize Oxygenation and Ventilation  Recent Flowsheet Documentation  Taken 9/28/2024 1657 by Marychuy King RN  Head of Bed (HOB) Positioning: HOB at 60 degrees  Taken 9/28/2024 1400 by Marychuy King RN  Head of Bed (HOB) Positioning: HOB at 60 degrees  Taken 9/28/2024 1245 by Marychuy King RN  Head of Bed (HOB) Positioning: HOB at 60-90 degrees  Taken 9/28/2024 1000 by Marychuy King RN  Head of Bed (HOB) Positioning: HOB at 60 degrees  Taken 9/28/2024 0940 by Marychuy King RN  Head of Bed (HOB) Positioning: HOB at 60 degrees   Goal Outcome Evaluation:  Plan of Care Reviewed With:  patient        Progress: no change  Outcome Evaluation: Patient VSS, RA, NSR on tele, A&Ox4. Sputum culture sent to lab. Bottom red/blanchable. SOA on excertion. Up w/ 1 assist to BSC. Voiding well. Still no BM. No c/o pain. Abx given.
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
## 2024-09-28 NOTE — PLAN OF CARE
Goal Outcome Evaluation:      Patient A&Ox4 on 4lt NC. VSS. SCDs on. Fluids started.

## 2024-09-29 LAB
ALBUMIN SERPL-MCNC: 2.6 G/DL (ref 3.5–5.2)
ALBUMIN/GLOB SERPL: 2 G/DL
ALP SERPL-CCNC: 52 U/L (ref 39–117)
ALT SERPL W P-5'-P-CCNC: 14 U/L (ref 1–33)
ANION GAP SERPL CALCULATED.3IONS-SCNC: 5 MMOL/L (ref 5–15)
AST SERPL-CCNC: 15 U/L (ref 1–32)
BASOPHILS # BLD AUTO: 0.01 10*3/MM3 (ref 0–0.2)
BASOPHILS NFR BLD AUTO: 0.1 % (ref 0–1.5)
BILIRUB SERPL-MCNC: 0.2 MG/DL (ref 0–1.2)
BUN SERPL-MCNC: 16 MG/DL (ref 8–23)
BUN/CREAT SERPL: 40 (ref 7–25)
CALCIUM SPEC-SCNC: 8.4 MG/DL (ref 8.6–10.5)
CHLORIDE SERPL-SCNC: 101 MMOL/L (ref 98–107)
CO2 SERPL-SCNC: 37 MMOL/L (ref 22–29)
CREAT SERPL-MCNC: 0.4 MG/DL (ref 0.57–1)
CRP SERPL-MCNC: 0.41 MG/DL (ref 0–0.5)
DEPRECATED RDW RBC AUTO: 42.5 FL (ref 37–54)
EGFRCR SERPLBLD CKD-EPI 2021: 100.2 ML/MIN/1.73
EOSINOPHIL # BLD AUTO: 0.12 10*3/MM3 (ref 0–0.4)
EOSINOPHIL NFR BLD AUTO: 1 % (ref 0.3–6.2)
ERYTHROCYTE [DISTWIDTH] IN BLOOD BY AUTOMATED COUNT: 12.2 % (ref 12.3–15.4)
GLOBULIN UR ELPH-MCNC: 1.3 GM/DL
GLUCOSE BLDC GLUCOMTR-MCNC: 103 MG/DL (ref 70–130)
GLUCOSE BLDC GLUCOMTR-MCNC: 259 MG/DL (ref 70–130)
GLUCOSE BLDC GLUCOMTR-MCNC: 273 MG/DL (ref 70–130)
GLUCOSE BLDC GLUCOMTR-MCNC: 331 MG/DL (ref 70–130)
GLUCOSE SERPL-MCNC: 84 MG/DL (ref 65–99)
HCT VFR BLD AUTO: 27.2 % (ref 34–46.6)
HGB BLD-MCNC: 8 G/DL (ref 12–15.9)
IMM GRANULOCYTES # BLD AUTO: 0.05 10*3/MM3 (ref 0–0.05)
IMM GRANULOCYTES NFR BLD AUTO: 0.4 % (ref 0–0.5)
LYMPHOCYTES # BLD AUTO: 0.81 10*3/MM3 (ref 0.7–3.1)
LYMPHOCYTES NFR BLD AUTO: 6.4 % (ref 19.6–45.3)
MCH RBC QN AUTO: 28.3 PG (ref 26.6–33)
MCHC RBC AUTO-ENTMCNC: 29.4 G/DL (ref 31.5–35.7)
MCV RBC AUTO: 96.1 FL (ref 79–97)
MONOCYTES # BLD AUTO: 0.75 10*3/MM3 (ref 0.1–0.9)
MONOCYTES NFR BLD AUTO: 5.9 % (ref 5–12)
NEUTROPHILS NFR BLD AUTO: 10.88 10*3/MM3 (ref 1.7–7)
NEUTROPHILS NFR BLD AUTO: 86.2 % (ref 42.7–76)
NRBC BLD AUTO-RTO: 0 /100 WBC (ref 0–0.2)
PLATELET # BLD AUTO: 291 10*3/MM3 (ref 140–450)
PMV BLD AUTO: 10.6 FL (ref 6–12)
POTASSIUM SERPL-SCNC: 4.8 MMOL/L (ref 3.5–5.2)
PROT SERPL-MCNC: 3.9 G/DL (ref 6–8.5)
RBC # BLD AUTO: 2.83 10*6/MM3 (ref 3.77–5.28)
SODIUM SERPL-SCNC: 143 MMOL/L (ref 136–145)
WBC NRBC COR # BLD AUTO: 12.62 10*3/MM3 (ref 3.4–10.8)

## 2024-09-29 PROCEDURE — 94664 DEMO&/EVAL PT USE INHALER: CPT

## 2024-09-29 PROCEDURE — 25010000002 AZITHROMYCIN PER 500 MG: Performed by: PHYSICIAN ASSISTANT

## 2024-09-29 PROCEDURE — 94799 UNLISTED PULMONARY SVC/PX: CPT

## 2024-09-29 PROCEDURE — 94761 N-INVAS EAR/PLS OXIMETRY MLT: CPT

## 2024-09-29 PROCEDURE — 86140 C-REACTIVE PROTEIN: CPT | Performed by: INTERNAL MEDICINE

## 2024-09-29 PROCEDURE — 99232 SBSQ HOSP IP/OBS MODERATE 35: CPT | Performed by: STUDENT IN AN ORGANIZED HEALTH CARE EDUCATION/TRAINING PROGRAM

## 2024-09-29 PROCEDURE — 25010000002 REMDESIVIR 100 MG/20ML SOLUTION 1 EACH VIAL

## 2024-09-29 PROCEDURE — 63710000001 DEXAMETHASONE PER 0.25 MG: Performed by: INTERNAL MEDICINE

## 2024-09-29 PROCEDURE — 25810000003 SODIUM CHLORIDE 0.9 % SOLUTION 250 ML FLEX CONT

## 2024-09-29 PROCEDURE — 63710000001 INSULIN LISPRO (HUMAN) PER 5 UNITS: Performed by: PHYSICIAN ASSISTANT

## 2024-09-29 PROCEDURE — 82948 REAGENT STRIP/BLOOD GLUCOSE: CPT

## 2024-09-29 PROCEDURE — 80053 COMPREHEN METABOLIC PANEL: CPT | Performed by: INTERNAL MEDICINE

## 2024-09-29 PROCEDURE — 85025 COMPLETE CBC W/AUTO DIFF WBC: CPT | Performed by: PHYSICIAN ASSISTANT

## 2024-09-29 PROCEDURE — 25810000003 SODIUM CHLORIDE 0.9 % SOLUTION 250 ML FLEX CONT: Performed by: PHYSICIAN ASSISTANT

## 2024-09-29 RX ORDER — SENNOSIDES A AND B 8.6 MG/1
1 TABLET, FILM COATED ORAL 2 TIMES DAILY
Status: DISCONTINUED | OUTPATIENT
Start: 2024-09-29 | End: 2024-09-30

## 2024-09-29 RX ORDER — POLYETHYLENE GLYCOL 3350 17 G/17G
17 POWDER, FOR SOLUTION ORAL DAILY
Status: DISCONTINUED | OUTPATIENT
Start: 2024-09-29 | End: 2024-10-05 | Stop reason: HOSPADM

## 2024-09-29 RX ADMIN — INSULIN LISPRO 6 UNITS: 100 INJECTION, SOLUTION INTRAVENOUS; SUBCUTANEOUS at 17:14

## 2024-09-29 RX ADMIN — LEVOTHYROXINE SODIUM 75 MCG: 0.07 TABLET ORAL at 05:27

## 2024-09-29 RX ADMIN — IPRATROPIUM BROMIDE AND ALBUTEROL SULFATE 3 ML: 2.5; .5 SOLUTION RESPIRATORY (INHALATION) at 15:34

## 2024-09-29 RX ADMIN — IPRATROPIUM BROMIDE AND ALBUTEROL SULFATE 3 ML: 2.5; .5 SOLUTION RESPIRATORY (INHALATION) at 03:29

## 2024-09-29 RX ADMIN — INSULIN LISPRO 6 UNITS: 100 INJECTION, SOLUTION INTRAVENOUS; SUBCUTANEOUS at 11:52

## 2024-09-29 RX ADMIN — Medication 10 ML: at 08:29

## 2024-09-29 RX ADMIN — APIXABAN 2.5 MG: 2.5 TABLET, FILM COATED ORAL at 08:28

## 2024-09-29 RX ADMIN — SENNOSIDES 1 TABLET: 8.6 TABLET, FILM COATED ORAL at 11:53

## 2024-09-29 RX ADMIN — GUAIFENESIN 1200 MG: 600 TABLET, EXTENDED RELEASE ORAL at 20:50

## 2024-09-29 RX ADMIN — IPRATROPIUM BROMIDE AND ALBUTEROL SULFATE 3 ML: 2.5; .5 SOLUTION RESPIRATORY (INHALATION) at 07:10

## 2024-09-29 RX ADMIN — POLYETHYLENE GLYCOL 3350 17 G: 17 POWDER, FOR SOLUTION ORAL at 11:53

## 2024-09-29 RX ADMIN — IPRATROPIUM BROMIDE AND ALBUTEROL SULFATE 3 ML: 2.5; .5 SOLUTION RESPIRATORY (INHALATION) at 19:23

## 2024-09-29 RX ADMIN — AZITHROMYCIN DIHYDRATE 500 MG: 500 INJECTION, POWDER, LYOPHILIZED, FOR SOLUTION INTRAVENOUS at 20:44

## 2024-09-29 RX ADMIN — SENNOSIDES 1 TABLET: 8.6 TABLET, FILM COATED ORAL at 20:47

## 2024-09-29 RX ADMIN — REMDESIVIR 100 MG: 100 INJECTION, POWDER, LYOPHILIZED, FOR SOLUTION INTRAVENOUS at 10:31

## 2024-09-29 RX ADMIN — APIXABAN 2.5 MG: 2.5 TABLET, FILM COATED ORAL at 20:46

## 2024-09-29 RX ADMIN — INSULIN LISPRO 7 UNITS: 100 INJECTION, SOLUTION INTRAVENOUS; SUBCUTANEOUS at 20:46

## 2024-09-29 RX ADMIN — Medication 5 MG: at 23:08

## 2024-09-29 RX ADMIN — METOPROLOL TARTRATE 25 MG: 25 TABLET, FILM COATED ORAL at 08:28

## 2024-09-29 RX ADMIN — METOPROLOL TARTRATE 25 MG: 25 TABLET, FILM COATED ORAL at 20:47

## 2024-09-29 RX ADMIN — IPRATROPIUM BROMIDE AND ALBUTEROL SULFATE 3 ML: 2.5; .5 SOLUTION RESPIRATORY (INHALATION) at 11:55

## 2024-09-29 RX ADMIN — ASPIRIN 81 MG: 81 TABLET, COATED ORAL at 08:28

## 2024-09-29 RX ADMIN — DEXAMETHASONE 6 MG: 4 TABLET ORAL at 08:28

## 2024-09-29 RX ADMIN — Medication 10 ML: at 20:47

## 2024-09-29 RX ADMIN — GUAIFENESIN 1200 MG: 600 TABLET, EXTENDED RELEASE ORAL at 08:28

## 2024-09-29 RX ADMIN — PRAVASTATIN SODIUM 40 MG: 40 TABLET ORAL at 20:46
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
## 2024-09-29 NOTE — PLAN OF CARE
Goal Outcome Evaluation:         No significant events overnight. Respiratory treatments and abx given as scheduled. Patient did not sleep well. VSS on 4lt nc. A&Ox4

## 2024-09-30 LAB
ANION GAP SERPL CALCULATED.3IONS-SCNC: 6 MMOL/L (ref 5–15)
BACTERIA SPEC RESP CULT: NORMAL
BASOPHILS # BLD AUTO: 0.01 10*3/MM3 (ref 0–0.2)
BASOPHILS NFR BLD AUTO: 0.1 % (ref 0–1.5)
BUN SERPL-MCNC: 20 MG/DL (ref 8–23)
BUN/CREAT SERPL: 74.1 (ref 7–25)
CALCIUM SPEC-SCNC: 8.2 MG/DL (ref 8.6–10.5)
CHLORIDE SERPL-SCNC: 98 MMOL/L (ref 98–107)
CO2 SERPL-SCNC: 35 MMOL/L (ref 22–29)
CREAT SERPL-MCNC: 0.27 MG/DL (ref 0.57–1)
CRP SERPL-MCNC: 0.77 MG/DL (ref 0–0.5)
DEPRECATED RDW RBC AUTO: 42.5 FL (ref 37–54)
EGFRCR SERPLBLD CKD-EPI 2021: 110.2 ML/MIN/1.73
EOSINOPHIL # BLD AUTO: 0.05 10*3/MM3 (ref 0–0.4)
EOSINOPHIL NFR BLD AUTO: 0.6 % (ref 0.3–6.2)
ERYTHROCYTE [DISTWIDTH] IN BLOOD BY AUTOMATED COUNT: 12.3 % (ref 12.3–15.4)
GLUCOSE BLDC GLUCOMTR-MCNC: 126 MG/DL (ref 70–130)
GLUCOSE BLDC GLUCOMTR-MCNC: 305 MG/DL (ref 70–130)
GLUCOSE BLDC GLUCOMTR-MCNC: 314 MG/DL (ref 70–130)
GLUCOSE BLDC GLUCOMTR-MCNC: 338 MG/DL (ref 70–130)
GLUCOSE SERPL-MCNC: 96 MG/DL (ref 65–99)
GRAM STN SPEC: NORMAL
HCT VFR BLD AUTO: 28.2 % (ref 34–46.6)
HGB BLD-MCNC: 8.5 G/DL (ref 12–15.9)
IMM GRANULOCYTES # BLD AUTO: 0.03 10*3/MM3 (ref 0–0.05)
IMM GRANULOCYTES NFR BLD AUTO: 0.4 % (ref 0–0.5)
LYMPHOCYTES # BLD AUTO: 0.69 10*3/MM3 (ref 0.7–3.1)
LYMPHOCYTES NFR BLD AUTO: 8.1 % (ref 19.6–45.3)
MCH RBC QN AUTO: 28.7 PG (ref 26.6–33)
MCHC RBC AUTO-ENTMCNC: 30.1 G/DL (ref 31.5–35.7)
MCV RBC AUTO: 95.3 FL (ref 79–97)
MONOCYTES # BLD AUTO: 0.64 10*3/MM3 (ref 0.1–0.9)
MONOCYTES NFR BLD AUTO: 7.5 % (ref 5–12)
NEUTROPHILS NFR BLD AUTO: 7.12 10*3/MM3 (ref 1.7–7)
NEUTROPHILS NFR BLD AUTO: 83.3 % (ref 42.7–76)
NRBC BLD AUTO-RTO: 0 /100 WBC (ref 0–0.2)
PLATELET # BLD AUTO: 273 10*3/MM3 (ref 140–450)
PMV BLD AUTO: 10.7 FL (ref 6–12)
POTASSIUM SERPL-SCNC: 4.2 MMOL/L (ref 3.5–5.2)
RBC # BLD AUTO: 2.96 10*6/MM3 (ref 3.77–5.28)
SODIUM SERPL-SCNC: 139 MMOL/L (ref 136–145)
WBC NRBC COR # BLD AUTO: 8.54 10*3/MM3 (ref 3.4–10.8)

## 2024-09-30 PROCEDURE — 25010000002 AZITHROMYCIN PER 500 MG: Performed by: PHYSICIAN ASSISTANT

## 2024-09-30 PROCEDURE — 63710000001 INSULIN LISPRO (HUMAN) PER 5 UNITS: Performed by: PHYSICIAN ASSISTANT

## 2024-09-30 PROCEDURE — 82948 REAGENT STRIP/BLOOD GLUCOSE: CPT

## 2024-09-30 PROCEDURE — 86140 C-REACTIVE PROTEIN: CPT | Performed by: INTERNAL MEDICINE

## 2024-09-30 PROCEDURE — 63710000001 INSULIN LISPRO (HUMAN) PER 5 UNITS: Performed by: STUDENT IN AN ORGANIZED HEALTH CARE EDUCATION/TRAINING PROGRAM

## 2024-09-30 PROCEDURE — 25810000003 SODIUM CHLORIDE 0.9 % SOLUTION 250 ML FLEX CONT

## 2024-09-30 PROCEDURE — 94799 UNLISTED PULMONARY SVC/PX: CPT

## 2024-09-30 PROCEDURE — 94664 DEMO&/EVAL PT USE INHALER: CPT

## 2024-09-30 PROCEDURE — 99232 SBSQ HOSP IP/OBS MODERATE 35: CPT | Performed by: STUDENT IN AN ORGANIZED HEALTH CARE EDUCATION/TRAINING PROGRAM

## 2024-09-30 PROCEDURE — 25010000002 REMDESIVIR 100 MG/20ML SOLUTION 1 EACH VIAL

## 2024-09-30 PROCEDURE — 25810000003 SODIUM CHLORIDE 0.9 % SOLUTION 250 ML FLEX CONT: Performed by: PHYSICIAN ASSISTANT

## 2024-09-30 PROCEDURE — 85025 COMPLETE CBC W/AUTO DIFF WBC: CPT | Performed by: PHYSICIAN ASSISTANT

## 2024-09-30 PROCEDURE — 63710000001 DEXAMETHASONE PER 0.25 MG: Performed by: INTERNAL MEDICINE

## 2024-09-30 PROCEDURE — 80048 BASIC METABOLIC PNL TOTAL CA: CPT | Performed by: PHYSICIAN ASSISTANT

## 2024-09-30 RX ORDER — INSULIN LISPRO 100 [IU]/ML
3 INJECTION, SOLUTION INTRAVENOUS; SUBCUTANEOUS
Status: DISCONTINUED | OUTPATIENT
Start: 2024-09-30 | End: 2024-10-02

## 2024-09-30 RX ORDER — SENNOSIDES A AND B 8.6 MG/1
1 TABLET, FILM COATED ORAL NIGHTLY
Status: DISCONTINUED | OUTPATIENT
Start: 2024-09-30 | End: 2024-10-05 | Stop reason: HOSPADM

## 2024-09-30 RX ADMIN — METOPROLOL TARTRATE 25 MG: 25 TABLET, FILM COATED ORAL at 08:47

## 2024-09-30 RX ADMIN — ASPIRIN 81 MG: 81 TABLET, COATED ORAL at 08:48

## 2024-09-30 RX ADMIN — APIXABAN 2.5 MG: 2.5 TABLET, FILM COATED ORAL at 20:59

## 2024-09-30 RX ADMIN — IPRATROPIUM BROMIDE AND ALBUTEROL SULFATE 3 ML: 2.5; .5 SOLUTION RESPIRATORY (INHALATION) at 22:41

## 2024-09-30 RX ADMIN — REMDESIVIR 100 MG: 100 INJECTION, POWDER, LYOPHILIZED, FOR SOLUTION INTRAVENOUS at 10:48

## 2024-09-30 RX ADMIN — INSULIN LISPRO 3 UNITS: 100 INJECTION, SOLUTION INTRAVENOUS; SUBCUTANEOUS at 17:45

## 2024-09-30 RX ADMIN — APIXABAN 2.5 MG: 2.5 TABLET, FILM COATED ORAL at 08:47

## 2024-09-30 RX ADMIN — GUAIFENESIN 1200 MG: 600 TABLET, EXTENDED RELEASE ORAL at 20:59

## 2024-09-30 RX ADMIN — IPRATROPIUM BROMIDE AND ALBUTEROL SULFATE 3 ML: 2.5; .5 SOLUTION RESPIRATORY (INHALATION) at 04:03

## 2024-09-30 RX ADMIN — INSULIN LISPRO 7 UNITS: 100 INJECTION, SOLUTION INTRAVENOUS; SUBCUTANEOUS at 12:51

## 2024-09-30 RX ADMIN — IPRATROPIUM BROMIDE AND ALBUTEROL SULFATE 3 ML: 2.5; .5 SOLUTION RESPIRATORY (INHALATION) at 16:22

## 2024-09-30 RX ADMIN — Medication 5 MG: at 22:03

## 2024-09-30 RX ADMIN — IPRATROPIUM BROMIDE AND ALBUTEROL SULFATE 3 ML: 2.5; .5 SOLUTION RESPIRATORY (INHALATION) at 00:11

## 2024-09-30 RX ADMIN — PRAVASTATIN SODIUM 40 MG: 40 TABLET ORAL at 20:59

## 2024-09-30 RX ADMIN — METOPROLOL TARTRATE 25 MG: 25 TABLET, FILM COATED ORAL at 20:59

## 2024-09-30 RX ADMIN — GUAIFENESIN 1200 MG: 600 TABLET, EXTENDED RELEASE ORAL at 08:47

## 2024-09-30 RX ADMIN — INSULIN LISPRO 7 UNITS: 100 INJECTION, SOLUTION INTRAVENOUS; SUBCUTANEOUS at 20:58

## 2024-09-30 RX ADMIN — DEXAMETHASONE 6 MG: 4 TABLET ORAL at 08:48

## 2024-09-30 RX ADMIN — IPRATROPIUM BROMIDE AND ALBUTEROL SULFATE 3 ML: 2.5; .5 SOLUTION RESPIRATORY (INHALATION) at 19:24

## 2024-09-30 RX ADMIN — INSULIN LISPRO 7 UNITS: 100 INJECTION, SOLUTION INTRAVENOUS; SUBCUTANEOUS at 17:45

## 2024-09-30 RX ADMIN — SENNOSIDES 1 TABLET: 8.6 TABLET, FILM COATED ORAL at 20:59

## 2024-09-30 RX ADMIN — AZITHROMYCIN DIHYDRATE 500 MG: 500 INJECTION, POWDER, LYOPHILIZED, FOR SOLUTION INTRAVENOUS at 20:58

## 2024-09-30 RX ADMIN — LEVOTHYROXINE SODIUM 75 MCG: 0.07 TABLET ORAL at 05:40

## 2024-09-30 RX ADMIN — Medication 10 ML: at 08:49
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
## 2024-09-30 NOTE — PLAN OF CARE
Problem: Adult Inpatient Plan of Care  Goal: Plan of Care Review  9/30/2024 0603 by Marcel Schulz RN  Outcome: Progressing  9/30/2024 0603 by Marcel Schulz RN  Outcome: Not Progressing  9/30/2024 0603 by Marcel Schulz RN  Outcome: Progressing  Goal: Patient-Specific Goal (Individualized)  9/30/2024 0603 by Marcel Schulz RN  Outcome: Progressing  9/30/2024 0603 by Marcel Schulz RN  Outcome: Not Progressing  9/30/2024 0603 by Marcel Schulz RN  Outcome: Progressing  Goal: Absence of Hospital-Acquired Illness or Injury  9/30/2024 0603 by Marcel Schulz RN  Outcome: Progressing  9/30/2024 0603 by Marcel Schulz RN  Outcome: Not Progressing  9/30/2024 0603 by Marcel Schulz RN  Outcome: Progressing  Intervention: Identify and Manage Fall Risk  Recent Flowsheet Documentation  Taken 9/30/2024 0359 by Marcel Schulz RN  Safety Promotion/Fall Prevention:   activity supervised   assistive device/personal items within reach   clutter free environment maintained   fall prevention program maintained   gait belt   mobility aid in reach   nonskid shoes/slippers when out of bed   room organization consistent   safety round/check completed  Taken 9/30/2024 0205 by Marcel Schulz RN  Safety Promotion/Fall Prevention:   activity supervised   assistive device/personal items within reach   clutter free environment maintained   fall prevention program maintained   gait belt   mobility aid in reach   nonskid shoes/slippers when out of bed   room organization consistent   safety round/check completed  Taken 9/30/2024 0024 by Marcel Schulz RN  Safety Promotion/Fall Prevention:   activity supervised   assistive device/personal items within reach   clutter free environment maintained   fall prevention program maintained   gait belt   mobility aid in reach   nonskid shoes/slippers when out of bed   room organization consistent   safety round/check completed  Taken 9/29/2024 2225 by Marcel Schulz RN  Safety Promotion/Fall  Prevention:   activity supervised   assistive device/personal items within reach   clutter free environment maintained   fall prevention program maintained   gait belt   mobility aid in reach   nonskid shoes/slippers when out of bed   room organization consistent   safety round/check completed  Taken 9/29/2024 2030 by Marcel Schulz RN  Safety Promotion/Fall Prevention:   activity supervised   assistive device/personal items within reach   clutter free environment maintained   fall prevention program maintained   gait belt   mobility aid in reach   nonskid shoes/slippers when out of bed   room organization consistent   safety round/check completed  Intervention: Prevent Skin Injury  Recent Flowsheet Documentation  Taken 9/30/2024 0359 by Marcel Schulz RN  Body Position: position changed independently  Taken 9/30/2024 0205 by Marcel Schulz RN  Body Position: position changed independently  Taken 9/30/2024 0024 by Marcel Schulz RN  Body Position: position changed independently  Skin Protection:   adhesive use limited   incontinence pads utilized   tubing/devices free from skin contact  Taken 9/29/2024 2225 by Marcel Schulz RN  Body Position: position changed independently  Taken 9/29/2024 2030 by Marcel Schulz RN  Body Position: position changed independently  Skin Protection:   adhesive use limited   incontinence pads utilized   tubing/devices free from skin contact  Intervention: Prevent and Manage VTE (Venous Thromboembolism) Risk  Recent Flowsheet Documentation  Taken 9/30/2024 0359 by Marcel Schulz RN  VTE Prevention/Management:   bilateral   sequential compression devices on  Taken 9/30/2024 0205 by Marcel Schulz RN  Activity Management: activity minimized  VTE Prevention/Management:   bilateral   sequential compression devices on  Taken 9/30/2024 0024 by Marcel Schulz RN  Activity Management: activity minimized  VTE Prevention/Management:   bilateral   sequential compression devices on  Taken  9/29/2024 2225 by Marcel Schulz RN  Activity Management: activity encouraged  VTE Prevention/Management:   bilateral   sequential compression devices on  Taken 9/29/2024 2030 by Marcel Schulz RN  Activity Management: activity encouraged  VTE Prevention/Management:   bilateral   sequential compression devices on  Intervention: Prevent Infection  Recent Flowsheet Documentation  Taken 9/30/2024 0359 by Marcel Schulz RN  Infection Prevention:   rest/sleep promoted   single patient room provided  Taken 9/30/2024 0205 by Marcel Schulz RN  Infection Prevention:   environmental surveillance performed   personal protective equipment utilized   rest/sleep promoted   single patient room provided  Taken 9/30/2024 0024 by Marcel Schulz RN  Infection Prevention:   rest/sleep promoted   single patient room provided   visitors restricted/screened  Taken 9/29/2024 2225 by Marcel Schulz RN  Infection Prevention:   environmental surveillance performed   personal protective equipment utilized   rest/sleep promoted   single patient room provided   visitors restricted/screened  Taken 9/29/2024 2030 by Marcel Schulz RN  Infection Prevention:   environmental surveillance performed   personal protective equipment utilized   rest/sleep promoted   single patient room provided  Goal: Optimal Comfort and Wellbeing  9/30/2024 0603 by Marcel Schulz RN  Outcome: Progressing  9/30/2024 0603 by Marcel Schulz RN  Outcome: Not Progressing  9/30/2024 0603 by Marcel Schulz RN  Outcome: Progressing  Intervention: Monitor Pain and Promote Comfort  Recent Flowsheet Documentation  Taken 9/30/2024 0359 by Marcel Schulz RN  Pain Management Interventions: quiet environment facilitated  Taken 9/30/2024 0024 by Marcel Schulz RN  Pain Management Interventions:   no interventions per patient request   quiet environment facilitated  Taken 9/29/2024 2225 by Marcel Schulz RN  Pain Management Interventions:   quiet environment facilitated    no interventions per patient request  Taken 9/29/2024 2030 by Marcel Schulz RN  Pain Management Interventions:   no interventions per patient request   quiet environment facilitated   care clustered  Intervention: Provide Person-Centered Care  Recent Flowsheet Documentation  Taken 9/29/2024 2225 by Marcel Schulz RN  Trust Relationship/Rapport: care explained  Taken 9/29/2024 2030 by Marcel Schulz RN  Trust Relationship/Rapport: care explained  Goal: Readiness for Transition of Care  9/30/2024 0603 by Marcel Schulz RN  Outcome: Progressing  9/30/2024 0603 by Marcel Schulz RN  Outcome: Not Progressing  9/30/2024 0603 by Marcel Schulz RN  Outcome: Progressing     Problem: Skin Injury Risk Increased  Goal: Skin Health and Integrity  9/30/2024 0603 by Marecl Schulz RN  Outcome: Progressing  9/30/2024 0603 by Marcel Schulz RN  Outcome: Not Progressing  9/30/2024 0603 by Marcel Schulz RN  Outcome: Progressing  Intervention: Optimize Skin Protection  Recent Flowsheet Documentation  Taken 9/30/2024 0359 by Marcel Schulz RN  Head of Bed (HOB) Positioning: HOB elevated  Taken 9/30/2024 0205 by Marcel Schulz RN  Head of Bed (HOB) Positioning: HOB elevated  Taken 9/30/2024 0024 by Marcel Schulz RN  Pressure Reduction Techniques: frequent weight shift encouraged  Head of Bed (HOB) Positioning: HOB elevated  Pressure Reduction Devices:   positioning supports utilized   pressure-redistributing mattress utilized  Skin Protection:   adhesive use limited   incontinence pads utilized   tubing/devices free from skin contact  Taken 9/29/2024 2225 by Marcel Schulz RN  Head of Bed (HOB) Positioning: HOB elevated  Taken 9/29/2024 2030 by Marcel Schulz RN  Pressure Reduction Techniques: frequent weight shift encouraged  Head of Bed (HOB) Positioning: HOB elevated  Pressure Reduction Devices:   pressure-redistributing mattress utilized   positioning supports utilized  Skin Protection:   adhesive use limited    incontinence pads utilized   tubing/devices free from skin contact     Problem: Fluid Imbalance (Pneumonia)  Goal: Fluid Balance  9/30/2024 0603 by Marcel Schulz RN  Outcome: Progressing  9/30/2024 0603 by Marcel Schulz RN  Outcome: Not Progressing  9/30/2024 0603 by Marcel Schulz RN  Outcome: Progressing     Problem: Infection (Pneumonia)  Goal: Resolution of Infection Signs and Symptoms  9/30/2024 0603 by Marcel Schulz RN  Outcome: Progressing  9/30/2024 0603 by Marcel Shculz RN  Outcome: Not Progressing  9/30/2024 0603 by Marcel Schulz RN  Outcome: Progressing  Intervention: Prevent Infection Progression  Recent Flowsheet Documentation  Taken 9/30/2024 0359 by Marcel Schulz RN  Isolation Precautions: precautions maintained  Taken 9/30/2024 0205 by Marcel Schulz RN  Isolation Precautions:   precautions maintained   airborne   contact  Taken 9/30/2024 0024 by Marcel Schulz RN  Isolation Precautions: precautions maintained  Taken 9/29/2024 2225 by Marcel Schulz RN  Isolation Precautions: precautions maintained  Taken 9/29/2024 2030 by Marcel Schulz RN  Isolation Precautions:   precautions maintained   airborne   contact     Problem: Respiratory Compromise (Pneumonia)  Goal: Effective Oxygenation and Ventilation  9/30/2024 0603 by Marcel Schulz RN  Outcome: Progressing  9/30/2024 0603 by Marcel Schulz RN  Outcome: Not Progressing  9/30/2024 0603 by Marcel Schulz RN  Outcome: Progressing  Intervention: Promote Airway Secretion Clearance  Recent Flowsheet Documentation  Taken 9/29/2024 2030 by Marcel Schulz RN  Cough And Deep Breathing: done independently per patient  Intervention: Optimize Oxygenation and Ventilation  Recent Flowsheet Documentation  Taken 9/30/2024 0359 by Marcel Schulz RN  Head of Bed (HOB) Positioning: HOB elevated  Taken 9/30/2024 0205 by Marcel Schulz RN  Head of Bed (HOB) Positioning: HOB elevated  Taken 9/30/2024 0024 by Marcel Schulz RN  Head of Bed (HOB)  Positioning: HOB elevated  Taken 9/29/2024 2225 by Marcel Schulz RN  Head of Bed (HOB) Positioning: HOB elevated  Taken 9/29/2024 2030 by Marcel Schulz RN  Head of Bed (HOB) Positioning: HOB elevated     Problem: Fall Injury Risk  Goal: Absence of Fall and Fall-Related Injury  9/30/2024 0603 by Marcel Schulz RN  Outcome: Progressing  9/30/2024 0603 by Marcel Schulz RN  Outcome: Not Progressing  9/30/2024 0603 by Marcel Schulz RN  Outcome: Progressing  Intervention: Identify and Manage Contributors  Recent Flowsheet Documentation  Taken 9/30/2024 0024 by Marcel Schulz RN  Self-Care Promotion: independence encouraged  Taken 9/29/2024 2030 by Marcel Schulz RN  Medication Review/Management: medications reviewed  Self-Care Promotion: independence encouraged  Intervention: Promote Injury-Free Environment  Recent Flowsheet Documentation  Taken 9/30/2024 0359 by Marcel Schulz RN  Safety Promotion/Fall Prevention:   activity supervised   assistive device/personal items within reach   clutter free environment maintained   fall prevention program maintained   gait belt   mobility aid in reach   nonskid shoes/slippers when out of bed   room organization consistent   safety round/check completed  Taken 9/30/2024 0205 by Marcel Schulz RN  Safety Promotion/Fall Prevention:   activity supervised   assistive device/personal items within reach   clutter free environment maintained   fall prevention program maintained   gait belt   mobility aid in reach   nonskid shoes/slippers when out of bed   room organization consistent   safety round/check completed  Taken 9/30/2024 0024 by Marcel Schulz RN  Safety Promotion/Fall Prevention:   activity supervised   assistive device/personal items within reach   clutter free environment maintained   fall prevention program maintained   gait belt   mobility aid in reach   nonskid shoes/slippers when out of bed   room organization consistent   safety round/check completed  Taken  9/29/2024 2225 by Marcel Schulz, RN  Safety Promotion/Fall Prevention:   activity supervised   assistive device/personal items within reach   clutter free environment maintained   fall prevention program maintained   gait belt   mobility aid in reach   nonskid shoes/slippers when out of bed   room organization consistent   safety round/check completed  Taken 9/29/2024 2030 by Marcel Schulz, RN  Safety Promotion/Fall Prevention:   activity supervised   assistive device/personal items within reach   clutter free environment maintained   fall prevention program maintained   gait belt   mobility aid in reach   nonskid shoes/slippers when out of bed   room organization consistent   safety round/check completed   Goal Outcome Evaluation:      Pt resting in bed. A@OX4. VSS. 3LNC. UOP adequate. Ambulating well with staff assist.

## 2024-09-30 NOTE — CASE MANAGEMENT/SOCIAL WORK
Continued Stay Note  [facility]     Patient Name: Maura Jiménez  MRN: [id number]  Today's Date: 9/30/2024    Admit Date: 9/27/2024    Plan: home/family   Discharge Plan       Row Name 09/30/24 0911       Plan    Plan Comments CM spoke with Kathy who reports they had order to begin HH with pt but she was readmitted before they could see her. CM will keep them updated and resume care at dc                   Discharge Codes    No documentation.                 Expected Discharge Date and Time       Expected Discharge Date Expected Discharge Time    Sep 30, 2024               Virgen Galicia RN

## 2024-09-30 NOTE — PLAN OF CARE
Goal Outcome Evaluation:   Alert and oriented.On 3 L of o2 through Nasal Cannula.Blood glucose monitored.Had BM today. On Antibiotic PO.Normal sinus on Telemetry.

## 2024-10-01 LAB
CRP SERPL-MCNC: 1.01 MG/DL (ref 0–0.5)
GLUCOSE BLDC GLUCOMTR-MCNC: 181 MG/DL (ref 70–130)
GLUCOSE BLDC GLUCOMTR-MCNC: 251 MG/DL (ref 70–130)
GLUCOSE BLDC GLUCOMTR-MCNC: 292 MG/DL (ref 70–130)
GLUCOSE BLDC GLUCOMTR-MCNC: 98 MG/DL (ref 70–130)

## 2024-10-01 PROCEDURE — 94799 UNLISTED PULMONARY SVC/PX: CPT

## 2024-10-01 PROCEDURE — 25010000002 REMDESIVIR 100 MG/20ML SOLUTION 1 EACH VIAL

## 2024-10-01 PROCEDURE — 97535 SELF CARE MNGMENT TRAINING: CPT

## 2024-10-01 PROCEDURE — 82948 REAGENT STRIP/BLOOD GLUCOSE: CPT

## 2024-10-01 PROCEDURE — 86140 C-REACTIVE PROTEIN: CPT | Performed by: INTERNAL MEDICINE

## 2024-10-01 PROCEDURE — 97165 OT EVAL LOW COMPLEX 30 MIN: CPT

## 2024-10-01 PROCEDURE — 63710000001 INSULIN LISPRO (HUMAN) PER 5 UNITS: Performed by: PHYSICIAN ASSISTANT

## 2024-10-01 PROCEDURE — 99232 SBSQ HOSP IP/OBS MODERATE 35: CPT | Performed by: STUDENT IN AN ORGANIZED HEALTH CARE EDUCATION/TRAINING PROGRAM

## 2024-10-01 PROCEDURE — 25810000003 SODIUM CHLORIDE 0.9 % SOLUTION 250 ML FLEX CONT

## 2024-10-01 PROCEDURE — 94761 N-INVAS EAR/PLS OXIMETRY MLT: CPT

## 2024-10-01 PROCEDURE — 63710000001 DEXAMETHASONE PER 0.25 MG: Performed by: INTERNAL MEDICINE

## 2024-10-01 PROCEDURE — 94664 DEMO&/EVAL PT USE INHALER: CPT

## 2024-10-01 PROCEDURE — 97530 THERAPEUTIC ACTIVITIES: CPT

## 2024-10-01 PROCEDURE — 97161 PT EVAL LOW COMPLEX 20 MIN: CPT

## 2024-10-01 PROCEDURE — 63710000001 INSULIN LISPRO (HUMAN) PER 5 UNITS: Performed by: STUDENT IN AN ORGANIZED HEALTH CARE EDUCATION/TRAINING PROGRAM

## 2024-10-01 RX ADMIN — INSULIN LISPRO 3 UNITS: 100 INJECTION, SOLUTION INTRAVENOUS; SUBCUTANEOUS at 16:35

## 2024-10-01 RX ADMIN — INSULIN LISPRO 2 UNITS: 100 INJECTION, SOLUTION INTRAVENOUS; SUBCUTANEOUS at 12:07

## 2024-10-01 RX ADMIN — PRAVASTATIN SODIUM 40 MG: 40 TABLET ORAL at 21:20

## 2024-10-01 RX ADMIN — IPRATROPIUM BROMIDE AND ALBUTEROL SULFATE 3 ML: 2.5; .5 SOLUTION RESPIRATORY (INHALATION) at 19:29

## 2024-10-01 RX ADMIN — IPRATROPIUM BROMIDE AND ALBUTEROL SULFATE 3 ML: 2.5; .5 SOLUTION RESPIRATORY (INHALATION) at 06:52

## 2024-10-01 RX ADMIN — ACETAMINOPHEN 650 MG: 325 TABLET ORAL at 13:26

## 2024-10-01 RX ADMIN — INSULIN LISPRO 3 UNITS: 100 INJECTION, SOLUTION INTRAVENOUS; SUBCUTANEOUS at 12:07

## 2024-10-01 RX ADMIN — METOPROLOL TARTRATE 25 MG: 25 TABLET, FILM COATED ORAL at 08:37

## 2024-10-01 RX ADMIN — INSULIN LISPRO 6 UNITS: 100 INJECTION, SOLUTION INTRAVENOUS; SUBCUTANEOUS at 16:35

## 2024-10-01 RX ADMIN — Medication 10 ML: at 21:21

## 2024-10-01 RX ADMIN — Medication 10 ML: at 08:44

## 2024-10-01 RX ADMIN — DEXAMETHASONE 6 MG: 4 TABLET ORAL at 08:37

## 2024-10-01 RX ADMIN — Medication 5 MG: at 21:19

## 2024-10-01 RX ADMIN — IPRATROPIUM BROMIDE AND ALBUTEROL SULFATE 3 ML: 2.5; .5 SOLUTION RESPIRATORY (INHALATION) at 11:21

## 2024-10-01 RX ADMIN — REMDESIVIR 100 MG: 100 INJECTION, POWDER, LYOPHILIZED, FOR SOLUTION INTRAVENOUS at 08:40

## 2024-10-01 RX ADMIN — IPRATROPIUM BROMIDE AND ALBUTEROL SULFATE 3 ML: 2.5; .5 SOLUTION RESPIRATORY (INHALATION) at 02:53

## 2024-10-01 RX ADMIN — INSULIN LISPRO 3 UNITS: 100 INJECTION, SOLUTION INTRAVENOUS; SUBCUTANEOUS at 08:43

## 2024-10-01 RX ADMIN — GUAIFENESIN 1200 MG: 600 TABLET, EXTENDED RELEASE ORAL at 08:37

## 2024-10-01 RX ADMIN — IPRATROPIUM BROMIDE AND ALBUTEROL SULFATE 3 ML: 2.5; .5 SOLUTION RESPIRATORY (INHALATION) at 16:00

## 2024-10-01 RX ADMIN — INSULIN LISPRO 6 UNITS: 100 INJECTION, SOLUTION INTRAVENOUS; SUBCUTANEOUS at 21:20

## 2024-10-01 RX ADMIN — APIXABAN 2.5 MG: 2.5 TABLET, FILM COATED ORAL at 21:20

## 2024-10-01 RX ADMIN — GUAIFENESIN 1200 MG: 600 TABLET, EXTENDED RELEASE ORAL at 21:20

## 2024-10-01 RX ADMIN — ASPIRIN 81 MG: 81 TABLET, COATED ORAL at 08:37

## 2024-10-01 RX ADMIN — IPRATROPIUM BROMIDE AND ALBUTEROL SULFATE 3 ML: 2.5; .5 SOLUTION RESPIRATORY (INHALATION) at 23:29

## 2024-10-01 RX ADMIN — LEVOTHYROXINE SODIUM 75 MCG: 0.07 TABLET ORAL at 05:26

## 2024-10-01 RX ADMIN — APIXABAN 2.5 MG: 2.5 TABLET, FILM COATED ORAL at 08:37

## 2024-10-01 RX ADMIN — AMLODIPINE BESYLATE 5 MG: 5 TABLET ORAL at 08:37

## 2024-10-01 NOTE — PLAN OF CARE
Problem: Adult Inpatient Plan of Care  Goal: Plan of Care Review  Outcome: Progressing  Goal: Patient-Specific Goal (Individualized)  Outcome: Progressing  Goal: Absence of Hospital-Acquired Illness or Injury  Outcome: Progressing  Intervention: Identify and Manage Fall Risk  Recent Flowsheet Documentation  Taken 10/1/2024 0629 by Marcel Schulz RN  Safety Promotion/Fall Prevention:   activity supervised   assistive device/personal items within reach   clutter free environment maintained   fall prevention program maintained   gait belt   mobility aid in reach   nonskid shoes/slippers when out of bed   room organization consistent   safety round/check completed  Taken 10/1/2024 0404 by Marcel Schulz RN  Safety Promotion/Fall Prevention:   activity supervised   assistive device/personal items within reach   clutter free environment maintained   fall prevention program maintained   gait belt   mobility aid in reach   nonskid shoes/slippers when out of bed   room organization consistent   safety round/check completed  Taken 10/1/2024 0222 by Marcel Schulz RN  Safety Promotion/Fall Prevention:   activity supervised   assistive device/personal items within reach   clutter free environment maintained   fall prevention program maintained   gait belt   mobility aid in reach   nonskid shoes/slippers when out of bed   room organization consistent   safety round/check completed  Taken 10/1/2024 0025 by Marcel Schulz RN  Safety Promotion/Fall Prevention:   activity supervised   assistive device/personal items within reach   clutter free environment maintained   fall prevention program maintained   gait belt   mobility aid in reach   nonskid shoes/slippers when out of bed   room organization consistent   safety round/check completed  Taken 9/30/2024 2220 by Marcel Schulz RN  Safety Promotion/Fall Prevention:   activity supervised   assistive device/personal items within reach   clutter free environment maintained    fall prevention program maintained   gait belt   mobility aid in reach   nonskid shoes/slippers when out of bed   room organization consistent   safety round/check completed  Taken 9/30/2024 2015 by Marcel Schulz RN  Safety Promotion/Fall Prevention:   activity supervised   assistive device/personal items within reach   clutter free environment maintained   fall prevention program maintained   gait belt   mobility aid in reach   nonskid shoes/slippers when out of bed   room organization consistent   safety round/check completed  Intervention: Prevent Skin Injury  Recent Flowsheet Documentation  Taken 10/1/2024 0629 by Marcel Schulz RN  Body Position: position changed independently  Taken 10/1/2024 0404 by Marcel Schulz RN  Body Position: position changed independently  Skin Protection:   adhesive use limited   incontinence pads utilized   tubing/devices free from skin contact  Taken 10/1/2024 0222 by Marcel Schulz RN  Body Position: position changed independently  Taken 10/1/2024 0025 by Marcel Schulz RN  Body Position: position changed independently  Skin Protection:   adhesive use limited   incontinence pads utilized   tubing/devices free from skin contact  Taken 9/30/2024 2220 by Marcel Schulz RN  Body Position: position changed independently  Taken 9/30/2024 2015 by Marcel Schulz RN  Body Position: position changed independently  Intervention: Prevent and Manage VTE (Venous Thromboembolism) Risk  Recent Flowsheet Documentation  Taken 10/1/2024 0629 by Marcel Schulz RN  VTE Prevention/Management:   bilateral   sequential compression devices on  Taken 10/1/2024 0533 by Marcel Schulz RN  Activity Management: ambulated in room  Taken 10/1/2024 0404 by Marcel Schulz RN  Activity Management: activity minimized  VTE Prevention/Management:   bilateral   sequential compression devices on  Taken 10/1/2024 0222 by Marcel Schulz RN  Activity Management: activity minimized  VTE Prevention/Management:    bilateral   sequential compression devices on  Taken 10/1/2024 0025 by Marcel Schulz RN  Activity Management: activity minimized  VTE Prevention/Management:   bilateral   sequential compression devices on  Taken 9/30/2024 2220 by Marcel Schulz RN  Activity Management: activity minimized  VTE Prevention/Management:   bilateral   sequential compression devices on  Taken 9/30/2024 2015 by Marcle Schulz RN  VTE Prevention/Management:   bilateral   sequential compression devices on  Range of Motion: active ROM (range of motion) encouraged  Intervention: Prevent Infection  Recent Flowsheet Documentation  Taken 10/1/2024 0629 by Marcel Schulz RN  Infection Prevention:   rest/sleep promoted   single patient room provided   personal protective equipment utilized   environmental surveillance performed  Taken 10/1/2024 0404 by Marcel Schulz RN  Infection Prevention:   rest/sleep promoted   single patient room provided  Taken 10/1/2024 0222 by Marcel Schulz RN  Infection Prevention:   rest/sleep promoted   single patient room provided   personal protective equipment utilized  Taken 10/1/2024 0025 by Marcel Schulz RN  Infection Prevention:   environmental surveillance performed   rest/sleep promoted   single patient room provided  Taken 9/30/2024 2220 by Marcel Schulz RN  Infection Prevention:   personal protective equipment utilized   rest/sleep promoted   single patient room provided  Taken 9/30/2024 2015 by Marcel Schulz RN  Infection Prevention:   environmental surveillance performed   personal protective equipment utilized   rest/sleep promoted   single patient room provided  Goal: Optimal Comfort and Wellbeing  Outcome: Progressing  Intervention: Monitor Pain and Promote Comfort  Recent Flowsheet Documentation  Taken 10/1/2024 0629 by Marcel Schulz RN  Pain Management Interventions:   no interventions per patient request   quiet environment facilitated  Taken 10/1/2024 0404 by Marcel Schulz RN  Pain  Management Interventions:   quiet environment facilitated   no interventions per patient request  Taken 10/1/2024 0222 by Marcel Schulz RN  Pain Management Interventions:   quiet environment facilitated   no interventions per patient request  Taken 10/1/2024 0025 by Marcel Schulz RN  Pain Management Interventions:   quiet environment facilitated   no interventions per patient request  Taken 9/30/2024 2220 by Marcel Schulz RN  Pain Management Interventions:   no interventions per patient request   quiet environment facilitated  Taken 9/30/2024 2015 by Marcel Schulz RN  Pain Management Interventions:   quiet environment facilitated   no interventions per patient request  Intervention: Provide Person-Centered Care  Recent Flowsheet Documentation  Taken 10/1/2024 0629 by Marcel Schulz RN  Trust Relationship/Rapport: care explained  Taken 10/1/2024 0025 by Marcel Schulz RN  Trust Relationship/Rapport: care explained  Taken 9/30/2024 2220 by Marcel Schulz RN  Trust Relationship/Rapport: care explained  Taken 9/30/2024 2015 by Marcel Schulz RN  Trust Relationship/Rapport:   care explained   questions answered   questions encouraged   reassurance provided   thoughts/feelings acknowledged  Goal: Readiness for Transition of Care  Outcome: Progressing     Problem: Skin Injury Risk Increased  Goal: Skin Health and Integrity  Outcome: Progressing  Intervention: Optimize Skin Protection  Recent Flowsheet Documentation  Taken 10/1/2024 0629 by Marcel Schulz RN  Head of Bed (HOB) Positioning: HOB elevated  Taken 10/1/2024 0404 by Marcel Schulz RN  Pressure Reduction Techniques: frequent weight shift encouraged  Head of Bed (HOB) Positioning: HOB elevated  Pressure Reduction Devices:   positioning supports utilized   pressure-redistributing mattress utilized  Skin Protection:   adhesive use limited   incontinence pads utilized   tubing/devices free from skin contact  Taken 10/1/2024 0222 by Marcel Schulz  RN  Head of Bed ([facility]) Positioning: HOB elevated  Taken 10/1/2024 0025 by Marcel Schulz RN  Pressure Reduction Techniques: frequent weight shift encouraged  Head of Bed (HOB) Positioning: HOB elevated  Pressure Reduction Devices:   positioning supports utilized   pressure-redistributing mattress utilized  Skin Protection:   adhesive use limited   incontinence pads utilized   tubing/devices free from skin contact  Taken 9/30/2024 2220 by Marcel Schulz RN  Head of Bed ([facility]) Positioning: HOB elevated  Taken 9/30/2024 2015 by Marcel Schulz RN  Pressure Reduction Techniques: frequent weight shift encouraged  Head of Bed (HOB) Positioning: [facility] elevated  Pressure Reduction Devices:   positioning supports utilized   pressure-redistributing mattress utilized     Problem: Fluid Imbalance (Pneumonia)  Goal: Fluid Balance  Outcome: Progressing     Problem: Infection (Pneumonia)  Goal: Resolution of Infection Signs and Symptoms  Outcome: Progressing  Intervention: Prevent Infection Progression  Recent Flowsheet Documentation  Taken 10/1/2024 0629 by Marcel Schulz RN  Isolation Precautions:   precautions maintained   airborne   contact  Taken 10/1/2024 0404 by Marcel Schulz RN  Isolation Precautions:   precautions maintained   airborne   contact  Taken 10/1/2024 0222 by Marcel Schulz RN  Isolation Precautions: precautions maintained  Taken 10/1/2024 0025 by Marcel Schulz RN  Isolation Precautions: precautions maintained  Taken 9/30/2024 2220 by Marcel Schulz RN  Isolation Precautions: precautions maintained  Taken 9/30/2024 2015 by Marcel Schulz RN  Isolation Precautions: precautions maintained     Problem: Respiratory Compromise (Pneumonia)  Goal: Effective Oxygenation and Ventilation  Outcome: Progressing  Intervention: Promote Airway Secretion Clearance  Recent Flowsheet Documentation  Taken 9/30/2024 2015 by Marcel Schulz RN  Cough And Deep Breathing: done independently per patient  Intervention: Optimize  Oxygenation and Ventilation  Recent Flowsheet Documentation  Taken 10/1/2024 0629 by Marcel Schulz RN  Head of Bed (HOB) Positioning: HOB elevated  Taken 10/1/2024 0404 by Marcel Schulz RN  Head of Bed (HOB) Positioning: HOB elevated  Taken 10/1/2024 0222 by Marcel Schulz RN  Head of Bed (HOB) Positioning: HOB elevated  Taken 10/1/2024 0025 by Marcel Schulz RN  Head of Bed (HOB) Positioning: HOB elevated  Taken 9/30/2024 2220 by Marcel Schulz RN  Head of Bed (HOB) Positioning: HOB elevated  Taken 9/30/2024 2015 by Marcel Schulz RN  Head of Bed (HOB) Positioning: HOB elevated     Problem: Fall Injury Risk  Goal: Absence of Fall and Fall-Related Injury  Outcome: Progressing  Intervention: Identify and Manage Contributors  Recent Flowsheet Documentation  Taken 10/1/2024 0404 by Marcel Schulz RN  Self-Care Promotion: independence encouraged  Taken 10/1/2024 0222 by Marcel Schulz RN  Self-Care Promotion: independence encouraged  Taken 10/1/2024 0025 by Marcel Schulz RN  Self-Care Promotion: independence encouraged  Taken 9/30/2024 2220 by Marcel Schulz RN  Self-Care Promotion: independence encouraged  Taken 9/30/2024 2015 by Marcel Schulz RN  Medication Review/Management: medications reviewed  Self-Care Promotion: independence encouraged  Intervention: Promote Injury-Free Environment  Recent Flowsheet Documentation  Taken 10/1/2024 0629 by Marcel Schulz RN  Safety Promotion/Fall Prevention:   activity supervised   assistive device/personal items within reach   clutter free environment maintained   fall prevention program maintained   gait belt   mobility aid in reach   nonskid shoes/slippers when out of bed   room organization consistent   safety round/check completed  Taken 10/1/2024 0404 by Marcel Schulz RN  Safety Promotion/Fall Prevention:   activity supervised   assistive device/personal items within reach   clutter free environment maintained   fall prevention program maintained   gait  belt   mobility aid in reach   nonskid shoes/slippers when out of bed   room organization consistent   safety round/check completed  Taken 10/1/2024 0222 by aMrcel Schulz RN  Safety Promotion/Fall Prevention:   activity supervised   assistive device/personal items within reach   clutter free environment maintained   fall prevention program maintained   gait belt   mobility aid in reach   nonskid shoes/slippers when out of bed   room organization consistent   safety round/check completed  Taken 10/1/2024 0025 by Marcel Schulz RN  Safety Promotion/Fall Prevention:   activity supervised   assistive device/personal items within reach   clutter free environment maintained   fall prevention program maintained   gait belt   mobility aid in reach   nonskid shoes/slippers when out of bed   room organization consistent   safety round/check completed  Taken 9/30/2024 2220 by Marcel Schulz RN  Safety Promotion/Fall Prevention:   activity supervised   assistive device/personal items within reach   clutter free environment maintained   fall prevention program maintained   gait belt   mobility aid in reach   nonskid shoes/slippers when out of bed   room organization consistent   safety round/check completed  Taken 9/30/2024 2015 by Marcel Schulz RN  Safety Promotion/Fall Prevention:   activity supervised   assistive device/personal items within reach   clutter free environment maintained   fall prevention program maintained   gait belt   mobility aid in reach   nonskid shoes/slippers when out of bed   room organization consistent   safety round/check completed   Goal Outcome Evaluation:         Pt resting in bed. A@OX4. VSS. 2LNC. UOP adequate. Ambulating well with staff assist. Pt reports SOB when ambulating. Foam dressing to coccyx. Pt independently positions self.

## 2024-10-01 NOTE — PLAN OF CARE
Goal Outcome Evaluation:  Plan of Care Reviewed With: patient           Outcome Evaluation: PT eval complete. Pt presents with mild balance deficits, generalized weakness, and decreased functional endurance warranting skilled IP PT to facilitate return to PLOF. Pt amb 12'+25' w/ FWW, CGA. Mobility limited by dizziness and SOA this date. PT rec IRF at d/c, will continue to monitor progress.      Anticipated Discharge Disposition (PT): inpatient rehabilitation facility

## 2024-10-01 NOTE — THERAPY EVALUATION
Patient Name: Maura Jiménez  : 1945    MRN: [id number]                              Today's Date: 10/1/2024       Admit Date: 2024    Visit Dx:     ICD-10-CM ICD-9-CM   1. Acute respiratory failure with hypoxia  J96.01 518.81   2. COPD with acute exacerbation  J44.1 491.21   3. COVID-19  U07.1 079.89   4. Atypical pneumonia  J18.9 486     Patient Active Problem List   Diagnosis    Respiratory failure with hypoxia    Chronic obstructive pulmonary disease with acute exacerbation    Type 2 diabetes mellitus without complication, without long-term current use of insulin    COPD exacerbation    Acute on chronic respiratory failure with hypoxia    Chronic respiratory failure with hypoxia    Rhinovirus infection    Severe malnutrition    Impaired mobility    Acute respiratory failure with hypoxia    Cytokine release syndrome, grade 1    Cytokine release syndrome, grade 1    Pneumonia    Acute respiratory failure with hypoxia    Hypoalbuminemia    Essential hypertension    Mixed hyperlipidemia    Other specified hypothyroidism    Paroxysmal atrial fibrillation     Past Medical History:   Diagnosis Date    COPD (chronic obstructive pulmonary disease)     Disease of thyroid gland     Hyperlipidemia     Osteoarthritis     Type 2 diabetes mellitus without complication, without long-term current use of insulin 2018     Past Surgical History:   Procedure Laterality Date    APPENDECTOMY      CATARACT EXTRACTION, BILATERAL      HYSTERECTOMY        General Information       Row Name 10/01/24 0946          Physical Therapy Time and Intention    Document Type evaluation  -KE     Mode of Treatment physical therapy  -KE       Row Name 10/01/24 0946          General Information    Patient Profile Reviewed yes  -KE     Prior Level of Function independent:;all household mobility;community mobility;gait;ADL's  ind w/ rollator for mobility; 4L O2 baseline, endorses 2 recent falls in past 6 mo  -KE     Existing  Precautions/Restrictions fall;oxygen therapy device and L/min  COVID  -     Barriers to Rehab medically complex;previous functional deficit  -       Row Name 10/01/24 0946          Living Environment    People in Home spouse  -       Row Name 10/01/24 0946          Home Main Entrance    Number of Stairs, Main Entrance one  -       Row Name 10/01/24 0946          Stairs Within Home, Primary    Number of Stairs, Within Home, Primary none  -       Row Name 10/01/24 0946          Cognition    Orientation Status (Cognition) oriented x 4  -       Row Name 10/01/24 0946          Safety Issues, Functional Mobility    Safety Issues Affecting Function (Mobility) awareness of need for assistance;insight into deficits/self-awareness;safety precaution awareness  -     Impairments Affecting Function (Mobility) balance;endurance/activity tolerance;strength;shortness of breath  -               User Key  (r) = Recorded By, (t) = Taken By, (c) = Cosigned By      Initials Name Provider Type    Alicia Meadows, CARLEE Physical Therapist                   Mobility       Row Name 10/01/24 0947          Bed Mobility    Bed Mobility supine-sit  -     Supine-Sit [place] (Bed Mobility) standby assist  -     Assistive Device (Bed Mobility) head of bed elevated;bed rails  -LA     Comment, (Bed Mobility) req increased time and effort to complete  -       Row Name 10/01/24 0947          Transfers    Comment, (Transfers) pt w/ c/o dizziness once EOB, /50 and SpO2 91% on 4L O2  -       Row Name 10/01/24 0947          Sit-Stand Transfer    Sit-Stand [place] (Transfers) contact guard;1 person assist  -LA     Assistive Device (Sit-Stand Transfers) walker, front-wheeled  -LA     Comment, (Sit-Stand Transfer) VCs for improving HP, x1 STS from EOB, x2 STS from commode  -       Row Name 10/01/24 0947          Gait/Stairs (Locomotion)    [place] Level (Gait) contact guard;1 person assist;1 person to manage  equipment  -KE     Assistive Device (Gait) walker, front-wheeled  -KE     Patient was able to Ambulate yes  -KE     Distance in Feet (Gait) 12  +25'  -KE     Deviations/Abnormal Patterns (Gait) bilateral deviations;base of support, narrow;gait speed decreased;stride length decreased  -KE     Bilateral Gait Deviations forward flexed posture;heel strike decreased  -KE     Comment, (Gait/Stairs) Pt amb w/ step through gait pattern with slow gait speed, short step length, decreased heel strike, and fwd flexed posture. No overt LOB noted. VCs for managment of FWW. Pt w/ c/o dizziness limiting further mobility. Vitals monitored and stable, SpO2 92% on 4L of O2.  -KE               User Key  (r) = Recorded By, (t) = Taken By, (c) = Cosigned By      Initials Name Provider Type    Alicia Meadows, PT Physical Therapist                   Obj/Interventions       Row Name 10/01/24 0953          Range of Motion Comprehensive    General Range of Motion bilateral lower extremity ROM WFL  -KE       Row Name 10/01/24 0953          Strength Comprehensive (MMT)    General Manual Muscle Testing (MMT) Assessment lower extremity strength deficits identified  -KE     Comment, General Manual Muscle Testing (MMT) Assessment B LEs grossly 4/5  -KE       Row Name 10/01/24 0953          Balance    Balance Assessment sitting static balance;sit to stand dynamic balance;sitting dynamic balance;standing static balance;standing dynamic balance  -KE     Static Sitting Balance standby assist  -KE     Dynamic Sitting Balance standby assist  -KE     Position, Sitting Balance sitting edge of bed  -KE     Sit to Stand Dynamic Balance contact guard  -KE     Static Standing Balance contact guard  -KE     Dynamic Standing Balance contact guard  -KE     Position/Device Used, Standing Balance supported;walker, front-wheeled  -KE     Balance Interventions sitting;standing;sit to stand;supported;static;dynamic  -KE     Comment, Balance no overt LOB  -KE        Row Name 10/01/24 0953          Sensory Assessment (Somatosensory)    Sensory Assessment (Somatosensory) LE sensation intact  -KE               User Key  (r) = Recorded By, (t) = Taken By, (c) = Cosigned By      Initials Name Provider Type    Alicia Meadows, PT Physical Therapist                   Goals/Plan       Row Name 10/01/24 0957          Bed Mobility Goal 1 (PT)    Activity/Assistive Device (Bed Mobility Goal 1, PT) sit to supine/supine to sit  -KE     [place] Level/Cues Needed (Bed Mobility Goal 1, PT) independent  -KE     Time Frame (Bed Mobility Goal 1, PT) short term goal (STG);5 days  -KE     Strategies/Barriers (Bed Mobility Goal 1, PT) HOB flat  -KE     Progress/Outcomes (Bed Mobility Goal 1, PT) new goal  -KE       Row Name 10/01/24 0957          Transfer Goal 1 (PT)    Activity/Assistive Device (Transfer Goal 1, PT) sit-to-stand/stand-to-sit;bed-to-chair/chair-to-bed  -KE     [place] Level/Cues Needed (Transfer Goal 1, PT) independent  -KE     Time Frame (Transfer Goal 1, PT) long term goal (LTG);10 days  -KE     Progress/Outcome (Transfer Goal 1, PT) new goal  -KE       Row Name 10/01/24 0957          Gait Training Goal 1 (PT)    Activity/Assistive Device (Gait Training Goal 1, PT) gait (walking locomotion);assistive device use;improve balance and speed;increase endurance/gait distance  -KE     [place] Level (Gait Training Goal 1, PT) standby assist  -KE     Distance (Gait Training Goal 1, PT) 150  -KE     Time Frame (Gait Training Goal 1, PT) long term goal (LTG);10 days  -KE     Strategies/Barriers (Gait Training Goal 1, PT) SpO2 remaining >90%  -KE     Progress/Outcome (Gait Training Goal 1, PT) new goal  -KE       Row Name 10/01/24 0957          Therapy Assessment/Plan (PT)    Planned Therapy Interventions (PT) balance training;bed mobility training;gait training;neuromuscular re-education;home exercise program;postural re-education;patient/family education;transfer  training;stretching;strengthening;stair training;ROM (range of motion)  -KE               User Key  (r) = Recorded By, (t) = Taken By, (c) = Cosigned By      Initials Name Provider Type    Alicia Meadows, PT Physical Therapist                   Clinical Impression       Row Name 10/01/24 0954          Pain    Pretreatment Pain Rating 0/10 - no pain  -KE     Posttreatment Pain Rating 0/10 - no pain  -KE       Row Name 10/01/24 0954          Plan of Care Review    Plan of Care Reviewed With patient  -KE     Outcome Evaluation PT eval complete. Pt presents with mild balance deficits, generalized weakness, and decreased functional endurance warranting skilled IP PT to facilitate return to PLOF. Pt amb 12'+25' w/ FWW, CGA. Mobility limited by dizziness and SOA this date. PT rec IRF at d/c, will continue to monitor progress.  -       Row Name 10/01/24 0954          Therapy Assessment/Plan (PT)    Patient/Family Therapy Goals Statement (PT) return to PLOF  -KE     Rehab Potential (PT) good, to achieve stated therapy goals  -KE     Criteria for Skilled Interventions Met (PT) yes;meets criteria;skilled treatment is necessary  -KE     Therapy Frequency (PT) daily  -KE     Predicted Duration of Therapy Intervention (PT) 5 days  -KE       Row Name 10/01/24 0954          Vital Signs    Intra Systolic BP Rehab 131  -KE     Intra Treatment Diastolic BP 50  -KE     Pretreatment Heart Rate (beats/min) 73  -KE     Posttreatment Heart Rate (beats/min) 74  -KE     Pre SpO2 (%) 94  -KE     O2 Delivery Pre Treatment supplemental O2  -KE     Intra SpO2 (%) 91  -KE     O2 Delivery Intra Treatment supplemental O2  -KE     Post SpO2 (%) 97  -KE     O2 Delivery Post Treatment supplemental O2  -KE     Pre Patient Position Supine  -KE     Intra Patient Position Standing  -KE     Post Patient Position Sitting  -KE       Row Name 10/01/24 0954          Positioning and Restraints    Pre-Treatment Position in bed  -KE     Post Treatment  Position chair  -KE     In Chair notified nsg;reclined;call light within reach;encouraged to call for assist;exit alarm on;legs elevated  -KE               User Key  (r) = Recorded By, (t) = Taken By, (c) = Cosigned By      Initials Name Provider Type    Alicia Meadows, PT Physical Therapist                   Outcome Measures       Row Name 10/01/24 0959 10/01/24 0845       How much help from another person do you currently need...    Turning from your back to your side while in flat bed without using bedrails? 4  -KE 4  -CB    Moving from lying on back to sitting on the side of a flat bed without bedrails? 3  -KE 4  -CB    Moving to and from a bed to a chair (including a wheelchair)? 3  -KE 3  -CB    Standing up from a chair using your arms (e.g., wheelchair, bedside chair)? 3  -KE 3  -CB    Climbing 3-5 steps with a railing? 3  -KE 2  -CB    To walk in hospital room? 3  -KE 3  -CB    AM-PAC 6 Clicks Score (PT) 19  -KE 19  -CB    Highest Level of Mobility Goal 6 --> Walk 10 steps or more  -KE 6 --> Walk 10 steps or more  -CB      Row Name 10/01/24 0025          How much help from another person do you currently need...    Turning from your back to your side while in flat bed without using bedrails? 4  -TA     Moving from lying on back to sitting on the side of a flat bed without bedrails? 4  -TA     Moving to and from a bed to a chair (including a wheelchair)? 3  -TA     Standing up from a chair using your arms (e.g., wheelchair, bedside chair)? 3  -TA     Climbing 3-5 steps with a railing? 2  -TA     To walk in hospital room? 3  -TA     AM-PAC 6 Clicks Score (PT) 19  -TA     Highest Level of Mobility Goal 6 --> Walk 10 steps or more  -TA       Row Name 10/01/24 0959          Functional Assessment    Outcome Measure Options AM-PAC 6 Clicks Basic Mobility (PT)  -KE               User Key  (r) = Recorded By, (t) = Taken By, (c) = Cosigned By      Initials Name Provider Type    Harleen Aguillon, RN Registered Nurse     Marcel Avery, RN Registered Nurse    Alicia Meadows, PT Physical Therapist                                 Physical Therapy Education       Title: PT OT SLP Therapies (Done)       Topic: Physical Therapy (Done)       Point: Mobility training (Done)       Learning Progress Summary             Patient Acceptance, E, VU by LA at 10/1/2024 1000                         Point: Home exercise program (Done)       Learning Progress Summary             Patient Acceptance, E, VU by LA at 10/1/2024 1000                         Point: Body mechanics (Done)       Learning Progress Summary             Patient Acceptance, E, VU by LA at 10/1/2024 1000                         Point: Precautions (Done)       Learning Progress Summary             Patient Acceptance, E, VU by LA at 10/1/2024 1000                                         User Key       Initials Effective Dates Name Provider Type Discipline    LA 11/16/23 -  Alicia Waite, CARLEE Physical Therapist PT                  PT Recommendation and Plan  Planned Therapy Interventions (PT): balance training, bed mobility training, gait training, neuromuscular re-education, home exercise program, postural re-education, patient/family education, transfer training, stretching, strengthening, stair training, ROM (range of motion)  Plan of Care Reviewed With: patient  Outcome Evaluation: PT eval complete. Pt presents with mild balance deficits, generalized weakness, and decreased functional endurance warranting skilled IP PT to facilitate return to PLOF. Pt amb 12'+25' w/ FWW, CGA. Mobility limited by dizziness and SOA this date. PT rec IRF at d/c, will continue to monitor progress.     Time Calculation:   PT Evaluation Complexity  History, PT Evaluation Complexity: 1-2 personal factors and/or comorbidities  Examination of Body Systems (PT Eval Complexity): total of 3 or more elements  Clinical Presentation (PT Evaluation Complexity): evolving  Clinical Decision Making (PT  Evaluation Complexity): low complexity  Overall Complexity (PT Evaluation Complexity): low complexity     PT Charges       Row Name 10/01/24 1000             Time Calculation    Start Time 0901  -KE      PT Received On 10/01/24  -KE      PT Goal Re-Cert Due Date 10/11/24  -KE         Timed Charges    93625 - PT Therapeutic Activity Minutes 10  -KE         Untimed Charges    PT Eval/Re-eval Minutes 51  -KE         Total Minutes    Timed Charges Total Minutes 10  -KE      Untimed Charges Total Minutes 51  -KE       Total Minutes 61  -KE                User Key  (r) = Recorded By, (t) = Taken By, (c) = Cosigned By      Initials Name Provider Type    Alicia Meadows PT Physical Therapist                  Therapy Charges for Today       Code Description Service Date Service Provider Modifiers Qty    54266644366 HC PT EVAL LOW COMPLEXITY 4 10/1/2024 Alicia Waite, PT GP 1    35237219687 HC PT THERAPEUTIC ACT EA 15 MIN 10/1/2024 Alicia Waite PT GP 1            PT G-Codes  Outcome Measure Options: AM-PAC 6 Clicks Basic Mobility (PT)  AM-PAC 6 Clicks Score (PT): 19  PT Discharge Summary  Anticipated Discharge Disposition (PT): inpatient rehabilitation facility    Alicia Waite PT  10/1/2024
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
## 2024-10-01 NOTE — PLAN OF CARE
Goal Outcome Evaluation:  Plan of Care Reviewed With: (P) patient        Progress: (P) no change  Outcome Evaluation: (P) OT eval complete. Pt presenting with fatigue, generalized weakness, and decreased activity tolerance impacting ADL independence. Pt was SBA for bed mobility and CGA for toilet transfer this date. Continue IP OT to address current deficits. Recommend IRF at d/c for best functional outcome.      Anticipated Discharge Disposition (OT): (P) inpatient rehabilitation facility

## 2024-10-01 NOTE — THERAPY EVALUATION
Patient Name: Maura Jiménez  : 1945    MRN: [id number]                              Today's Date: 10/1/2024       Admit Date: 2024    Visit Dx:     ICD-10-CM ICD-9-CM   1. Acute respiratory failure with hypoxia  J96.01 518.81   2. COPD with acute exacerbation  J44.1 491.21   3. COVID-19  U07.1 079.89   4. Atypical pneumonia  J18.9 486     Patient Active Problem List   Diagnosis    Respiratory failure with hypoxia    Chronic obstructive pulmonary disease with acute exacerbation    Type 2 diabetes mellitus without complication, without long-term current use of insulin    COPD exacerbation    Acute on chronic respiratory failure with hypoxia    Chronic respiratory failure with hypoxia    Rhinovirus infection    Severe malnutrition    Impaired mobility    Acute respiratory failure with hypoxia    Cytokine release syndrome, grade 1    Cytokine release syndrome, grade 1    Pneumonia    Acute respiratory failure with hypoxia    Hypoalbuminemia    Essential hypertension    Mixed hyperlipidemia    Other specified hypothyroidism    Paroxysmal atrial fibrillation     Past Medical History:   Diagnosis Date    COPD (chronic obstructive pulmonary disease)     Disease of thyroid gland     Hyperlipidemia     Osteoarthritis     Type 2 diabetes mellitus without complication, without long-term current use of insulin 2018     Past Surgical History:   Procedure Laterality Date    APPENDECTOMY      CATARACT EXTRACTION, BILATERAL      HYSTERECTOMY        General Information       Row Name 10/01/24 1000          OT Time and Intention    Document Type evaluation (P)   -BH     Mode of Treatment occupational therapy (P)   -BH       Row Name 10/01/24 1000          General Information    Patient Profile Reviewed yes (P)   -BH     Prior Level of Function independent:;all household mobility;community mobility;bed mobility;ADL's (P)   -BH     Existing Precautions/Restrictions fall;oxygen therapy device and L/min (P)   -BH      Barriers to Rehab medically complex;previous functional deficit (P)   -       Row Name 10/01/24 1000          Living Environment    People in Home spouse (P)   -       Row Name 10/01/24 1000          Home Main Entrance    Number of Stairs, Main Entrance one (P)   -       Row Name 10/01/24 1000          Cognition    Orientation Status (Cognition) oriented x 4 (P)   -       Row Name 10/01/24 1000          Safety Issues, Functional Mobility    Safety Issues Affecting Function (Mobility) awareness of need for assistance;insight into deficits/self-awareness;safety precaution awareness;safety precautions follow-through/compliance (P)   -     Impairments Affecting Function (Mobility) balance;endurance/activity tolerance;strength;shortness of breath (P)   -               User Key  (r) = Recorded By, (t) = Taken By, (c) = Cosigned By      Initials Name Provider Type     Aruna Mandujano, OT Student OT Student                     Mobility/ADL's       Row Name 10/01/24 1001          Bed Mobility    Bed Mobility supine-sit (P)   -     Supine-Sit [place] (Bed Mobility) standby assist (P)   -     Assistive Device (Bed Mobility) head of bed elevated;bed rails (P)   -     Comment, (Bed Mobility) Pt required increased time and effort to complete. (P)   -       Row Name 10/01/24 1001          Transfers    Comment, (Transfers) Pt w/ c/o dizziness once EOB, /50 and SpO2 91% on 4L O2 (P)   -       Row Name 10/01/24 1001          Sit-Stand Transfer    Sit-Stand [place] (Transfers) contact guard;1 person assist (P)   -     Assistive Device (Sit-Stand Transfers) walker, front-wheeled (P)   -     Comment, (Sit-Stand Transfer) VCs for improving HP, x1 STS from EOB, x2 STS from commode (P)   -       Row Name 10/01/24 1001          Functional Mobility    Functional Mobility-Distance (Feet) -- (P)    distance  -     Patient was able to Ambulate yes (P)   -       Row Name 10/01/24 1001           Activities of Daily Living    BADL Assessment/Intervention toileting (P)   -       Row Name 10/01/24 1001          Toileting Assessment/Training    [place] (Toileting) adjust/manage clothing;perform perineal hygiene;maximum assist (25% patient effort) (P)   -     Position (Toileting) unsupported sitting;supported standing (P)   -     Comment, (Toileting) Pt required Max A d/t BM. (P)   -               User Key  (r) = Recorded By, (t) = Taken By, (c) = Cosigned By      Initials Name Provider Type     Aruna Mandujano OT Student OT Student                   Obj/Interventions       Row Name 10/01/24 1003          Sensory Assessment (Somatosensory)    Sensory Assessment (Somatosensory) sensation intact (P)   -       Row Name 10/01/24 1003          Vision Assessment/Intervention    Visual Impairment/Limitations WFL (P)   -       Row Name 10/01/24 1003          Range of Motion Comprehensive    General Range of Motion no range of motion deficits identified (P)   -       Row Name 10/01/24 1003          Strength Comprehensive (MMT)    General Manual Muscle Testing (MMT) Assessment no strength deficits identified (P)   -     Comment, General Manual Muscle Testing (MMT) Assessment BUE MMT grossly 4/5 as observed through functional tasks (P)   -       Row Name 10/01/24 1003          Balance    Balance Assessment sitting static balance;sitting dynamic balance;sit to stand dynamic balance;standing static balance;standing dynamic balance (P)   -     Static Sitting Balance standby assist (P)   -     Dynamic Sitting Balance standby assist (P)   -     Position, Sitting Balance unsupported;sitting edge of bed (P)   -     Sit to Stand Dynamic Balance contact guard (P)   -     Static Standing Balance contact guard (P)   -     Dynamic Standing Balance contact guard (P)   -     Position/Device Used, Standing Balance supported;walker, front-wheeled (P)   -     Balance Interventions  sitting;standing;sit to stand;supported;static;dynamic;occupation based/functional task (P)   -     Comment, Balance No overt LOB. (P)   -               User Key  (r) = Recorded By, (t) = Taken By, (c) = Cosigned By      Initials Name Provider Type    Aruna Viramontes, OT Student OT Student                   Goals/Plan       Row Name 10/01/24 1007          Dressing Goal 1 (OT)    Activity/Device (Dressing Goal 1, OT) lower body dressing (P)   -     [place]/Cues Needed (Dressing Goal 1, OT) standby assist (P)   -     Time Frame (Dressing Goal 1, OT) short term goal (STG);3 days (P)   -     Progress/Outcome (Dressing Goal 1, OT) new goal (P)   -       Row Name 10/01/24 1007          Toileting Goal 1 (OT)    Activity/Device (Toileting Goal 1, OT) adjust/manage clothing;perform perineal hygiene (P)   -     [place] Level/Cues Needed (Toileting Goal 1, OT) minimum assist (75% or more patient effort) (P)   -     Time Frame (Toileting Goal 1, OT) short term goal (STG);3 days (P)   -     Progress/Outcome (Toileting Goal 1, OT) new goal (P)   -       Row Name 10/01/24 1007          Grooming Goal 1 (OT)    Activity/Device (Grooming Goal 1, OT) oral care;wash face, hands (P)   -     [place] (Grooming Goal 1, OT) standby assist (P)   [place]  -     Time Frame (Grooming Goal 1, OT) long term goal (LTG);5 days (P)   -     Progress/Outcome (Grooming Goal 1, OT) new goal (P)   -       Row Name 10/01/24 1007          Therapy Assessment/Plan (OT)    Planned Therapy Interventions (OT) activity tolerance training;BADL retraining;functional balance retraining;occupation/activity based interventions;ROM/therapeutic exercise;strengthening exercise;transfer/mobility retraining (P)   -               User Key  (r) = Recorded By, (t) = Taken By, (c) = Cosigned By      Initials Name Provider Type    Aruna Viramontes, OT Student OT Student                   Clinical Impression       Row  Name 10/01/24 1004          Pain Assessment    Pretreatment Pain Rating 0/10 - no pain (P)   -     Posttreatment Pain Rating 0/10 - no pain (P)   -       Row Name 10/01/24 1004          Plan of Care Review    Plan of Care Reviewed With patient (P)   -     Progress no change (P)   -     Outcome Evaluation OT eval complete. Pt presenting with fatigue, generalized weakness, and decreased activity tolerance impacting ADL independence. Pt was SBA for bed mobility and CGA for toilet transfer this date. Continue IP OT to address current deficits. Recommend IRF at d/c for best functional outcome. (P)   -       Row Name 10/01/24 1004          Therapy Assessment/Plan (OT)    Patient/Family Therapy Goal Statement (OT) Return to PLOF (P)   -     Rehab Potential (OT) good, to achieve stated therapy goals (P)   -     Criteria for Skilled Therapeutic Interventions Met (OT) yes;meets criteria;skilled treatment is necessary (P)   -     Therapy Frequency (OT) daily (P)   -     Predicted Duration of Therapy Intervention (OT) 5 days (P)   -       Row Name 10/01/24 1004          Therapy Plan Review/Discharge Plan (OT)    Anticipated Discharge Disposition (OT) inpatient rehabilitation facility (P)   -       Row Name 10/01/24 1004          Vital Signs    Intra Systolic BP Rehab 131 (P)   -BH     Intra Treatment Diastolic BP 50 (P)   -BH     Pretreatment Heart Rate (beats/min) 73 (P)   -BH     Posttreatment Heart Rate (beats/min) 74 (P)   -BH     Pre SpO2 (%) 94 (P)   -BH     O2 Delivery Pre Treatment supplemental O2 (P)   -BH     Intra SpO2 (%) 91 (P)   -BH     O2 Delivery Intra Treatment supplemental O2 (P)   -BH     Post SpO2 (%) 97 (P)   -BH     O2 Delivery Post Treatment supplemental O2 (P)   -BH     Pre Patient Position Supine (P)   -BH     Intra Patient Position Standing (P)   -BH     Post Patient Position Sitting (P)   -       Row Name 10/01/24 1004          Positioning and Restraints    Pre-Treatment  Position in bed (P)   -BH     Post Treatment Position chair (P)   -BH     In Chair notified nsg;reclined;call light within reach;encouraged to call for assist;exit alarm on;legs elevated (P)   -               User Key  (r) = Recorded By, (t) = Taken By, (c) = Cosigned By      Initials Name Provider Type     Aruna Mandujano, OT Student OT Student                   Outcome Measures       Row Name 10/01/24 1008          How much help from another is currently needed...    Putting on and taking off regular lower body clothing? 2 (P)   -BH     Bathing (including washing, rinsing, and drying) 2 (P)   -BH     Toileting (which includes using toilet bed pan or urinal) 2 (P)   -BH     Putting on and taking off regular upper body clothing 3 (P)   -BH     Taking care of personal grooming (such as brushing teeth) 3 (P)   -BH     Eating meals 4 (P)   -BH     AM-PAC 6 Clicks Score (OT) 16 (P)   -BH       Row Name 10/01/24 0959 10/01/24 0845       How much help from another person do you currently need...    Turning from your back to your side while in flat bed without using bedrails? 4  -KE 4  -CB    Moving from lying on back to sitting on the side of a flat bed without bedrails? 3  -KE 4  -CB    Moving to and from a bed to a chair (including a wheelchair)? 3  -KE 3  -CB    Standing up from a chair using your arms (e.g., wheelchair, bedside chair)? 3  -KE 3  -CB    Climbing 3-5 steps with a railing? 3  -KE 2  -CB    To walk in hospital room? 3  -KE 3  -CB    AM-PAC 6 Clicks Score (PT) 19  -KE 19  -CB    Highest Level of Mobility Goal 6 --> Walk 10 steps or more  -KE 6 --> Walk 10 steps or more  -CB      Row Name 10/01/24 0025          How much help from another person do you currently need...    Turning from your back to your side while in flat bed without using bedrails? 4  -TA     Moving from lying on back to sitting on the side of a flat bed without bedrails? 4  -TA     Moving to and from a bed to a chair (including a  wheelchair)? 3  -TA     Standing up from a chair using your arms (e.g., wheelchair, bedside chair)? 3  -TA     Climbing 3-5 steps with a railing? 2  -TA     To walk in hospital room? 3  -TA     AM-PAC 6 Clicks Score (PT) 19  -TA     Highest Level of Mobility Goal 6 --> Walk 10 steps or more  -TA       Row Name 10/01/24 1008 10/01/24 0959       Functional Assessment    Outcome Measure Options AM-PAC 6 Clicks Daily Activity (OT) (P)   - AM-PAC 6 Clicks Basic Mobility (PT)  -LA              User Key  (r) = Recorded By, (t) = Taken By, (c) = Cosigned By      Initials Name Provider Type    Harleen Aguillon, RN Registered Nurse    Marcel Avery, RN Registered Nurse    Alicia Meadows, PT Physical Therapist     Aruna Mandujano, OT Student OT Student                    Occupational Therapy Education       Title: PT OT SLP Therapies (In Progress)       Topic: Occupational Therapy (Not Started)       Point: ADL training (Not Started)       Description:   Instruct learner(s) on proper safety adaptation and remediation techniques during self care or transfers.   Instruct in proper use of assistive devices.                  Learner Progress:  Not documented in this visit.              Point: Home exercise program (Not Started)       Description:   Instruct learner(s) on appropriate technique for monitoring, assisting and/or progressing therapeutic exercises/activities.                  Learner Progress:  Not documented in this visit.              Point: Precautions (Not Started)       Description:   Instruct learner(s) on prescribed precautions during self-care and functional transfers.                  Learner Progress:  Not documented in this visit.              Point: Body mechanics (Not Started)       Description:   Instruct learner(s) on proper positioning and spine alignment during self-care, functional mobility activities and/or exercises.                  Learner Progress:  Not documented in this visit.                                   OT Recommendation and Plan  Planned Therapy Interventions (OT): (P) activity tolerance training, BADL retraining, functional balance retraining, occupation/activity based interventions, ROM/therapeutic exercise, strengthening exercise, transfer/mobility retraining  Therapy Frequency (OT): (P) daily  Plan of Care Review  Plan of Care Reviewed With: (P) patient  Progress: (P) no change  Outcome Evaluation: (P) OT eval complete. Pt presenting with fatigue, generalized weakness, and decreased activity tolerance impacting ADL independence. Pt was SBA for bed mobility and CGA for toilet transfer this date. Continue IP OT to address current deficits. Recommend IRF at d/c for best functional outcome.     Time Calculation:   Evaluation Complexity (OT)  Review Occupational Profile/Medical/Therapy History Complexity: (P) brief/low complexity  Assessment, Occupational Performance/Identification of Deficit Complexity: (P) 1-3 performance deficits  Clinical Decision Making Complexity (OT): (P) problem focused assessment/low complexity  Overall Complexity of Evaluation (OT): (P) low complexity     Time Calculation- OT       Row Name 10/01/24 1009             Time Calculation-     OT Start Time 0907 (P)   -      OT Received On 10/01/24 (P)   -      OT Goal Re-Cert Due Date 10/11/24 (P)   -         Timed Charges    52520 - OT Self Care/Mgmt Minutes 13 (P)   -         Untimed Charges    OT Eval/Re-eval Minutes 50 (P)   -         Total Minutes    Timed Charges Total Minutes 13 (P)   -      Untimed Charges Total Minutes 50 (P)   -       Total Minutes 63 (P)   -                User Key  (r) = Recorded By, (t) = Taken By, (c) = Cosigned By      Initials Name Provider Type     Aruna Mandujano, OT Student OT Student                  Therapy Charges for Today       Code Description Service Date Service Provider Modifiers Qty    [id number]  OT SELF CARE/MGMT/TRAIN EA 15 MIN 10/1/2024  Aruna Mandujano, OT Student GO 1    [id number]  OT EVAL LOW COMPLEXITY 4 10/1/2024 Aruna Mandujano, OT Student GO 1                 Aruna Mandujano, CAROLEE Student  10/1/2024

## 2024-10-02 ENCOUNTER — APPOINTMENT (OUTPATIENT)
Dept: GENERAL RADIOLOGY | Facility: HOSPITAL | Age: 79
DRG: 177 | End: 2024-10-02
Payer: MEDICARE

## 2024-10-02 LAB
ALBUMIN SERPL-MCNC: 2.7 G/DL (ref 3.5–5.2)
ALBUMIN/GLOB SERPL: 1.8 G/DL
ALP SERPL-CCNC: 56 U/L (ref 39–117)
ALT SERPL W P-5'-P-CCNC: 17 U/L (ref 1–33)
ANION GAP SERPL CALCULATED.3IONS-SCNC: 4 MMOL/L (ref 5–15)
AST SERPL-CCNC: 19 U/L (ref 1–32)
BACTERIA SPEC AEROBE CULT: NORMAL
BACTERIA SPEC AEROBE CULT: NORMAL
BASOPHILS # BLD AUTO: 0.01 10*3/MM3 (ref 0–0.2)
BASOPHILS NFR BLD AUTO: 0.1 % (ref 0–1.5)
BILIRUB SERPL-MCNC: 0.2 MG/DL (ref 0–1.2)
BUN SERPL-MCNC: 19 MG/DL (ref 8–23)
BUN/CREAT SERPL: 47.5 (ref 7–25)
CALCIUM SPEC-SCNC: 8.2 MG/DL (ref 8.6–10.5)
CHLORIDE SERPL-SCNC: 99 MMOL/L (ref 98–107)
CO2 SERPL-SCNC: 37 MMOL/L (ref 22–29)
CREAT SERPL-MCNC: 0.4 MG/DL (ref 0.57–1)
CRP SERPL-MCNC: 0.98 MG/DL (ref 0–0.5)
DEPRECATED RDW RBC AUTO: 40.6 FL (ref 37–54)
EGFRCR SERPLBLD CKD-EPI 2021: 100.2 ML/MIN/1.73
EOSINOPHIL # BLD AUTO: 0.05 10*3/MM3 (ref 0–0.4)
EOSINOPHIL NFR BLD AUTO: 0.5 % (ref 0.3–6.2)
ERYTHROCYTE [DISTWIDTH] IN BLOOD BY AUTOMATED COUNT: 12.3 % (ref 12.3–15.4)
GLOBULIN UR ELPH-MCNC: 1.5 GM/DL
GLUCOSE BLDC GLUCOMTR-MCNC: 126 MG/DL (ref 70–130)
GLUCOSE BLDC GLUCOMTR-MCNC: 189 MG/DL (ref 70–130)
GLUCOSE BLDC GLUCOMTR-MCNC: 233 MG/DL (ref 70–130)
GLUCOSE BLDC GLUCOMTR-MCNC: 286 MG/DL (ref 70–130)
GLUCOSE SERPL-MCNC: 121 MG/DL (ref 65–99)
HCT VFR BLD AUTO: 29.2 % (ref 34–46.6)
HGB BLD-MCNC: 9.1 G/DL (ref 12–15.9)
IMM GRANULOCYTES # BLD AUTO: 0.05 10*3/MM3 (ref 0–0.05)
IMM GRANULOCYTES NFR BLD AUTO: 0.5 % (ref 0–0.5)
LYMPHOCYTES # BLD AUTO: 0.93 10*3/MM3 (ref 0.7–3.1)
LYMPHOCYTES NFR BLD AUTO: 9.5 % (ref 19.6–45.3)
MAGNESIUM SERPL-MCNC: 1.9 MG/DL (ref 1.6–2.4)
MCH RBC QN AUTO: 28.5 PG (ref 26.6–33)
MCHC RBC AUTO-ENTMCNC: 31.2 G/DL (ref 31.5–35.7)
MCV RBC AUTO: 91.5 FL (ref 79–97)
MONOCYTES # BLD AUTO: 0.71 10*3/MM3 (ref 0.1–0.9)
MONOCYTES NFR BLD AUTO: 7.2 % (ref 5–12)
NEUTROPHILS NFR BLD AUTO: 8.08 10*3/MM3 (ref 1.7–7)
NEUTROPHILS NFR BLD AUTO: 82.2 % (ref 42.7–76)
NRBC BLD AUTO-RTO: 0 /100 WBC (ref 0–0.2)
PLATELET # BLD AUTO: 344 10*3/MM3 (ref 140–450)
PMV BLD AUTO: 10.8 FL (ref 6–12)
POTASSIUM SERPL-SCNC: 4.3 MMOL/L (ref 3.5–5.2)
PROT SERPL-MCNC: 4.2 G/DL (ref 6–8.5)
RBC # BLD AUTO: 3.19 10*6/MM3 (ref 3.77–5.28)
SODIUM SERPL-SCNC: 140 MMOL/L (ref 136–145)
WBC NRBC COR # BLD AUTO: 9.83 10*3/MM3 (ref 3.4–10.8)

## 2024-10-02 PROCEDURE — 85025 COMPLETE CBC W/AUTO DIFF WBC: CPT | Performed by: STUDENT IN AN ORGANIZED HEALTH CARE EDUCATION/TRAINING PROGRAM

## 2024-10-02 PROCEDURE — 80053 COMPREHEN METABOLIC PANEL: CPT | Performed by: STUDENT IN AN ORGANIZED HEALTH CARE EDUCATION/TRAINING PROGRAM

## 2024-10-02 PROCEDURE — 94799 UNLISTED PULMONARY SVC/PX: CPT

## 2024-10-02 PROCEDURE — 25810000003 SODIUM CHLORIDE 0.9 % SOLUTION 250 ML FLEX CONT

## 2024-10-02 PROCEDURE — 82948 REAGENT STRIP/BLOOD GLUCOSE: CPT

## 2024-10-02 PROCEDURE — 83735 ASSAY OF MAGNESIUM: CPT | Performed by: STUDENT IN AN ORGANIZED HEALTH CARE EDUCATION/TRAINING PROGRAM

## 2024-10-02 PROCEDURE — 25010000002 CEFTRIAXONE PER 250 MG: Performed by: STUDENT IN AN ORGANIZED HEALTH CARE EDUCATION/TRAINING PROGRAM

## 2024-10-02 PROCEDURE — 94664 DEMO&/EVAL PT USE INHALER: CPT

## 2024-10-02 PROCEDURE — 25010000002 REMDESIVIR 100 MG/20ML SOLUTION 1 EACH VIAL

## 2024-10-02 PROCEDURE — 86140 C-REACTIVE PROTEIN: CPT | Performed by: INTERNAL MEDICINE

## 2024-10-02 PROCEDURE — 99232 SBSQ HOSP IP/OBS MODERATE 35: CPT | Performed by: STUDENT IN AN ORGANIZED HEALTH CARE EDUCATION/TRAINING PROGRAM

## 2024-10-02 PROCEDURE — 63710000001 INSULIN LISPRO (HUMAN) PER 5 UNITS: Performed by: STUDENT IN AN ORGANIZED HEALTH CARE EDUCATION/TRAINING PROGRAM

## 2024-10-02 PROCEDURE — 63710000001 INSULIN LISPRO (HUMAN) PER 5 UNITS: Performed by: PHYSICIAN ASSISTANT

## 2024-10-02 PROCEDURE — 94761 N-INVAS EAR/PLS OXIMETRY MLT: CPT

## 2024-10-02 PROCEDURE — 63710000001 DEXAMETHASONE PER 0.25 MG: Performed by: INTERNAL MEDICINE

## 2024-10-02 PROCEDURE — 71045 X-RAY EXAM CHEST 1 VIEW: CPT

## 2024-10-02 RX ORDER — INSULIN LISPRO 100 [IU]/ML
4 INJECTION, SOLUTION INTRAVENOUS; SUBCUTANEOUS
Status: DISCONTINUED | OUTPATIENT
Start: 2024-10-02 | End: 2024-10-05 | Stop reason: HOSPADM

## 2024-10-02 RX ORDER — CASTOR OIL AND BALSAM, PERU 788; 87 MG/G; MG/G
1 OINTMENT TOPICAL EVERY 12 HOURS SCHEDULED
Status: DISCONTINUED | OUTPATIENT
Start: 2024-10-02 | End: 2024-10-05 | Stop reason: HOSPADM

## 2024-10-02 RX ADMIN — IPRATROPIUM BROMIDE AND ALBUTEROL SULFATE 3 ML: 2.5; .5 SOLUTION RESPIRATORY (INHALATION) at 18:34

## 2024-10-02 RX ADMIN — DEXAMETHASONE 6 MG: 4 TABLET ORAL at 08:57

## 2024-10-02 RX ADMIN — Medication 10 ML: at 19:52

## 2024-10-02 RX ADMIN — INSULIN LISPRO 3 UNITS: 100 INJECTION, SOLUTION INTRAVENOUS; SUBCUTANEOUS at 11:56

## 2024-10-02 RX ADMIN — IPRATROPIUM BROMIDE AND ALBUTEROL SULFATE 3 ML: 2.5; .5 SOLUTION RESPIRATORY (INHALATION) at 16:40

## 2024-10-02 RX ADMIN — CASTOR OIL AND BALSAM, PERU 1 APPLICATION: 788; 87 OINTMENT TOPICAL at 13:50

## 2024-10-02 RX ADMIN — INSULIN LISPRO 3 UNITS: 100 INJECTION, SOLUTION INTRAVENOUS; SUBCUTANEOUS at 08:56

## 2024-10-02 RX ADMIN — LEVOTHYROXINE SODIUM 75 MCG: 0.07 TABLET ORAL at 05:12

## 2024-10-02 RX ADMIN — GUAIFENESIN 1200 MG: 600 TABLET, EXTENDED RELEASE ORAL at 08:57

## 2024-10-02 RX ADMIN — IPRATROPIUM BROMIDE AND ALBUTEROL SULFATE 3 ML: 2.5; .5 SOLUTION RESPIRATORY (INHALATION) at 23:22

## 2024-10-02 RX ADMIN — Medication 5 MG: at 22:33

## 2024-10-02 RX ADMIN — INSULIN LISPRO 2 UNITS: 100 INJECTION, SOLUTION INTRAVENOUS; SUBCUTANEOUS at 11:55

## 2024-10-02 RX ADMIN — APIXABAN 2.5 MG: 2.5 TABLET, FILM COATED ORAL at 19:55

## 2024-10-02 RX ADMIN — GUAIFENESIN 1200 MG: 600 TABLET, EXTENDED RELEASE ORAL at 19:53

## 2024-10-02 RX ADMIN — IPRATROPIUM BROMIDE AND ALBUTEROL SULFATE 3 ML: 2.5; .5 SOLUTION RESPIRATORY (INHALATION) at 02:39

## 2024-10-02 RX ADMIN — SODIUM CHLORIDE 1000 MG: 900 INJECTION INTRAVENOUS at 13:50

## 2024-10-02 RX ADMIN — PRAVASTATIN SODIUM 40 MG: 40 TABLET ORAL at 19:51

## 2024-10-02 RX ADMIN — INSULIN LISPRO 4 UNITS: 100 INJECTION, SOLUTION INTRAVENOUS; SUBCUTANEOUS at 17:02

## 2024-10-02 RX ADMIN — REMDESIVIR 100 MG: 100 INJECTION, POWDER, LYOPHILIZED, FOR SOLUTION INTRAVENOUS at 09:49

## 2024-10-02 RX ADMIN — INSULIN LISPRO 4 UNITS: 100 INJECTION, SOLUTION INTRAVENOUS; SUBCUTANEOUS at 19:57

## 2024-10-02 RX ADMIN — ASPIRIN 81 MG: 81 TABLET, COATED ORAL at 08:57

## 2024-10-02 RX ADMIN — IPRATROPIUM BROMIDE AND ALBUTEROL SULFATE 3 ML: 2.5; .5 SOLUTION RESPIRATORY (INHALATION) at 07:36

## 2024-10-02 RX ADMIN — INSULIN LISPRO 6 UNITS: 100 INJECTION, SOLUTION INTRAVENOUS; SUBCUTANEOUS at 17:02

## 2024-10-02 RX ADMIN — CASTOR OIL AND BALSAM, PERU 1 APPLICATION: 788; 87 OINTMENT TOPICAL at 19:54

## 2024-10-02 RX ADMIN — APIXABAN 2.5 MG: 2.5 TABLET, FILM COATED ORAL at 08:57

## 2024-10-02 NOTE — NURSING NOTE
WOC consulted for pressure injury to right of coccyx.    The patient is malnourished.  Her skin is very loose and friable on her buttock.  She has had issues with diarrhea over the last couple of days per review of notes.    There is an area to the right of the coccyx that appears to be a friction/shear injury.  The epidermis appears to have been peeled off whether due to over excess moisture or being pulled up in the bed.  The wound bed is blanchable at this time.  Recommend topical Venelex and Optifoam dressing.    Patient is a very high risk for pressure injury development and should be turned and offloaded appropriately.    WOC will follow.    Mykel Blanca RN, BSN, CCRN, CWOCN  Wound, Ostomy and Continence (WOC) Department  [facility]

## 2024-10-02 NOTE — PLAN OF CARE
Problem: Adult Inpatient Plan of Care  Goal: Plan of Care Review  Outcome: Progressing  Goal: Patient-Specific Goal (Individualized)  Outcome: Progressing  Goal: Absence of Hospital-Acquired Illness or Injury  Outcome: Progressing  Intervention: Identify and Manage Fall Risk  Recent Flowsheet Documentation  Taken 10/2/2024 0430 by Marcel Schulz RN  Safety Promotion/Fall Prevention:   activity supervised   assistive device/personal items within reach   clutter free environment maintained   fall prevention program maintained   gait belt   mobility aid in reach   nonskid shoes/slippers when out of bed   room organization consistent   safety round/check completed  Taken 10/2/2024 0220 by Marcel Schulz RN  Safety Promotion/Fall Prevention:   activity supervised   assistive device/personal items within reach   clutter free environment maintained   fall prevention program maintained   gait belt   mobility aid in reach   nonskid shoes/slippers when out of bed   room organization consistent   safety round/check completed  Taken 10/2/2024 0016 by Marcel Schulz RN  Safety Promotion/Fall Prevention:   activity supervised   assistive device/personal items within reach   clutter free environment maintained   fall prevention program maintained   gait belt   mobility aid in reach   nonskid shoes/slippers when out of bed   room organization consistent   safety round/check completed  Taken 10/1/2024 2230 by Marcel Schulz RN  Safety Promotion/Fall Prevention:   activity supervised   assistive device/personal items within reach   clutter free environment maintained   fall prevention program maintained   gait belt   mobility aid in reach   nonskid shoes/slippers when out of bed   room organization consistent   safety round/check completed  Taken 10/1/2024 2028 by Marcel Schulz RN  Safety Promotion/Fall Prevention:   activity supervised   assistive device/personal items within reach   clutter free environment maintained    fall prevention program maintained   gait belt   mobility aid in reach   nonskid shoes/slippers when out of bed   room organization consistent   safety round/check completed  Intervention: Prevent Skin Injury  Recent Flowsheet Documentation  Taken 10/2/2024 0430 by Marcel Schulz RN  Body Position:   side-lying   left  Skin Protection:   adhesive use limited   tubing/devices free from skin contact   silicone foam dressing in place  Taken 10/2/2024 0220 by Marcel Schulz RN  Body Position:   side-lying   right  Taken 10/2/2024 0016 by Marcel Schulz RN  Body Position:   side-lying   left  Skin Protection:   adhesive use limited   incontinence pads utilized   silicone foam dressing in place   tubing/devices free from skin contact  Taken 10/1/2024 2230 by Marcel Schulz RN  Body Position:   position changed independently   right  Taken 10/1/2024 2028 by Marcel Schulz RN  Body Position: (Pt turns independently. Pt instructed to side lie rotationally. Pt agreed and is currently lying on left side. Staff to remind pt and assist with turning as needed.)   side-lying   right  Skin Protection:   adhesive use limited   incontinence pads utilized   silicone foam dressing in place   tubing/devices free from skin contact  Intervention: Prevent and Manage VTE (Venous Thromboembolism) Risk  Recent Flowsheet Documentation  Taken 10/2/2024 0430 by Marcel Schulz RN  Activity Management: activity encouraged  VTE Prevention/Management:   bilateral   sequential compression devices on  Taken 10/2/2024 0220 by Marcel Schulz RN  Activity Management: activity encouraged  VTE Prevention/Management:   bilateral   sequential compression devices on  Taken 10/2/2024 0016 by Marcel Schulz RN  Activity Management: activity encouraged  VTE Prevention/Management:   bilateral   sequential compression devices on  Taken 10/1/2024 2230 by Marcel Schulz RN  Activity Management: activity encouraged  VTE Prevention/Management:   bilateral    sequential compression devices on  Taken 10/1/2024 2028 by Marcel Schulz RN  Activity Management: activity encouraged  VTE Prevention/Management:   bilateral   sequential compression devices on  Range of Motion: active ROM (range of motion) encouraged  Intervention: Prevent Infection  Recent Flowsheet Documentation  Taken 10/2/2024 0430 by Marcel Schulz RN  Infection Prevention:   personal protective equipment utilized   rest/sleep promoted   single patient room provided  Taken 10/2/2024 0220 by Marcel Schulz RN  Infection Prevention:   personal protective equipment utilized   rest/sleep promoted   single patient room provided  Taken 10/2/2024 0016 by Marcel Schulz RN  Infection Prevention:   environmental surveillance performed   personal protective equipment utilized   rest/sleep promoted   single patient room provided  Taken 10/1/2024 2230 by Marcel Schulz RN  Infection Prevention:   environmental surveillance performed   rest/sleep promoted   single patient room provided  Taken 10/1/2024 2028 by Marcel Schulz RN  Infection Prevention:   environmental surveillance performed   rest/sleep promoted   single patient room provided  Goal: Optimal Comfort and Wellbeing  Outcome: Progressing  Intervention: Monitor Pain and Promote Comfort  Recent Flowsheet Documentation  Taken 10/2/2024 0430 by Marcel Schulz RN  Pain Management Interventions:   pillow support provided   quiet environment facilitated  Taken 10/2/2024 0220 by Marcel Schulz RN  Pain Management Interventions: quiet environment facilitated  Taken 10/2/2024 0016 by Marcel Schulz RN  Pain Management Interventions: (patient positioned self to side lying position)   pillow support provided   quiet environment facilitated  Taken 10/1/2024 2230 by Marcel Schulz RN  Pain Management Interventions:   pillow support provided   position adjusted   quiet environment facilitated  Intervention: Provide Person-Centered Care  Recent Flowsheet  Documentation  Taken 10/2/2024 0430 by Marcel Schulz RN  Trust Relationship/Rapport: care explained  Taken 10/2/2024 0016 by Marcel Schulz RN  Trust Relationship/Rapport: care explained  Taken 10/1/2024 2028 by Marcel Schulz RN  Trust Relationship/Rapport:   care explained   questions answered   questions encouraged  Goal: Readiness for Transition of Care  Outcome: Progressing     Problem: Skin Injury Risk Increased  Goal: Skin Health and Integrity  Outcome: Progressing  Intervention: Optimize Skin Protection  Recent Flowsheet Documentation  Taken 10/2/2024 0430 by Marcel Schulz RN  Pressure Reduction Techniques:   frequent weight shift encouraged   positioned off wounds  Head of Bed (HOB) Positioning: HOB elevated  Pressure Reduction Devices: (Pt turned self at nurse request)   positioning supports utilized   pressure-redistributing mattress utilized  Skin Protection:   adhesive use limited   tubing/devices free from skin contact   silicone foam dressing in place  Taken 10/2/2024 0220 by Marcel Schulz RN  Head of Bed (HOB) Positioning: HOB elevated  Taken 10/2/2024 0016 by Marcel Schulz RN  Pressure Reduction Techniques:   frequent weight shift encouraged   positioned off wounds   pressure points protected  Head of Bed (HOB) Positioning: HOB elevated  Pressure Reduction Devices: (Pt turned at nurse request)   pressure-redistributing mattress utilized   positioning supports utilized  Skin Protection:   adhesive use limited   incontinence pads utilized   silicone foam dressing in place   tubing/devices free from skin contact  Taken 10/1/2024 2230 by Marcel Schulz RN  Head of Bed (HOB) Positioning: HOB elevated  Taken 10/1/2024 2028 by Marcel Schulz RN  Pressure Reduction Techniques:   positioned off wounds   frequent weight shift encouraged   pressure points protected  Head of Bed (HOB) Positioning: HOB elevated  Pressure Reduction Devices:   pressure-redistributing mattress utilized   positioning  supports utilized  Skin Protection:   adhesive use limited   incontinence pads utilized   silicone foam dressing in place   tubing/devices free from skin contact     Problem: Fluid Imbalance (Pneumonia)  Goal: Fluid Balance  Outcome: Progressing     Problem: Infection (Pneumonia)  Goal: Resolution of Infection Signs and Symptoms  Outcome: Progressing  Intervention: Prevent Infection Progression  Recent Flowsheet Documentation  Taken 10/2/2024 0430 by Marcel Schulz RN  Isolation Precautions:   precautions maintained   contact   airborne  Taken 10/2/2024 0220 by Marcel Schulz RN  Isolation Precautions:   precautions maintained   airborne   contact  Taken 10/2/2024 0016 by Marcel Schulz RN  Isolation Precautions:   precautions maintained   airborne   contact  Taken 10/1/2024 2230 by Marcel Schulz RN  Isolation Precautions:   precautions maintained   airborne   contact  Taken 10/1/2024 2028 by Marcel Schulz RN  Isolation Precautions:   precautions maintained   airborne   contact     Problem: Respiratory Compromise (Pneumonia)  Goal: Effective Oxygenation and Ventilation  Outcome: Progressing  Intervention: Promote Airway Secretion Clearance  Recent Flowsheet Documentation  Taken 10/1/2024 2028 by Marcel Schulz RN  Cough And Deep Breathing: done independently per patient  Intervention: Optimize Oxygenation and Ventilation  Recent Flowsheet Documentation  Taken 10/2/2024 0430 by Marcel Schulz RN  Head of Bed (HOB) Positioning: HOB elevated  Taken 10/2/2024 0220 by Marcel Schulz RN  Head of Bed (HOB) Positioning: HOB elevated  Taken 10/2/2024 0016 by Marcel Schulz RN  Head of Bed (HOB) Positioning: HOB elevated  Taken 10/1/2024 2230 by Marcel Schulz RN  Head of Bed (HOB) Positioning: HOB elevated  Taken 10/1/2024 2028 by Marcel Schulz RN  Head of Bed (HOB) Positioning: HOB elevated     Problem: Fall Injury Risk  Goal: Absence of Fall and Fall-Related Injury  Outcome: Progressing  Intervention:  Identify and Manage Contributors  Recent Flowsheet Documentation  Taken 10/2/2024 0430 by Marcel Schulz RN  Self-Care Promotion: independence encouraged  Taken 10/2/2024 0220 by Marcel Schulz RN  Self-Care Promotion: independence encouraged  Taken 10/1/2024 2230 by Marcel Schulz RN  Self-Care Promotion: independence encouraged  Taken 10/1/2024 2028 by Marcel Schulz RN  Medication Review/Management: medications reviewed  Self-Care Promotion: independence encouraged  Intervention: Promote Injury-Free Environment  Recent Flowsheet Documentation  Taken 10/2/2024 0430 by Marcel Schulz RN  Safety Promotion/Fall Prevention:   activity supervised   assistive device/personal items within reach   clutter free environment maintained   fall prevention program maintained   gait belt   mobility aid in reach   nonskid shoes/slippers when out of bed   room organization consistent   safety round/check completed  Taken 10/2/2024 0220 by Marcel Schulz RN  Safety Promotion/Fall Prevention:   activity supervised   assistive device/personal items within reach   clutter free environment maintained   fall prevention program maintained   gait belt   mobility aid in reach   nonskid shoes/slippers when out of bed   room organization consistent   safety round/check completed  Taken 10/2/2024 0016 by Marcel Schulz RN  Safety Promotion/Fall Prevention:   activity supervised   assistive device/personal items within reach   clutter free environment maintained   fall prevention program maintained   gait belt   mobility aid in reach   nonskid shoes/slippers when out of bed   room organization consistent   safety round/check completed  Taken 10/1/2024 2230 by Marcel Schulz RN  Safety Promotion/Fall Prevention:   activity supervised   assistive device/personal items within reach   clutter free environment maintained   fall prevention program maintained   gait belt   mobility aid in reach   nonskid shoes/slippers when out of bed   room  organization consistent   safety round/check completed  Taken 10/1/2024 2028 by Marcel Schulz RN  Safety Promotion/Fall Prevention:   activity supervised   assistive device/personal items within reach   clutter free environment maintained   fall prevention program maintained   gait belt   mobility aid in reach   nonskid shoes/slippers when out of bed   room organization consistent   safety round/check completed   Goal Outcome Evaluation:          Pt resting in bed. A@OX4. VSS. 4LNC. UOP adequate. Ambulating well with staff assist. Pt reports SOB when ambulating. Foam dressing to coccyx and preventative dressing to bony prominences of spine.  Pt independently positions self, but needs to be reminded to do so. Staff has monitoring patient turns and instructing pt as to when to turn. Patient agreeable and cooperative.
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
## 2024-10-02 NOTE — CASE MANAGEMENT/SOCIAL WORK
Continued Stay Note  [facility]     Patient Name: Maura Jiménez  MRN: [id number]  Today's Date: 10/2/2024    Admit Date: 9/27/2024    Plan: home/family   Discharge Plan       Row Name 10/02/24 0856       Plan    Plan Comments PT/OT have recommended rehab for pt prior to discharge. CM spoke with pt about this and she has requested a referral to [facility]. April has the referral.      Pt now reports she prefers to return home with HH once medically ready. [facility] has been updated and HH will be arranged once medically ready for discharge                   Discharge Codes    No documentation.                 Expected Discharge Date and Time       Expected Discharge Date Expected Discharge Time    Sep 30, 2024               Virgen Galicia RN

## 2024-10-03 LAB
CRP SERPL-MCNC: 1.27 MG/DL (ref 0–0.5)
GLUCOSE BLDC GLUCOMTR-MCNC: 108 MG/DL (ref 70–130)
GLUCOSE BLDC GLUCOMTR-MCNC: 169 MG/DL (ref 70–130)
GLUCOSE BLDC GLUCOMTR-MCNC: 204 MG/DL (ref 70–130)
GLUCOSE BLDC GLUCOMTR-MCNC: 93 MG/DL (ref 70–130)
SARS-COV-2 AG RESP QL IA.RAPID: NORMAL

## 2024-10-03 PROCEDURE — 25010000002 CEFTRIAXONE PER 250 MG: Performed by: STUDENT IN AN ORGANIZED HEALTH CARE EDUCATION/TRAINING PROGRAM

## 2024-10-03 PROCEDURE — 94761 N-INVAS EAR/PLS OXIMETRY MLT: CPT

## 2024-10-03 PROCEDURE — 94664 DEMO&/EVAL PT USE INHALER: CPT

## 2024-10-03 PROCEDURE — 99221 1ST HOSP IP/OBS SF/LOW 40: CPT | Performed by: INTERNAL MEDICINE

## 2024-10-03 PROCEDURE — 63710000001 INSULIN LISPRO (HUMAN) PER 5 UNITS: Performed by: PHYSICIAN ASSISTANT

## 2024-10-03 PROCEDURE — 82948 REAGENT STRIP/BLOOD GLUCOSE: CPT

## 2024-10-03 PROCEDURE — 63710000001 DEXAMETHASONE PER 0.25 MG: Performed by: INTERNAL MEDICINE

## 2024-10-03 PROCEDURE — 99232 SBSQ HOSP IP/OBS MODERATE 35: CPT | Performed by: STUDENT IN AN ORGANIZED HEALTH CARE EDUCATION/TRAINING PROGRAM

## 2024-10-03 PROCEDURE — 86140 C-REACTIVE PROTEIN: CPT | Performed by: INTERNAL MEDICINE

## 2024-10-03 PROCEDURE — 63710000001 INSULIN LISPRO (HUMAN) PER 5 UNITS: Performed by: STUDENT IN AN ORGANIZED HEALTH CARE EDUCATION/TRAINING PROGRAM

## 2024-10-03 PROCEDURE — 94799 UNLISTED PULMONARY SVC/PX: CPT

## 2024-10-03 PROCEDURE — 87426 SARSCOV CORONAVIRUS AG IA: CPT | Performed by: STUDENT IN AN ORGANIZED HEALTH CARE EDUCATION/TRAINING PROGRAM

## 2024-10-03 RX ADMIN — METOPROLOL TARTRATE 25 MG: 25 TABLET, FILM COATED ORAL at 20:43

## 2024-10-03 RX ADMIN — ACETAMINOPHEN 650 MG: 325 TABLET ORAL at 18:54

## 2024-10-03 RX ADMIN — DEXAMETHASONE 6 MG: 4 TABLET ORAL at 08:53

## 2024-10-03 RX ADMIN — IPRATROPIUM BROMIDE AND ALBUTEROL SULFATE 3 ML: 2.5; .5 SOLUTION RESPIRATORY (INHALATION) at 08:16

## 2024-10-03 RX ADMIN — CASTOR OIL AND BALSAM, PERU 1 APPLICATION: 788; 87 OINTMENT TOPICAL at 20:43

## 2024-10-03 RX ADMIN — AMLODIPINE BESYLATE 5 MG: 5 TABLET ORAL at 08:53

## 2024-10-03 RX ADMIN — Medication 10 ML: at 08:54

## 2024-10-03 RX ADMIN — IPRATROPIUM BROMIDE AND ALBUTEROL SULFATE 3 ML: 2.5; .5 SOLUTION RESPIRATORY (INHALATION) at 23:43

## 2024-10-03 RX ADMIN — IPRATROPIUM BROMIDE AND ALBUTEROL SULFATE 3 ML: 2.5; .5 SOLUTION RESPIRATORY (INHALATION) at 12:57

## 2024-10-03 RX ADMIN — APIXABAN 2.5 MG: 2.5 TABLET, FILM COATED ORAL at 08:53

## 2024-10-03 RX ADMIN — APIXABAN 2.5 MG: 2.5 TABLET, FILM COATED ORAL at 20:43

## 2024-10-03 RX ADMIN — ASPIRIN 81 MG: 81 TABLET, COATED ORAL at 08:53

## 2024-10-03 RX ADMIN — INSULIN LISPRO 4 UNITS: 100 INJECTION, SOLUTION INTRAVENOUS; SUBCUTANEOUS at 17:34

## 2024-10-03 RX ADMIN — PRAVASTATIN SODIUM 40 MG: 40 TABLET ORAL at 20:43

## 2024-10-03 RX ADMIN — CASTOR OIL AND BALSAM, PERU 1 APPLICATION: 788; 87 OINTMENT TOPICAL at 08:54

## 2024-10-03 RX ADMIN — LOSARTAN POTASSIUM 25 MG: 25 TABLET, FILM COATED ORAL at 08:53

## 2024-10-03 RX ADMIN — IPRATROPIUM BROMIDE AND ALBUTEROL SULFATE 3 ML: 2.5; .5 SOLUTION RESPIRATORY (INHALATION) at 19:49

## 2024-10-03 RX ADMIN — LEVOTHYROXINE SODIUM 75 MCG: 0.07 TABLET ORAL at 05:01

## 2024-10-03 RX ADMIN — METOPROLOL TARTRATE 25 MG: 25 TABLET, FILM COATED ORAL at 08:53

## 2024-10-03 RX ADMIN — SODIUM CHLORIDE 1000 MG: 900 INJECTION INTRAVENOUS at 10:37

## 2024-10-03 RX ADMIN — INSULIN LISPRO 4 UNITS: 100 INJECTION, SOLUTION INTRAVENOUS; SUBCUTANEOUS at 08:54

## 2024-10-03 RX ADMIN — GUAIFENESIN 1200 MG: 600 TABLET, EXTENDED RELEASE ORAL at 20:43

## 2024-10-03 RX ADMIN — IPRATROPIUM BROMIDE AND ALBUTEROL SULFATE 3 ML: 2.5; .5 SOLUTION RESPIRATORY (INHALATION) at 17:09

## 2024-10-03 RX ADMIN — GUAIFENESIN 1200 MG: 600 TABLET, EXTENDED RELEASE ORAL at 08:53

## 2024-10-03 RX ADMIN — INSULIN LISPRO 4 UNITS: 100 INJECTION, SOLUTION INTRAVENOUS; SUBCUTANEOUS at 11:49

## 2024-10-03 RX ADMIN — INSULIN LISPRO 2 UNITS: 100 INJECTION, SOLUTION INTRAVENOUS; SUBCUTANEOUS at 20:42

## 2024-10-03 RX ADMIN — Medication 5 MG: at 20:43

## 2024-10-03 NOTE — PROGRESS NOTES
Malnutrition Severity Assessment    Patient Name:  Maura Jiménez  YOB: 1945  MRN: [id number]  Admit Date:  9/27/2024    Patient meets criteria for : Severe Malnutrition      Malnutrition Severity Assessment  Malnutrition Type: Chronic Disease - Related Malnutrition  Malnutrition Type (Last 8 Hours)       Malnutrition Severity Assessment       Row Name 10/03/24 1648       Malnutrition Severity Assessment    Malnutrition Type Chronic Disease - Related Malnutrition      Row Name 10/03/24 1648       Insufficient Energy Intake     Insufficient Energy Intake Findings Severe    Insufficient Energy Intake  <75% of est. energy requirement for > or equal to 3 months      Row Name 10/03/24 1648       Unintentional Weight Loss     Unintentional Weight Loss Findings --  32lb wt loss over 6 years, 32% wt loss      Row Name 10/03/24 1648       Muscle Loss    Loss of Muscle Mass Findings Severe    Episcopalian Region Severe - deep hollowing/scooping, lack of muscle to touch, facial bones well defined    Clavicle Bone Region Severe - protruding prominent bone    Acromion Bone Region Severe - squared shoulders, bones, and acromion process protrusion prominent    Dorsal Hand Region Severe - prominent depression    Patellar Region Severe - prominent bone, square looking, very little muscle definition    Anterior Thigh Region Severe - line/depression along thigh, obviously thin    Posterior Calf Region Severe - thin with very little definition/firmness      Row Name 10/03/24 1648       Fat Loss    Subcutaneous Fat Loss Findings Severe    Orbital Region  Severe - pronounced hollowness/depression, dark circles, loose saggy skin    Upper Arm Region Severe - mostly skin, very little space between folds, fingers touch      Row Name 10/03/24 1648       Criteria Met (Must meet criteria for severity in at least 2 of these categories: M Wasting, Fat Loss, Fluid, Secondary Signs, Wt. Status, Intake)    Patient meets criteria for   Severe Malnutrition                    Electronically signed by:  Key Sanford RD  10/03/24 16:50 EDT

## 2024-10-03 NOTE — CONSULTS
"          Clinical Nutrition Assessment     Patient Name: Maura Jiménez  YOB: 1945  MRN: [id number]  Date of Encounter: 10/03/24 16:32 EDT  Admission date: 9/27/2024  Reason for Visit: Follow-up protocol, Malnutrition Severity Assessment    Assessment   Nutrition Assessment   Admission Diagnosis:  Pneumonia [J18.9]    Problem List:    Pneumonia    Type 2 diabetes mellitus without complication, without long-term current use of insulin    COPD exacerbation    Acute respiratory failure with hypoxia    Hypoalbuminemia    Essential hypertension    Mixed hyperlipidemia    Other specified hypothyroidism    Paroxysmal atrial fibrillation      PMH:   She  has a past medical history of COPD (chronic obstructive pulmonary disease), Disease of thyroid gland, Hyperlipidemia, Osteoarthritis, and Type 2 diabetes mellitus without complication, without long-term current use of insulin (11/1/2018).    PSH:  She  has a past surgical history that includes Appendectomy; Hysterectomy; and Cataract extraction, bilateral.    Applicable Nutrition History:   Hx of Malnutrition  [facility]  2/21/23 9/16/24    Anthropometrics     Height: Height: 144.8 cm (57\")  Last Filed Weight: Weight: 31.1 kg (68 lb 8 oz) (09/28/24 0218)  Method: Weight Method: Bed scale  BMI: BMI (Calculated): 14.8    UBW:  68lb recently    Wt loss: 32lb's over past 6 years, 32% wt loss     Weight       Weight (kg) Weight (lbs) Weight Method Visit Report   10/31/2018 42.185 kg  93 lb  Stated      41.731 kg  92 lb      11/1/2018 43.636 kg  96 lb 3.2 oz  Bed scale     11/2/2018 44.906 kg  99 lb  Bed scale     11/3/2018 45.677 kg  100 lb 11.2 oz  Bed scale     11/4/2018 45.088 kg  99 lb 6.4 oz  Bed scale     11/5/2018 45.2 kg  99 lb 10.4 oz      9/12/2022 35.834 kg  79 lb  Stated      35.471 kg  78 lb 3.2 oz      9/15/2022 35.381 kg  78 lb  Stated     2/20/2023 36.288 kg  80 lb  Standing scale      34.1 kg  75 lb 2.8 oz  Bed scale     2/21/2023 32.7 kg  72 lb " 1.5 oz  Bed scale     2/4/2024 31.752 kg  70 lb  Standing scale     6/26/2024 31.298 kg  69 lb   --        --    9/10/2024 30.845 kg  68 lb  Stated     9/14/2024 30.845 kg  68 lb  Bed scale      28.8 kg  63 lb 7.9 oz      9/16/2024 28.8 kg  63 lb 7.9 oz      9/20/2024 30.845 kg  68 lb  Bed scale      28.7 kg  63 lb 4.4 oz      9/27/2024 30.845 kg  68 lb      9/28/2024 31.071 kg  68 lb 8 oz  Bed scale             Nutrition Focused Physical Exam    Date:  10-3    Patient meets criteria for malnutrition diagnosis, see MSA note.     Subjective   Reported/Observed/Food/Nutrition Related History:     10-3: pt came out of Covid Isolation today    Pt sitting up in chair, on nasal cannula, cachectic appearing, with squared shoulders, temporal wasting, severe muscle wasting and fat loss on exam    Pt reports she has been around 68lb's recently, although was 80lb;s awhile back, has had gradual wt loss over the past several years, typically eats 2 small meals per day, usually a sandwich and a cookie, does not cook, drinks ensure at home, has dentures, able to chew and swallow ok      9-28:Pt is in COVID isolation, all hx obtained via room phone. Pt with recent hospitalization at [facility] where was followed by ANGELICA. Noted at this met criteria for malnutrition, suspect with current BMI and known COPD would meet criteria for wasting once out of isolation - low creatinine. Pt reports long hx of poor appetite - primarily snacks vs meals. Drink Ensure - agreeable to receive equivalent while admitted. Denies weight changes - noted prior admission recorded 8.6% wt loss x3 months however current weight in line with reported CBW. Uncontrolled DM noted - A1c 8.1%. Denies N/V/D - hx of constipation but does not take bowel regimen at home. Known food allergies in medical records    Current Nutrition Prescription   PO: Cardiac DIabetic  Oral Nutrition Supplement: Boost GC 3x/day  Intake:  50% of 8 meals    Assessment & Plan   Nutrition  Diagnosis   Date:  10-3            Updated:    Problem Malnutrition     Etiology COPD   Signs/Symptoms Po intake < 75%, 32lb wt loss over past 6 years, 32% wt loss, severe muscle wasting, and fat loss   Status: Active    Goal:   Nutrition to support treatment and Increase intake      Nutrition Intervention      Follow treatment progress, Care plan reviewed, Advise alternate selection, Encourage intake, Supplement provided    Change to Regular High Protein Diet to encourage intake and avoid restriction on food choices    Encourage high protein foods, healthy carbohydrate choices    Ha1c = 7.3, diabetes fairly well controlled, DM Educator saw today    Pt meets criteria for severe chronic malnutrition 2nd COPD, evidenced by po intake < 75%, 32lb wt loss over 6 years, 32% wt loss, severe muscle wasting, fat loss      Monitoring/Evaluation:   Per protocol, I&O, PO intake, Supplement intake, Pertinent labs, Symptoms, POC/GOC    Key Sanford RD  Time Spent: 60min
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
## 2024-10-03 NOTE — CONSULTS
"Diabetes Education    Patient Name:  Maura Jiménez  YOB: 1945  MRN: [id number]  Admit Date:  9/27/2024      Total time spent reviewing chart, preparing education/materials, providing education at bedside, and coordinating care approx 30 minutes.    Consult for diabetes education received \"newly diagnosed\". Chart reviewed. Pt was seen at bedside today. Permission given for visit.     Pt states she has been aware of having diabetes for a few years. Takes metformin. Denies difficulty obtaining or tolerating it. SMBG twice per day at home. Is unable to recollect typical readings but does believe it usually runs in the 100s. Target blood glucose readings and A1c goals per ADA were reviewed. Reviewed with patient current A1c 8.1 and discussed its significance. Signs, symptoms, prevention and treatment of hyperglycemia and hypoglycemia were discussed.     Discussed with her effects of steroid on blood glucose levels,encouraged closer monitoring if DC home on steroids and discussed contacting her provider if blood glucose readings >100 than baseline to discuss potential for treatment plan changes.     Discussed general healthy eating guidelines, note pt with BMI 14.82 and RD consult in place. Time given for questions, pt displayed understanding of concepts discussed today via teach back.     Thank you for this consult.     Electronically signed by:  Adina Francois RN, [facility]  10/03/24 13:20 EDT  "
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
## 2024-10-03 NOTE — PLAN OF CARE
Goal Outcome Evaluation:      Patient overall doing well today. Patient retested for COVID and tested negative so patient no longer in precautions. Patient on 4L O2 via NC as is her baseline at home. Some c/o hip pain but resolved with repositioning. No other pain noted, PO intake good.       Problem: Adult Inpatient Plan of Care  Goal: Plan of Care Review  Outcome: Progressing  Goal: Patient-Specific Goal (Individualized)  Outcome: Progressing  Goal: Absence of Hospital-Acquired Illness or Injury  Outcome: Progressing  Intervention: Identify and Manage Fall Risk  Recent Flowsheet Documentation  Taken 10/3/2024 1625 by Una Guzman, RN  Safety Promotion/Fall Prevention:   activity supervised   assistive device/personal items within reach   clutter free environment maintained   elopement precautions   fall prevention program maintained   lighting adjusted   gait belt   mobility aid in reach   muscle strengthening facilitated   nonskid shoes/slippers when out of bed   room organization consistent   safety round/check completed   toileting scheduled  Taken 10/3/2024 1414 by Una Guzman, RN  Safety Promotion/Fall Prevention:   activity supervised   assistive device/personal items within reach   clutter free environment maintained   elopement precautions   fall prevention program maintained   gait belt   lighting adjusted   mobility aid in reach   muscle strengthening facilitated   nonskid shoes/slippers when out of bed   room organization consistent   safety round/check completed   toileting scheduled  Taken 10/3/2024 1230 by Una Guzman, RN  Safety Promotion/Fall Prevention:   activity supervised   assistive device/personal items within reach   clutter free environment maintained   elopement precautions   fall prevention program maintained   gait belt   lighting adjusted   mobility aid in reach   muscle strengthening facilitated   nonskid shoes/slippers when out of bed   room organization consistent   safety  round/check completed   toileting scheduled  Taken 10/3/2024 1025 by Una Guzman RN  Safety Promotion/Fall Prevention:   activity supervised   assistive device/personal items within reach   clutter free environment maintained   elopement precautions   fall prevention program maintained   gait belt   lighting adjusted   mobility aid in reach   muscle strengthening facilitated   nonskid shoes/slippers when out of bed   room organization consistent   safety round/check completed   toileting scheduled  Taken 10/3/2024 0816 by Una Guzman RN  Safety Promotion/Fall Prevention:   activity supervised   assistive device/personal items within reach   clutter free environment maintained   elopement precautions   fall prevention program maintained   gait belt   lighting adjusted   mobility aid in reach   muscle strengthening facilitated   nonskid shoes/slippers when out of bed   room organization consistent   safety round/check completed   toileting scheduled  Intervention: Prevent Skin Injury  Recent Flowsheet Documentation  Taken 10/3/2024 1625 by Una Guzman RN  Body Position: legs elevated  Skin Protection:   adhesive use limited   incontinence pads utilized   transparent dressing maintained   tubing/devices free from skin contact  Taken 10/3/2024 1414 by Una Guzman RN  Body Position: legs elevated  Skin Protection:   adhesive use limited   incontinence pads utilized   transparent dressing maintained   tubing/devices free from skin contact  Taken 10/3/2024 1230 by Una Guzman RN  Body Position: legs elevated  Skin Protection:   adhesive use limited   incontinence pads utilized   transparent dressing maintained   tubing/devices free from skin contact  Taken 10/3/2024 1025 by Una Guzman RN  Body Position: position changed independently  Skin Protection:   adhesive use limited   incontinence pads utilized   transparent dressing maintained   tubing/devices free from skin contact  Taken 10/3/2024 0816  by Una Guzman RN  Body Position: 30 degrees  Skin Protection:   adhesive use limited   incontinence pads utilized   transparent dressing maintained   tubing/devices free from skin contact  Intervention: Prevent and Manage VTE (Venous Thromboembolism) Risk  Recent Flowsheet Documentation  Taken 10/3/2024 0816 by Una Guzman RN  VTE Prevention/Management: (medication) other (see comments)  Range of Motion: active ROM (range of motion) encouraged  Intervention: Prevent Infection  Recent Flowsheet Documentation  Taken 10/3/2024 1625 by Una Guzman RN  Infection Prevention:   equipment surfaces disinfected   environmental surveillance performed   hand hygiene promoted   personal protective equipment utilized   rest/sleep promoted   single patient room provided  Taken 10/3/2024 1414 by Una Guzman RN  Infection Prevention:   environmental surveillance performed   equipment surfaces disinfected   hand hygiene promoted   personal protective equipment utilized   rest/sleep promoted   single patient room provided  Taken 10/3/2024 1230 by Una Guzman RN  Infection Prevention:   environmental surveillance performed   hand hygiene promoted   equipment surfaces disinfected   personal protective equipment utilized   rest/sleep promoted   single patient room provided  Taken 10/3/2024 1025 by Una Guzman RN  Infection Prevention:   environmental surveillance performed   equipment surfaces disinfected   hand hygiene promoted   personal protective equipment utilized   rest/sleep promoted   single patient room provided  Taken 10/3/2024 0816 by Una Guzman RN  Infection Prevention:   environmental surveillance performed   equipment surfaces disinfected   hand hygiene promoted   personal protective equipment utilized   rest/sleep promoted   single patient room provided  Goal: Optimal Comfort and Wellbeing  Outcome: Progressing  Intervention: Provide Person-Centered Care  Recent Flowsheet Documentation  Taken  10/3/2024 0816 by Una Guzman RN  Trust Relationship/Rapport:   care explained   choices provided   emotional support provided   empathic listening provided   questions answered   questions encouraged   reassurance provided   thoughts/feelings acknowledged  Goal: Readiness for Transition of Care  Outcome: Progressing     Problem: Skin Injury Risk Increased  Goal: Skin Health and Integrity  Outcome: Progressing  Intervention: Optimize Skin Protection  Recent Flowsheet Documentation  Taken 10/3/2024 1625 by Una Guzman RN  Pressure Reduction Techniques: frequent weight shift encouraged  Head of Bed (HOB) Positioning: [facility] [place] elevated  Pressure Reduction Devices: pressure-redistributing mattress utilized  Skin Protection:   adhesive use limited   incontinence pads utilized   transparent dressing maintained   tubing/devices free from skin contact  Taken 10/3/2024 1414 by Una Guzman RN  Pressure Reduction Techniques: frequent weight shift encouraged  Head of Bed (HOB) Positioning: [facility] [place] elevated  Pressure Reduction Devices: pressure-redistributing mattress utilized  Skin Protection:   adhesive use limited   incontinence pads utilized   transparent dressing maintained   tubing/devices free from skin contact  Taken 10/3/2024 1230 by Una Guzman RN  Pressure Reduction Techniques: frequent weight shift encouraged  Head of Bed (HOB) Positioning: [facility] [place] elevated  Pressure Reduction Devices: pressure-redistributing mattress utilized  Skin Protection:   adhesive use limited   incontinence pads utilized   transparent dressing maintained   tubing/devices free from skin contact  Taken 10/3/2024 1025 by Una Guzman RN  Pressure Reduction Techniques: frequent weight shift encouraged  Head of Bed (HOB) Positioning: [facility] [place] elevated  Pressure Reduction Devices: pressure-redistributing mattress utilized  Skin Protection:   adhesive use limited   incontinence pads utilized   transparent dressing maintained   tubing/devices free  from skin contact  Taken 10/3/2024 0816 by Una Guzman RN  Pressure Reduction Techniques: frequent weight shift encouraged  Head of Bed (HOB) Positioning: HOB elevated  Pressure Reduction Devices: pressure-redistributing mattress utilized  Skin Protection:   adhesive use limited   incontinence pads utilized   transparent dressing maintained   tubing/devices free from skin contact     Problem: Fluid Imbalance (Pneumonia)  Goal: Fluid Balance  Outcome: Progressing     Problem: Infection (Pneumonia)  Goal: Resolution of Infection Signs and Symptoms  Outcome: Progressing     Problem: Respiratory Compromise (Pneumonia)  Goal: Effective Oxygenation and Ventilation  Outcome: Progressing  Intervention: Promote Airway Secretion Clearance  Recent Flowsheet Documentation  Taken 10/3/2024 1025 by Una Guzman RN  Cough And Deep Breathing: done independently per patient  Taken 10/3/2024 0816 by Una Guzman RN  Cough And Deep Breathing: done independently per patient  Intervention: Optimize Oxygenation and Ventilation  Recent Flowsheet Documentation  Taken 10/3/2024 1625 by Una Guzman RN  Head of Bed (HOB) Positioning: HOB elevated  Taken 10/3/2024 1414 by Una Guzman RN  Head of Bed (HOB) Positioning: HOB elevated  Taken 10/3/2024 1230 by Una Guzman RN  Head of Bed (HOB) Positioning: HOB elevated  Taken 10/3/2024 1025 by Una Guzman RN  Head of Bed (HOB) Positioning: HOB elevated  Taken 10/3/2024 0816 by Una Guzman RN  Head of Bed (HOB) Positioning: HOB elevated     Problem: Fall Injury Risk  Goal: Absence of Fall and Fall-Related Injury  Outcome: Progressing  Intervention: Identify and Manage Contributors  Recent Flowsheet Documentation  Taken 10/3/2024 1625 by Una Guzman RN  Medication Review/Management: medications reviewed  Taken 10/3/2024 1414 by Una Guzman RN  Medication Review/Management: medications reviewed  Taken 10/3/2024 1230 by Una Guzman RN  Medication  Review/Management: medications reviewed  Taken 10/3/2024 1025 by Una Guzman, RN  Medication Review/Management: medications reviewed  Taken 10/3/2024 0816 by Una Guzman RN  Medication Review/Management: medications reviewed  Intervention: Promote Injury-Free Environment  Recent Flowsheet Documentation  Taken 10/3/2024 1625 by Una Guzman, RN  Safety Promotion/Fall Prevention:   activity supervised   assistive device/personal items within reach   clutter free environment maintained   elopement precautions   fall prevention program maintained   lighting adjusted   gait belt   mobility aid in reach   muscle strengthening facilitated   nonskid shoes/slippers when out of bed   room organization consistent   safety round/check completed   toileting scheduled  Taken 10/3/2024 1414 by Una Guzman RN  Safety Promotion/Fall Prevention:   activity supervised   assistive device/personal items within reach   clutter free environment maintained   elopement precautions   fall prevention program maintained   gait belt   lighting adjusted   mobility aid in reach   muscle strengthening facilitated   nonskid shoes/slippers when out of bed   room organization consistent   safety round/check completed   toileting scheduled  Taken 10/3/2024 1230 by Una Guzman RN  Safety Promotion/Fall Prevention:   activity supervised   assistive device/personal items within reach   clutter free environment maintained   elopement precautions   fall prevention program maintained   gait belt   lighting adjusted   mobility aid in reach   muscle strengthening facilitated   nonskid shoes/slippers when out of bed   room organization consistent   safety round/check completed   toileting scheduled  Taken 10/3/2024 1025 by Una Guzman RN  Safety Promotion/Fall Prevention:   activity supervised   assistive device/personal items within reach   clutter free environment maintained   elopement precautions   fall prevention program maintained    gait belt   lighting adjusted   mobility aid in reach   muscle strengthening facilitated   nonskid shoes/slippers when out of bed   room organization consistent   safety round/check completed   toileting scheduled  Taken 10/3/2024 0816 by Una Guzman RN  Safety Promotion/Fall Prevention:   activity supervised   assistive device/personal items within reach   clutter free environment maintained   elopement precautions   fall prevention program maintained   gait belt   lighting adjusted   mobility aid in reach   muscle strengthening facilitated   nonskid shoes/slippers when out of bed   room organization consistent   safety round/check completed   toileting scheduled

## 2024-10-04 LAB
ANION GAP SERPL CALCULATED.3IONS-SCNC: 5 MMOL/L (ref 5–15)
BUN SERPL-MCNC: 22 MG/DL (ref 8–23)
BUN/CREAT SERPL: 47.8 (ref 7–25)
CALCIUM SPEC-SCNC: 8 MG/DL (ref 8.6–10.5)
CHLORIDE SERPL-SCNC: 101 MMOL/L (ref 98–107)
CO2 SERPL-SCNC: 32 MMOL/L (ref 22–29)
CREAT SERPL-MCNC: 0.46 MG/DL (ref 0.57–1)
CRP SERPL-MCNC: 1.38 MG/DL (ref 0–0.5)
DEPRECATED RDW RBC AUTO: 43.4 FL (ref 37–54)
EGFRCR SERPLBLD CKD-EPI 2021: 96.9 ML/MIN/1.73
ERYTHROCYTE [DISTWIDTH] IN BLOOD BY AUTOMATED COUNT: 13.1 % (ref 12.3–15.4)
GLUCOSE BLDC GLUCOMTR-MCNC: 202 MG/DL (ref 70–130)
GLUCOSE BLDC GLUCOMTR-MCNC: 219 MG/DL (ref 70–130)
GLUCOSE BLDC GLUCOMTR-MCNC: 288 MG/DL (ref 70–130)
GLUCOSE BLDC GLUCOMTR-MCNC: 375 MG/DL (ref 70–130)
GLUCOSE SERPL-MCNC: 191 MG/DL (ref 65–99)
HCT VFR BLD AUTO: 30.2 % (ref 34–46.6)
HGB BLD-MCNC: 9.4 G/DL (ref 12–15.9)
MAGNESIUM SERPL-MCNC: 2 MG/DL (ref 1.6–2.4)
MCH RBC QN AUTO: 28.9 PG (ref 26.6–33)
MCHC RBC AUTO-ENTMCNC: 31.1 G/DL (ref 31.5–35.7)
MCV RBC AUTO: 92.9 FL (ref 79–97)
PLATELET # BLD AUTO: 320 10*3/MM3 (ref 140–450)
PMV BLD AUTO: 10.7 FL (ref 6–12)
POTASSIUM SERPL-SCNC: 4.6 MMOL/L (ref 3.5–5.2)
RBC # BLD AUTO: 3.25 10*6/MM3 (ref 3.77–5.28)
SODIUM SERPL-SCNC: 138 MMOL/L (ref 136–145)
WBC NRBC COR # BLD AUTO: 11.39 10*3/MM3 (ref 3.4–10.8)

## 2024-10-04 PROCEDURE — 94799 UNLISTED PULMONARY SVC/PX: CPT

## 2024-10-04 PROCEDURE — 63710000001 DEXAMETHASONE PER 0.25 MG: Performed by: INTERNAL MEDICINE

## 2024-10-04 PROCEDURE — 83735 ASSAY OF MAGNESIUM: CPT | Performed by: STUDENT IN AN ORGANIZED HEALTH CARE EDUCATION/TRAINING PROGRAM

## 2024-10-04 PROCEDURE — 25010000002 CEFTRIAXONE PER 250 MG: Performed by: STUDENT IN AN ORGANIZED HEALTH CARE EDUCATION/TRAINING PROGRAM

## 2024-10-04 PROCEDURE — 82948 REAGENT STRIP/BLOOD GLUCOSE: CPT

## 2024-10-04 PROCEDURE — 63710000001 INSULIN LISPRO (HUMAN) PER 5 UNITS: Performed by: STUDENT IN AN ORGANIZED HEALTH CARE EDUCATION/TRAINING PROGRAM

## 2024-10-04 PROCEDURE — 94664 DEMO&/EVAL PT USE INHALER: CPT

## 2024-10-04 PROCEDURE — 80048 BASIC METABOLIC PNL TOTAL CA: CPT | Performed by: STUDENT IN AN ORGANIZED HEALTH CARE EDUCATION/TRAINING PROGRAM

## 2024-10-04 PROCEDURE — 85027 COMPLETE CBC AUTOMATED: CPT | Performed by: STUDENT IN AN ORGANIZED HEALTH CARE EDUCATION/TRAINING PROGRAM

## 2024-10-04 PROCEDURE — 99231 SBSQ HOSP IP/OBS SF/LOW 25: CPT | Performed by: INTERNAL MEDICINE

## 2024-10-04 PROCEDURE — 63710000001 INSULIN LISPRO (HUMAN) PER 5 UNITS: Performed by: PHYSICIAN ASSISTANT

## 2024-10-04 PROCEDURE — 94761 N-INVAS EAR/PLS OXIMETRY MLT: CPT

## 2024-10-04 PROCEDURE — 97530 THERAPEUTIC ACTIVITIES: CPT

## 2024-10-04 PROCEDURE — 86140 C-REACTIVE PROTEIN: CPT | Performed by: INTERNAL MEDICINE

## 2024-10-04 PROCEDURE — 97116 GAIT TRAINING THERAPY: CPT

## 2024-10-04 RX ORDER — DEXAMETHASONE 4 MG/1
4 TABLET ORAL
Status: DISCONTINUED | OUTPATIENT
Start: 2024-10-05 | End: 2024-10-05 | Stop reason: HOSPADM

## 2024-10-04 RX ORDER — LOPERAMIDE HCL 2 MG
2 CAPSULE ORAL 4 TIMES DAILY PRN
Status: DISCONTINUED | OUTPATIENT
Start: 2024-10-04 | End: 2024-10-05 | Stop reason: HOSPADM

## 2024-10-04 RX ADMIN — INSULIN LISPRO 4 UNITS: 100 INJECTION, SOLUTION INTRAVENOUS; SUBCUTANEOUS at 11:59

## 2024-10-04 RX ADMIN — LOPERAMIDE HYDROCHLORIDE 2 MG: 2 CAPSULE ORAL at 11:58

## 2024-10-04 RX ADMIN — INSULIN LISPRO 6 UNITS: 100 INJECTION, SOLUTION INTRAVENOUS; SUBCUTANEOUS at 21:55

## 2024-10-04 RX ADMIN — IPRATROPIUM BROMIDE AND ALBUTEROL SULFATE 3 ML: 2.5; .5 SOLUTION RESPIRATORY (INHALATION) at 12:27

## 2024-10-04 RX ADMIN — GUAIFENESIN 1200 MG: 600 TABLET, EXTENDED RELEASE ORAL at 08:29

## 2024-10-04 RX ADMIN — Medication 10 ML: at 08:32

## 2024-10-04 RX ADMIN — CASTOR OIL AND BALSAM, PERU 1 APPLICATION: 788; 87 OINTMENT TOPICAL at 08:28

## 2024-10-04 RX ADMIN — PRAVASTATIN SODIUM 40 MG: 40 TABLET ORAL at 21:54

## 2024-10-04 RX ADMIN — Medication 5 MG: at 23:02

## 2024-10-04 RX ADMIN — APIXABAN 2.5 MG: 2.5 TABLET, FILM COATED ORAL at 08:30

## 2024-10-04 RX ADMIN — IPRATROPIUM BROMIDE AND ALBUTEROL SULFATE 3 ML: 2.5; .5 SOLUTION RESPIRATORY (INHALATION) at 03:27

## 2024-10-04 RX ADMIN — APIXABAN 2.5 MG: 2.5 TABLET, FILM COATED ORAL at 21:54

## 2024-10-04 RX ADMIN — Medication 10 ML: at 21:56

## 2024-10-04 RX ADMIN — INSULIN LISPRO 4 UNITS: 100 INJECTION, SOLUTION INTRAVENOUS; SUBCUTANEOUS at 08:33

## 2024-10-04 RX ADMIN — IPRATROPIUM BROMIDE AND ALBUTEROL SULFATE 3 ML: 2.5; .5 SOLUTION RESPIRATORY (INHALATION) at 22:50

## 2024-10-04 RX ADMIN — GUAIFENESIN 1200 MG: 600 TABLET, EXTENDED RELEASE ORAL at 21:53

## 2024-10-04 RX ADMIN — DEXAMETHASONE 6 MG: 4 TABLET ORAL at 08:31

## 2024-10-04 RX ADMIN — INSULIN LISPRO 4 UNITS: 100 INJECTION, SOLUTION INTRAVENOUS; SUBCUTANEOUS at 17:00

## 2024-10-04 RX ADMIN — METOPROLOL TARTRATE 25 MG: 25 TABLET, FILM COATED ORAL at 21:54

## 2024-10-04 RX ADMIN — INSULIN LISPRO 4 UNITS: 100 INJECTION, SOLUTION INTRAVENOUS; SUBCUTANEOUS at 08:28

## 2024-10-04 RX ADMIN — ASPIRIN 81 MG: 81 TABLET, COATED ORAL at 08:29

## 2024-10-04 RX ADMIN — IPRATROPIUM BROMIDE AND ALBUTEROL SULFATE 3 ML: 2.5; .5 SOLUTION RESPIRATORY (INHALATION) at 07:04

## 2024-10-04 RX ADMIN — SODIUM CHLORIDE 1000 MG: 900 INJECTION INTRAVENOUS at 11:59

## 2024-10-04 RX ADMIN — INSULIN LISPRO 8 UNITS: 100 INJECTION, SOLUTION INTRAVENOUS; SUBCUTANEOUS at 17:00

## 2024-10-04 RX ADMIN — LEVOTHYROXINE SODIUM 75 MCG: 0.07 TABLET ORAL at 06:09

## 2024-10-04 RX ADMIN — IPRATROPIUM BROMIDE AND ALBUTEROL SULFATE 3 ML: 2.5; .5 SOLUTION RESPIRATORY (INHALATION) at 19:21

## 2024-10-04 RX ADMIN — CASTOR OIL AND BALSAM, PERU 1 APPLICATION: 788; 87 OINTMENT TOPICAL at 21:59

## 2024-10-04 RX ADMIN — IPRATROPIUM BROMIDE AND ALBUTEROL SULFATE 3 ML: 2.5; .5 SOLUTION RESPIRATORY (INHALATION) at 15:34

## 2024-10-04 NOTE — THERAPY TREATMENT NOTE
Patient Name: Maura Jiménez  : 1945    MRN: [id number]                              Today's Date: 10/4/2024       Admit Date: 2024    Visit Dx:     ICD-10-CM ICD-9-CM   1. Acute respiratory failure with hypoxia  J96.01 518.81   2. COPD with acute exacerbation  J44.1 491.21   3. COVID-19  U07.1 079.89   4. Atypical pneumonia  J18.9 486     Patient Active Problem List   Diagnosis    Respiratory failure with hypoxia    Chronic obstructive pulmonary disease with acute exacerbation    Type 2 diabetes mellitus without complication, without long-term current use of insulin    COPD exacerbation    Acute on chronic respiratory failure with hypoxia    Chronic respiratory failure with hypoxia    Rhinovirus infection    Severe malnutrition    Impaired mobility    Acute respiratory failure with hypoxia    Cytokine release syndrome, grade 1    Cytokine release syndrome, grade 1    Pneumonia    Acute respiratory failure with hypoxia    Hypoalbuminemia    Essential hypertension    Mixed hyperlipidemia    Other specified hypothyroidism    Paroxysmal atrial fibrillation     Past Medical History:   Diagnosis Date    COPD (chronic obstructive pulmonary disease)     Disease of thyroid gland     Hyperlipidemia     Osteoarthritis     Type 2 diabetes mellitus without complication, without long-term current use of insulin 2018     Past Surgical History:   Procedure Laterality Date    APPENDECTOMY      CATARACT EXTRACTION, BILATERAL      HYSTERECTOMY        General Information       Row Name 10/04/24 1530          Physical Therapy Time and Intention    Document Type therapy note (daily note)  -CK     Mode of Treatment physical therapy;individual therapy  -CK       Row Name 10/04/24 8654          General Information    Patient Profile Reviewed yes  -CK     Existing Precautions/Restrictions fall;oxygen therapy device and L/min  -CK     Barriers to Rehab medically complex;previous functional deficit  -CK       Row Name  10/04/24 1530          Cognition    Orientation Status (Cognition) oriented x 4  -CK       Row Name 10/04/24 1530          Safety Issues, Functional Mobility    Safety Issues Affecting Function (Mobility) awareness of need for assistance;insight into deficits/self-awareness;safety precaution awareness  -CK     Impairments Affecting Function (Mobility) balance;endurance/activity tolerance;strength;shortness of breath  -CK               User Key  (r) = Recorded By, (t) = Taken By, (c) = Cosigned By      Initials Name Provider Type    CK Zaira Jimenez PT Physical Therapist                   Mobility       Row Name 10/04/24 1530          Bed Mobility    Bed Mobility supine-sit  -CK     Supine-Sit Shannon (Bed Mobility) standby assist  -CK     Assistive Device (Bed Mobility) head of bed elevated;bed rails  -CK       Row Name 10/04/24 1530          Sit-Stand Transfer    Sit-Stand Shannon (Transfers) contact guard  -CK     Assistive Device (Sit-Stand Transfers) walker, front-wheeled;other (see comments)  pediatric FWW  -CK       Row Name 10/04/24 1530          Gait/Stairs (Locomotion)    Shannon Level (Gait) contact guard;1 person assist  -CK     Assistive Device (Gait) walker, front-wheeled;other (see comments)  pediatric FWW  -CK     Distance in Feet (Gait) 100  -CK     Deviations/Abnormal Patterns (Gait) bilateral deviations;base of support, narrow;surya decreased;gait speed decreased;stride length decreased  -CK     Bilateral Gait Deviations forward flexed posture;heel strike decreased  -CK     Comment, (Gait/Stairs) Patient ambulated in baker with step through gait pattern while on 4L O2. Cues provided for upright posture with forward gaze and PLB. Patient with good effort to correct her mechanics. She was mildly unsteady but no overt LOB. SpO2 checked while ambulating and it was 94% on 4L O2. She was very SOA after ambulating.  -CK               User Key  (r) = Recorded By, (t) = Taken By, (c) =  Cosigned By      Initials Name Provider Type    CK Zaira Jimenez PT Physical Therapist                   Obj/Interventions       Row Name 10/04/24 1533          Motor Skills    Therapeutic Exercise other (see comments)  IS and flutter x10  -CK       Row Name 10/04/24 1533          Balance    Balance Assessment sitting static balance;standing static balance;standing dynamic balance  -CK     Static Sitting Balance standby assist  -CK     Position, Sitting Balance unsupported;sitting edge of bed;sitting in chair  -CK     Static Standing Balance contact guard  -CK     Dynamic Standing Balance contact guard  -CK     Position/Device Used, Standing Balance supported;walker, front-wheeled  -CK               User Key  (r) = Recorded By, (t) = Taken By, (c) = Cosigned By      Initials Name Provider Type    Zaira Reza PT Physical Therapist                   Goals/Plan    No documentation.                  Clinical Impression       Row Name 10/04/24 1534          Pain    Pretreatment Pain Rating 4/10  -CK     Posttreatment Pain Rating 4/10  -CK     Pain Location generalized  -CK     Pain Location - other (see comments)  sacrum  -CK     Pain Intervention(s) Ambulation/increased activity;Repositioned  offloaded with pillow in chair  -CK       Row Name 10/04/24 1534          Plan of Care Review    Plan of Care Reviewed With patient  -CK     Progress improving  -CK     Outcome Evaluation Patient able to progress her ambulation distance to 100' CGA with pediatric FWW while on 4L O2. She was mildly unsteady and very SOA with mobility today, although no oxygen desaturation noted. IPPT remains indicated to address current deficits. Continue to recommend D/C to IPR.  -CK       Row Name 10/04/24 1534          Vital Signs    Pretreatment Heart Rate (beats/min) 90  -CK     Intratreatment Heart Rate (beats/min) 118  -CK     Pre SpO2 (%) 94  -CK     O2 Delivery Pre Treatment nasal cannula  -CK     Intra SpO2 (%) 94  -CK      O2 Delivery Intra Treatment nasal cannula  -CK     O2 Delivery Post Treatment nasal cannula  -CK     Pre Patient Position Supine  -CK     Intra Patient Position Standing  -CK     Post Patient Position Sitting  -CK       Row Name 10/04/24 1534          Positioning and Restraints    Pre-Treatment Position in bed  -CK     Post Treatment Position chair  -CK     In Chair reclined;call light within reach;encouraged to call for assist;exit alarm on;waffle cushion;compression device;notified nsg  R hip offloaded with pillow  -CK               User Key  (r) = Recorded By, (t) = Taken By, (c) = Cosigned By      Initials Name Provider Type    Zaira Reza PT Physical Therapist                   Outcome Measures       Row Name 10/04/24 1536          How much help from another person do you currently need...    Turning from your back to your side while in flat bed without using bedrails? 4  -CK     Moving from lying on back to sitting on the side of a flat bed without bedrails? 3  -CK     Moving to and from a bed to a chair (including a wheelchair)? 3  -CK     Standing up from a chair using your arms (e.g., wheelchair, bedside chair)? 3  -CK     Climbing 3-5 steps with a railing? 2  -CK     To walk in hospital room? 3  -CK     AM-PAC 6 Clicks Score (PT) 18  -CK     Highest Level of Mobility Goal 6 --> Walk 10 steps or more  -CK       Row Name 10/04/24 1536          Functional Assessment    Outcome Measure Options AM-PAC 6 Clicks Basic Mobility (PT)  -CK               User Key  (r) = Recorded By, (t) = Taken By, (c) = Cosigned By      Initials Name Provider Type    Zaira Reza PT Physical Therapist                                 Physical Therapy Education       Title: PT OT SLP Therapies (In Progress)       Topic: Physical Therapy (Done)       Point: Mobility training (Done)       Learning Progress Summary             Patient Acceptance, E, VU by CK at 10/4/2024 1536    Acceptance, E, VU by  at 10/1/2024  1008    Acceptance, E, VU by  at 10/1/2024 1000                         Point: Home exercise program (Done)       Learning Progress Summary             Patient Acceptance, E, VU by  at 10/1/2024 1008    Acceptance, E, VU by  at 10/1/2024 1000                         Point: Body mechanics (Done)       Learning Progress Summary             Patient Acceptance, E, VU by  at 10/4/2024 1536    Acceptance, E, VU by  at 10/1/2024 1008    Acceptance, E, VU by  at 10/1/2024 1000                         Point: Precautions (Done)       Learning Progress Summary             Patient Acceptance, E, VU by  at 10/4/2024 1536    Acceptance, E, VU by  at 10/1/2024 1008    Acceptance, E, VU by  at 10/1/2024 1000                                         User Key       Initials Effective Dates Name Provider Type Discipline     02/06/24 -  Zaira Jimenez, PT Physical Therapist PT     11/16/23 -  Alicia Waite, PT Physical Therapist PT     08/08/24 -  Aruna Mandujano OT Student OT Student OT                  PT Recommendation and Plan     Plan of Care Reviewed With: patient  Progress: improving  Outcome Evaluation: Patient able to progress her ambulation distance to 100' CGA with pediatric FWW while on 4L O2. She was mildly unsteady and very SOA with mobility today, although no oxygen desaturation noted. IPPT remains indicated to address current deficits. Continue to recommend D/C to IPR.     Time Calculation:         PT Charges       Row Name 10/04/24 1537             Time Calculation    Start Time 1453  -CK      PT Received On 10/04/24  -CK         Timed Charges    66507 - Gait Training Minutes  13  -CK      08999 - PT Therapeutic Activity Minutes 10  -CK         Total Minutes    Timed Charges Total Minutes 23  -CK       Total Minutes 23  -CK                User Key  (r) = Recorded By, (t) = Taken By, (c) = Cosigned By      Initials Name Provider Type    CK Zaira Jimenez, PT Physical Therapist                   Therapy Charges for Today       Code Description Service Date Service Provider Modifiers Qty    91537306332 HC GAIT TRAINING EA 15 MIN 10/4/2024 Zaira Jimenez, PT GP 1    30065646307 HC PT THERAPEUTIC ACT EA 15 MIN 10/4/2024 Zaira Jimenez, PT GP 1            PT G-Codes  Outcome Measure Options: AM-PAC 6 Clicks Basic Mobility (PT)  AM-PAC 6 Clicks Score (PT): 18  AM-PAC 6 Clicks Score (OT): 16  PT Discharge Summary  Anticipated Discharge Disposition (PT): inpatient rehabilitation facility    Zaira Jimenez, CARLEE  10/4/2024
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
## 2024-10-04 NOTE — PLAN OF CARE
Problem: Adult Inpatient Plan of Care  Goal: Plan of Care Review  Outcome: Progressing  Goal: Patient-Specific Goal (Individualized)  Outcome: Progressing  Goal: Absence of Hospital-Acquired Illness or Injury  Outcome: Progressing  Intervention: Identify and Manage Fall Risk  Recent Flowsheet Documentation  Taken 10/4/2024 0336 by Serena Kemp RN  Safety Promotion/Fall Prevention:   safety round/check completed   activity supervised   assistive device/personal items within reach   clutter free environment maintained   fall prevention program maintained  Taken 10/4/2024 0200 by Serena Kemp RN  Safety Promotion/Fall Prevention:   safety round/check completed   activity supervised   assistive device/personal items within reach   clutter free environment maintained   fall prevention program maintained  Taken 10/4/2024 0000 by Serena Kemp RN  Safety Promotion/Fall Prevention:   safety round/check completed   activity supervised   assistive device/personal items within reach   clutter free environment maintained   fall prevention program maintained  Taken 10/3/2024 2200 by Serena Kemp RN  Safety Promotion/Fall Prevention:   safety round/check completed   activity supervised   assistive device/personal items within reach   clutter free environment maintained   fall prevention program maintained  Taken 10/3/2024 2050 by Serena Kemp RN  Safety Promotion/Fall Prevention:   activity supervised   assistive device/personal items within reach   clutter free environment maintained   fall prevention program maintained   safety round/check completed   room organization consistent  Intervention: Prevent Skin Injury  Recent Flowsheet Documentation  Taken 10/4/2024 0336 by Serena Kemp RN  Body Position: position changed independently  Taken 10/4/2024 0200 by Serena Kemp RN  Body Position: position changed independently  Taken 10/4/2024 0000 by Serena Kemp RN  Body Position:  position changed independently  Taken 10/3/2024 2200 by Serena Kemp RN  Body Position: position changed independently  Taken 10/3/2024 2050 by Serena Kemp RN  Body Position: position changed independently  Intervention: Prevent and Manage VTE (Venous Thromboembolism) Risk  Recent Flowsheet Documentation  Taken 10/4/2024 0336 by Serena Kemp RN  Activity Management: activity minimized  Taken 10/4/2024 0200 by Serena Kemp RN  Activity Management: activity minimized  Taken 10/4/2024 0000 by Serena Kemp RN  Activity Management: activity minimized  Taken 10/3/2024 2200 by Serena Kemp RN  Activity Management: activity minimized  Taken 10/3/2024 2050 by Serena Kemp RN  Activity Management: activity encouraged  Intervention: Prevent Infection  Recent Flowsheet Documentation  Taken 10/4/2024 0336 by Serena Kemp RN  Infection Prevention: rest/sleep promoted  Taken 10/4/2024 0200 by Serena Kemp RN  Infection Prevention: rest/sleep promoted  Taken 10/4/2024 0000 by Serena Kemp RN  Infection Prevention: rest/sleep promoted  Taken 10/3/2024 2200 by Serena Kemp RN  Infection Prevention: rest/sleep promoted  Taken 10/3/2024 2050 by Serena Kemp RN  Infection Prevention:   rest/sleep promoted   hand hygiene promoted   equipment surfaces disinfected  Goal: Optimal Comfort and Wellbeing  Outcome: Progressing  Intervention: Provide Person-Centered Care  Recent Flowsheet Documentation  Taken 10/3/2024 2050 by Serena Kemp RN  Trust Relationship/Rapport:   care explained   choices provided   questions answered   questions encouraged  Goal: Readiness for Transition of Care  Outcome: Progressing     Problem: Skin Injury Risk Increased  Goal: Skin Health and Integrity  Outcome: Progressing  Intervention: Optimize Skin Protection  Recent Flowsheet Documentation  Taken 10/4/2024 0336 by Serena Kemp RN  Head of Bed (HOB) Positioning: HOB  elevated  Taken 10/4/2024 0200 by Serena Kemp RN  Head of Bed ([facility]) Positioning: HOB elevated  Taken 10/4/2024 0000 by Serena Kemp RN  Head of Bed (HOB) Positioning: HOB elevated  Taken 10/3/2024 2200 by Serena Kemp RN  Head of Bed ([facility]) Positioning: HOB elevated  Taken 10/3/2024 2050 by Serena Kemp RN  Head of Bed ([facility]) Positioning: HOB elevated     Problem: Fluid Imbalance (Pneumonia)  Goal: Fluid Balance  Outcome: Progressing     Problem: Infection (Pneumonia)  Goal: Resolution of Infection Signs and Symptoms  Outcome: Progressing     Problem: Respiratory Compromise (Pneumonia)  Goal: Effective Oxygenation and Ventilation  Outcome: Progressing  Intervention: Promote Airway Secretion Clearance  Recent Flowsheet Documentation  Taken 10/4/2024 0336 by Serena Kemp RN  Cough And Deep Breathing: done independently per patient  Taken 10/4/2024 0200 by Serena Kemp RN  Cough And Deep Breathing: done independently per patient  Taken 10/4/2024 0000 by Serena Kemp RN  Cough And Deep Breathing: done independently per patient  Taken 10/3/2024 2200 by Serena Kemp RN  Cough And Deep Breathing: done independently per patient  Intervention: Optimize Oxygenation and Ventilation  Recent Flowsheet Documentation  Taken 10/4/2024 0336 by Serena Kemp RN  Head of Bed ([facility]) Positioning: HOB elevated  Taken 10/4/2024 0200 by Serena Kemp RN  Head of Bed ([facility]) Positioning: HOB elevated  Taken 10/4/2024 0000 by Serena Kemp RN  Head of Bed ([facility]) Positioning: HOB elevated  Taken 10/3/2024 2200 by Serena Kemp RN  Head of Bed ([facility]) Positioning: HOB elevated  Taken 10/3/2024 2050 by Serena Kemp RN  Head of Bed ([facility]) Positioning: HOB elevated     Problem: Fall Injury Risk  Goal: Absence of Fall and Fall-Related Injury  Outcome: Progressing  Intervention: Identify and Manage Contributors  Recent Flowsheet Documentation  Taken 10/3/2024 2050 by Justo  TRUE Lyons  Medication Review/Management: medications reviewed  Intervention: Promote Injury-Free Environment  Recent Flowsheet Documentation  Taken 10/4/2024 0336 by Serena Kemp RN  Safety Promotion/Fall Prevention:   safety round/check completed   activity supervised   assistive device/personal items within reach   clutter free environment maintained   fall prevention program maintained  Taken 10/4/2024 0200 by Serena Kemp RN  Safety Promotion/Fall Prevention:   safety round/check completed   activity supervised   assistive device/personal items within reach   clutter free environment maintained   fall prevention program maintained  Taken 10/4/2024 0000 by Serena Kemp RN  Safety Promotion/Fall Prevention:   safety round/check completed   activity supervised   assistive device/personal items within reach   clutter free environment maintained   fall prevention program maintained  Taken 10/3/2024 2200 by Serena Kemp, RN  Safety Promotion/Fall Prevention:   safety round/check completed   activity supervised   assistive device/personal items within reach   clutter free environment maintained   fall prevention program maintained  Taken 10/3/2024 2050 by Serena Kemp RN  Safety Promotion/Fall Prevention:   activity supervised   assistive device/personal items within reach   clutter free environment maintained   fall prevention program maintained   safety round/check completed   room organization consistent   Goal Outcome Evaluation:

## 2024-10-04 NOTE — PLAN OF CARE
Goal Outcome Evaluation:  Plan of Care Reviewed With: patient        Progress: improving  Outcome Evaluation: Patient able to progress her ambulation distance to 100' CGA with pediatric FWW while on 4L O2. She was mildly unsteady and very SOA with mobility today, although no oxygen desaturation noted. IPPT remains indicated to address current deficits. Continue to recommend D/C to IPR.      Anticipated Discharge Disposition (PT): inpatient rehabilitation facility

## 2024-10-04 NOTE — CASE MANAGEMENT/SOCIAL WORK
Discharge Planning Assessment  [facility]     Patient Name: Maura Jiménez  MRN: [id number]  Today's Date: 10/4/2024    Admit Date: 9/27/2024    Plan: Home with [facility]     Discharge Plan       Row Name 10/04/24 1724       Plan    Plan Home with [facility]    Patient/Family in Agreement with Plan yes    Plan Comments 17:52 EDT  Ariana accepted referral by phone.  They request a call prior to DC to arrange delivery of tanks.    Per MDR, Ms. Jiménez will likely be discharged in 1-2 days.  She has refused PT/OT recommended inpatient rehab.  I have confirmed with [facility] that they will provide home health services at discharge.  [facility] O2 referral requested by family and patient.  CM will cont to follow plan of care, f/u with Ariana in am regarding referral and cont to assist with discharge planning as appropriate.    Final Discharge Disposition Code 06 - home with home health care                  Continued Care and Services - Admitted Since 9/27/2024       Durable Medical Equipment       Service Provider Request Status Selected Services Address Phone Fax Patient Preferred    [facility] - CHARLES REYNALDO Pending - No Request Sent N/A [address] [facility] [address] [phone] --    [facility] SH[facility]ANK [facility] Pending - No Request Sent N/A [address] [facility] [address] [facility] [address] [address] [phone] --              Home Medical Care Coordination complete.      Service Provider Request Status Selected Services Address Phone Fax Patient Preferred    [facility] - [place]  Selected Home Health Services ,  Home Nursing ,  Home Rehabilitation [address] [address] [phone] --                  Selected Continued Care - Prior Encounters Includes continued care and service providers with selected services from prior encounters from 6/29/2024 to 10/4/2024      Discharged on 9/26/2024  Admission date: 9/20/2024 - Discharge disposition: Home or Self Care      Home Medical Care       Service Provider Selected Services Address Phone Fax Patient Preferred    [facility]  [address] [id number] [phone] --                      Discharged on 9/18/2024 Admission date: 9/14/2024 - Discharge disposition: Home or Self Care      Durable Medical Equipment       Service Provider Selected Services Address Phone Fax Patient Preferred    [facility] Durable Medical Equipment [address] [address] [phone] --       Internal Comment last updated by Vane Martinez RN 9/18/2024 0908    [facility]                         Home Medical Care       Service Provider Selected Services Address Phone Fax Patient Preferred    [facility] [address] [address] [phone] --                          Expected Discharge Date and Time       Expected Discharge Date Expected Discharge Time    Sep 30, 2024             Ewelina Ospina, RN

## 2024-10-05 ENCOUNTER — READMISSION MANAGEMENT (OUTPATIENT)
Dept: CALL CENTER | Facility: HOSPITAL | Age: 79
End: 2024-10-05
Payer: MEDICARE

## 2024-10-05 VITALS
TEMPERATURE: 98 F | BODY MASS INDEX: 14.78 KG/M2 | RESPIRATION RATE: 16 BRPM | HEART RATE: 80 BPM | HEIGHT: 57 IN | WEIGHT: 68.5 LBS | SYSTOLIC BLOOD PRESSURE: 118 MMHG | OXYGEN SATURATION: 99 % | DIASTOLIC BLOOD PRESSURE: 35 MMHG

## 2024-10-05 PROBLEM — J18.9 PNEUMONIA: Status: RESOLVED | Noted: 2024-09-27 | Resolved: 2024-10-05

## 2024-10-05 PROBLEM — J44.1 COPD EXACERBATION: Status: RESOLVED | Noted: 2023-02-20 | Resolved: 2024-10-05

## 2024-10-05 PROBLEM — J96.01 ACUTE RESPIRATORY FAILURE WITH HYPOXIA: Status: RESOLVED | Noted: 2024-09-27 | Resolved: 2024-10-05

## 2024-10-05 LAB
CRP SERPL-MCNC: 0.87 MG/DL (ref 0–0.5)
GLUCOSE BLDC GLUCOMTR-MCNC: 150 MG/DL (ref 70–130)

## 2024-10-05 PROCEDURE — 82948 REAGENT STRIP/BLOOD GLUCOSE: CPT

## 2024-10-05 PROCEDURE — 86140 C-REACTIVE PROTEIN: CPT | Performed by: INTERNAL MEDICINE

## 2024-10-05 PROCEDURE — 63710000001 DEXAMETHASONE PER 0.25 MG: Performed by: INTERNAL MEDICINE

## 2024-10-05 PROCEDURE — 94799 UNLISTED PULMONARY SVC/PX: CPT

## 2024-10-05 PROCEDURE — 99239 HOSP IP/OBS DSCHRG MGMT >30: CPT | Performed by: INTERNAL MEDICINE

## 2024-10-05 RX ORDER — METOPROLOL TARTRATE 25 MG/1
25 TABLET, FILM COATED ORAL EVERY 12 HOURS SCHEDULED
Qty: 60 TABLET | Refills: 0 | Status: SHIPPED | OUTPATIENT
Start: 2024-10-05

## 2024-10-05 RX ADMIN — DEXAMETHASONE 4 MG: 4 TABLET ORAL at 10:01

## 2024-10-05 RX ADMIN — IPRATROPIUM BROMIDE AND ALBUTEROL SULFATE 3 ML: 2.5; .5 SOLUTION RESPIRATORY (INHALATION) at 07:10

## 2024-10-05 RX ADMIN — IPRATROPIUM BROMIDE AND ALBUTEROL SULFATE 3 ML: 2.5; .5 SOLUTION RESPIRATORY (INHALATION) at 10:55

## 2024-10-05 RX ADMIN — ASPIRIN 81 MG: 81 TABLET, COATED ORAL at 10:01

## 2024-10-05 RX ADMIN — LEVOTHYROXINE SODIUM 75 MCG: 0.07 TABLET ORAL at 06:23

## 2024-10-05 RX ADMIN — GUAIFENESIN 1200 MG: 600 TABLET, EXTENDED RELEASE ORAL at 10:01

## 2024-10-05 RX ADMIN — APIXABAN 2.5 MG: 2.5 TABLET, FILM COATED ORAL at 10:01

## 2024-10-05 NOTE — PLAN OF CARE
Goal Outcome Evaluation:                   VSS throughout shift; Pt is on 4L NC this is baseline; Pt is being discharged today home w/family; Pt was a pleasure to take care of.

## 2024-10-05 NOTE — PLAN OF CARE
Goal Outcome Evaluation:  Plan of Care Reviewed With: patient        Progress: improving     The patient is pleasant, alert and oriented x4. She rested on and off throughout the night. No complaints of pain or signs of discomfort. No signs of respiratory distress. Voiding spontaneously via bedside commode. Skin interventions are in place. The plan is to discharge the patient home with home health.
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
## 2024-10-05 NOTE — OUTREACH NOTE
Prep Survey      Flowsheet Row Responses   [facility] facility patient discharged from? [place]   Is LACE score < 7 ? No   Eligibility Readm Mgmt   Discharge diagnosis Pneumonia   Does the patient have one of the following disease processes/diagnoses(primary or secondary)? Pneumonia   Does the patient have Home health ordered? Yes   What is the Home health agency?  [facility]   Prep survey completed? Yes            Neda ZEPEDA - Registered Nurse

## 2024-10-06 LAB
QT INTERVAL: 358 MS
QTC INTERVAL: 408 MS

## 2024-10-09 ENCOUNTER — READMISSION MANAGEMENT (OUTPATIENT)
Dept: CALL CENTER | Facility: HOSPITAL | Age: 79
End: 2024-10-09
Payer: MEDICARE

## 2024-10-09 NOTE — OUTREACH NOTE
COPD/PN Week 1 Survey      Flowsheet Row Responses   [facility] patient discharged from? [place]   Does the patient have one of the following disease processes/diagnoses(primary or secondary)? Pneumonia   Week 1 attempt successful? Yes   Call start time 1106   Call end time 1111   Discharge diagnosis Pneumonia   Meds reviewed with patient/caregiver? Yes   Is the patient having any side effects they believe may be caused by any medication additions or changes? No   Does the patient have all medications ordered at discharge? Yes   Is the patient taking all medications as directed (includes completed medication regime)? Yes   Does the patient have a primary care provider?  Yes   Does the patient have an appointment with their PCP or specialist within 7 days of discharge? Greater than 7 days  [Patient will see Pulmonary MD on 10/23/2024]   What is preventing the patient from scheduling follow up appointments within 7 days of discharge? Haven't had time   Nursing Interventions Verified appointment date/time/provider, Educated patient on importance of making appointment   Has the patient kept scheduled appointments due by today? N/A   What is the Home health agency?  formerly [facility]   Has home health visited the patient within 72 hours of discharge? Yes   Has all DME been delivered? Yes   Psychosocial issues? No   Did the patient receive a copy of their discharge instructions? Yes   Nursing interventions Reviewed instructions with patient   What is the patient's perception of their health status since discharge? Improving   Nursing Interventions Nurse provided patient education   Is the patient/caregiver able to teach back the hierarchy of who to call/visit for symptoms/problems? PCP, Specialist, Home health nurse, Urgent Care, ED, 911 Yes   Is the patient able to teach back COPD zones? Yes   Nursing interventions Education provided on various zones   Patient reports what zone on this call?  Green Zone   Green Zone Reports doing well, Breathing without shortness of breath   Green Zone interventions: Take daily medications, Use oxygen as prescribed   Week 1 call completed? Yes   Graduated Yes   Is the patient interested in additional calls from an ambulatory ? No   Would this patient benefit from a Referral to [facility] Social Work? No   Graduated/Revoked comments Patient reports she is doing well.   Call end time 1111            Warren ROMAN - Registered Nurse

## 2024-11-13 ENCOUNTER — OFFICE VISIT (OUTPATIENT)
Dept: PULMONOLOGY | Facility: CLINIC | Age: 79
End: 2024-11-13
Payer: MEDICARE

## 2024-11-13 VITALS
HEART RATE: 77 BPM | BODY MASS INDEX: 13.35 KG/M2 | HEIGHT: 60 IN | DIASTOLIC BLOOD PRESSURE: 57 MMHG | SYSTOLIC BLOOD PRESSURE: 131 MMHG | WEIGHT: 68 LBS | OXYGEN SATURATION: 81 %

## 2024-11-13 DIAGNOSIS — Z87.891 PERSONAL HISTORY OF NICOTINE DEPENDENCE: ICD-10-CM

## 2024-11-13 DIAGNOSIS — J43.9 PULMONARY EMPHYSEMA, UNSPECIFIED EMPHYSEMA TYPE: ICD-10-CM

## 2024-11-13 DIAGNOSIS — J96.11 CHRONIC RESPIRATORY FAILURE WITH HYPOXIA AND HYPERCAPNIA: ICD-10-CM

## 2024-11-13 DIAGNOSIS — J96.12 CHRONIC RESPIRATORY FAILURE WITH HYPOXIA AND HYPERCAPNIA: ICD-10-CM

## 2024-11-13 DIAGNOSIS — I27.20 PULMONARY HYPERTENSION: ICD-10-CM

## 2024-11-13 DIAGNOSIS — J44.9 COPD, SEVERE: Primary | ICD-10-CM

## 2024-11-13 DIAGNOSIS — R63.6 UNDERWEIGHT: ICD-10-CM

## 2024-11-13 RX ORDER — LOSARTAN POTASSIUM 50 MG/1
TABLET ORAL
COMMUNITY
Start: 2024-11-04

## 2024-11-13 NOTE — PROGRESS NOTES
Follow Up Office Visit      Patient Name: Maura Jiménez    Chief Complaint:    Chief Complaint   Patient presents with    Shortness of Breath       History of Present Illness: Maura Jiménez is a 79 y.o. female who is here today for follow up of severe COPD.  Since last visit, she was in the ED and September for a COPD exacerbation, and then was admitted to the hospital 3 times later that month: 9/14 for acute hypoxic respiratory failure, COPD with acute exacerbation, impaired mobility; 9/24 acute respiratory failure with hypoxia and hypercapnia, pulmonary hypertension, atrial flutter, sepsis, COPD exacerbation; 9/27 for acute respiratory failure with hypoxia, COPD with acute exacerbation, COVID-19, atypical pneumonia, paroxysmal atrial fibrillation.  According to her most recent hospital discharge note from [facility], she had been treated with azithromycin, Rocephin, remdesivir, dexamethasone, Mucinex, DuoNebs.  She is intolerant to BiPAP use.    Since the time of hospital discharge, the patient notes that she has been doing very well.  Her dyspnea is at baseline.  She has been participating in physical therapy, which she is tolerating well.  Occasional dry cough and intermittent wheezing    Associated Symptoms: Dyspnea with mild exertion, occasional dry cough, intermittent wheezing, no fevers, no hemoptysis  Supplemental Oxygen: 3L NC    Subjective      Review of Systems:  Review of Systems   Constitutional:  Negative for fever and unexpected weight change.   Respiratory:  Positive for cough, shortness of breath and wheezing.    Cardiovascular:  Negative for chest pain and leg swelling.        Past Medical History:   Past Medical History:   Diagnosis Date    COPD (chronic obstructive pulmonary disease)     Disease of thyroid gland     Hyperlipidemia     Osteoarthritis     Type 2 diabetes mellitus without complication, without long-term current use of insulin 11/1/2018       Past Surgical History:   Past  Surgical History:   Procedure Laterality Date    APPENDECTOMY      CATARACT EXTRACTION, BILATERAL      HYSTERECTOMY         Family History:   Family History   Problem Relation Age of Onset    Heart disease Father     Diabetes Brother        Social History:   Social History     Socioeconomic History    Marital status:    Tobacco Use    Smoking status: Former     Current packs/day: 0.00     Types: Cigarettes     Quit date: 2018     Years since quittin.1     Passive exposure: Past    Smokeless tobacco: Never   Vaping Use    Vaping status: Never Used   Substance and Sexual Activity    Alcohol use: No    Drug use: No    Sexual activity: Never       Current Medications:     Current Outpatient Medications:     apixaban (ELIQUIS) 2.5 MG tablet tablet, Take 1 tablet by mouth Every 12 (Twelve) Hours. Indications: Atrial Fibrillation, Disp: 60 tablet, Rfl: 0    Budeson-Glycopyrrol-Formoterol (BREZTRI) 160-9-4.8 MCG/ACT aerosol inhaler, Inhale 2 puffs 2 (Two) Times a Day., Disp: , Rfl:     Cyanocobalamin (VITAMIN B 12 PO), Take 1,000 Units by mouth Daily., Disp: , Rfl:     EQ Aspirin Adult Low Dose 81 MG EC tablet, Take 1 tablet by mouth Daily., Disp: , Rfl:     folic acid (FOLVITE) 1 MG tablet, Take 1 tablet by mouth Daily., Disp: , Rfl:     ipratropium-albuterol (DUO-NEB) 0.5-2.5 mg/3 ml nebulizer, Take 3 mL by nebulization 4 (Four) Times a Day As Needed for Wheezing or Shortness of Air., Disp: 360 mL, Rfl: 5    levothyroxine (SYNTHROID, LEVOTHROID) 75 MCG tablet, Take 1 tablet by mouth Daily., Disp: , Rfl:     losartan (COZAAR) 50 MG tablet, , Disp: , Rfl:     metFORMIN ER (GLUCOPHAGE-XR) 500 MG 24 hr tablet, Take 1 tablet by mouth Every 12 (Twelve) Hours. Hold due to diarrhea., Disp: , Rfl:     metoprolol tartrate (LOPRESSOR) 25 MG tablet, Take 1 tablet by mouth Every 12 (Twelve) Hours., Disp: 60 tablet, Rfl: 0    pravastatin (PRAVACHOL) 40 MG tablet, Take 1 tablet by mouth every night at bedtime., Disp: ,  "Rfl:      Allergies:   Allergies   Allergen Reactions    Shellfish-Derived Products Nausea And Vomiting    Codeine Confusion     unknown    Codeine Other (See Comments)     Unable to tolerate    Shellfish-Derived Products GI Intolerance       Objective     Physical Exam:  Vital Signs:   Vitals:    11/13/24 1105   BP: 131/57   Pulse: 77   SpO2: (!) 81%  Comment: RA- @2LPM-87%-  @3LPM97%   Weight: 30.8 kg (68 lb)   Height: 152.4 cm (60\")     Body mass index is 13.28 kg/m².    Physical Exam  Vitals reviewed.   Constitutional:       General: She is not in acute distress.     Appearance: She is not toxic-appearing.      Comments: Underweight   HENT:      Head: Normocephalic and atraumatic.      Mouth/Throat:      Mouth: Mucous membranes are moist.   Eyes:      Extraocular Movements: Extraocular movements intact.      Conjunctiva/sclera: Conjunctivae normal.   Cardiovascular:      Rate and Rhythm: Normal rate.      Heart sounds: Normal heart sounds.   Pulmonary:      Effort: Pulmonary effort is normal.      Breath sounds: Normal breath sounds.   Abdominal:      General: There is no distension.      Palpations: Abdomen is soft.   Musculoskeletal:         General: No swelling.      Cervical back: Neck supple.   Skin:     General: Skin is warm and dry.      Findings: No rash.   Neurological:      General: No focal deficit present.      Mental Status: She is alert and oriented to person, place, and time.   Psychiatric:         Mood and Affect: Mood normal.         Behavior: Behavior normal.         Results Review:   June 2024 PFTs showed severe obstruction without bronchodilator response, FEV1 33%, no restriction, + severe air trapping, no impairment in diffusion capacity when adjusted for alveolar volume.    September 14, 2024 CTA chest showed no pulmonary embolus.  Severe emphysematous lungs without acute infiltrate.  A 4 mm left lower lung nodule.    September 20, 2024 CTA chest showed no pulmonary emboli, no thoracic " aortic aneurysm or dissection, severe emphysema.  New bilateral lower lobe airspace disease, likely pneumonia.  Opacification of the right and left lower lobe bronchi which may be reflective of aspiration or secretions    September 27, 2024 chest x-ray showed emphysema.  Increasing interstitial markings throughout both lungs which may be secondary to pulmonary interstitial edema or atypical pneumonia.  No focal airspace consolidation or pleural effusion.    October 2, 2024 chest x-ray showed new left lower lobe infiltrate may represent atelectasis and/or pneumonia.  Small effusions, greatest on the left.  Underlying emphysema.    Site   Date Value Ref Range Status   09/27/2024 Right Radial  Final     Luciano's Test   Date Value Ref Range Status   09/27/2024 Positive  Final     pH, Arterial   Date Value Ref Range Status   09/27/2024 7.448 7.350 - 7.450 pH units Final   05/12/2022 7.438 7.350 - 7.450 Final     pCO2, Arterial   Date Value Ref Range Status   09/27/2024 65.9 (H) 35.0 - 45.0 mm Hg Final     Comment:     83 Value above reference range   05/12/2022 42.2 35.0 - 45.0 mmHg Final     pO2, Arterial   Date Value Ref Range Status   09/27/2024 91.6 83.0 - 108.0 mm Hg Final     HCO3, Arterial   Date Value Ref Range Status   09/27/2024 45.6 (H) 20.0 - 26.0 mmol/L Final   05/12/2022 27.9 (H) 22.0 - 26.0 mmol/L Final     Base Excess, Arterial   Date Value Ref Range Status   09/27/2024 18.8 (H) 0.0 - 2.0 mmol/L Final     O2 Saturation, Arterial   Date Value Ref Range Status   09/20/2024 100.1 (H) 94.0 - 100.0 % Final     Comment:     83 Value above reference range   05/12/2022 91.6 >92 % Final     Hemoglobin, Blood Gas   Date Value Ref Range Status   09/27/2024 9.8 (L) 14 - 18 g/dL Final     Comment:     84 Value below reference range     Hematocrit, Blood Gas   Date Value Ref Range Status   09/27/2024 30.1 (L) 38.0 - 51.0 % Final     Oxyhemoglobin   Date Value Ref Range Status   09/27/2024 96.9 94 - 99 % Final      Methemoglobin   Date Value Ref Range Status   09/27/2024 0.30 0.00 - 1.50 % Final     Carboxyhemoglobin   Date Value Ref Range Status   09/27/2024 1.1 0 - 2 % Final     CO2 Content   Date Value Ref Range Status   09/27/2024 47.6 (H) 22 - 33 mmol/L Final     Barometric Pressure for Blood Gas   Date Value Ref Range Status   09/27/2024   Final     Comment:     N/A   06/04/2016 734 mmHg Final     Modality   Date Value Ref Range Status   09/27/2024 Nasal Cannula  Final     FIO2   Date Value Ref Range Status   09/27/2024 32 % Final   05/12/2022 0.28 Not Established % Final     Results for orders placed during the hospital encounter of 09/14/24    Adult Transthoracic Echo Complete W/ Cont if Necessary Per Protocol    Interpretation Summary    Left ventricular systolic function is hyperdynamic (EF > 70%). Calculated left ventricular EF = 72.6%    Left ventricular diastolic function was normal.    Estimated right ventricular systolic pressure from tricuspid regurgitation is markedly elevated (>55 mmHg).    Severe pulmonary hypertension.    WBC   Date Value Ref Range Status   10/04/2024 11.39 (H) 3.40 - 10.80 10*3/mm3 Final   05/14/2022 15.1 (H) 4.0 - 11.0 K/uL Final     RBC   Date Value Ref Range Status   10/04/2024 3.25 (L) 3.77 - 5.28 10*6/mm3 Final   05/14/2022 3.75 (L) 3.80 - 5.80 M/uL Final     Hemoglobin   Date Value Ref Range Status   10/04/2024 9.4 (L) 12.0 - 15.9 g/dL Final   05/14/2022 10.9 (L) 11.5 - 16.5 g/dL Final     Hematocrit   Date Value Ref Range Status   10/04/2024 30.2 (L) 34.0 - 46.6 % Final   05/14/2022 36.0 (L) 37.0 - 47.0 % Final     MCV   Date Value Ref Range Status   10/04/2024 92.9 79.0 - 97.0 fL Final   05/14/2022 96.0 80.0 - 100.0 fL Final     MCH   Date Value Ref Range Status   10/04/2024 28.9 26.6 - 33.0 pg Final   05/14/2022 29.1 27.0 - 32.0 pg Final     MCHC   Date Value Ref Range Status   10/04/2024 31.1 (L) 31.5 - 35.7 g/dL Final   05/14/2022 30.3 (L) 31.0 - 35.0 g/dL Final     RDW    Date Value Ref Range Status   10/04/2024 13.1 12.3 - 15.4 % Final   05/14/2022 12.6 11.0 - 16.0 % Final     RDW-SD   Date Value Ref Range Status   10/04/2024 43.4 37.0 - 54.0 fl Final     MPV   Date Value Ref Range Status   10/04/2024 10.7 6.0 - 12.0 fL Final   05/14/2022 11.3 (H) 6.0 - 10.0 fL Final     Platelets   Date Value Ref Range Status   10/04/2024 320 140 - 450 10*3/mm3 Final   05/14/2022 268 150 - 400 K/uL Final     Neutrophil Rel %   Date Value Ref Range Status   05/13/2022 90.1 % Final     Neutrophil %   Date Value Ref Range Status   10/02/2024 82.2 (H) 42.7 - 76.0 % Final     Lymphocyte Rel %   Date Value Ref Range Status   05/13/2022 6.2 % Final     Lymphocyte %   Date Value Ref Range Status   10/02/2024 9.5 (L) 19.6 - 45.3 % Final     Monocyte Rel %   Date Value Ref Range Status   05/13/2022 2.9 % Final     Monocyte %   Date Value Ref Range Status   10/02/2024 7.2 5.0 - 12.0 % Final     Eosinophil Rel %   Date Value Ref Range Status   05/13/2022 0 % Final     Eosinophil %   Date Value Ref Range Status   10/02/2024 0.5 0.3 - 6.2 % Final     Basophil Rel %   Date Value Ref Range Status   05/13/2022 0.1 % Final     Basophil %   Date Value Ref Range Status   10/02/2024 0.1 0.0 - 1.5 % Final     Immature Grans %   Date Value Ref Range Status   10/02/2024 0.5 0.0 - 0.5 % Final   05/13/2022 0.7 0.0 - 5.0 % Final     Neutrophils Absolute   Date Value Ref Range Status   05/13/2022 8.3 (H) 2.0 - 7.5 K/uL Final     Neutrophils, Absolute   Date Value Ref Range Status   10/02/2024 8.08 (H) 1.70 - 7.00 10*3/mm3 Final     Lymphocytes Absolute   Date Value Ref Range Status   05/13/2022 0.6 (L) 1.5 - 4.0 K/uL Final     Lymphocytes, Absolute   Date Value Ref Range Status   10/02/2024 0.93 0.70 - 3.10 10*3/mm3 Final     Monocytes Absolute   Date Value Ref Range Status   05/13/2022 0.3 0.2 - 0.8 K/uL Final     Monocytes, Absolute   Date Value Ref Range Status   10/02/2024 0.71 0.10 - 0.90 10*3/mm3 Final     Eosinophils  Absolute   Date Value Ref Range Status   05/13/2022 0.0 0.0 - 0.4 K/uL Final     Eosinophils, Absolute   Date Value Ref Range Status   10/02/2024 0.05 0.00 - 0.40 10*3/mm3 Final     Basophils Absolute   Date Value Ref Range Status   05/13/2022 0.0 0.0 - 0.1 K/uL Final     Basophils, Absolute   Date Value Ref Range Status   10/02/2024 0.01 0.00 - 0.20 10*3/mm3 Final     Immature Grans, Absolute   Date Value Ref Range Status   10/02/2024 0.05 0.00 - 0.05 10*3/mm3 Final   05/13/2022 0.1 K/uL Final     nRBC   Date Value Ref Range Status   10/02/2024 0.0 0.0 - 0.2 /100 WBC Final     Lab Results   Component Value Date    GLUCOSE 191 (H) 10/04/2024    BUN 22 10/04/2024    CREATININE 0.46 (L) 10/04/2024     10/04/2024    K 4.6 10/04/2024     10/04/2024    CALCIUM 8.0 (L) 10/04/2024    PROTEINTOT 4.2 (L) 10/02/2024    ALBUMIN 2.7 (L) 10/02/2024    ALT 17 10/02/2024    AST 19 10/02/2024    ALKPHOS 56 10/02/2024    BILITOT 0.2 10/02/2024    GLOB 1.5 10/02/2024    AGRATIO 1.8 10/02/2024    BCR 47.8 (H) 10/04/2024    ANIONGAP 5.0 10/04/2024    EGFR 96.9 10/04/2024     2024 alpha-1 antitrypsin phenotype MM    Assessment / Plan      Assessment/Plan:   Diagnoses and all orders for this visit:    1. COPD, severe (Primary)  -     Oxygen Therapy  Symptoms are now at baseline.  Continue current inhaled regimen. We discussed the risk and benefits of inhaled corticosteroids. Patient instructed to take them on a regular basis as prescribed. Patient instructed to rinse their mouth out after each use.     2. Chronic respiratory failure with hypoxia and hypercapnia  -     Oxygen Therapy  Order written for patient to have portable oxygen tanks due to concern for power outages which could interrupt her oxygen delivery. Intolerant to BiPAP use during recent hospitalization.    3. Pulmonary emphysema, unspecified emphysema type  Normal alpha-1 antitrypsin phenotype.    4. Personal history of nicotine dependence  Ongoing cessation  advised.    5. Pulmonary hypertension  Likely due to the severity of her underlying COPD.  Advised her that further evaluation of pulmonary hypertension includes a right heart catheterization with vasodilator challenge, for which I would refer her to Dr. De Santiago at the [facility] should she desire further work up or have worsening RVSP.     6. Underweight  Patient was advised to eat small, frequent, high calorie foods / meals throughout the day in attempts to maintain / gain weight.        Follow Up:   May 2025 as previously scheduled  The patient was counseled on diagnostic results, risks and benefits of treatment options, risk factor modifications and the importance of treatment compliance. The patient was advised to contact the clinic with concerns or worsening symptoms.     BRITTNY Madison   [facility] [place]     This document has been electronically signed by BRITTNY Madison  November 13, 2024

## 2025-03-29 ENCOUNTER — TELEPHONE (OUTPATIENT)
Dept: URGENT CARE | Facility: CLINIC | Age: 80
End: 2025-03-29
Payer: MEDICARE

## 2025-03-29 ENCOUNTER — RESULTS FOLLOW-UP (OUTPATIENT)
Dept: URGENT CARE | Facility: CLINIC | Age: 80
End: 2025-03-29
Payer: MEDICARE

## 2025-03-29 DIAGNOSIS — J18.9 COMMUNITY ACQUIRED PNEUMONIA OF RIGHT LUNG, UNSPECIFIED PART OF LUNG: Primary | ICD-10-CM

## 2025-04-01 ENCOUNTER — APPOINTMENT (OUTPATIENT)
Dept: GENERAL RADIOLOGY | Facility: HOSPITAL | Age: 80
End: 2025-04-01
Payer: MEDICARE

## 2025-04-01 ENCOUNTER — HOSPITAL ENCOUNTER (EMERGENCY)
Facility: HOSPITAL | Age: 80
Discharge: HOME | End: 2025-04-01
Attending: EMERGENCY MEDICINE | Admitting: EMERGENCY MEDICINE
Payer: MEDICARE

## 2025-04-01 VITALS
SYSTOLIC BLOOD PRESSURE: 125 MMHG | RESPIRATION RATE: 21 BRPM | HEIGHT: 60 IN | OXYGEN SATURATION: 97 % | TEMPERATURE: 98.1 F | WEIGHT: 65 LBS | HEART RATE: 85 BPM | DIASTOLIC BLOOD PRESSURE: 56 MMHG | BODY MASS INDEX: 12.76 KG/M2

## 2025-04-01 DIAGNOSIS — J44.1 COPD EXACERBATION: ICD-10-CM

## 2025-04-01 DIAGNOSIS — J96.21 ACUTE ON CHRONIC RESPIRATORY FAILURE WITH HYPOXIA: Primary | ICD-10-CM

## 2025-04-01 LAB
ALBUMIN SERPL-MCNC: 4.1 G/DL (ref 3.5–5.2)
ALBUMIN/GLOB SERPL: 1.5 G/DL
ALP SERPL-CCNC: 71 U/L (ref 39–117)
ALT SERPL W P-5'-P-CCNC: 13 U/L (ref 1–33)
ANION GAP SERPL CALCULATED.3IONS-SCNC: 9.7 MMOL/L (ref 5–15)
AST SERPL-CCNC: 20 U/L (ref 1–32)
B PARAPERT DNA SPEC QL NAA+PROBE: NOT DETECTED
B PERT DNA SPEC QL NAA+PROBE: NOT DETECTED
BASOPHILS # BLD AUTO: 0.01 10*3/MM3 (ref 0–0.2)
BASOPHILS NFR BLD AUTO: 0.1 % (ref 0–1.5)
BILIRUB SERPL-MCNC: 0.2 MG/DL (ref 0–1.2)
BUN SERPL-MCNC: 36 MG/DL (ref 8–23)
BUN/CREAT SERPL: 58.1 (ref 7–25)
C PNEUM DNA NPH QL NAA+NON-PROBE: NOT DETECTED
CALCIUM SPEC-SCNC: 9.9 MG/DL (ref 8.6–10.5)
CHLORIDE SERPL-SCNC: 93 MMOL/L (ref 98–107)
CO2 SERPL-SCNC: 34.3 MMOL/L (ref 22–29)
CREAT SERPL-MCNC: 0.62 MG/DL (ref 0.57–1)
D-LACTATE SERPL-SCNC: 1.2 MMOL/L (ref 0.5–2)
DEPRECATED RDW RBC AUTO: 46.4 FL (ref 37–54)
EGFRCR SERPLBLD CKD-EPI 2021: 90.2 ML/MIN/1.73
EOSINOPHIL # BLD AUTO: 0.07 10*3/MM3 (ref 0–0.4)
EOSINOPHIL NFR BLD AUTO: 0.6 % (ref 0.3–6.2)
ERYTHROCYTE [DISTWIDTH] IN BLOOD BY AUTOMATED COUNT: 13.3 % (ref 12.3–15.4)
FLUAV SUBTYP SPEC NAA+PROBE: NOT DETECTED
FLUBV RNA ISLT QL NAA+PROBE: NOT DETECTED
GEN 5 1HR TROPONIN T REFLEX: 15 NG/L
GLOBULIN UR ELPH-MCNC: 2.8 GM/DL
GLUCOSE SERPL-MCNC: 300 MG/DL (ref 65–99)
HADV DNA SPEC NAA+PROBE: NOT DETECTED
HCOV 229E RNA SPEC QL NAA+PROBE: NOT DETECTED
HCOV HKU1 RNA SPEC QL NAA+PROBE: NOT DETECTED
HCOV NL63 RNA SPEC QL NAA+PROBE: NOT DETECTED
HCOV OC43 RNA SPEC QL NAA+PROBE: NOT DETECTED
HCT VFR BLD AUTO: 32.8 % (ref 34–46.6)
HGB BLD-MCNC: 9.9 G/DL (ref 12–15.9)
HMPV RNA NPH QL NAA+NON-PROBE: NOT DETECTED
HOLD SPECIMEN: NORMAL
HOLD SPECIMEN: NORMAL
HPIV1 RNA ISLT QL NAA+PROBE: NOT DETECTED
HPIV2 RNA SPEC QL NAA+PROBE: NOT DETECTED
HPIV3 RNA NPH QL NAA+PROBE: NOT DETECTED
HPIV4 P GENE NPH QL NAA+PROBE: NOT DETECTED
IMM GRANULOCYTES # BLD AUTO: 0.04 10*3/MM3 (ref 0–0.05)
IMM GRANULOCYTES NFR BLD AUTO: 0.4 % (ref 0–0.5)
LYMPHOCYTES # BLD AUTO: 1.04 10*3/MM3 (ref 0.7–3.1)
LYMPHOCYTES NFR BLD AUTO: 9.1 % (ref 19.6–45.3)
M PNEUMO IGG SER IA-ACNC: NOT DETECTED
MCH RBC QN AUTO: 28.6 PG (ref 26.6–33)
MCHC RBC AUTO-ENTMCNC: 30.2 G/DL (ref 31.5–35.7)
MCV RBC AUTO: 94.8 FL (ref 79–97)
MONOCYTES # BLD AUTO: 0.73 10*3/MM3 (ref 0.1–0.9)
MONOCYTES NFR BLD AUTO: 6.4 % (ref 5–12)
NEUTROPHILS NFR BLD AUTO: 83.4 % (ref 42.7–76)
NEUTROPHILS NFR BLD AUTO: 9.5 10*3/MM3 (ref 1.7–7)
NRBC BLD AUTO-RTO: 0 /100 WBC (ref 0–0.2)
NT-PROBNP SERPL-MCNC: 1432 PG/ML (ref 0–1800)
PLATELET # BLD AUTO: 349 10*3/MM3 (ref 140–450)
PMV BLD AUTO: 11 FL (ref 6–12)
POTASSIUM SERPL-SCNC: 5 MMOL/L (ref 3.5–5.2)
PROCALCITONIN SERPL-MCNC: 0.79 NG/ML (ref 0–0.25)
PROT SERPL-MCNC: 6.9 G/DL (ref 6–8.5)
RBC # BLD AUTO: 3.46 10*6/MM3 (ref 3.77–5.28)
RHINOVIRUS RNA SPEC NAA+PROBE: NOT DETECTED
RSV RNA NPH QL NAA+NON-PROBE: NOT DETECTED
SARS-COV-2 RNA RESP QL NAA+PROBE: NOT DETECTED
SODIUM SERPL-SCNC: 137 MMOL/L (ref 136–145)
TROPONIN T % DELTA: 7
TROPONIN T NUMERIC DELTA: 1 NG/L
TROPONIN T SERPL HS-MCNC: 14 NG/L
WBC NRBC COR # BLD AUTO: 11.39 10*3/MM3 (ref 3.4–10.8)
WHOLE BLOOD HOLD COAG: NORMAL
WHOLE BLOOD HOLD SPECIMEN: NORMAL

## 2025-04-01 PROCEDURE — 84145 PROCALCITONIN (PCT): CPT | Performed by: EMERGENCY MEDICINE

## 2025-04-01 PROCEDURE — 96375 TX/PRO/DX INJ NEW DRUG ADDON: CPT

## 2025-04-01 PROCEDURE — 94799 UNLISTED PULMONARY SVC/PX: CPT

## 2025-04-01 PROCEDURE — 93005 ELECTROCARDIOGRAM TRACING: CPT | Performed by: EMERGENCY MEDICINE

## 2025-04-01 PROCEDURE — 36415 COLL VENOUS BLD VENIPUNCTURE: CPT

## 2025-04-01 PROCEDURE — 83880 ASSAY OF NATRIURETIC PEPTIDE: CPT | Performed by: EMERGENCY MEDICINE

## 2025-04-01 PROCEDURE — 0202U NFCT DS 22 TRGT SARS-COV-2: CPT | Performed by: EMERGENCY MEDICINE

## 2025-04-01 PROCEDURE — 96365 THER/PROPH/DIAG IV INF INIT: CPT

## 2025-04-01 PROCEDURE — 80053 COMPREHEN METABOLIC PANEL: CPT | Performed by: EMERGENCY MEDICINE

## 2025-04-01 PROCEDURE — 83605 ASSAY OF LACTIC ACID: CPT | Performed by: EMERGENCY MEDICINE

## 2025-04-01 PROCEDURE — 99291 CRITICAL CARE FIRST HOUR: CPT | Performed by: EMERGENCY MEDICINE

## 2025-04-01 PROCEDURE — 94640 AIRWAY INHALATION TREATMENT: CPT

## 2025-04-01 PROCEDURE — 25010000002 MAGNESIUM SULFATE 2 GM/50ML SOLUTION: Performed by: EMERGENCY MEDICINE

## 2025-04-01 PROCEDURE — 84484 ASSAY OF TROPONIN QUANT: CPT | Performed by: EMERGENCY MEDICINE

## 2025-04-01 PROCEDURE — 99285 EMERGENCY DEPT VISIT HI MDM: CPT

## 2025-04-01 PROCEDURE — 85025 COMPLETE CBC W/AUTO DIFF WBC: CPT | Performed by: EMERGENCY MEDICINE

## 2025-04-01 PROCEDURE — 25010000002 DEXAMETHASONE SODIUM PHOSPHATE 10 MG/ML SOLUTION: Performed by: EMERGENCY MEDICINE

## 2025-04-01 PROCEDURE — 71045 X-RAY EXAM CHEST 1 VIEW: CPT

## 2025-04-01 RX ORDER — SODIUM CHLORIDE 0.9 % (FLUSH) 0.9 %
10 SYRINGE (ML) INJECTION AS NEEDED
Status: DISCONTINUED | OUTPATIENT
Start: 2025-04-01 | End: 2025-04-01 | Stop reason: HOSPADM

## 2025-04-01 RX ORDER — MAGNESIUM SULFATE HEPTAHYDRATE 40 MG/ML
2 INJECTION, SOLUTION INTRAVENOUS ONCE
Status: COMPLETED | OUTPATIENT
Start: 2025-04-01 | End: 2025-04-01

## 2025-04-01 RX ORDER — PREDNISONE 20 MG/1
40 TABLET ORAL DAILY
Qty: 20 TABLET | Refills: 0 | Status: SHIPPED | OUTPATIENT
Start: 2025-04-01 | End: 2025-04-11

## 2025-04-01 RX ORDER — ALBUTEROL SULFATE 0.83 MG/ML
10 SOLUTION RESPIRATORY (INHALATION)
Status: COMPLETED | OUTPATIENT
Start: 2025-04-01 | End: 2025-04-01

## 2025-04-01 RX ORDER — IPRATROPIUM BROMIDE AND ALBUTEROL SULFATE 2.5; .5 MG/3ML; MG/3ML
3 SOLUTION RESPIRATORY (INHALATION) ONCE
Status: COMPLETED | OUTPATIENT
Start: 2025-04-01 | End: 2025-04-01

## 2025-04-01 RX ORDER — DEXAMETHASONE SODIUM PHOSPHATE 10 MG/ML
10 INJECTION, SOLUTION INTRAMUSCULAR; INTRAVENOUS ONCE
Status: COMPLETED | OUTPATIENT
Start: 2025-04-01 | End: 2025-04-01

## 2025-04-01 RX ADMIN — DEXAMETHASONE SODIUM PHOSPHATE 10 MG: 10 INJECTION INTRAMUSCULAR; INTRAVENOUS at 18:29

## 2025-04-01 RX ADMIN — MAGNESIUM SULFATE HEPTAHYDRATE 2 G: 40 INJECTION, SOLUTION INTRAVENOUS at 18:32

## 2025-04-01 RX ADMIN — ALBUTEROL SULFATE 10 MG: 2.5 SOLUTION RESPIRATORY (INHALATION) at 18:24

## 2025-04-01 RX ADMIN — IPRATROPIUM BROMIDE AND ALBUTEROL SULFATE 3 ML: 2.5; .5 SOLUTION RESPIRATORY (INHALATION) at 18:24

## 2025-04-01 NOTE — ED PROVIDER NOTES
EMERGENCY DEPARTMENT ENCOUNTER    Pt Name: Maura Jiménez  MRN: [id number]  Pt :   1945  Room Number:    Date of encounter:  2025  PCP: Sofia Martinez APRN  ED Provider: Johnny Zepeda MD    Historian: Patient      HPI:  Chief Complaint   Patient presents with    Shortness of Breath          Context: Maura Jiménez is a 79 y.o. female who presents to the ED c/o shortness of breath, present for 3 days, feels that she cannot catch her breath, associate with nonproductive cough, denies fevers.  Denies sick contacts.  She went to urgent care several days ago and the shortness of breath started, received antibiotics, has not had any improvement.  She wears 3 L of oxygen chronically, reports that she still feels short of breath on her 3 L of oxygen denies chest pain.      PAST MEDICAL HISTORY  Past Medical History:   Diagnosis Date    COPD (chronic obstructive pulmonary disease)     Disease of thyroid gland     Hyperlipidemia     Osteoarthritis     Type 2 diabetes mellitus without complication, without long-term current use of insulin 2018         PAST SURGICAL HISTORY  Past Surgical History:   Procedure Laterality Date    APPENDECTOMY      CATARACT EXTRACTION, BILATERAL      HYSTERECTOMY           FAMILY HISTORY  Family History   Problem Relation Age of Onset    Heart disease Father     Diabetes Brother          SOCIAL HISTORY  Social History     Socioeconomic History    Marital status:    Tobacco Use    Smoking status: Former     Current packs/day: 0.00     Types: Cigarettes     Quit date: 2018     Years since quittin.5     Passive exposure: Past    Smokeless tobacco: Never   Vaping Use    Vaping status: Never Used   Substance and Sexual Activity    Alcohol use: No    Drug use: No    Sexual activity: Never         ALLERGIES  Shellfish-derived products, Codeine, Codeine, and Shellfish-derived products        REVIEW OF SYSTEMS  Review of Systems    Constitutional:  Negative for chills and fever.   HENT:  Negative for sore throat and trouble swallowing.    Eyes:  Negative for pain and redness.   Respiratory:  Positive for cough and shortness of breath.    Cardiovascular:  Negative for chest pain and leg swelling.   Gastrointestinal:  Negative for abdominal pain, nausea and vomiting.   Genitourinary:  Negative for dysuria and urgency.   Musculoskeletal:  Negative for back pain and neck pain.   Skin:  Negative for rash and wound.   Neurological:  Negative for dizziness and weakness.        All systems reviewed and negative except for those discussed in HPI.       PHYSICAL EXAM    I have reviewed the triage vital signs and nursing notes.    ED Triage Vitals [04/01/25 1759]   Temp Heart Rate Resp BP SpO2   98 °F (36.7 °C) 97 22 (!) 185/72 99 %      Temp src Heart Rate Source Patient Position BP Location FiO2 (%)   Oral Monitor Sitting Left arm --       Physical Exam  Constitutional:       Appearance: Normal appearance. She is not ill-appearing.   HENT:      Head: Normocephalic and atraumatic.      Right Ear: External ear normal.      Left Ear: External ear normal.      Nose: Nose normal.      Mouth/Throat:      Mouth: Mucous membranes are moist.      Pharynx: Oropharynx is clear.   Eyes:      Extraocular Movements: Extraocular movements intact.      Conjunctiva/sclera: Conjunctivae normal.      Pupils: Pupils are equal, round, and reactive to light.   Cardiovascular:      Rate and Rhythm: Normal rate and regular rhythm.      Pulses:           Radial pulses are 2+ on the right side and 2+ on the left side.   Pulmonary:      Effort: Tachypnea and prolonged expiration present.      Breath sounds: Examination of the right-upper field reveals decreased breath sounds. Examination of the left-upper field reveals decreased breath sounds. Examination of the right-middle field reveals decreased breath sounds. Examination of the left-middle field reveals decreased breath  sounds. Examination of the right-lower field reveals decreased breath sounds. Examination of the left-lower field reveals decreased breath sounds. Decreased breath sounds and wheezing present.   Abdominal:      General: There is no distension.      Palpations: Abdomen is soft.      Tenderness: There is no abdominal tenderness.   Musculoskeletal:         General: No tenderness or deformity. Normal range of motion.      Cervical back: Normal range of motion and neck supple.      Right lower leg: No edema.      Left lower leg: No edema.   Skin:     General: Skin is warm and dry.      Capillary Refill: Capillary refill takes less than 2 seconds.   Neurological:      General: No focal deficit present.      Mental Status: She is alert and oriented to person, place, and time.            LAB RESULTS  Recent Results (from the past 24 hours)   Comprehensive Metabolic Panel    Collection Time: 04/01/25  6:13 PM    Specimen: Blood   Result Value Ref Range    Glucose 300 (H) 65 - 99 mg/dL    BUN 36 (H) 8 - 23 mg/dL    Creatinine 0.62 0.57 - 1.00 mg/dL    Sodium 137 136 - 145 mmol/L    Potassium 5.0 3.5 - 5.2 mmol/L    Chloride 93 (L) 98 - 107 mmol/L    CO2 34.3 (H) 22.0 - 29.0 mmol/L    Calcium 9.9 8.6 - 10.5 mg/dL    Total Protein 6.9 6.0 - 8.5 g/dL    Albumin 4.1 3.5 - 5.2 g/dL    ALT (SGPT) 13 1 - 33 U/L    AST (SGOT) 20 1 - 32 U/L    Alkaline Phosphatase 71 39 - 117 U/L    Total Bilirubin 0.2 0.0 - 1.2 mg/dL    Globulin 2.8 gm/dL    A/G Ratio 1.5 g/dL    BUN/Creatinine Ratio 58.1 (H) 7.0 - 25.0    Anion Gap 9.7 5.0 - 15.0 mmol/L    eGFR 90.2 >60.0 mL/min/1.73   BNP    Collection Time: 04/01/25  6:13 PM    Specimen: Blood   Result Value Ref Range    proBNP 1,432.0 0.0 - 1,800.0 pg/mL   High Sensitivity Troponin T    Collection Time: 04/01/25  6:13 PM    Specimen: Blood   Result Value Ref Range    HS Troponin T 14 (H) <14 ng/L   Green Top (Gel)    Collection Time: 04/01/25  6:13 PM   Result Value Ref Range    Extra Tube Hold  for add-ons.    Lavender Top    Collection Time: 04/01/25  6:13 PM   Result Value Ref Range    Extra Tube hold for add-on    Gold Top - SST    Collection Time: 04/01/25  6:13 PM   Result Value Ref Range    Extra Tube Hold for add-ons.    Light Blue Top    Collection Time: 04/01/25  6:13 PM   Result Value Ref Range    Extra Tube Hold for add-ons.    CBC Auto Differential    Collection Time: 04/01/25  6:13 PM    Specimen: Blood   Result Value Ref Range    WBC 11.39 (H) 3.40 - 10.80 10*3/mm3    RBC 3.46 (L) 3.77 - 5.28 10*6/mm3    Hemoglobin 9.9 (L) 12.0 - 15.9 g/dL    Hematocrit 32.8 (L) 34.0 - 46.6 %    MCV 94.8 79.0 - 97.0 fL    MCH 28.6 26.6 - 33.0 pg    MCHC 30.2 (L) 31.5 - 35.7 g/dL    RDW 13.3 12.3 - 15.4 %    RDW-SD 46.4 37.0 - 54.0 fl    MPV 11.0 6.0 - 12.0 fL    Platelets 349 140 - 450 10*3/mm3    Neutrophil % 83.4 (H) 42.7 - 76.0 %    Lymphocyte % 9.1 (L) 19.6 - 45.3 %    Monocyte % 6.4 5.0 - 12.0 %    Eosinophil % 0.6 0.3 - 6.2 %    Basophil % 0.1 0.0 - 1.5 %    Immature Grans % 0.4 0.0 - 0.5 %    Neutrophils, Absolute 9.50 (H) 1.70 - 7.00 10*3/mm3    Lymphocytes, Absolute 1.04 0.70 - 3.10 10*3/mm3    Monocytes, Absolute 0.73 0.10 - 0.90 10*3/mm3    Eosinophils, Absolute 0.07 0.00 - 0.40 10*3/mm3    Basophils, Absolute 0.01 0.00 - 0.20 10*3/mm3    Immature Grans, Absolute 0.04 0.00 - 0.05 10*3/mm3    nRBC 0.0 0.0 - 0.2 /100 WBC   Lactic Acid, Plasma    Collection Time: 04/01/25  6:13 PM    Specimen: Blood   Result Value Ref Range    Lactate 1.2 0.5 - 2.0 mmol/L   Procalcitonin    Collection Time: 04/01/25  6:13 PM    Specimen: Blood   Result Value Ref Range    Procalcitonin 0.79 (H) 0.00 - 0.25 ng/mL   Respiratory Panel PCR w/COVID-19(SARS-CoV-2) MICHAEL/SHASHANK/NAVJOT/PAD/[facility] In-House, NP Swab in UTM/VTM, 2 HR TAT - Swab, Nasopharynx    Collection Time: 04/01/25  6:33 PM    Specimen: Nasopharynx; Swab   Result Value Ref Range    ADENOVIRUS, PCR Not Detected Not Detected    Coronavirus 229E Not Detected Not  Detected    Coronavirus HKU1 Not Detected Not Detected    Coronavirus NL63 Not Detected Not Detected    Coronavirus OC43 Not Detected Not Detected    COVID19 Not Detected Not Detected - Ref. Range    Human Metapneumovirus Not Detected Not Detected    Human Rhinovirus/Enterovirus Not Detected Not Detected    Influenza A PCR Not Detected Not Detected    Influenza B PCR Not Detected Not Detected    Parainfluenza Virus 1 Not Detected Not Detected    Parainfluenza Virus 2 Not Detected Not Detected    Parainfluenza Virus 3 Not Detected Not Detected    Parainfluenza Virus 4 Not Detected Not Detected    RSV, PCR Not Detected Not Detected    Bordetella pertussis pcr Not Detected Not Detected    Bordetella parapertussis PCR Not Detected Not Detected    Chlamydophila pneumoniae PCR Not Detected Not Detected    Mycoplasma pneumo by PCR Not Detected Not Detected   High Sensitivity Troponin T 1Hr    Collection Time: 04/01/25  7:15 PM    Specimen: Blood   Result Value Ref Range    HS Troponin T 15 (H) <14 ng/L    Troponin T Numeric Delta 1 ng/L    Troponin T % Delta 7 Abnormal if >/= 20%       If labs were ordered, I independently reviewed the results and considered them in treating the patient.        RADIOLOGY  No Radiology Exams Resulted Within Past 24 Hours        PROCEDURES    Critical Care    Performed by: Johnny Zepeda MD  Authorized by: Johnny Zepeda MD    Critical care provider statement:     Critical care time (minutes):  47    Critical care time was exclusive of:  Separately billable procedures and treating other patients    Critical care was necessary to treat or prevent imminent or life-threatening deterioration of the following conditions:  Respiratory failure    Critical care was time spent personally by me on the following activities:  Ordering and performing treatments and interventions, ordering and review of laboratory studies, ordering and review of radiographic studies, pulse oximetry,  re-evaluation of patient's condition, blood draw for specimens, examination of patient and evaluation of patient's response to treatment    I assumed direction of critical care for this patient from another provider in my specialty: no        Interpretations    O2 Sat: The patient's oxygen saturation was 87% on  3 L Nasal Cannula.  This was independently interpreted by me as Hypoxic    EKG: I reviewed and independently interpreted the EKG as sinus rhythm rate of 89 bpm, normal axis, normal intervals, no ST elevation, no T wave inversions    Cardiac Monitoring: I reviewed and independently interpreted the Rhythm Strip as Normal Sinus rhythm rate of 97    Radiology: I ordered and independently reviewed the above noted radiographic studies.  I viewed images of Chest Xray which showed No pulmonary process per my independent interpretation. See radiologist's dictation for official interpretation.         MEDICATIONS GIVEN IN ER    Medications   sodium chloride 0.9 % flush 10 mL (has no administration in time range)   ipratropium-albuterol (DUO-NEB) nebulizer solution 3 mL (3 mL Nebulization Given 4/1/25 1824)   albuterol (PROVENTIL) nebulizer solution 0.083% 2.5 mg/3mL (10 mg Nebulization Given 4/1/25 1824)   dexAMETHasone sodium phosphate injection 10 mg (10 mg Intravenous Given 4/1/25 1829)   magnesium sulfate 2g/50 mL (PREMIX) infusion (0 g Intravenous Stopped 4/1/25 1914)         MEDICAL DECISION MAKING, PROGRESS, and CONSULTS    All labs, if obtained, have been independently reviewed by me.  All radiology studies, if obtained, have been reviewed by me and the radiologist dictating the report.  All EKG's, if obtained, have been independently viewed and interpreted by me      Discussion below represents my analysis of pertinent findings related to patient's condition, differential diagnosis, treatment plan and final disposition.      Differential diagnosis:    79-year-old female with history of COPD on home oxygen  presented to the ED with complaint shortness of breath, has been present for 3 days, has a cough, concern for COPD exacerbation, versus pneumonia, viral infection, will obtain respiratory panel, laboratory workup including BNP, troponin, chest x-ray, provide nebulized breathing treatments, steroids    Additional Sources:  External (non-ED) record review:  Urgent care note 3/29/2025 for COPD exacerbation       Orders placed during this visit:  Orders Placed This Encounter   Procedures    Critical Care    Respiratory Panel PCR w/COVID-19(SARS-CoV-2) MICHAEL/SHASHANK/NAVJOT/PAD/[facility] In-House, NP Swab in UTM/VTM, 2 HR TAT - Swab, Nasopharynx    XR Chest 1 View    [place] Draw    Comprehensive Metabolic Panel    BNP    High Sensitivity Troponin T    CBC Auto Differential    Lactic Acid, Plasma    Procalcitonin    High Sensitivity Troponin T 1Hr    Blood Gas, Venous -With Co-Ox Panel: Yes    NPO Diet NPO Type: Strict NPO    Undress & Gown    Continuous Pulse Oximetry    Vital Signs    Oxygen Therapy- Nasal Cannula; Titrate 1-6 LPM Per SpO2; 90 - 95%    ECG 12 Lead Dyspnea    Insert Peripheral IV    CBC & Differential    Green Top (Gel)    Lavender Top    Gold Top - SST    Light Blue Top         Additional orders considered but not ordered:  None    ED Course:    Consultants:  None    ED Course as of 04/01/25 2029 Tue Apr 01, 2025   1824 CBC & Differential(!)  Mildly elevated white blood cell count at baseline [CS]   1858 Procalcitonin(!)  Procalcitonin elevated [CS]   1858 High Sensitivity Troponin T(!)  Troponin elevated, however much lower than baseline [CS]   1922 Comprehensive Metabolic Panel(!)  Chemistries with hyperglycemia, no DKA, otherwise normal [CS]   1925 Respiratory Panel PCR w/COVID-19(SARS-CoV-2) MICHAEL/SHASHANK/NAVJOT/PAD/[facility] In-House, NP Swab in UTM/VTM, 2 HR TAT - Swab, Nasopharynx  Respiratory panel is negative [CS]   1940 High Sensitivity Troponin T 1Hr(!)  Repeat troponin not significantly elevated [CS]   1940  Patient feeling much better, speaking full sentences, turned her down to her normal 3 L of oxygen, will reevaluate for possible discharge home [CS]   2016 After 10 minutes patient is maintaining 98% on her home oxygen, she is speaking in full sentences, her workup here has been fairly unrevealing, I do think this is likely exacerbation of her chronic respiratory condition.  Will recommend she continue her antibiotics, placed on steroids for several more days, use her nebulizer or inhaler every 4 hours and follow-up with her primary care physician.  Patient voiced understanding is agreeable with the plan for discharge home [CS]      ED Course User Index  [CS] Johnny Zepeda MD           After my consideration of clinical presentation and any laboratory/radiology studies obtained, I discussed the findings with the patient/patient representative who is in agreement with the treatment plan and the final disposition. Risks and benefits of discharge were discussed.     AS OF 20:29 EDT VITALS:    BP - 129/51  HR - 93  TEMP - 98 °F (36.7 °C) (Oral)  O2 SATS - 98%    I reviewed the patient's prescription monitoring report if available prior to discharge    DIAGNOSIS  Final diagnoses:   Acute on chronic respiratory failure with hypoxia   COPD exacerbation         DISPOSITION  ED Disposition       ED Disposition   Discharge    Condition   Stable    Comment   --                   Please note that portions of this document were completed with voice recognition software.        Johnny Zepeda MD  04/01/25 2029

## 2025-05-19 ENCOUNTER — HOSPITAL ENCOUNTER (OUTPATIENT)
Dept: GENERAL RADIOLOGY | Facility: HOSPITAL | Age: 80
Discharge: HOME | End: 2025-05-19
Payer: MEDICARE

## 2025-05-19 ENCOUNTER — HOSPITAL ENCOUNTER (OUTPATIENT)
Facility: HOSPITAL | Age: 80
Discharge: HOME | End: 2025-05-19
Payer: MEDICARE

## 2025-05-19 DIAGNOSIS — R06.02 SHORTNESS OF BREATH: ICD-10-CM

## 2025-05-19 PROCEDURE — 71046 X-RAY EXAM CHEST 2 VIEWS: CPT

## 2025-06-12 ENCOUNTER — OFFICE VISIT (OUTPATIENT)
Dept: CARDIOLOGY | Facility: CLINIC | Age: 80
End: 2025-06-12
Payer: MEDICARE

## 2025-06-12 VITALS
WEIGHT: 61.8 LBS | BODY MASS INDEX: 12.13 KG/M2 | HEART RATE: 70 BPM | DIASTOLIC BLOOD PRESSURE: 56 MMHG | HEIGHT: 60 IN | SYSTOLIC BLOOD PRESSURE: 100 MMHG | OXYGEN SATURATION: 92 %

## 2025-06-12 DIAGNOSIS — I48.0 PAROXYSMAL ATRIAL FIBRILLATION: Primary | ICD-10-CM

## 2025-06-12 DIAGNOSIS — I27.20 PULMONARY HTN: ICD-10-CM

## 2025-06-12 RX ORDER — CALCIUM CARBONATE/VITAMIN D3 600 MG-10
1 TABLET ORAL DAILY
COMMUNITY
Start: 2025-04-18

## 2025-06-12 RX ORDER — LEVOFLOXACIN 500 MG/1
1 TABLET, FILM COATED ORAL DAILY
COMMUNITY
Start: 2025-05-30 | End: 2025-06-12

## 2025-06-12 RX ORDER — MULTIVITAMIN WITH IRON
500 TABLET ORAL DAILY
COMMUNITY

## 2025-06-12 RX ORDER — FOLIC ACID 0.8 MG
800 TABLET ORAL DAILY
COMMUNITY

## 2025-06-12 NOTE — PROGRESS NOTES
[facility] Cardiology  Consultation H&P  Maura Jiménez  1945  [phone]  There is no work phone number on file..    VISIT DATE:  06/12/2025    PCP: Sofia Martinez, APRN  [address] [place] [address]    CC:  Chief Complaint   Patient presents with    Atrial Fibrillation    Shortness of Breath    Edema     Bilateral ankles       Previous cardiac studies and procedures:  2018 TTE  Left ventricular systolic function is hyperdynamic (EF > 70).  Mild to moderate tricuspid valve regurgitation is present.  Calculated right ventricular systolic pressure from tricuspid regurgitation is 60 mmHg.    September 2024 TTE    Left ventricular systolic function is hyperdynamic (EF > 70%). Calculated left ventricular EF = 72.6%    Left ventricular diastolic function was normal.    Estimated right ventricular systolic pressure from tricuspid regurgitation is markedly elevated (>55 mmHg).    Severe pulmonary hypertension.    ASSESSMENT:   Diagnosis Plan   1. Paroxysmal atrial fibrillation        2. Pulmonary HTN              PLAN:  Paroxysmal atrial fibrillation: Continue low-dose beta-blockade and Eliquis 2.5 mg p.o. twice daily.  Would discontinue low-dose aspirin in setting of chronic anticoagulation.    Hypertension: Goal less than 130/80 mmHg.  Currently well-controlled.  Continue current medical therapy.    Hyperlipidemia: Goal LDL less than 100.  Continue pravastatin 40 mg p.o. daily.    End-stage COPD with underlying pulmonary cachexia    History of Present Illness   Brief, rare episodes of tachypalpitations.  Tolerating Eliquis 2.5 mg p.o. twice daily.  Intermittent transient mild epigastric discomfort, nonexertional.  3 L nasal cannula oxygen.  Mild dependent edema.  Denies PND orthopnea.    PHYSICAL EXAMINATION:  Vitals:    06/12/25 1320   BP: 100/56   BP Location: Left arm   Patient Position: Sitting   Pulse: 70   SpO2: 92%   Weight: 28 kg (61 lb 12.8 oz)   Height:  "152.4 cm (60\")     General Appearance:    Alert, cooperative, no distress, appears stated age, frail and cachectic   Head:    Normocephalic, without obvious abnormality, atraumatic   Eyes:    conjunctiva/corneas clear, EOM's intact, fundi     benign, both eyes   Ears:    Normal TM's and external ear canals, both ears   Nose:   Nares normal, septum midline, mucosa normal, no drainage    or sinus tenderness   Throat:   Lips, mucosa, and tongue normal; teeth and gums normal   Neck:   Supple, symmetrical, trachea midline, no adenopathy;     thyroid:  no enlargement/tenderness/nodules; no carotid    bruit or JVD   Back:     Symmetric, no curvature, ROM normal, no CVA tenderness   Lungs:   Diminished throughout, respirations unlabored   Chest Wall:    No tenderness or deformity    Heart:    Regular rate and rhythm, S1 and S2 normal, no murmur, rub   or gallop, normal carotid impulse bilaterally without bruit.   Abdomen:     Soft, non-tender, bowel sounds active all four quadrants,     no masses, no organomegaly   Extremities:   Extremities normal, atraumatic, no cyanosis.  1+ ankle edema.   Pulses:   2+ and symmetric all extremities   Skin:   Skin color, texture, turgor normal, no rashes or lesions   Lymph nodes:   Cervical, supraclavicular, and axillary nodes normal   Neurologic:   normal strength, sensation intact     throughout       Diagnostic Data:  Procedures  No results found for: \"CHLPL\", \"TRIG\", \"HDL\", \"LDLDIRECT\"  Lab Results   Component Value Date    GLUCOSE 300 (H) 04/01/2025    BUN 36 (H) 04/01/2025    CREATININE 0.62 04/01/2025     04/01/2025    K 5.0 04/01/2025    CL 93 (L) 04/01/2025    CO2 34.3 (H) 04/01/2025     Lab Results   Component Value Date    HGBA1C 8.10 (H) 09/28/2024     Lab Results   Component Value Date    WBC 11.39 (H) 04/01/2025    HGB 9.9 (L) 04/01/2025    HCT 32.8 (L) 04/01/2025     04/01/2025       PROBLEM LIST:  Patient Active Problem List   Diagnosis    Respiratory failure " with hypoxia    Chronic obstructive pulmonary disease with acute exacerbation    Type 2 diabetes mellitus without complication, without long-term current use of insulin    Acute on chronic respiratory failure with hypoxia    Chronic respiratory failure with hypoxia    Rhinovirus infection    Severe malnutrition    Impaired mobility    Acute respiratory failure with hypoxia    Cytokine release syndrome, grade 1    Cytokine release syndrome, grade 1    Hypoalbuminemia    Essential hypertension    Mixed hyperlipidemia    Other specified hypothyroidism    Paroxysmal atrial fibrillation    Pulmonary HTN       PAST MEDICAL HX  Past Medical History:   Diagnosis Date    COPD (chronic obstructive pulmonary disease)     Disease of thyroid gland     Hyperlipidemia     Osteoarthritis     Type 2 diabetes mellitus without complication, without long-term current use of insulin 11/1/2018       Allergies  Allergies   Allergen Reactions    Shellfish-Derived Products Nausea And Vomiting    Codeine Confusion     unknown    Codeine Other (See Comments)     Unable to tolerate    Shellfish-Derived Products GI Intolerance       Current Medications    Current Outpatient Medications:     albuterol sulfate  (90 Base) MCG/ACT inhaler, Inhale 2 puffs Every 4 (Four) Hours As Needed for Wheezing or Shortness of Air., Disp: 18 g, Rfl: 0    apixaban (ELIQUIS) 2.5 MG tablet tablet, Take 1 tablet by mouth Every 12 (Twelve) Hours. Indications: Atrial Fibrillation, Disp: 60 tablet, Rfl: 0    Budeson-Glycopyrrol-Formoterol (BREZTRI) 160-9-4.8 MCG/ACT aerosol inhaler, Inhale 2 puffs 2 (Two) Times a Day., Disp: , Rfl:     calcium carb-cholecalciferol 600-10 MG-MCG tablet per tablet, Take 1 tablet by mouth Daily., Disp: , Rfl:     EQ Aspirin Adult Low Dose 81 MG EC tablet, Take 1 tablet by mouth Daily., Disp: , Rfl:     Ferate 240 (27 Fe) MG tablet, TAKE 1 TABLET BY MOUTH ONCE DAILY WITH VITAMIN C TABLET, Disp: , Rfl:     folic acid (FOLVITE) 800  MCG tablet, Take 1 tablet by mouth Daily., Disp: , Rfl:     ipratropium-albuterol (DUO-NEB) 0.5-2.5 mg/3 ml nebulizer, Take 3 mL by nebulization 4 (Four) Times a Day As Needed for Wheezing or Shortness of Air., Disp: 360 mL, Rfl: 5    levothyroxine (SYNTHROID, LEVOTHROID) 75 MCG tablet, Take 1 tablet by mouth Daily., Disp: , Rfl:     losartan (COZAAR) 50 MG tablet, Take 1 tablet by mouth Daily., Disp: , Rfl:     metFORMIN ER (GLUCOPHAGE-XR) 500 MG 24 hr tablet, Take 1 tablet by mouth Every 12 (Twelve) Hours. Hold due to diarrhea., Disp: , Rfl:     metoprolol tartrate (LOPRESSOR) 25 MG tablet, Take 1 tablet by mouth Every 12 (Twelve) Hours., Disp: 60 tablet, Rfl: 0    O2 (OXYGEN), Inhale 3 L/min Continuous., Disp: , Rfl:     pravastatin (PRAVACHOL) 40 MG tablet, Take 1 tablet by mouth every night at bedtime., Disp: , Rfl:     vitamin B-12 (CYANOCOBALAMIN) 500 MCG tablet, Take 1 tablet by mouth Daily., Disp: , Rfl:     vitamin C (ASCORBIC ACID) 500 MG tablet, TAKE 1 TABLET BY MOUTH ONCE DAILY WITH IRON SUPPLEMENT, Disp: , Rfl:          ROS  ROS      SOCIAL HX  Social History     Socioeconomic History    Marital status:    Tobacco Use    Smoking status: Former     Current packs/day: 0.00     Types: Cigarettes     Quit date: 2018     Years since quittin.7     Passive exposure: Past    Smokeless tobacco: Never    Tobacco comments:     Pt smoked half PPD   Vaping Use    Vaping status: Never Used   Substance and Sexual Activity    Alcohol use: No    Drug use: No    Sexual activity: Never       FAMILY HX  Family History   Problem Relation Age of Onset    Heart disease Father     COPD Sister     Cancer Sister     No Known Problems Sister     Diabetes Brother     No Known Problems Brother     Heart attack Brother              Reji Calderón III, MD, [facility]

## 2025-06-13 ENCOUNTER — APPOINTMENT (OUTPATIENT)
Dept: CT IMAGING | Facility: HOSPITAL | Age: 80
End: 2025-06-13
Payer: MEDICARE

## 2025-06-13 ENCOUNTER — HOSPITAL ENCOUNTER (EMERGENCY)
Facility: HOSPITAL | Age: 80
Discharge: HOME | End: 2025-06-13
Attending: EMERGENCY MEDICINE
Payer: MEDICARE

## 2025-06-13 ENCOUNTER — APPOINTMENT (OUTPATIENT)
Dept: GENERAL RADIOLOGY | Facility: HOSPITAL | Age: 80
End: 2025-06-13
Payer: MEDICARE

## 2025-06-13 VITALS
OXYGEN SATURATION: 100 % | SYSTOLIC BLOOD PRESSURE: 127 MMHG | TEMPERATURE: 98.6 F | RESPIRATION RATE: 18 BRPM | WEIGHT: 61 LBS | HEIGHT: 60 IN | BODY MASS INDEX: 11.98 KG/M2 | DIASTOLIC BLOOD PRESSURE: 49 MMHG | HEART RATE: 78 BPM

## 2025-06-13 DIAGNOSIS — R10.13 EPIGASTRIC ABDOMINAL PAIN: ICD-10-CM

## 2025-06-13 DIAGNOSIS — I99.8 VASCULAR CALCIFICATION: Primary | ICD-10-CM

## 2025-06-13 DIAGNOSIS — N83.8 OVARIAN MASS: ICD-10-CM

## 2025-06-13 DIAGNOSIS — R91.8 OPACITY OF LUNG ON IMAGING STUDY: ICD-10-CM

## 2025-06-13 DIAGNOSIS — K59.00 CONSTIPATION, UNSPECIFIED CONSTIPATION TYPE: ICD-10-CM

## 2025-06-13 LAB
ALBUMIN SERPL-MCNC: 3.7 G/DL (ref 3.5–5.2)
ALBUMIN/GLOB SERPL: 1.4 G/DL
ALP SERPL-CCNC: 59 U/L (ref 39–117)
ALT SERPL W P-5'-P-CCNC: 8 U/L (ref 1–33)
ANION GAP SERPL CALCULATED.3IONS-SCNC: 8.6 MMOL/L (ref 5–15)
AST SERPL-CCNC: 18 U/L (ref 1–32)
BACTERIA UR QL AUTO: NORMAL /HPF
BASOPHILS # BLD AUTO: 0.03 10*3/MM3 (ref 0–0.2)
BASOPHILS NFR BLD AUTO: 0.5 % (ref 0–1.5)
BILIRUB SERPL-MCNC: 0.2 MG/DL (ref 0–1.2)
BILIRUB UR QL STRIP: NEGATIVE
BUN SERPL-MCNC: 11 MG/DL (ref 8–23)
BUN/CREAT SERPL: 26.8 (ref 7–25)
CALCIUM SPEC-SCNC: 9.5 MG/DL (ref 8.6–10.5)
CHLORIDE SERPL-SCNC: 92 MMOL/L (ref 98–107)
CLARITY UR: CLEAR
CO2 SERPL-SCNC: 35.4 MMOL/L (ref 22–29)
COLOR UR: YELLOW
CREAT SERPL-MCNC: 0.41 MG/DL (ref 0.57–1)
D-LACTATE SERPL-SCNC: 2 MMOL/L (ref 0.5–2)
D-LACTATE SERPL-SCNC: 2.4 MMOL/L (ref 0.5–2)
DEPRECATED RDW RBC AUTO: 47.6 FL (ref 37–54)
EGFRCR SERPLBLD CKD-EPI 2021: 99.6 ML/MIN/1.73
EOSINOPHIL # BLD AUTO: 0.06 10*3/MM3 (ref 0–0.4)
EOSINOPHIL NFR BLD AUTO: 0.9 % (ref 0.3–6.2)
ERYTHROCYTE [DISTWIDTH] IN BLOOD BY AUTOMATED COUNT: 13.4 % (ref 12.3–15.4)
GEN 5 1HR TROPONIN T REFLEX: 23 NG/L
GLOBULIN UR ELPH-MCNC: 2.6 GM/DL
GLUCOSE SERPL-MCNC: 103 MG/DL (ref 65–99)
GLUCOSE UR STRIP-MCNC: NEGATIVE MG/DL
HCT VFR BLD AUTO: 33.1 % (ref 34–46.6)
HGB BLD-MCNC: 9.8 G/DL (ref 12–15.9)
HGB UR QL STRIP.AUTO: NEGATIVE
HOLD SPECIMEN: NORMAL
HOLD SPECIMEN: NORMAL
HYALINE CASTS UR QL AUTO: NORMAL /LPF
IMM GRANULOCYTES # BLD AUTO: 0.01 10*3/MM3 (ref 0–0.05)
IMM GRANULOCYTES NFR BLD AUTO: 0.2 % (ref 0–0.5)
KETONES UR QL STRIP: NEGATIVE
LEUKOCYTE ESTERASE UR QL STRIP.AUTO: NEGATIVE
LIPASE SERPL-CCNC: 23 U/L (ref 13–60)
LYMPHOCYTES # BLD AUTO: 1.2 10*3/MM3 (ref 0.7–3.1)
LYMPHOCYTES NFR BLD AUTO: 18.8 % (ref 19.6–45.3)
MAGNESIUM SERPL-MCNC: 1.7 MG/DL (ref 1.6–2.4)
MCH RBC QN AUTO: 28.6 PG (ref 26.6–33)
MCHC RBC AUTO-ENTMCNC: 29.6 G/DL (ref 31.5–35.7)
MCV RBC AUTO: 96.5 FL (ref 79–97)
MONOCYTES # BLD AUTO: 0.55 10*3/MM3 (ref 0.1–0.9)
MONOCYTES NFR BLD AUTO: 8.6 % (ref 5–12)
NEUTROPHILS NFR BLD AUTO: 4.54 10*3/MM3 (ref 1.7–7)
NEUTROPHILS NFR BLD AUTO: 71 % (ref 42.7–76)
NITRITE UR QL STRIP: NEGATIVE
NRBC BLD AUTO-RTO: 0 /100 WBC (ref 0–0.2)
NT-PROBNP SERPL-MCNC: 371.6 PG/ML (ref 0–1800)
PH UR STRIP.AUTO: 7.5 [PH] (ref 5–8)
PLATELET # BLD AUTO: 262 10*3/MM3 (ref 140–450)
PMV BLD AUTO: 10.4 FL (ref 6–12)
POTASSIUM SERPL-SCNC: 4.5 MMOL/L (ref 3.5–5.2)
PROT SERPL-MCNC: 6.3 G/DL (ref 6–8.5)
PROT UR QL STRIP: ABNORMAL
RBC # BLD AUTO: 3.43 10*6/MM3 (ref 3.77–5.28)
RBC # UR STRIP: NORMAL /HPF
REF LAB TEST METHOD: NORMAL
SODIUM SERPL-SCNC: 136 MMOL/L (ref 136–145)
SP GR UR STRIP: 1.02 (ref 1–1.03)
SQUAMOUS #/AREA URNS HPF: NORMAL /HPF
TROPONIN T % DELTA: -4
TROPONIN T NUMERIC DELTA: -1 NG/L
TROPONIN T SERPL HS-MCNC: 24 NG/L
UROBILINOGEN UR QL STRIP: ABNORMAL
WBC # UR STRIP: NORMAL /HPF
WBC NRBC COR # BLD AUTO: 6.39 10*3/MM3 (ref 3.4–10.8)
WHOLE BLOOD HOLD COAG: NORMAL
WHOLE BLOOD HOLD SPECIMEN: NORMAL

## 2025-06-13 PROCEDURE — 85025 COMPLETE CBC W/AUTO DIFF WBC: CPT | Performed by: EMERGENCY MEDICINE

## 2025-06-13 PROCEDURE — 93005 ELECTROCARDIOGRAM TRACING: CPT | Performed by: EMERGENCY MEDICINE

## 2025-06-13 PROCEDURE — 25010000002 FAMOTIDINE 10 MG/ML SOLUTION: Performed by: EMERGENCY MEDICINE

## 2025-06-13 PROCEDURE — 84484 ASSAY OF TROPONIN QUANT: CPT | Performed by: EMERGENCY MEDICINE

## 2025-06-13 PROCEDURE — 96374 THER/PROPH/DIAG INJ IV PUSH: CPT

## 2025-06-13 PROCEDURE — 71045 X-RAY EXAM CHEST 1 VIEW: CPT

## 2025-06-13 PROCEDURE — 83880 ASSAY OF NATRIURETIC PEPTIDE: CPT | Performed by: EMERGENCY MEDICINE

## 2025-06-13 PROCEDURE — 25510000001 IOPAMIDOL 61 % SOLUTION: Performed by: EMERGENCY MEDICINE

## 2025-06-13 PROCEDURE — 83735 ASSAY OF MAGNESIUM: CPT

## 2025-06-13 PROCEDURE — 96361 HYDRATE IV INFUSION ADD-ON: CPT

## 2025-06-13 PROCEDURE — 80053 COMPREHEN METABOLIC PANEL: CPT | Performed by: EMERGENCY MEDICINE

## 2025-06-13 PROCEDURE — 83690 ASSAY OF LIPASE: CPT

## 2025-06-13 PROCEDURE — 83605 ASSAY OF LACTIC ACID: CPT

## 2025-06-13 PROCEDURE — 81001 URINALYSIS AUTO W/SCOPE: CPT

## 2025-06-13 PROCEDURE — 25810000003 SODIUM CHLORIDE 0.9 % SOLUTION

## 2025-06-13 PROCEDURE — 71275 CT ANGIOGRAPHY CHEST: CPT

## 2025-06-13 PROCEDURE — 36415 COLL VENOUS BLD VENIPUNCTURE: CPT

## 2025-06-13 PROCEDURE — 99285 EMERGENCY DEPT VISIT HI MDM: CPT | Performed by: EMERGENCY MEDICINE

## 2025-06-13 PROCEDURE — 74177 CT ABD & PELVIS W/CONTRAST: CPT

## 2025-06-13 RX ORDER — FAMOTIDINE 10 MG/ML
20 INJECTION, SOLUTION INTRAVENOUS ONCE
Status: COMPLETED | OUTPATIENT
Start: 2025-06-13 | End: 2025-06-13

## 2025-06-13 RX ORDER — MORPHINE SULFATE 2 MG/ML
2 INJECTION, SOLUTION INTRAMUSCULAR; INTRAVENOUS ONCE
Status: DISCONTINUED | OUTPATIENT
Start: 2025-06-13 | End: 2025-06-13 | Stop reason: HOSPADM

## 2025-06-13 RX ORDER — IOPAMIDOL 612 MG/ML
60 INJECTION, SOLUTION INTRAVASCULAR
Status: COMPLETED | OUTPATIENT
Start: 2025-06-13 | End: 2025-06-13

## 2025-06-13 RX ORDER — AZITHROMYCIN 250 MG/1
500 TABLET, FILM COATED ORAL ONCE
Status: COMPLETED | OUTPATIENT
Start: 2025-06-13 | End: 2025-06-13

## 2025-06-13 RX ORDER — SODIUM CHLORIDE 0.9 % (FLUSH) 0.9 %
10 SYRINGE (ML) INJECTION AS NEEDED
Status: DISCONTINUED | OUTPATIENT
Start: 2025-06-13 | End: 2025-06-13 | Stop reason: HOSPADM

## 2025-06-13 RX ORDER — AZITHROMYCIN 250 MG/1
TABLET, FILM COATED ORAL
Qty: 6 TABLET | Refills: 0 | Status: SHIPPED | OUTPATIENT
Start: 2025-06-13

## 2025-06-13 RX ADMIN — AZITHROMYCIN DIHYDRATE 500 MG: 250 TABLET ORAL at 21:15

## 2025-06-13 RX ADMIN — IOPAMIDOL 60 ML: 612 INJECTION, SOLUTION INTRAVENOUS at 20:00

## 2025-06-13 RX ADMIN — FAMOTIDINE 20 MG: 10 INJECTION, SOLUTION INTRAVENOUS at 17:55

## 2025-06-13 RX ADMIN — SODIUM CHLORIDE 500 ML: 900 INJECTION, SOLUTION INTRAVENOUS at 19:30

## 2025-06-13 NOTE — ED PROVIDER NOTES
Subjective  History of Present Illness:    Patient is a 79-year-old female, history of type 2 diabetes mellitus, hyperlipidemia, COPD on chronic 3 L oxygen via nasal cannula presented for evaluation of epigastric abdominal pressure.  She has secondary complaints of some mild shortness of breath, reports that she is chronically short of breath, she reports that her main reason for wanting to come to the ER today is that she has had epigastric abdominal pressure for last couple days.  She reports that her shortness of breath is about baseline for her.  EMS administered 125 of Solu-Medrol and a DuoNeb prior to arrival and she reports that her shortness of breath is currently resolved.  She takes metformin at home, Eliquis.  She denies nausea or vomiting, no diarrhea.  Denies any bloody stools.  Does report some mild urinary frequency, no dysuria or urgency.  No fevers.  Not made worse with eating, no traumatic injuries.  Reports chronic cough but no hemoptysis.  Last bowel movement was yesterday and normal.  Denies history of pancreatitis.  She reports that currently, she has no abdominal discomfort but it has been intermittent over the last couple of days.      Nurses Notes reviewed and agree, including vitals, allergies, social history and prior medical history.     REVIEW OF SYSTEMS: All systems reviewed and not pertinent unless noted.  Review of Systems   Constitutional:  Negative for fever.   Respiratory:  Positive for cough and shortness of breath.    Cardiovascular:  Negative for chest pain and leg swelling.   Gastrointestinal:  Positive for abdominal pain. Negative for constipation, diarrhea, nausea and vomiting.   Genitourinary:  Positive for frequency. Negative for dysuria, flank pain, hematuria and urgency.   Musculoskeletal:  Negative for back pain.   All other systems reviewed and are negative.      Past Medical History:   Diagnosis Date    COPD (chronic obstructive pulmonary disease)     Disease of  "thyroid gland     Hyperlipidemia     Osteoarthritis     Type 2 diabetes mellitus without complication, without long-term current use of insulin 2018       Allergies:    Shellfish-derived products, Codeine, Codeine, and Shellfish-derived products      Past Surgical History:   Procedure Laterality Date    APPENDECTOMY      CATARACT EXTRACTION, BILATERAL      HYSTERECTOMY           Social History     Socioeconomic History    Marital status:    Tobacco Use    Smoking status: Former     Current packs/day: 0.00     Types: Cigarettes     Quit date: 2018     Years since quittin.7     Passive exposure: Past    Smokeless tobacco: Never    Tobacco comments:     Pt smoked half PPD   Vaping Use    Vaping status: Never Used   Substance and Sexual Activity    Alcohol use: No    Drug use: No    Sexual activity: Never         Family History   Problem Relation Age of Onset    Heart disease Father     COPD Sister     Cancer Sister     No Known Problems Sister     Diabetes Brother     No Known Problems Brother     Heart attack Brother        Objective  Physical Exam:  /49 (BP Location: Left arm, Patient Position: Lying)   Pulse 78   Temp 98.6 °F (37 °C) (Oral)   Resp 18   Ht 152.4 cm (60\")   Wt 27.7 kg (61 lb)   SpO2 100%   BMI 11.91 kg/m²      Physical Exam  Vitals and nursing note reviewed.   Constitutional:       General: She is not in acute distress.     Appearance: She is not toxic-appearing or diaphoretic.      Interventions: She is not intubated.     Comments: Cachectic, chronically ill-appearing   HENT:      Head: Normocephalic and atraumatic.      Mouth/Throat:      Mouth: Mucous membranes are moist.      Pharynx: Oropharynx is clear.   Eyes:      Extraocular Movements: Extraocular movements intact.      Pupils: Pupils are equal, round, and reactive to light.   Cardiovascular:      Rate and Rhythm: Normal rate and regular rhythm.   Pulmonary:      Effort: No tachypnea or respiratory distress. " She is not intubated.      Breath sounds: No stridor. Decreased breath sounds and wheezing present. No rhonchi or rales.   Chest:      Chest wall: No tenderness, crepitus or edema.   Abdominal:      Palpations: Abdomen is soft.      Tenderness: There is abdominal tenderness. There is no guarding.   Musculoskeletal:         General: Normal range of motion.      Cervical back: Normal range of motion.      Right lower leg: No tenderness. No edema.      Left lower leg: No tenderness. No edema.   Skin:     General: Skin is warm and dry.      Capillary Refill: Capillary refill takes less than 2 seconds.   Neurological:      General: No focal deficit present.      Mental Status: She is alert and oriented to person, place, and time.      Cranial Nerves: No cranial nerve deficit.      Motor: No weakness.   Psychiatric:         Mood and Affect: Mood normal.         Behavior: Behavior normal.               Procedures    ED Course:    ED Course as of 06/13/25 2108 Fri Jun 13, 2025   1901 Patient reassessed at this time and noted to be feeling better. [JR]   1913 Lipase: 23 [JR]      ED Course User Index  [JR] Talat Junior PA-C       Lab Results (last 24 hours)       Procedure Component Value Units Date/Time    CBC & Differential [[id number]]  (Abnormal) Collected: 06/13/25 1719    Specimen: Blood Updated: 06/13/25 1738    Narrative:      The following orders were created for panel order CBC & Differential.  Procedure                               Abnormality         Status                     ---------                               -----------         ------                     CBC Auto Differential[[id number]]        Abnormal            Final result               Scan Slide[[id number]]                                                                    Please view results for these tests on the individual orders.    Comprehensive Metabolic Panel [[id number]]  (Abnormal) Collected: 06/13/25 1719    Specimen: Blood Updated:  06/13/25 1750     Glucose 103 mg/dL      BUN 11.0 mg/dL      Creatinine 0.41 mg/dL      Sodium 136 mmol/L      Potassium 4.5 mmol/L      Comment: Slight hemolysis detected by analyzer. Result may be falsely elevated.        Chloride 92 mmol/L      CO2 35.4 mmol/L      Calcium 9.5 mg/dL      Total Protein 6.3 g/dL      Albumin 3.7 g/dL      ALT (SGPT) 8 U/L      AST (SGOT) 18 U/L      Alkaline Phosphatase 59 U/L      Total Bilirubin 0.2 mg/dL      Globulin 2.6 gm/dL      A/G Ratio 1.4 g/dL      BUN/Creatinine Ratio 26.8     Anion Gap 8.6 mmol/L      eGFR 99.6 mL/min/1.73     Narrative:      GFR Categories in Chronic Kidney Disease (CKD)              GFR Category          GFR (mL/min/1.73)    Interpretation  G1                    90 or greater        Normal or high (1)  G2                    60-89                Mild decrease (1)  G3a                   45-59                Mild to moderate decrease  G3b                   30-44                Moderate to severe decrease  G4                    15-29                Severe decrease  G5                    14 or less           Kidney failure    (1)In the absence of evidence of kidney disease, neither GFR category G1 or G2 fulfill the criteria for CKD.    eGFR calculation 2021 CKD-EPI creatinine equation, which does not include race as a factor    BNP [[id number]]  (Normal) Collected: 06/13/25 1719    Specimen: Blood Updated: 06/13/25 1753     proBNP 371.6 pg/mL     Narrative:      This assay is used as an aid in the diagnosis of individuals suspected of having heart failure. It can be used as an aid in the diagnosis of acute decompensated heart failure (ADHF) in patients presenting with signs and symptoms of ADHF to the emergency department (ED). In addition, NT-proBNP of <300 pg/mL indicates ADHF is not likely.    Age Range Result Interpretation  NT-proBNP Concentration (pg/mL:      <50             Positive            >450                   Gray                 300-450                     Negative             <300    50-75           Positive            >900                  Gray                300-900                  Negative            <300      >75             Positive            >1800                  Gray                300-1800                  Negative            <300    High Sensitivity Troponin T [[id number]]  (Abnormal) Collected: 06/13/25 1719    Specimen: Blood Updated: 06/13/25 1753     HS Troponin T 24 ng/L     Narrative:      High Sensitive Troponin T Reference Range:  <14.0 ng/L- Negative Female for AMI  <22.0 ng/L- Negative Male for AMI  >=14 - Abnormal Female indicating possible myocardial injury.  >=22 - Abnormal Male indicating possible myocardial injury.   Clinicians would have to utilize clinical acumen, EKG, Troponin, and serial changes to determine if it is an Acute Myocardial Infarction or myocardial injury due to an underlying chronic condition.         CBC Auto Differential [[id number]]  (Abnormal) Collected: 06/13/25 1719    Specimen: Blood Updated: 06/13/25 1738     WBC 6.39 10*3/mm3      RBC 3.43 10*6/mm3      Hemoglobin 9.8 g/dL      Hematocrit 33.1 %      MCV 96.5 fL      MCH 28.6 pg      MCHC 29.6 g/dL      RDW 13.4 %      RDW-SD 47.6 fl      MPV 10.4 fL      Platelets 262 10*3/mm3      Neutrophil % 71.0 %      Lymphocyte % 18.8 %      Monocyte % 8.6 %      Eosinophil % 0.9 %      Basophil % 0.5 %      Immature Grans % 0.2 %      Neutrophils, Absolute 4.54 10*3/mm3      Lymphocytes, Absolute 1.20 10*3/mm3      Monocytes, Absolute 0.55 10*3/mm3      Eosinophils, Absolute 0.06 10*3/mm3      Basophils, Absolute 0.03 10*3/mm3      Immature Grans, Absolute 0.01 10*3/mm3      nRBC 0.0 /100 WBC     Magnesium [[id number]]  (Normal) Collected: 06/13/25 1719    Specimen: Blood Updated: 06/13/25 1750     Magnesium 1.7 mg/dL     Lactic Acid, Plasma [[id number]]  (Abnormal) Collected: 06/13/25 1719    Specimen: Blood Updated: 06/13/25 1923     Lactate 2.4 mmol/L      Urinalysis With Culture If Indicated - Urine, Clean Catch [[id number]]  (Abnormal) Collected: 06/13/25 1755    Specimen: Urine, Clean Catch Updated: 06/13/25 1832     Color, UA Yellow     Appearance, UA Clear     pH, UA 7.5     Specific Gravity, UA 1.016     Glucose, UA Negative     Ketones, UA Negative     Bilirubin, UA Negative     Blood, UA Negative     Protein, UA 30 mg/dL (1+)     Leuk Esterase, UA Negative     Nitrite, UA Negative     Urobilinogen, UA 1.0 E.U./dL    Narrative:      In absence of clinical symptoms, the presence of pyuria, bacteria, and/or nitrites on the urinalysis result does not correlate with infection.    Urinalysis, Microscopic Only - Urine, Clean Catch [[id number]] Collected: 06/13/25 1755    Specimen: Urine, Clean Catch Updated: 06/13/25 1832     RBC, UA None Seen /HPF      WBC, UA 0-2 /HPF      Comment: Urine culture not indicated.        Bacteria, UA None Seen /HPF      Squamous Epithelial Cells, UA 0-2 /HPF      Hyaline Casts, UA None Seen /LPF      Methodology Manual Light Microscopy    High Sensitivity Troponin T 1Hr [[id number]]  (Abnormal) Collected: 06/13/25 1826    Specimen: Blood Updated: 06/13/25 1910     HS Troponin T 23 ng/L      Troponin T Numeric Delta -1 ng/L      Troponin T % Delta -4    Narrative:      High Sensitive Troponin T Reference Range:  <14.0 ng/L- Negative Female for AMI  <22.0 ng/L- Negative Male for AMI  >=14 - Abnormal Female indicating possible myocardial injury.  >=22 - Abnormal Male indicating possible myocardial injury.   Clinicians would have to utilize clinical acumen, EKG, Troponin, and serial changes to determine if it is an Acute Myocardial Infarction or myocardial injury due to an underlying chronic condition.         Lipase [[id number]]  (Normal) Collected: 06/13/25 1826    Specimen: Blood Updated: 06/13/25 1904     Lipase 23 U/L     STAT Lactic Acid, Reflex [[id number]]  (Normal) Collected: 06/13/25 2027    Specimen: Blood Updated: 06/13/25 2053      Lactate 2.0 mmol/L              CT Angiogram Chest Pulmonary Embolism  Result Date: 6/13/2025  FINAL REPORT TECHNIQUE: null CLINICAL HISTORY: Mid sternal chest pain, SOA; R/O Pulmonary Embolism COMPARISON: null FINDINGS: CT angiography chest using contrast. 3-D postprocessing Comparison: CT/SR - CT ANGIOGRAM CHEST PULMONARY EMBOLISM - 9/20/24 19:22 EDT Findings: Mild motion artifact present. No pulmonary embolism. No thoracic aorta aneurysm or dissection. Moderate atherosclerotic calcifications are present at the thoracic aorta. Heart size within normal limits. Pulmonary emphysema redemonstrated with associated pulmonary hyperexpansion. Mild bronchial wall thickening present with scattered mucous plugging at the left lower lobe. A mild, irregular opacity is identified at the right upper lobe with a small amount  of superimposed calcification superiorly. This is difficult to measure given its irregularity, but appears to measure up to 8.3 x 0.8 cm in maximal sagittal dimensions. No pneumothorax or pleural effusion. No acute fractures.     Impression: Impression: 1. Negative for pulmonary embolism. No acute abnormality of the thoracic aorta. No thoracic aorta aneurysm or dissection demonstrated. 2. Pulmonary emphysema with interval development of an irregular opacity of the right upper lobe measuring up to approximately 8.3 x 0.3 cm in maximal sagittal dimensions. There is a small amount of superimposed calcification in this region superiorly. This finding is nonspecific, possibly consistent with atelectasis, scarring, tumor, or pneumonia. May consider PET-CT examination, short interval follow-up chest CT examination in 3 months, or tissue sampling for further evaluation. 3. Mild bronchial wall thickening with scattered left lower lobe mucous plugging. Authenticated and Electronically Signed by Andrzej Mcghee on 06/13/2025 08:35:42 PM    CT Abdomen Pelvis With Contrast  Result Date: 6/13/2025  FINAL REPORT TECHNIQUE:  null CLINICAL HISTORY: epigastric abdominal pain, eval pancreatitis COMPARISON: null FINDINGS: CT abdomen and pelvis with contrast Comparison: None Findings: This examination is mildly limited by motion artifact. The liver is enlarged. The liver appears normal in contour. The spleen, gallbladder, and pancreas are within normal limits for appearance. The bilateral adrenal glands are not well visualized on this examination. No clear adrenal abnormality identified. No hydronephrosis. gallbladder and solid organs are within normal limits. No hydronephrosis or hydroureter. There is limited evaluation of the bowel on this examination related to close apposition of bowel loops diffusely throughout the abdomen and motion artifact. The stomach is underdistended, limiting its evaluation. Moderate colonic stool burden. Severe atherosclerotic calcifications are present at the abdominal aorta, producing luminal irregularity. No abdominal aorta aneurysm. No bowel obstruction, pneumoperitoneum, or pneumatosis. The uterus is not visualized, possibly surgically absent, atrophic, or obscured by adjacent structures. 4.5 cm low-attenuation density present at the right hemipelvis, possibly consistent with a right adnexal cyst. The bladder is underdistended, limiting  its evaluation. Nonvisualization of the appendix. No acute fracture visualized.     Impression: IMPRESSION: Mildly limited examination as described above. No acute inflammatory process identified within the abdomen or pelvis. Mild hepatomegaly. Moderate colonic stool burden. No bowel obstruction. 4.5 cm low-attenuation structure present at the right hemipelvis, suggesting a right adnexal cyst. Recommend nonemergent pelvic ultrasound examination for further evaluation given the patient's age. Authenticated and Electronically Signed by Andrzej Mcghee on 06/13/2025 08:27:44 PM         MDM     Amount and/or Complexity of Data Reviewed  Decide to obtain previous medical records  or to obtain history from someone other than the patient: yes        Initial impression of presenting illness: 79-year-old female presented for evaluation of epigastric abdominal discomfort, endorsed mild shortness of breath to EMS and they gave her 125 Solu-Medrol and a DuoNeb en route.  She reports her shortness of breath is resolved, currently no abdominal pain.  Has reported intermittent gastric discomfort over the last couple days, denies any significant NSAID use, no bloody stools.  Does not take antacids at home    DDX: includes but is not limited to: Pancreatitis, gastritis, peptic ulcer disease, gallbladder abnormality, ACS, NSTEMI, congestive heart, COPD exacerbation, pneumonia, pulmonary embolism, colitis, gastroenteritis, viral GI illness, urinary tract infection, pneumothorax, others    Patient arrives hemodynamically stable, afebrile, nontachycardic, nontachypneic, hypoxic requiring normal 3 L oxygen with vitals interpreted by myself.     Pertinent features from physical exam: Patient cachectic, chronically ill-appearing 79-year-old female, mildly increased work of breathing which patient reports is her baseline for many years.  She has mild decreased lung sounds and very mild wheezing throughout.  Abdomen is soft, mild epigastric tenderness to palpation.  She is alert and oriented x 4, cranial nerves II through XII grossly intact..    Initial diagnostic plan: CBC, CMP, troponin, BNP, magnesium, urinalysis, EKG, CT angiogram chest PE, CT abdomen pelvis with contrast    Results from initial plan were reviewed and interpreted by me revealing CBC chronic anemia, hemoglobin stable.  No leukocytosis.  EKG sinus rhythm rate of 80, no acute ischemic changes my interpretation.  No STEMI.  CT angiogram chest PE per radiology Impression:     1. Negative for pulmonary embolism. No acute abnormality of the thoracic aorta. No thoracic aorta aneurysm or dissection demonstrated.     2. Pulmonary emphysema with  interval development of an irregular opacity of the right upper lobe measuring up to approximately 8.3 x 0.3 cm in maximal sagittal dimensions. There is a small amount of superimposed calcification in this region superiorly.   This finding is nonspecific, possibly consistent with atelectasis, scarring, tumor, or pneumonia. May consider PET-CT examination, short interval follow-up chest CT examination in 3 months, or tissue sampling for further evaluation.     3. Mild bronchial wall thickening with scattered left lower lobe mucous plugging.    CT abdomen pelvis radiology IMPRESSION:     Mildly limited examination as described above. No acute inflammatory process identified within the abdomen or pelvis.     Mild hepatomegaly.     Moderate colonic stool burden. No bowel obstruction.     4.5 cm low-attenuation structure present at the right hemipelvis, suggesting a right adnexal cyst. Recommend nonemergent pelvic ultrasound examination for further evaluation given the patient's age.    Urinalysis is negative for infection, initial lactic of 2.4 downtrended to 2.0 after 500 of fluids, no mesenteric ischemia seen on CT imaging.  Labs are otherwise reassuring, troponin downtrended by 1 and ACS not suspected, BNP within normal limits, magnesium normal.  Her CO2 is baseline.      Diagnostic information from other sources: Record reviewed    Interventions / Re-evaluation:   Medications   sodium chloride 0.9 % flush 10 mL (has no administration in time range)   Morphine sulfate (PF) injection 2 mg (0 mg Intravenous Hold 6/13/25 1755)   azithromycin (ZITHROMAX) tablet 500 mg (has no administration in time range)   famotidine (PEPCID) injection 20 mg (20 mg Intravenous Given 6/13/25 1755)   sodium chloride 0.9 % bolus 500 mL (0 mL Intravenous Stopped 6/13/25 2030)   iopamidol (ISOVUE-300) 61 % injection 60 mL (60 mL Intravenous Given 6/13/25 2000)   Patient was reassessed multiple times, noted to be eating in no acute distress,  breathing comfortably.  Reports her symptoms are significantly improved, does not wish for hospitalization.  Will discharge with outpatient follow-up.  Given possibility of pneumonia with chronic cough, will prescribe Augmentin and Zithromax with her comorbidities.    Results/clinical rationale were discussed with patient at bedside, discussed incidental findings of ovarian mass, opacity that could represent scarring, pneumonia, or mass, additionally discussed other incidental findings as above on CT imaging.    Consultations/Discussion of results with other physicians: N/A    Disposition plan: Discharge, follow-up with OB/GYN, pulmonology, vascular surgery in regards to incidental findings.  -----    Final diagnoses:   Epigastric abdominal pain   Constipation, unspecified constipation type   Ovarian mass   Opacity of lung on imaging study   Vascular calcification          Talat Junior PA-C  06/13/25 2208

## 2025-06-14 NOTE — DISCHARGE INSTRUCTIONS
Clem follow-up with your primary care physician, have additionally referred you to OB/GYN to have further evaluation of the possible ovarian lesion identified on CT imaging.  I have placed you on Augmentin and Zithromax for the opacity identified on CT imaging of the chest to cover for pneumonia, I did recommend that you have further testing with your pulmonologist and possibly a PET scan of the area.  If you develop worsening symptoms please return for reevaluation, I do recommend taking MiraLAX as I did see that you had a lot of stool in your stomach today which could be part of the abdominal discomfort that you are experiencing.  Furthermore they have seen some vascular calcifications of your aorta and I have placed a referral to vascular surgery for this who should contact you for a follow-up appointment.

## 2025-07-14 ENCOUNTER — APPOINTMENT (OUTPATIENT)
Dept: GENERAL RADIOLOGY | Facility: HOSPITAL | Age: 80
End: 2025-07-14
Payer: MEDICARE

## 2025-07-14 ENCOUNTER — HOSPITAL ENCOUNTER (EMERGENCY)
Facility: HOSPITAL | Age: 80
Discharge: HOME | End: 2025-07-14
Attending: STUDENT IN AN ORGANIZED HEALTH CARE EDUCATION/TRAINING PROGRAM | Admitting: STUDENT IN AN ORGANIZED HEALTH CARE EDUCATION/TRAINING PROGRAM
Payer: MEDICARE

## 2025-07-14 VITALS
TEMPERATURE: 97.8 F | HEART RATE: 71 BPM | BODY MASS INDEX: 12.37 KG/M2 | WEIGHT: 63 LBS | HEIGHT: 60 IN | RESPIRATION RATE: 17 BRPM | DIASTOLIC BLOOD PRESSURE: 44 MMHG | SYSTOLIC BLOOD PRESSURE: 131 MMHG | OXYGEN SATURATION: 100 %

## 2025-07-14 DIAGNOSIS — J44.1 COPD EXACERBATION: Primary | ICD-10-CM

## 2025-07-14 LAB
ALBUMIN SERPL-MCNC: 3.9 G/DL (ref 3.5–5.2)
ALBUMIN/GLOB SERPL: 1.5 G/DL
ALP SERPL-CCNC: 68 U/L (ref 39–117)
ALT SERPL W P-5'-P-CCNC: 12 U/L (ref 1–33)
ANION GAP SERPL CALCULATED.3IONS-SCNC: 4.4 MMOL/L (ref 5–15)
AST SERPL-CCNC: 23 U/L (ref 1–32)
BASO STIPL COARSE BLD QL SMEAR: NORMAL
BASOPHILS # BLD AUTO: 0.03 10*3/MM3 (ref 0–0.2)
BASOPHILS NFR BLD AUTO: 0.5 % (ref 0–1.5)
BILIRUB SERPL-MCNC: 0.2 MG/DL (ref 0–1.2)
BUN SERPL-MCNC: 8 MG/DL (ref 8–23)
BUN/CREAT SERPL: 17.8 (ref 7–25)
CALCIUM SPEC-SCNC: 9.7 MG/DL (ref 8.6–10.5)
CHLORIDE SERPL-SCNC: 93 MMOL/L (ref 98–107)
CO2 SERPL-SCNC: 44.6 MMOL/L (ref 22–29)
CREAT SERPL-MCNC: 0.45 MG/DL (ref 0.57–1)
DEPRECATED RDW RBC AUTO: 48.5 FL (ref 37–54)
EGFRCR SERPLBLD CKD-EPI 2021: 97.4 ML/MIN/1.73
EOSINOPHIL # BLD AUTO: 0.13 10*3/MM3 (ref 0–0.4)
EOSINOPHIL NFR BLD AUTO: 2 % (ref 0.3–6.2)
ERYTHROCYTE [DISTWIDTH] IN BLOOD BY AUTOMATED COUNT: 13 % (ref 12.3–15.4)
GEN 5 1HR TROPONIN T REFLEX: 24 NG/L
GLOBULIN UR ELPH-MCNC: 2.6 GM/DL
GLUCOSE SERPL-MCNC: 171 MG/DL (ref 65–99)
HCT VFR BLD AUTO: 36.9 % (ref 34–46.6)
HGB BLD-MCNC: 10.6 G/DL (ref 12–15.9)
HOLD SPECIMEN: NORMAL
HOLD SPECIMEN: NORMAL
HYPOCHROMIA BLD QL: NORMAL
IMM GRANULOCYTES # BLD AUTO: 0.03 10*3/MM3 (ref 0–0.05)
IMM GRANULOCYTES NFR BLD AUTO: 0.5 % (ref 0–0.5)
LYMPHOCYTES # BLD AUTO: 0.58 10*3/MM3 (ref 0.7–3.1)
LYMPHOCYTES NFR BLD AUTO: 9 % (ref 19.6–45.3)
MCH RBC QN AUTO: 29.3 PG (ref 26.6–33)
MCHC RBC AUTO-ENTMCNC: 28.7 G/DL (ref 31.5–35.7)
MCV RBC AUTO: 101.9 FL (ref 79–97)
MONOCYTES # BLD AUTO: 0.44 10*3/MM3 (ref 0.1–0.9)
MONOCYTES NFR BLD AUTO: 6.8 % (ref 5–12)
NEUTROPHILS NFR BLD AUTO: 5.22 10*3/MM3 (ref 1.7–7)
NEUTROPHILS NFR BLD AUTO: 81.2 % (ref 42.7–76)
NRBC BLD AUTO-RTO: 0 /100 WBC (ref 0–0.2)
NT-PROBNP SERPL-MCNC: 927.8 PG/ML (ref 0–1800)
PLAT MORPH BLD: NORMAL
PLATELET # BLD AUTO: 234 10*3/MM3 (ref 140–450)
PMV BLD AUTO: 10.4 FL (ref 6–12)
POTASSIUM SERPL-SCNC: 3.8 MMOL/L (ref 3.5–5.2)
PROT SERPL-MCNC: 6.5 G/DL (ref 6–8.5)
RBC # BLD AUTO: 3.62 10*6/MM3 (ref 3.77–5.28)
SODIUM SERPL-SCNC: 142 MMOL/L (ref 136–145)
TROPONIN T % DELTA: -4
TROPONIN T NUMERIC DELTA: -1 NG/L
TROPONIN T SERPL HS-MCNC: 25 NG/L
WBC MORPH BLD: NORMAL
WBC NRBC COR # BLD AUTO: 6.43 10*3/MM3 (ref 3.4–10.8)
WHOLE BLOOD HOLD COAG: NORMAL
WHOLE BLOOD HOLD SPECIMEN: NORMAL

## 2025-07-14 PROCEDURE — 63710000001 PREDNISONE PER 5 MG: Performed by: STUDENT IN AN ORGANIZED HEALTH CARE EDUCATION/TRAINING PROGRAM

## 2025-07-14 PROCEDURE — 84484 ASSAY OF TROPONIN QUANT: CPT | Performed by: STUDENT IN AN ORGANIZED HEALTH CARE EDUCATION/TRAINING PROGRAM

## 2025-07-14 PROCEDURE — 99284 EMERGENCY DEPT VISIT MOD MDM: CPT | Performed by: STUDENT IN AN ORGANIZED HEALTH CARE EDUCATION/TRAINING PROGRAM

## 2025-07-14 PROCEDURE — 71045 X-RAY EXAM CHEST 1 VIEW: CPT

## 2025-07-14 PROCEDURE — 85007 BL SMEAR W/DIFF WBC COUNT: CPT | Performed by: STUDENT IN AN ORGANIZED HEALTH CARE EDUCATION/TRAINING PROGRAM

## 2025-07-14 PROCEDURE — 36415 COLL VENOUS BLD VENIPUNCTURE: CPT

## 2025-07-14 PROCEDURE — 93005 ELECTROCARDIOGRAM TRACING: CPT | Performed by: STUDENT IN AN ORGANIZED HEALTH CARE EDUCATION/TRAINING PROGRAM

## 2025-07-14 PROCEDURE — 85025 COMPLETE CBC W/AUTO DIFF WBC: CPT | Performed by: STUDENT IN AN ORGANIZED HEALTH CARE EDUCATION/TRAINING PROGRAM

## 2025-07-14 PROCEDURE — 94799 UNLISTED PULMONARY SVC/PX: CPT

## 2025-07-14 PROCEDURE — 80053 COMPREHEN METABOLIC PANEL: CPT | Performed by: STUDENT IN AN ORGANIZED HEALTH CARE EDUCATION/TRAINING PROGRAM

## 2025-07-14 PROCEDURE — 83880 ASSAY OF NATRIURETIC PEPTIDE: CPT | Performed by: STUDENT IN AN ORGANIZED HEALTH CARE EDUCATION/TRAINING PROGRAM

## 2025-07-14 PROCEDURE — 94640 AIRWAY INHALATION TREATMENT: CPT

## 2025-07-14 RX ORDER — ALBUTEROL SULFATE 90 UG/1
2 INHALANT RESPIRATORY (INHALATION) EVERY 4 HOURS PRN
Qty: 8 G | Refills: 0 | Status: SHIPPED | OUTPATIENT
Start: 2025-07-14

## 2025-07-14 RX ORDER — ALBUTEROL SULFATE 0.83 MG/ML
0.5 SOLUTION RESPIRATORY (INHALATION) ONCE
Status: DISCONTINUED | OUTPATIENT
Start: 2025-07-14 | End: 2025-07-14

## 2025-07-14 RX ORDER — ALBUTEROL SULFATE 90 UG/1
2 INHALANT RESPIRATORY (INHALATION) EVERY 4 HOURS PRN
Qty: 8 G | Refills: 0 | Status: SHIPPED | OUTPATIENT
Start: 2025-07-14 | End: 2025-07-14

## 2025-07-14 RX ORDER — ALBUTEROL SULFATE 2.5 MG/.5ML
SOLUTION RESPIRATORY (INHALATION)
Status: COMPLETED
Start: 2025-07-14 | End: 2025-07-14

## 2025-07-14 RX ORDER — AZITHROMYCIN 250 MG/1
TABLET, FILM COATED ORAL
Qty: 6 TABLET | Refills: 0 | Status: SHIPPED | OUTPATIENT
Start: 2025-07-14 | End: 2025-07-14

## 2025-07-14 RX ORDER — SODIUM CHLORIDE 0.9 % (FLUSH) 0.9 %
10 SYRINGE (ML) INJECTION AS NEEDED
Status: DISCONTINUED | OUTPATIENT
Start: 2025-07-14 | End: 2025-07-14 | Stop reason: HOSPADM

## 2025-07-14 RX ORDER — AZITHROMYCIN 250 MG/1
TABLET, FILM COATED ORAL
Qty: 6 TABLET | Refills: 0 | Status: SHIPPED | OUTPATIENT
Start: 2025-07-14

## 2025-07-14 RX ORDER — ALBUTEROL SULFATE 0.83 MG/ML
5 SOLUTION RESPIRATORY (INHALATION) ONCE
Status: COMPLETED | OUTPATIENT
Start: 2025-07-14 | End: 2025-07-14

## 2025-07-14 RX ORDER — ALBUTEROL SULFATE 0.83 MG/ML
1 SOLUTION RESPIRATORY (INHALATION) ONCE
Status: DISCONTINUED | OUTPATIENT
Start: 2025-07-14 | End: 2025-07-14

## 2025-07-14 RX ORDER — PREDNISONE 50 MG/1
50 TABLET ORAL ONCE
Status: COMPLETED | OUTPATIENT
Start: 2025-07-14 | End: 2025-07-14

## 2025-07-14 RX ORDER — PREDNISONE 50 MG/1
50 TABLET ORAL DAILY
Qty: 5 TABLET | Refills: 0 | Status: SHIPPED | OUTPATIENT
Start: 2025-07-14

## 2025-07-14 RX ORDER — ALBUTEROL SULFATE 0.83 MG/ML
5 SOLUTION RESPIRATORY (INHALATION) ONCE
Status: DISCONTINUED | OUTPATIENT
Start: 2025-07-14 | End: 2025-07-14

## 2025-07-14 RX ORDER — PREDNISONE 50 MG/1
50 TABLET ORAL DAILY
Qty: 5 TABLET | Refills: 0 | Status: SHIPPED | OUTPATIENT
Start: 2025-07-14 | End: 2025-07-14

## 2025-07-14 RX ADMIN — PREDNISONE 50 MG: 50 TABLET ORAL at 14:56

## 2025-07-14 RX ADMIN — ALBUTEROL SULFATE 5 MG: 2.5 SOLUTION RESPIRATORY (INHALATION) at 13:48

## 2025-07-14 RX ADMIN — IPRATROPIUM BROMIDE 0.5 MG: 0.5 SOLUTION RESPIRATORY (INHALATION) at 13:49

## 2025-07-14 NOTE — ED PROVIDER NOTES
[facility]  Emergency Department Encounter  Emergency Medicine Physician Note       Pt Name: Maura Jiménez  MRN: [id number]  Pt :   1945  Room Number:  10/10  Date of encounter:  2025  PCP: Sofia Martinez APRN  ED Physician: Haroldo Moore DO    HPI:  Maura Jiménez is a 79 y.o. female who presents to the ED with shortness of breath, wheezing, cough.  Started gradually 1 week ago.  Cough is dry.  Duration of symptoms constant.  Has been taking inhaler with some improvement of symptoms.  No fever, chills, chest pain, abdominal or back pain, problems with urination bilomas, leg pain or swelling.  EMS was called.  Patient was transported for evaluation.  Vital signs stable en route.  Given DuoNeb prior to arrival with improvement of symptoms.    Pertinent medical history: COPD, chronic respiratory failure on baseline 3 L nasal cannula, type 2 diabetes.    Former smoker.    PAST MEDICAL HISTORY  Past Medical History:   Diagnosis Date    COPD (chronic obstructive pulmonary disease)     Disease of thyroid gland     Hyperlipidemia     Osteoarthritis     Type 2 diabetes mellitus without complication, without long-term current use of insulin 2018     Current Outpatient Medications   Medication Instructions    albuterol sulfate  (90 Base) MCG/ACT inhaler 2 puffs, Inhalation, Every 4 Hours PRN    azithromycin (Zithromax Z-Landen) 250 MG tablet Take 2 tablets by mouth on day 1, then 1 tablet daily on days 2-5    Budeson-Glycopyrrol-Formoterol (BREZTRI) 160-9-4.8 MCG/ACT aerosol inhaler 2 puffs, 2 Times Daily    calcium carb-cholecalciferol 600-10 MG-MCG tablet per tablet 1 tablet, Daily    Eliquis 2.5 mg, Oral, Every 12 Hours Scheduled    Ferate 240 (27 Fe) MG tablet TAKE 1 TABLET BY MOUTH ONCE DAILY WITH VITAMIN C TABLET    folic acid (FOLVITE) 800 mcg, Daily    ipratropium-albuterol (DUO-NEB) 0.5-2.5 mg/3 ml nebulizer 3 mL, Nebulization, 4 Times Daily PRN     levothyroxine (SYNTHROID, LEVOTHROID) 75 mcg, Daily    losartan (COZAAR) 50 MG tablet Take 1 tablet by mouth Daily.    metFORMIN ER (GLUCOPHAGE-XR) 500 mg, Oral, Every 12 Hours Scheduled, Hold due to diarrhea.    metoprolol tartrate (LOPRESSOR) 25 mg, Oral, Every 12 Hours Scheduled    O2 (OXYGEN) 3 L/min, Continuous    pravastatin (PRAVACHOL) 40 mg, Every Night at Bedtime    predniSONE (DELTASONE) 50 mg, Oral, Daily    vitamin B-12 (CYANOCOBALAMIN) 500 mcg, Daily    vitamin C (ASCORBIC ACID) 500 MG tablet TAKE 1 TABLET BY MOUTH ONCE DAILY WITH IRON SUPPLEMENT      PAST SURGICAL HISTORY  Past Surgical History:   Procedure Laterality Date    APPENDECTOMY      CATARACT EXTRACTION, BILATERAL      HYSTERECTOMY         FAMILY HISTORY  Family History   Problem Relation Age of Onset    Heart disease Father     COPD Sister     Cancer Sister     No Known Problems Sister     Diabetes Brother     No Known Problems Brother     Heart attack Brother        SOCIAL HISTORY  Social History     Socioeconomic History    Marital status:    Tobacco Use    Smoking status: Former     Current packs/day: 0.00     Types: Cigarettes     Quit date: 2018     Years since quittin.7     Passive exposure: Past    Smokeless tobacco: Never    Tobacco comments:     Pt smoked half PPD   Vaping Use    Vaping status: Never Used   Substance and Sexual Activity    Alcohol use: No    Drug use: No    Sexual activity: Never     ALLERGIES  Shellfish-derived products, Codeine, Codeine, and Shellfish-derived products    REVIEW OF SYSTEMS  All systems reviewed and negative except for those discussed in HPI.     PHYSICAL EXAM  ED Triage Vitals [25 1318]   Temp Heart Rate Resp BP SpO2   97.8 °F (36.6 °C) 66 17 121/47 100 %      Temp src Heart Rate Source Patient Position BP Location FiO2 (%)   Oral Monitor Sitting -- --     I have reviewed the triage vital signs and nursing notes.    General: Elderly female. Alert.  Appears chronically OCOM but  nontoxic.  No acute distress.  Head: Normocephalic.  Atraumatic.  Eyes: No scleral icterus.  ENT: Moist mucous membranes.  Cardiovascular: Regular rate and rhythm.  No murmurs.  No rubs.  2+ distal pulses bilaterally.  Respiratory: + wheezing. No rales.  No rhonchi.  No conversational dyspnea or respiratory distress.  GI: Abdomen is soft.  Nondistended.  Nontender to palpation.  No rebound.  No guarding.  No CVA tenderness.  MSK: Moves all 4 extremities.  Neurologic: Oriented x 3.  No focal deficits.  Skin: No edema. No erythema. No pallor. No cyanosis.  Psych: Normal mood and affect.    LAB RESULTS  Recent Results (from the past 24 hours)   Comprehensive Metabolic Panel    Collection Time: 07/14/25  1:43 PM    Specimen: Blood   Result Value Ref Range    Glucose 171 (H) 65 - 99 mg/dL    BUN 8.0 8.0 - 23.0 mg/dL    Creatinine 0.45 (L) 0.57 - 1.00 mg/dL    Sodium 142 136 - 145 mmol/L    Potassium 3.8 3.5 - 5.2 mmol/L    Chloride 93 (L) 98 - 107 mmol/L    CO2 44.6 (H) 22.0 - 29.0 mmol/L    Calcium 9.7 8.6 - 10.5 mg/dL    Total Protein 6.5 6.0 - 8.5 g/dL    Albumin 3.9 3.5 - 5.2 g/dL    ALT (SGPT) 12 1 - 33 U/L    AST (SGOT) 23 1 - 32 U/L    Alkaline Phosphatase 68 39 - 117 U/L    Total Bilirubin 0.2 0.0 - 1.2 mg/dL    Globulin 2.6 gm/dL    A/G Ratio 1.5 g/dL    BUN/Creatinine Ratio 17.8 7.0 - 25.0    Anion Gap 4.4 (L) 5.0 - 15.0 mmol/L    eGFR 97.4 >60.0 mL/min/1.73   BNP    Collection Time: 07/14/25  1:43 PM    Specimen: Blood   Result Value Ref Range    proBNP 927.8 0.0 - 1,800.0 pg/mL   High Sensitivity Troponin T    Collection Time: 07/14/25  1:43 PM    Specimen: Blood   Result Value Ref Range    HS Troponin T 25 (H) <14 ng/L   Green Top (Gel)    Collection Time: 07/14/25  1:43 PM   Result Value Ref Range    Extra Tube Hold for add-ons.    Lavender Top    Collection Time: 07/14/25  1:43 PM   Result Value Ref Range    Extra Tube hold for add-on    Gold Top - SST    Collection Time: 07/14/25  1:43 PM   Result Value  Ref Range    Extra Tube Hold for add-ons.    Light Blue Top    Collection Time: 07/14/25  1:43 PM   Result Value Ref Range    Extra Tube Hold for add-ons.    CBC Auto Differential    Collection Time: 07/14/25  1:43 PM    Specimen: Blood   Result Value Ref Range    WBC 6.43 3.40 - 10.80 10*3/mm3    RBC 3.62 (L) 3.77 - 5.28 10*6/mm3    Hemoglobin 10.6 (L) 12.0 - 15.9 g/dL    Hematocrit 36.9 34.0 - 46.6 %    .9 (H) 79.0 - 97.0 fL    MCH 29.3 26.6 - 33.0 pg    MCHC 28.7 (L) 31.5 - 35.7 g/dL    RDW 13.0 12.3 - 15.4 %    RDW-SD 48.5 37.0 - 54.0 fl    MPV 10.4 6.0 - 12.0 fL    Platelets 234 140 - 450 10*3/mm3    Neutrophil % 81.2 (H) 42.7 - 76.0 %    Lymphocyte % 9.0 (L) 19.6 - 45.3 %    Monocyte % 6.8 5.0 - 12.0 %    Eosinophil % 2.0 0.3 - 6.2 %    Basophil % 0.5 0.0 - 1.5 %    Immature Grans % 0.5 0.0 - 0.5 %    Neutrophils, Absolute 5.22 1.70 - 7.00 10*3/mm3    Lymphocytes, Absolute 0.58 (L) 0.70 - 3.10 10*3/mm3    Monocytes, Absolute 0.44 0.10 - 0.90 10*3/mm3    Eosinophils, Absolute 0.13 0.00 - 0.40 10*3/mm3    Basophils, Absolute 0.03 0.00 - 0.20 10*3/mm3    Immature Grans, Absolute 0.03 0.00 - 0.05 10*3/mm3    nRBC 0.0 0.0 - 0.2 /100 WBC   Scan Slide    Collection Time: 07/14/25  1:43 PM    Specimen: Blood   Result Value Ref Range    Basophilic Stippling Slight/1+ None Seen    Hypochromia Mod/2+ None Seen    WBC Morphology Normal Normal    Platelet Morphology Normal Normal   High Sensitivity Troponin T 1Hr    Collection Time: 07/14/25  3:19 PM    Specimen: Blood   Result Value Ref Range    HS Troponin T 24 (H) <14 ng/L    Troponin T Numeric Delta -1 ng/L    Troponin T % Delta -4 Abnormal if >/= 20%       RADIOLOGY  XR Chest 1 View  Result Date: 7/14/2025  PROCEDURE: XR CHEST 1 VW-    HISTORY: SOA Triage Protocol  COMPARISON: June 13, 2025.  FINDINGS: The heart is normal in size. There are aortic mural calcifications. There is emphysematous change. Chronic changes are seen of the lungs bilaterally. There is no  consolidation. The lungs are clear. There is no pneumothorax. There are no acute osseous abnormalities.      Chronic changes without acute cardiopulmonary process.        Images were reviewed, interpreted, and dictated by Dr. Valerie Sherman MD Transcribed by Sammie Call PA-C.  This report was signed and finalized on 7/14/2025 3:20 PM by Valerie Sherman MD.        PROCEDURES  Procedures    RISK STRATIFICATION    MEDICAL DECISION MAKING  79 y.o. female with past medical history listed above who presents with shortness of breath, wheezing, cough.    Patient arrives via EMS.  I have reviewed the EMS documentation/notes and included that information in my HPI.    Vital signs within normal limits.  Pulse ox 98% on baseline 3L nasal cannula.    Based on clinical presentation and physical exam, differential diagnosis includes, but is not limited to, COPD, viral URI, bronchitis, pneumonia, pneumothorax.  Presentation not consistent with acute coronary syndrome, pulmonary embolism, thoracic aortic dissection.    At least 3 different tests have been ordered on this patient.    Medications administered in ED:  Medications   sodium chloride 0.9 % flush 10 mL (has no administration in time range)   ipratropium (ATROVENT) 0.02 % nebulizer solution  - ADS Override Pull (has no administration in time range)   ipratropium (ATROVENT) nebulizer solution 0.5 mg (0.5 mg Nebulization Given 7/14/25 1349)   predniSONE (DELTASONE) tablet 50 mg (50 mg Oral Given 7/14/25 1456)   albuterol (PROVENTIL) 2.5 MG/0.5ML nebulizer solution  - ADS Override Pull (5 mg  Given 7/14/25 1348)   albuterol (PROVENTIL) nebulizer solution 0.083% 2.5 mg/3mL (5 mg Nebulization Given 7/14/25 1348)     Please see ED course below for my interpretation of the ED workup.  ED Course as of 07/14/25 1622   Mon Jul 14, 2025   1339 ECG 12 Lead Dyspnea  EKG per my interpretation normal sinus rhythm, rate 62, rightward axis, no STEMI, normal QRS QTC intervals.   [JS]   1601  I reviewed the labs listed above. Clinically unremarkable.    Notable findings are highlighted below.    Old laboratory data was reviewed from the medical records and compared to today's results. [JS]   1601 Hemoglobin(!): 10.6 [JS]   1601 Glucose(!): 171 [JS]   1601 HS Troponin T(!): 25 [JS]   1601 HS Troponin T(!): 24 [JS]   1601 Troponin T Numeric Delta: -1 [JS]   1601 XR Chest 1 View  I have independently reviewed and interpreted the chest x-ray.  My interpretation is negative for infiltrate. [JS]      ED Course User Index  [JS] Haroldo Moore DO     On re-evaluation, patient resting comfortably.  States symptoms have improved following therapy.  Repeat lung auscultation reveals minimal wheezing.  Vital signs remained stable on baseline oxygen.  Patient was ambulatory in the ED with steady gait.  Able to tolerate oral intake appropriately.    I discussed the findings of the ED workup with the patient at bedside.  No clinical indication for admission. I recommended outpatient follow-up with PCP/pulmonology.  Patient was deemed medically stable for discharge with close outpatient follow-up and strict ED return precautions. Patient agreeable with plan and disposition.    Home medications were reviewed.  Prescriptions for discharge: Albuterol, prednisone, Z-Landen    Chronic conditions affecting care: COPD    Social determinants of health impacting treatment or disposition: None    REPEAT VITAL SIGNS  AS OF 16:22 EDT VITALS:  BP - 131/44  HR - 71  TEMP - 97.8 °F (36.6 °C) (Oral)  O2 SATS - 100%    DIAGNOSIS  Final diagnoses:   COPD exacerbation     DISPOSITION  ED Disposition       ED Disposition   Discharge    Condition   Stable    Comment   --               PATIENT REFERRALS  Sofia Martinez, BRITTNY  [address] [facility] [address] [phone]      PCP. 48 hours.    Khushi Brink MD  [address]  [address]  [facility] [address] [phone]      Pulmonology. 3-5 days.          Please note that  portions of this document were completed with voice recognition software.        Haroldo Moore,   07/14/25 0004

## 2025-07-14 NOTE — DISCHARGE INSTRUCTIONS
Instructions from Dr. Moore:    It was a privilege being your ER physician today.    Please follow-up in clinic as we discussed.    Please return to the ER if you experience any worsening symptoms or for any other concerns.